# Patient Record
Sex: FEMALE | Race: WHITE | NOT HISPANIC OR LATINO | Employment: FULL TIME | URBAN - METROPOLITAN AREA
[De-identification: names, ages, dates, MRNs, and addresses within clinical notes are randomized per-mention and may not be internally consistent; named-entity substitution may affect disease eponyms.]

---

## 2017-01-23 ASSESSMENT — ENCOUNTER SYMPTOMS
CONSTIPATION: 1
BOWEL INCONTINENCE: 0
HEMATURIA: 0
BLOATING: 1
POLYDIPSIA: 0
WEIGHT GAIN: 0
RECTAL PAIN: 0
NIGHT SWEATS: 1
RECTAL BLEEDING: 0
ALTERED TEMPERATURE REGULATION: 1
WEIGHT LOSS: 0
NERVOUS/ANXIOUS: 1
FLANK PAIN: 0
ABDOMINAL PAIN: 1
FATIGUE: 1
BLOOD IN STOOL: 0
VOMITING: 0
DEPRESSION: 0
PANIC: 0
JAUNDICE: 0
DECREASED CONCENTRATION: 1
DYSURIA: 0
INSOMNIA: 1
FEVER: 0
HEARTBURN: 1
DIARRHEA: 0
DIFFICULTY URINATING: 0
DECREASED APPETITE: 0
HALLUCINATIONS: 0
POLYPHAGIA: 0
CHILLS: 0
INCREASED ENERGY: 0
NAUSEA: 0

## 2017-01-27 ENCOUNTER — OFFICE VISIT (OUTPATIENT)
Dept: FAMILY MEDICINE | Facility: CLINIC | Age: 22
End: 2017-01-27
Payer: COMMERCIAL

## 2017-01-27 VITALS
BODY MASS INDEX: 23.56 KG/M2 | TEMPERATURE: 97.7 F | DIASTOLIC BLOOD PRESSURE: 67 MMHG | SYSTOLIC BLOOD PRESSURE: 98 MMHG | WEIGHT: 141.4 LBS | HEIGHT: 65 IN | OXYGEN SATURATION: 100 % | HEART RATE: 55 BPM

## 2017-01-27 DIAGNOSIS — Z30.41 ENCOUNTER FOR BIRTH CONTROL PILLS MAINTENANCE: ICD-10-CM

## 2017-01-27 DIAGNOSIS — K59.00 CONSTIPATION, UNSPECIFIED CONSTIPATION TYPE: ICD-10-CM

## 2017-01-27 DIAGNOSIS — R10.11 ABDOMINAL PAIN, RIGHT UPPER QUADRANT: Primary | ICD-10-CM

## 2017-01-27 PROCEDURE — 99203 OFFICE O/P NEW LOW 30 MIN: CPT | Performed by: PHYSICIAN ASSISTANT

## 2017-01-27 RX ORDER — NORGESTIMATE AND ETHINYL ESTRADIOL 0.25-0.035
KIT ORAL
Refills: 3 | COMMUNITY
Start: 2017-01-09 | End: 2017-02-01

## 2017-01-27 RX ORDER — NORGESTIMATE AND ETHINYL ESTRADIOL 0.25-0.035
1 KIT ORAL DAILY
Qty: 84 TABLET | Refills: 3 | Status: SHIPPED | OUTPATIENT
Start: 2017-01-27 | End: 2018-01-29

## 2017-01-27 RX ORDER — IBUPROFEN 200 MG
1 CAPSULE ORAL DAILY
COMMUNITY
End: 2019-08-30

## 2017-01-27 NOTE — PROGRESS NOTES
"   SUBJECTIVE:     CC: Genesis Rivas is an 21 year old woman who presents for preventive health visit.     Physical  Annual:     Getting at least 3 servings of Calcium per day::  Yes    Bi-annual eye exam::  Yes    Dental care twice a year::  NO    Sleep apnea or symptoms of sleep apnea::  None    Diet::  Regular (no restrictions)    Frequency of exercise::  4-5 days/week    Duration of exercise::  30-45 minutes    Taking medications regularly::  Yes    Medication side effects::  Not applicable    Additional concerns today::  YES    {Outside tests to abstract? :215730}    {additional problems to add:627392}    Today's PHQ-2 Score:   PHQ-2 ( 1999 [employer]) 1/27/2017   Q1: Little interest or pleasure in doing things 0   Q2: Feeling down, depressed or hopeless 1   PHQ-2 Score 1   Little interest or pleasure in doing things -   Feeling down, depressed or hopeless -   PHQ-2 Score -       Abuse: Current or Past(Physical, Sexual or Emotional)- No  Do you feel safe in your environment -yes    Social History   Substance Use Topics     Smoking status: Never Smoker      Smokeless tobacco: Never Used     Alcohol Use: 0.0 oz/week     0 Standard drinks or equivalent per week     rare    No results for input(s): CHOL, HDL, LDL, TRIG, CHOLHDLRATIO, NHDL in the last 63104 hours.    Reviewed orders with patient.  Reviewed health maintenance and updated orders accordingly - {Yes/No:189867::\"Yes\"}    Mammo Decision Support:  {Mammo:365474}    Pertinent mammograms are reviewed under the imaging tab.  History of abnormal Pap smear: {PAP HX:584111}  All Histories reviewed and updated in Epic.  {HISTORY OPTIONS:058935}    ROS:  {FEMALE PREVENTATIVE ROS:621753}    {CHRONICPROBDATA:678751}  OBJECTIVE:     BP 98/67 mmHg  Pulse 55  Temp(Src) 97.7  F (36.5  C) (Oral)  Ht 5' 5\" (1.651 m)  Wt 141 lb 6.4 oz (64.139 kg)  BMI 23.53 kg/m2  SpO2 100%  LMP 01/09/2017 (Approximate)  Breastfeeding? No  EXAM:  {Exam " "Choices:386428}    ASSESSMENT/PLAN:     {Diag Picklist:350565}    COUNSELING:  {FEMALE COUNSELING MESSAGES:264160::\"Reviewed preventive health counseling, as reflected in patient instructions\"}    {Blood Pressure Screenin}     reports that she has never smoked. She has never used smokeless tobacco.  {Tobacco Cessation needed for ACO -- Delete if patient is a non-smoker:612679}  Estimated body mass index is 23.53 kg/(m^2) as calculated from the following:    Height as of this encounter: 5' 5\" (1.651 m).    Weight as of this encounter: 141 lb 6.4 oz (64.139 kg).   {Weight Management Plan needed for ACO:715988}    Counseling Resources:  ATP IV Guidelines  Pooled Cohorts Equation Calculator  Breast Cancer Risk Calculator  FRAX Risk Assessment  ICSI Preventive Guidelines  Dietary Guidelines for Americans,   USDA's MyPlate  ASA Prophylaxis  Lung CA Screening    Josette Chavez PA-C  [facility]    "

## 2017-01-27 NOTE — PROGRESS NOTES
SUBJECTIVE:                                                    Genesis Rivas is a 21 year old female who presents to clinic today for the following health issues:      Long history of constipation.  Was seen by GI in July 2016. This is when she started miralax 4 times daily and a fiber supplement to treat the constipation. She is drinking lots of water daily. She is feeling very gassy. Treatment is helping with the constipation, but she continues to feel bloated. She will have pain in her LLQ intermittently. Denies bloody and black tarry stools. She is having bowel movements most days. She does have to strain to pass her bowel movements. Bowel movements are softer. She has never had a colonoscopy. She has had an abdominal CT this fall, which showed she was full of gas and stool. Denies urinary and vaginal symptoms. She has had some urinary incontinence due to constipation. She has not tried any elimination diets.     She will have RUQ abdominal pain after having larger meals. She is concerned this may be due to her gallbladder. States an Ultrasound was unremarkable. She would like further workup.      She is requesting a refill of her birth control. She denies any side effects from the birth control. She has been on this birth control for 2-3 years.     She denies any other concerns.     Problem list and histories reviewed & adjusted, as indicated.  Additional history: as documented    There is no problem list on file for this patient.    History reviewed. No pertinent past surgical history.    Social History   Substance Use Topics     Smoking status: Never Smoker      Smokeless tobacco: Never Used     Alcohol Use: 0.0 oz/week     0 Standard drinks or equivalent per week     Family History   Problem Relation Age of Onset     DIABETES Mother      DIABETES Maternal Grandmother      Coronary Artery Disease Maternal Grandfather      CEREBROVASCULAR DISEASE Maternal Grandfather      Coronary Artery Disease  "Paternal Grandmother      Unknown/Adopted Paternal Grandfather          Current Outpatient Prescriptions   Medication Sig Dispense Refill     calcium citrate (CALCITRATE) 950 MG tablet Take 1 tablet by mouth daily       VITAMIN D, CHOLECALCIFEROL, PO Take 2,000 Units by mouth daily       norgestimate-ethinyl estradiol (ORTHO-CYCLEN, SPRINTEC) 0.25-35 MG-MCG per tablet Take 1 tablet by mouth daily 84 tablet 3     polyethylene glycol (MIRALAX) powder        Multiple Vitamins-Calcium (DAILY VITAMINS FOR WOMEN PO)        Fiber POWD        SPRINTEC 28 0.25-35 MG-MCG per tablet   3     Allergies   Allergen Reactions     Penicillins Rash     BP Readings from Last 3 Encounters:   01/27/17 98/67   11/09/16 100/60   07/19/16 115/77    Wt Readings from Last 3 Encounters:   01/27/17 141 lb 6.4 oz (64.139 kg)   11/09/16 139 lb 14.4 oz (63.458 kg)   07/19/16 140 lb 4.8 oz (63.64 kg)                  Problem list, Medication list, Allergies, and Medical/Social/Surgical histories reviewed in [facility] and updated as appropriate.    ROS:  Constitutional, HEENT, cardiovascular, pulmonary, gi and gu systems are negative, except as otherwise noted.    OBJECTIVE:                                                    BP 98/67 mmHg  Pulse 55  Temp(Src) 97.7  F (36.5  C) (Oral)  Ht 5' 5\" (1.651 m)  Wt 141 lb 6.4 oz (64.139 kg)  BMI 23.53 kg/m2  SpO2 100%  LMP 01/09/2017 (Approximate)  Breastfeeding? No  Body mass index is 23.53 kg/(m^2).  GENERAL: healthy, alert and no distress  RESP: lungs clear to auscultation - no rales, rhonchi or wheezes  CV: regular rate and rhythm, normal S1 S2, no S3 or S4, no murmur, click or rub, no peripheral edema and peripheral pulses strong  ABDOMEN: soft, RUQ tenderness, no guarding, no rebound, no hepatosplenomegaly, no masses and bowel sounds normal  MS: no gross musculoskeletal defects noted, no edema  SKIN: no suspicious lesions or rashes  BACK: no CVA tenderness, no paralumbar tenderness     "     ASSESSMENT/PLAN:                                                        ICD-10-CM    1. Abdominal pain, right upper quadrant R10.11 NM HepatOBiliary Scan w CCK   2. Encounter for birth control pills maintenance Z30.41 norgestimate-ethinyl estradiol (ORTHO-CYCLEN, SPRINTEC) 0.25-35 MG-MCG per tablet   3. Constipation, unspecified constipation type K59.00        Patient Instructions   Eat a gluten free diet and keep track of your symptoms for the next 4 weeks.    Stop the fiber supplement to see if it helps decrease you bloating.    Schedule your HIDA scan. If your scan shows problems with your gallbladder, I will refer you to gastroenterology at that time.     Return to the clinic as needed.           Josette Chavez PA-C  [facility]

## 2017-01-27 NOTE — NURSING NOTE
"Chief Complaint   Patient presents with     Physical       Initial BP 98/67 mmHg  Pulse 55  Temp(Src) 97.7  F (36.5  C) (Oral)  Ht 5' 5\" (1.651 m)  Wt 141 lb 6.4 oz (64.139 kg)  BMI 23.53 kg/m2  SpO2 100%  LMP 01/09/2017 (Approximate)  Breastfeeding? No Estimated body mass index is 23.53 kg/(m^2) as calculated from the following:    Height as of this encounter: 5' 5\" (1.651 m).    Weight as of this encounter: 141 lb 6.4 oz (64.139 kg).  BP completed using cuff size: jefe Larios CMA   4:36 PM  1/27/2017        "

## 2017-01-27 NOTE — MR AVS SNAPSHOT
After Visit Summary   1/27/2017    Genesis Rivas    MRN: [id number]           Patient Information     Date Of Birth          1995        Visit Information        Provider Department      1/27/2017 4:40 PM Josette Chavez PA-C [facility]        Today's Diagnoses     Abdominal pain, right upper quadrant    -  1     Encounter for birth control pills maintenance           Care Instructions    Eat a gluten free diet and keep track of your symptoms for the next 4 weeks.    Stop the fiber supplement to see if it helps decrease you bloating.    Schedule your HIDA scan. If your scan shows problems with your gallbladder, I will refer you to gastroenterology at that time.     Return to the clinic as needed.           Follow-ups after your visit        Your next 10 appointments already scheduled     Feb 01, 2017 11:40 AM   (Arrive by 11:25 AM)   Return Visit with Gayle Haro MD   [facility] ([facility])    [address] [place] [id number] [phone]            Apr 05, 2017  9:40 AM   (Arrive by 9:25 AM)   Return Visit with Gayle Haro MD   [facility] ([facility])    [address] [place] [id number]   [phone]              Future tests that were ordered for you today     Open Future Orders        Priority Expected Expires Ordered    NM HepatOBiliary Scan w CCK Routine  1/27/2018 1/27/2017            Who to contact     If you have questions or need follow up information about today's clinic visit or your schedule please contact [facility] directly at [phone].  Normal or non-critical lab and imaging results will be communicated to you by MyChart, letter or phone within 4 business days after the clinic has received the results. If you do not hear from us within 7 days, please  "contact the clinic through [employer] or phone. If you have a critical or abnormal lab result, we will notify you by phone as soon as possible.  Submit refill requests through [employer] or call your pharmacy and they will forward the refill request to us. Please allow 3 business days for your refill to be completed.          Additional Information About Your Visit        [employer] Information     [employer] gives you secure access to your electronic health record. If you see a primary care provider, you can also send messages to your care team and make appointments. If you have questions, please call your primary care clinic.  If you do not have a primary care provider, please call [phone] and they will assist you.        Care EveryWhere ID     This is your Care EveryWhere ID. This could be used by other organizations to access your [place] medical records  [id number]        Your Vitals Were     Pulse Temperature Height    55 97.7  F (36.5  C) (Oral) 5' 5\" (1.651 m)    BMI (Body Mass Index) Pulse Oximetry Last Period    23.53 kg/m2 100% 01/09/2017 (Approximate)    Breastfeeding?          No         Blood Pressure from Last 3 Encounters:   01/27/17 98/67   11/09/16 100/60   07/19/16 115/77    Weight from Last 3 Encounters:   01/27/17 141 lb 6.4 oz (64.139 kg)   11/09/16 139 lb 14.4 oz (63.458 kg)   07/19/16 140 lb 4.8 oz (63.64 kg)                 Today's Medication Changes          These changes are accurate as of: 1/27/17  5:11 PM.  If you have any questions, ask your nurse or doctor.               These medicines have changed or have updated prescriptions.        Dose/Directions    * SPRINTEC 28 0.25-35 MG-MCG per tablet   This may have changed:  Another medication with the same name was added. Make sure you understand how and when to take each.   Generic drug:  norgestimate-ethinyl estradiol   Changed by:  Josette Chavez PA-C        Refills:  3       * norgestimate-ethinyl estradiol 0.25-35 MG-MCG per " tablet   Commonly known as:  ORTHO-CYCLEN, SPRINTEC   This may have changed:  You were already taking a medication with the same name, and this prescription was added. Make sure you understand how and when to take each.   Used for:  Encounter for birth control pills maintenance   Changed by:  Josette Chavez PA-C        Dose:  1 tablet   Take 1 tablet by mouth daily   Quantity:  84 tablet   Refills:  3       * Notice:  This list has 2 medication(s) that are the same as other medications prescribed for you. Read the directions carefully, and ask your doctor or other care provider to review them with you.         Where to get your medicines      These medications were sent to [place] [id number]     Phone:  [phone]    - norgestimate-ethinyl estradiol 0.25-35 MG-MCG per tablet             Primary Care Provider Office Phone # Fax #    Gwendolyn Burns [phone]       [place] [id number]        Thank you!     Thank you for choosing [facility]  for your care. Our goal is always to provide you with excellent care. Hearing back from our patients is one way we can continue to improve our services. Please take a few minutes to complete the written survey that you may receive in the mail after your visit with us. Thank you!             Your Updated Medication List - Protect others around you: Learn how to safely use, store and throw away your medicines at www.disposemymeds.org.          This list is accurate as of: 1/27/17  5:11 PM.  Always use your most recent med list.                   Brand Name Dispense Instructions for use    calcium citrate 950 MG tablet    CALCITRATE     Take 1 tablet by mouth daily       DAILY VITAMINS FOR WOMEN PO          Fiber Powd          MIRALAX powder   Generic drug:  polyethylene glycol          * SPRINTEC 28 0.25-35  MG-MCG per tablet   Generic drug:  norgestimate-ethinyl estradiol          * norgestimate-ethinyl estradiol 0.25-35 MG-MCG per tablet    ORTHO-CYCLEN, SPRINTEC    84 tablet    Take 1 tablet by mouth daily       VITAMIN D (CHOLECALCIFEROL) PO      Take 2,000 Units by mouth daily       * Notice:  This list has 2 medication(s) that are the same as other medications prescribed for you. Read the directions carefully, and ask your doctor or other care provider to review them with you.

## 2017-01-27 NOTE — PATIENT INSTRUCTIONS
Eat a gluten free diet and keep track of your symptoms for the next 4 weeks.    Stop the fiber supplement to see if it helps decrease you bloating.    Schedule your HIDA scan. If your scan shows problems with your gallbladder, I will refer you to gastroenterology at that time.     Return to the clinic as needed.

## 2017-02-01 ENCOUNTER — OFFICE VISIT (OUTPATIENT)
Dept: GASTROENTEROLOGY | Facility: CLINIC | Age: 22
End: 2017-02-01

## 2017-02-01 VITALS
DIASTOLIC BLOOD PRESSURE: 74 MMHG | BODY MASS INDEX: 23.49 KG/M2 | WEIGHT: 141 LBS | HEART RATE: 56 BPM | OXYGEN SATURATION: 100 % | HEIGHT: 65 IN | SYSTOLIC BLOOD PRESSURE: 108 MMHG

## 2017-02-01 DIAGNOSIS — R10.11 ABDOMINAL PAIN, RIGHT UPPER QUADRANT: Primary | ICD-10-CM

## 2017-02-01 DIAGNOSIS — K59.00 CONSTIPATION, UNSPECIFIED CONSTIPATION TYPE: ICD-10-CM

## 2017-02-01 DIAGNOSIS — R10.11 ABDOMINAL PAIN, RIGHT UPPER QUADRANT: ICD-10-CM

## 2017-02-01 ASSESSMENT — PAIN SCALES - GENERAL: PAINLEVEL: NO PAIN (0)

## 2017-02-01 NOTE — PROGRESS NOTES
GI CLINIC VISIT    CC: follow-up constipation      ASSESSMENT/PLAN:   (R10.11) Abdominal pain, right upper quadrant  (primary encounter diagnosis)  Comment:    Ms. Rivas describes this as a separate discomfort (more sharp, RUQ) than her constipation-related pain (LLQ, more pressure/fullness) and in her mind does not correlate with more significant periods of constipation. Per history reliably occurs after very large meals and is associated with nausea and vomiting, but no other symptoms noted.     CT and US without acute cause. Labs at time of pain, including LFTs were normal. Does not appear amylase and lipase were sent, though no overt CT evidence of acute pancreatitis.    Certainly gastric/luminal over-distention in the setting of an already packed lumen (due to chronic constipation) could lead to acute worsening of pain though stool burden seen on CT is chronic and may be unrelated. Pursuit of HIDA donahue planned and would be very reasonable.    Celiac disease an unlikely contributor to acute, pronounced pain - though noted patient concern. Will send serologies.  Plan:   -IgA, Tissue transglutaminase citlalli IgA and IgG,   -noted pending HIDA scan          (K59.00) Constipation, unspecified constipation type  Comment:    Chronic constipation since early childhood with essentially normal anorectal manometry, EMG, and defecography. Fortunately, Hirschsprung's felt to be unlikely based on these evaluations.    Pt initially with good response to Miralax, now requiring large doses. With waning response to laxatives would pursue additional evaluation with Sitz marker study to assess colonic transit as well as a colonoscopy. (Time Sitz marker test prior to colonoscopy, then do bowel-cleanout).   Plan:   - Sitz marker study (instructions reviewed)  - colonoscopy with a two-day colon prep with [facility].   - in the meantime continue your Miralax  - ok for Mg citrate if 3 days without a BM   - continue high fiber diet with up  to 8 glasses of water daily  - ok to continue your caffeine.   - check with your parents about colon polyp history      RTC follow-up after above testing, ~ 3-4 months    It was a pleasure to participate in the care of this patient; please contact us with any further questions.  A total of 30 minutes was spent with this patient, >50% of which was counseling regarding the above delineated issues.    Gayle Haro MD   of Medicine  Division of Gastroenterology, Hepatology and Nutrition  [facility]    ---------------------------------------------------------------------------------------------------  HPI:  Ms. Rivas is a previously healthy 21 year old female who was initially seen in consultation with Jordan Du and Fabien of GI in July 2016 for constipation. She was initially seen by this writer in Nov 2016 and returns today for follow-up.     Summarized per my prior documentation:   Ms. Rivas reports issues with constipation since early childhood but with no regular laxatives or daily medications as a child nor need for disimpactions, enemas, nor hospitalizations. She used OTC laxatives as needed through late childhood and her stools. She had two urgent care visits during these years for constipation-related abdominal pain.     Her constipation apparently worsened in her early twenties, occurring once every 1-2 months. She described going up to 2 weeks without a satisfactory BM, (she may pass some small,[place] 1 stools with straining during this time). These periods are associated with significant bloating and LLQ pain which resolve after defecation.  Ms. Rivas denies rectal fullness, rectal pain/discomfort, noted tissue prolapse, or known hemorrhoids. In the past she has had drops of blood on toilet paper. She previously tried prunes/prune juice, OTC Mg citrate, an unknown laxative pill prn with minimal effect. Her Avi COSTA put her on daily Miralax which initially  "was helpful, but its effect waned.  She reports a high vegetable and fruit intake, at least 70 oz of water daily, and daily physical activity (running) which has led to an intentional 80 lbs weight loss.  Her prior work-up included a normal TSH, calcium, CMP, and hgb. She has not undergone any prior colonoscopy, anorectal manometry, or abdominal imaging.     At her initial GI consultation with Dr. Du, she was started on Citrucel 1 scoopful daily and Miralax BID. This eventually was increased to TID due to loss of response. She continued her one cup of coffee daily (no other caffeinated beverages). With this regimen she was stooling about ever other day/4 times a week, [place] 4 -5 stools with intermittent straining (typically on the days she does not drink coffee). At her last visit with increased her Citrucel dosing, continued her Miralax and referred her for anorectal manometry and defecography (she'd denied rectal symptoms but did endorse urinary leakage with standing (but not with daily running, coughing).       Since her last visit, [facility] on 11/22/2016 by Dr. Kailey Acevedo. Her testing was essentially normal with no evidence of Hirschsprung's, only mild perineal descent was noted.        In regards to her constipation she is using Miralax three to four times daily. She was taking Citrucel only on 1 tablespoon a day, though this was discontinued by her PCP due to concern for \"gas\". With this regimen, Ms. Rivas has one large, soft, BM every morning and typically three smaller BMs spread throughout the day. There is occasionally straining with first BM. She continues to endorse ongoing abdominal fullness and bloating even with more regular bowel movements. She reports frequent flatus.       Unfortunately, Ms. Rivas states she had a period of pronounced RUQ pain on Thanksgiving after eating. Pepto-Bismol, Tums, lying down, and stretching were all attempted to " "address the pain but were unsuccessful. She was seen in the [facility] ED and a CT scan and abdominal US were done. No acute cause of her discomfort was identified; she had no cholelithiais, nor GB sludge.Ms. Rivas feels this was not related to her constipation as the characteristics of the pain were different in her mind. She states the CT revealed a large amount of stool and so she was told she had \"constipation\" and discharged to home. Per her recollection, this is the 4th or 5th time this pain has occurred in the last two years. She notes that the pain episodes always occurs after a big, heavy meal (she lists Thanksgiving, Xmas, and a birthday dinner as times of prior episodes). She states that she will often have nausea and vomiting during the pain episodes but denies fevers/chills. She has no other associated GI symptoms and has not noted a correlation with her stooling pattern. She has been following with her PCP for this issue and a HIDA scan is ordered for later this week.       ROS:    Please see bottom of this note.    PROBLEM LIST  There are no active problems to display for this patient.      PERTINENT MEDICATIONS:  Current Outpatient Prescriptions   Medication     calcium citrate (CALCITRATE) 950 MG tablet     VITAMIN D, CHOLECALCIFEROL, PO     norgestimate-ethinyl estradiol (ORTHO-CYCLEN, SPRINTEC) 0.25-35 MG-MCG per tablet     Multiple Vitamins-Calcium (DAILY VITAMINS FOR WOMEN PO)     Fiber POWD     [DISCONTINUED] SPRINTEC 28 0.25-35 MG-MCG per tablet     polyethylene glycol (MIRALAX) powder     No current facility-administered medications for this visit.         PHYSICAL EXAMINATION:  Vitals /74 mmHg  Pulse 56  Ht 1.651 m (5' 5\")  Wt 63.957 kg (141 lb)  BMI 23.46 kg/m2  SpO2 100%  LMP 01/09/2017 (Approximate)   Wt   Wt Readings from Last 2 Encounters:   02/01/17 63.957 kg (141 lb)   01/27/17 64.139 kg (141 lb 6.4 oz)   Gen: Pt sitting up in NAD, interactive and cooperative on exam  Eyes: " sclerae anicteric, no injection  ENT:   MMM  GI: Normoactive BS, abd soft, flat, nontender  Skin: Warm, dry, no jaundice, nails appear healthy  Neuro: alert, oriented, answers questions appropriately      PERTINENT STUDIES:    Orders Only on 07/19/2016   Component Date Value Ref Range Status     Sodium 07/19/2016 140  133 - 144 mmol/L Final     Potassium 07/19/2016 4.1  3.4 - 5.3 mmol/L Final     Chloride 07/19/2016 108  94 - 109 mmol/L Final     Carbon Dioxide 07/19/2016 29  20 - 32 mmol/L Final     Anion Gap 07/19/2016 2* 3 - 14 mmol/L Final     Glucose 07/19/2016 68* 70 - 99 mg/dL Final     Urea Nitrogen 07/19/2016 13  7 - 30 mg/dL Final     Creatinine 07/19/2016 0.70  0.52 - 1.04 mg/dL Final     GFR Estimate 07/19/2016   >60 mL/min/1.7m2 Final                    Value:>90  Non  GFR Calc       GFR Estimate If Black 07/19/2016   >60 mL/min/1.7m2 Final                    Value:>90   GFR Calc       Calcium 07/19/2016 9.0  8.5 - 10.1 mg/dL Final     Bilirubin Direct 07/19/2016 0.2  0.0 - 0.2 mg/dL Final     Bilirubin Total 07/19/2016 0.5  0.2 - 1.3 mg/dL Final     Albumin 07/19/2016 3.4  3.4 - 5.0 g/dL Final     Protein Total 07/19/2016 6.8  6.8 - 8.8 g/dL Final     Alkaline Phosphatase 07/19/2016 46  40 - 150 U/L Final     ALT 07/19/2016 33  0 - 50 U/L Final     AST 07/19/2016 27  0 - 45 U/L Final     Hemoglobin 07/19/2016 13.5  11.7 - 15.7 g/dL Final           Answers for HPI/ROS submitted by the patient on 1/23/2017   General Symptoms: Yes  Skin Symptoms: No  HENT Symptoms: No  EYE SYMPTOMS: No  HEART SYMPTOMS: No  LUNG SYMPTOMS: No  INTESTINAL SYMPTOMS: Yes  URINARY SYMPTOMS: Yes  GYNECOLOGIC SYMPTOMS: No  BREAST SYMPTOMS: No  SKELETAL SYMPTOMS: No  BLOOD SYMPTOMS: No  NERVOUS SYSTEM SYMPTOMS: No  MENTAL HEALTH SYMPTOMS: Yes  Fever: No  Loss of appetite: No  Weight loss: No  Weight gain: No  Fatigue: Yes  Night sweats: Yes  Chills: No  Increased stress: Yes  Excessive hunger:  No  Excessive thirst: No  Feeling hot or cold when others believe the temperature is normal: Yes  Loss of height: No  Post-operative complications: No  Surgical site pain: No  Hallucinations: No  Change in or Loss of Energy: No  Hyperactivity: No  Confusion: No  Heart burn or indigestion: Yes  Nausea: No  Vomiting: No  Abdominal pain: Yes  Bloating: Yes  Constipation: Yes  Diarrhea: No  Blood in stool: No  Black stools: No  Rectal or Anal pain: No  Fecal incontinence: No  Rectal bleeding: No  Yellowing of skin or eyes: No  Vomit with blood: No  Change in stools: Yes  Hemorrhoids: No  Trouble holding urine or incontinence: Yes  Pain or burning: No  Trouble starting or stopping: No  Increased frequency of urination: No  Blood in urine: No  Decreased frequency of urination: No  Frequent nighttime urination: No  Flank pain: No  Difficulty emptying bladder: No  Nervous or Anxious: Yes  Depression: No  Trouble sleeping: Yes  Trouble thinking or concentrating: Yes  Mood changes: Yes  Panic attacks: No

## 2017-02-01 NOTE — NURSING NOTE
"Chief Complaint   Patient presents with     RECHECK     abd pain.       Filed Vitals:    02/01/17 1141   BP: 108/74   Pulse: 56   Height: 1.651 m (5' 5\")   Weight: 63.957 kg (141 lb)   SpO2: 100%       Body mass index is 23.46 kg/(m^2).                           "

## 2017-02-01 NOTE — LETTER
2/1/2017       RE: Genesis Rivas  [address]  [place] [address]     Dear Colleague,    Thank you for referring your patient, Genesis Rivas, to the [facility] GASTROENTEROLOGY AND IBD at [facility]. Please see a copy of my visit note below.    GI CLINIC VISIT    CC: follow-up constipation      ASSESSMENT/PLAN:   (R10.11) Abdominal pain, right upper quadrant  (primary encounter diagnosis)  Comment:    Ms. Rivas describes this as a separate discomfort (more sharp, RUQ) than her constipation-related pain (LLQ, more pressure/fullness) and in her mind does not correlate with more significant periods of constipation. Per history reliably occurs after very large meals and is associated with nausea and vomiting, but no other symptoms noted.     CT and US without acute cause. Labs at time of pain, including LFTs were normal. Does not appear amylase and lipase were sent, though no overt CT evidence of acute pancreatitis.    Certainly gastric/luminal over-distention in the setting of an already packed lumen (due to chronic constipation) could lead to acute worsening of pain though stool burden seen on CT is chronic and may be unrelated. Pursuit of HIDA donahue planned and would be very reasonable.    Celiac disease an unlikely contributor to acute, pronounced pain - though noted patient concern. Will send serologies.  Plan:   -IgA, Tissue transglutaminase citlalli IgA and IgG,   -noted pending HIDA scan          (K59.00) Constipation, unspecified constipation type  Comment:    Chronic constipation since early childhood with essentially normal anorectal manometry, EMG, and defecography. Fortunately, Hirschsprung's felt to be unlikely based on these evaluations.    Pt initially with good response to Miralax, now requiring large doses. With waning response to laxatives would pursue additional evaluation with Sitz marker study to assess colonic transit as well as a  colonoscopy. (Time Sitz marker test prior to colonoscopy, then do bowel-cleanout).   Plan:   - Sitz marker study (instructions reviewed)  - colonoscopy with a two-day colon prep with [facility].   - in the meantime continue your Miralax  - ok for Mg citrate if 3 days without a BM   - continue high fiber diet with up to 8 glasses of water daily  - ok to continue your caffeine.   - check with your parents about colon polyp history      RTC follow-up after above testing, ~ 3-4 months    It was a pleasure to participate in the care of this patient; please contact us with any further questions.  A total of 30 minutes was spent with this patient, >50% of which was counseling regarding the above delineated issues.    Gayle Haro MD   of Medicine  Division of Gastroenterology, Hepatology and Nutrition  [facility]    ---------------------------------------------------------------------------------------------------  HPI:  Ms. Rivas is a previously healthy 21 year old female who was initially seen in consultation with Jordan Du and Fabien of GI in July 2016 for constipation. She was initially seen by this writer in Nov 2016 and returns today for follow-up.     Summarized per my prior documentation:   Ms. Rivas reports issues with constipation since early childhood but with no regular laxatives or daily medications as a child nor need for disimpactions, enemas, nor hospitalizations. She used OTC laxatives as needed through late childhood and her stools. She had two urgent care visits during these years for constipation-related abdominal pain.     Her constipation apparently worsened in her early twenties, occurring once every 1-2 months. She described going up to 2 weeks without a satisfactory BM, (she may pass some small,[place] 1 stools with straining during this time). These periods are associated with significant bloating and LLQ pain which resolve after defecation.  Ms.  "Rob denies rectal fullness, rectal pain/discomfort, noted tissue prolapse, or known hemorrhoids. In the past she has had drops of blood on toilet paper. She previously tried prunes/prune juice, OTC Mg citrate, an unknown laxative pill prn with minimal effect. Her Avi MD put her on daily Miralax which initially was helpful, but its effect waned.  She reports a high vegetable and fruit intake, at least 70 oz of water daily, and daily physical activity (running) which has led to an intentional 80 lbs weight loss.  Her prior work-up included a normal TSH, calcium, CMP, and hgb. She has not undergone any prior colonoscopy, anorectal manometry, or abdominal imaging.     At her initial GI consultation with Dr. Du, she was started on Citrucel 1 scoopful daily and Miralax BID. This eventually was increased to TID due to loss of response. She continued her one cup of coffee daily (no other caffeinated beverages). With this regimen she was stooling about ever other day/4 times a week, [place] 4 -5 stools with intermittent straining (typically on the days she does not drink coffee). At her last visit with increased her Citrucel dosing, continued her Miralax and referred her for anorectal manometry and defecography (she'd denied rectal symptoms but did endorse urinary leakage with standing (but not with daily running, coughing).       Since her last visit, [facility] on 11/22/2016 by Dr. Kailey Acevedo. Her testing was essentially normal with no evidence of Hirschsprung's, only mild perineal descent was noted.        In regards to her constipation she is using Miralax three to four times daily. She was taking Citrucel only on 1 tablespoon a day, though this was discontinued by her PCP due to concern for \"gas\". With this regimen, Ms. Rivas has one large, soft, BM every morning and typically three smaller BMs spread throughout the day. There is occasionally straining with first " "BM. She continues to endorse ongoing abdominal fullness and bloating even with more regular bowel movements. She reports frequent flatus.       Unfortunately, Ms. Rivas states she had a period of pronounced RUQ pain on Thanksgiving after eating. Pepto-Bismol, Tums, lying down, and stretching were all attempted to address the pain but were unsuccessful. She was seen in the [facility] ED and a CT scan and abdominal US were done. No acute cause of her discomfort was identified; she had no cholelithiais, nor GB sludge.Ms. Rivas feels this was not related to her constipation as the characteristics of the pain were different in her mind. She states the CT revealed a large amount of stool and so she was told she had \"constipation\" and discharged to home. Per her recollection, this is the 4th or 5th time this pain has occurred in the last two years. She notes that the pain episodes always occurs after a big, heavy meal (she lists Thanksgiving, Xmas, and a birthday dinner as times of prior episodes). She states that she will often have nausea and vomiting during the pain episodes but denies fevers/chills. She has no other associated GI symptoms and has not noted a correlation with her stooling pattern. She has been following with her PCP for this issue and a HIDA scan is ordered for later this week.       ROS:    Please see bottom of this note.    PROBLEM LIST  There are no active problems to display for this patient.      PERTINENT MEDICATIONS:  Current Outpatient Prescriptions   Medication     calcium citrate (CALCITRATE) 950 MG tablet     VITAMIN D, CHOLECALCIFEROL, PO     norgestimate-ethinyl estradiol (ORTHO-CYCLEN, SPRINTEC) 0.25-35 MG-MCG per tablet     Multiple Vitamins-Calcium (DAILY VITAMINS FOR WOMEN PO)     Fiber POWD     [DISCONTINUED] SPRINTEC 28 0.25-35 MG-MCG per tablet     polyethylene glycol (MIRALAX) powder     No current facility-administered medications for this visit.         PHYSICAL " "EXAMINATION:  Vitals /74 mmHg  Pulse 56  Ht 1.651 m (5' 5\")  Wt 63.957 kg (141 lb)  BMI 23.46 kg/m2  SpO2 100%  LMP 01/09/2017 (Approximate)   Wt   Wt Readings from Last 2 Encounters:   02/01/17 63.957 kg (141 lb)   01/27/17 64.139 kg (141 lb 6.4 oz)   Gen: Pt sitting up in NAD, interactive and cooperative on exam  Eyes: sclerae anicteric, no injection  ENT:   MMM  GI: Normoactive BS, abd soft, flat, nontender  Skin: Warm, dry, no jaundice, nails appear healthy  Neuro: alert, oriented, answers questions appropriately      PERTINENT STUDIES:    Orders Only on 07/19/2016   Component Date Value Ref Range Status     Sodium 07/19/2016 140  133 - 144 mmol/L Final     Potassium 07/19/2016 4.1  3.4 - 5.3 mmol/L Final     Chloride 07/19/2016 108  94 - 109 mmol/L Final     Carbon Dioxide 07/19/2016 29  20 - 32 mmol/L Final     Anion Gap 07/19/2016 2* 3 - 14 mmol/L Final     Glucose 07/19/2016 68* 70 - 99 mg/dL Final     Urea Nitrogen 07/19/2016 13  7 - 30 mg/dL Final     Creatinine 07/19/2016 0.70  0.52 - 1.04 mg/dL Final     GFR Estimate 07/19/2016   >60 mL/min/1.7m2 Final                    Value:>90  Non  GFR Calc       GFR Estimate If Black 07/19/2016   >60 mL/min/1.7m2 Final                    Value:>90   GFR Calc       Calcium 07/19/2016 9.0  8.5 - 10.1 mg/dL Final     Bilirubin Direct 07/19/2016 0.2  0.0 - 0.2 mg/dL Final     Bilirubin Total 07/19/2016 0.5  0.2 - 1.3 mg/dL Final     Albumin 07/19/2016 3.4  3.4 - 5.0 g/dL Final     Protein Total 07/19/2016 6.8  6.8 - 8.8 g/dL Final     Alkaline Phosphatase 07/19/2016 46  40 - 150 U/L Final     ALT 07/19/2016 33  0 - 50 U/L Final     AST 07/19/2016 27  0 - 45 U/L Final     Hemoglobin 07/19/2016 13.5  11.7 - 15.7 g/dL Final           Answers for HPI/ROS submitted by the patient on 1/23/2017   General Symptoms: Yes  Skin Symptoms: No  HENT Symptoms: No  EYE SYMPTOMS: No  HEART SYMPTOMS: No  LUNG SYMPTOMS: No  INTESTINAL SYMPTOMS: " Yes  URINARY SYMPTOMS: Yes  GYNECOLOGIC SYMPTOMS: No  BREAST SYMPTOMS: No  SKELETAL SYMPTOMS: No  BLOOD SYMPTOMS: No  NERVOUS SYSTEM SYMPTOMS: No  MENTAL HEALTH SYMPTOMS: Yes  Fever: No  Loss of appetite: No  Weight loss: No  Weight gain: No  Fatigue: Yes  Night sweats: Yes  Chills: No  Increased stress: Yes  Excessive hunger: No  Excessive thirst: No  Feeling hot or cold when others believe the temperature is normal: Yes  Loss of height: No  Post-operative complications: No  Surgical site pain: No  Hallucinations: No  Change in or Loss of Energy: No  Hyperactivity: No  Confusion: No  Heart burn or indigestion: Yes  Nausea: No  Vomiting: No  Abdominal pain: Yes  Bloating: Yes  Constipation: Yes  Diarrhea: No  Blood in stool: No  Black stools: No  Rectal or Anal pain: No  Fecal incontinence: No  Rectal bleeding: No  Yellowing of skin or eyes: No  Vomit with blood: No  Change in stools: Yes  Hemorrhoids: No  Trouble holding urine or incontinence: Yes  Pain or burning: No  Trouble starting or stopping: No  Increased frequency of urination: No  Blood in urine: No  Decreased frequency of urination: No  Frequent nighttime urination: No  Flank pain: No  Difficulty emptying bladder: No  Nervous or Anxious: Yes  Depression: No  Trouble sleeping: Yes  Trouble thinking or concentrating: Yes  Mood changes: Yes  Panic attacks: No        Again, thank you for allowing me to participate in the care of your patient.      Sincerely,    Gayle Haro MD

## 2017-02-01 NOTE — MR AVS SNAPSHOT
After Visit Summary   2/1/2017    Genesis Rivas    MRN: [id number]           Patient Information     Date Of Birth          1995        Visit Information        Provider Department      2/1/2017 11:40 AM Gayle Haro MD [facility] Gastroenterology and IBD        Today's Diagnoses     Abdominal pain, right upper quadrant    -  1     Constipation, unspecified constipation type           Care Instructions    1. Stop at the lab for a blood draw.  2. Schedule a colonoscopy.   3. Recommend a two-day colon prep with Go-lytely.   4. Just prior to your colon prep get your abdominal X-ray for the Sitz marker study done.  5. Take your Sitz marker pills on Day 0, have your abdominal X-ray done on Day 5.  6. Stop your laxatives two days prior to taking the Sitz markers.  7. Continue your Miralax in the meantime.   8. A high fiber diet with plenty of fluids (up to 8 glasses of water daily) is suggested to relieve these symptoms.   9. Ok to continue your caffeine.   10. Check with your parents about what types of polyps they had.   If you have any questions, please don't hesitate to contact our GI RN Clinic Coordinators at [phone] (select option #3 for nurse line).             Follow-ups after your visit        Your next 10 appointments already scheduled     Feb 03, 2017  8:45 AM   NM HEPATOBILIARY SCAN W GB EF with [address], [place], Nuclear Medicine ([facility], [place])    [address] [address] [phone]           Please bring a list of your medicines to the exam. (Include vitamins, minerals and over-the-counter drugs.) You should wear comfortable clothes. Leave your valuables at home. Please bring related prior results and films.  Tell your doctor:   If you are breastfeeding or may be pregnant.   If you have had a barium test within the past few days. Barium may change the results of certain exams.   If you  think you may need sedation (medicine to help you relax).  No food or drink (including water) for 4 hours prior to the exam.  No morphine or morphine derivatives for 6 hours prior to the exam.  Please call your Imaging Department at your exam site with any questions.            Apr 05, 2017  9:40 AM   (Arrive by 9:25 AM)   Return Visit with Gayle Haro MD   [facility] G[facility]enterology and IBD ([facility] [place])    [address]  [address] [facility] [address] [phone]              Future tests that were ordered for you today     Open Future Orders        Priority Expected Expires Ordered    X-ray Abdomen 1 vw Routine 2/11/2017 3/18/2017 2/1/2017    IgA Routine  4/1/2017 2/1/2017    Tissue transglutaminase citlalli IgA and IgG Routine  4/1/2017 2/1/2017            Who to contact     Please call your clinic at [phone] to:    Ask questions about your health    Make or cancel appointments    Discuss your medicines    Learn about your test results    Speak to your doctor   If you have compliments or concerns about an experience at your clinic, or if you wish to file a complaint, please contact [facility] Patient Relations at [phone] or email us at [email or web address]         Additional Information About Your Visit        [employer] Information     [employer] gives you secure access to your electronic health record. If you see a primary care provider, you can also send messages to your care team and make appointments. If you have questions, please call your primary care clinic.  If you do not have a primary care provider, please call [phone] and they will assist you.      [employer] is an electronic gateway that provides easy, online access to your medical records. With [employer], you can request a clinic appointment, read your test results, renew a prescription or communicate with your care team.     To access your existing  "account, please contact your [facility] or call [phone] for assistance.        Care EveryWhere ID     This is your Care EveryWhere ID. This could be used by other organizations to access your [place] medical records  [id number]        Your Vitals Were     Pulse Height BMI (Body Mass Index) Pulse Oximetry Last Period       56 1.651 m (5' 5\") 23.46 kg/m2 100% 01/09/2017 (Approximate)        Blood Pressure from Last 3 Encounters:   02/01/17 108/74   01/27/17 98/67   11/09/16 100/60    Weight from Last 3 Encounters:   02/01/17 63.957 kg (141 lb)   01/27/17 64.139 kg (141 lb 6.4 oz)   11/09/16 63.458 kg (139 lb 14.4 oz)               Primary Care Provider Office Phone # Fax #    Gwendolyn Burns [phone]       [place] [address]        Thank you!     Thank you for choosing [facility] GASTROENTEROLOGY AND IBD  for your care. Our goal is always to provide you with excellent care. Hearing back from our patients is one way we can continue to improve our services. Please take a few minutes to complete the written survey that you may receive in the mail after your visit with us. Thank you!             Your Updated Medication List - Protect others around you: Learn how to safely use, store and throw away your medicines at www.disposemymeds.org.          This list is accurate as of: 2/1/17 12:37 PM.  Always use your most recent med list.                   Brand Name Dispense Instructions for use    calcium citrate 950 MG tablet    CALCITRATE     Take 1 tablet by mouth daily       DAILY VITAMINS FOR WOMEN PO          Fiber Powd          MIRALAX powder   Generic drug:  polyethylene glycol      2 capfuls daily       norgestimate-ethinyl estradiol 0.25-35 MG-MCG per tablet    ORTHO-CYCLEN, SPRINTEC    84 tablet    Take 1 tablet by mouth daily       VITAMIN D (CHOLECALCIFEROL) PO      Take 2,000 Units by mouth daily         "

## 2017-02-01 NOTE — PATIENT INSTRUCTIONS
1. Stop at the lab for a blood draw.  2. Schedule a colonoscopy.   3. Recommend a two-day colon prep with Go-lytely.   4. Just prior to your colon prep get your abdominal X-ray for the Sitz marker study done.  5. Take your Sitz marker pills on Day 0, have your abdominal X-ray done on Day 5.  6. Stop your laxatives two days prior to taking the Sitz markers.  7. Continue your Miralax in the meantime.   8. A high fiber diet with plenty of fluids (up to 8 glasses of water daily) is suggested to relieve these symptoms.   9. Ok to continue your caffeine.   10. Check with your parents about what types of polyps they had.   If you have any questions, please don't hesitate to contact our GI RN Clinic Coordinators at [phone] (select option #3 for nurse line).

## 2017-02-02 LAB — IGA SERPL-MCNC: 136 MG/DL (ref 70–380)

## 2017-02-03 ENCOUNTER — MYC MEDICAL ADVICE (OUTPATIENT)
Dept: FAMILY MEDICINE | Facility: CLINIC | Age: 22
End: 2017-02-03

## 2017-02-03 ENCOUNTER — HOSPITAL ENCOUNTER (OUTPATIENT)
Dept: NUCLEAR MEDICINE | Facility: CLINIC | Age: 22
Setting detail: NUCLEAR MEDICINE
Discharge: HOME | End: 2017-02-03
Attending: PHYSICIAN ASSISTANT | Admitting: PHYSICIAN ASSISTANT
Payer: COMMERCIAL

## 2017-02-03 DIAGNOSIS — R10.11 ABDOMINAL PAIN, RIGHT UPPER QUADRANT: ICD-10-CM

## 2017-02-03 DIAGNOSIS — K59.00 CONSTIPATION, UNSPECIFIED CONSTIPATION TYPE: Primary | ICD-10-CM

## 2017-02-03 DIAGNOSIS — R10.84 ABDOMINAL PAIN, GENERALIZED: ICD-10-CM

## 2017-02-03 LAB
TTG IGA SER-ACNC: NORMAL U/ML
TTG IGG SER-ACNC: NORMAL U/ML

## 2017-02-03 PROCEDURE — 34300033 ZZH RX 343: Performed by: PHYSICIAN ASSISTANT

## 2017-02-03 PROCEDURE — 78227 HEPATOBIL SYST IMAGE W/DRUG: CPT

## 2017-02-03 PROCEDURE — 25000125 ZZHC RX 250: Performed by: PHYSICIAN ASSISTANT

## 2017-02-03 PROCEDURE — A9537 TC99M MEBROFENIN: HCPCS | Performed by: PHYSICIAN ASSISTANT

## 2017-02-03 RX ORDER — KIT FOR THE PREPARATION OF TECHNETIUM TC 99M MEBROFENIN 45 MG/10ML
5-7 INJECTION, POWDER, LYOPHILIZED, FOR SOLUTION INTRAVENOUS ONCE
Status: COMPLETED | OUTPATIENT
Start: 2017-02-03 | End: 2017-02-03

## 2017-02-03 RX ADMIN — MEBROFENIN 5.2 MILLICURIE: 45 INJECTION, POWDER, LYOPHILIZED, FOR SOLUTION INTRAVENOUS at 08:15

## 2017-02-03 RX ADMIN — SINCALIDE 1.3 MCG: 5 INJECTION, POWDER, LYOPHILIZED, FOR SOLUTION INTRAVENOUS at 09:24

## 2017-02-09 ENCOUNTER — MYC MEDICAL ADVICE (OUTPATIENT)
Dept: FAMILY MEDICINE | Facility: CLINIC | Age: 22
End: 2017-02-09

## 2017-02-09 DIAGNOSIS — F43.9 STRESS: Primary | ICD-10-CM

## 2017-02-09 NOTE — TELEPHONE ENCOUNTER
Patient wondering who she can contact to get set up with a therapist/mental health provider to help her with her stress and self-image issues.    Would you like to suggest [facility]? [phone]

## 2017-03-05 ENCOUNTER — MYC MEDICAL ADVICE (OUTPATIENT)
Dept: FAMILY MEDICINE | Facility: CLINIC | Age: 22
End: 2017-03-05

## 2017-03-05 DIAGNOSIS — K59.00 CONSTIPATION, UNSPECIFIED CONSTIPATION TYPE: ICD-10-CM

## 2017-03-05 DIAGNOSIS — R10.11 ABDOMINAL PAIN, RIGHT UPPER QUADRANT: Primary | ICD-10-CM

## 2017-03-06 NOTE — TELEPHONE ENCOUNTER
I have pended the GI referral as per the one Dr. Haro had placed.  Please sign and send back to team for  to fax to [facility] at [phone] and let patient know when done.  Carolina Sapp RN

## 2017-03-09 ENCOUNTER — TELEPHONE (OUTPATIENT)
Dept: GASTROENTEROLOGY | Facility: OUTPATIENT CENTER | Age: 22
End: 2017-03-09

## 2017-03-09 NOTE — TELEPHONE ENCOUNTER
Patient taking any blood thinners ? no    Heart disease ? denies    Lung disease ? denies      Sleep apnea ? denies    Diabetic ? denies    Kidney disease ? denies    Dialysis ? n/a    Electronic implanted medical devices ? denies    Are you taking any narcotic pain medication ? no  What is your daily dosage ?    PTSD ? n/a    Prep instructions reviewed with patient ? Instructions for 2 day Golytely prep reviewed.  policy, MAC sedation plan reviewed. Advised pt. To have someone stay with her post exam    Ph[facility] : [facility]    Indication for procedure : constipation    Referring provider : Josette Chavez

## 2017-03-16 ENCOUNTER — TRANSFERRED RECORDS (OUTPATIENT)
Dept: HEALTH INFORMATION MANAGEMENT | Facility: CLINIC | Age: 22
End: 2017-03-16

## 2017-03-29 ENCOUNTER — CARE COORDINATION (OUTPATIENT)
Dept: GASTROENTEROLOGY | Facility: CLINIC | Age: 22
End: 2017-03-29

## 2017-03-29 ENCOUNTER — TELEPHONE (OUTPATIENT)
Dept: GASTROENTEROLOGY | Facility: CLINIC | Age: 22
End: 2017-03-29

## 2017-03-29 ASSESSMENT — ENCOUNTER SYMPTOMS
BOWEL INCONTINENCE: 0
DIFFICULTY URINATING: 0
DYSURIA: 0
RECTAL BLEEDING: 0
RECTAL PAIN: 0
HEARTBURN: 1
JAUNDICE: 0
VOMITING: 0
ABDOMINAL PAIN: 1
FLANK PAIN: 0
HEMATURIA: 0
DIARRHEA: 0
CONSTIPATION: 1
NAUSEA: 0
BLOOD IN STOOL: 0
BLOATING: 1

## 2017-03-29 NOTE — PROGRESS NOTES
I am contacting you to confirm your appointment with Dr. Haro at [facility] on the 4th floor at 9:40am on 4/5/2017.    If you cannot make it to this appointment and would like to reschedule, please call [phone].       It's a pleasure being involved in your health care.    Sincerely,     Rubia Bowers LPN

## 2017-04-05 ENCOUNTER — OFFICE VISIT (OUTPATIENT)
Dept: GASTROENTEROLOGY | Facility: CLINIC | Age: 22
End: 2017-04-05

## 2017-04-05 VITALS
WEIGHT: 135 LBS | OXYGEN SATURATION: 100 % | DIASTOLIC BLOOD PRESSURE: 64 MMHG | SYSTOLIC BLOOD PRESSURE: 104 MMHG | HEIGHT: 65 IN | HEART RATE: 60 BPM | BODY MASS INDEX: 22.49 KG/M2

## 2017-04-05 DIAGNOSIS — K59.04 CHRONIC IDIOPATHIC CONSTIPATION: Primary | ICD-10-CM

## 2017-04-05 ASSESSMENT — PAIN SCALES - GENERAL: PAINLEVEL: NO PAIN (0)

## 2017-04-05 NOTE — LETTER
4/5/2017       RE: Genesis Rivas  [address]  [place] [address]     Dear Colleague,    Thank you for referring your patient, Genesis Rivas, to the [facility] GASTROENTEROLOGY AND IBD at [facility]. Please see a copy of my visit note below.    GI CLINIC VISIT    CC: follow-up constipation      ASSESSMENT/PLAN:  (K59.04) Chronic idiopathic constipation  (primary encounter diagnosis)  Comment:    Normal colonoscopy, Sitz marker study, and anorectal manometry/defecography. Response to laxatives (Miralax specifically) but requiring ever-increasing doses (currently 4 Miralax daily). She is hopeful for an easier to take/time daily medication. She has reviewed previously provided literature on Amitiza and Linzess and has no further questions as this time. Linzess covered by her insurance - will start there. Med discussed including side effects, interactions, etc.   Plan:  - will start linaclotide (LINZESS) 145 MCG capsule, pending response can increase to 290 mcg daily  - stop Miralax   - if no response to Linzess/worsening constipation, ok for use of prn Miralax  - contact GI clinic via phone in a few weeks with an update.              RTC 3-4 months     It was a pleasure to participate in the care of this patient; please contact us with any further questions.  A total of 15 minutes was spent with this patient, >50% of which was counseling regarding the above delineated issues.    Gyale Haro MD   of Medicine  Division of Gastroenterology, Hepatology and Nutrition  [facility]    ---------------------------------------------------------------------------------------------------  HPI:  Ms. Rivas is a previously healthy 22 year old female who was initially seen in consultation with Jordan Du and Fabien of GI in July 2016 for constipation. She was initially seen by this writer in Nov 2016, her last visit was Feb 1,  2017. She returns today for follow-up.      Summarized per my prior documentation:   Ms. Rivas reports issues with constipation since early childhood but with no regular laxatives or daily medications as a child nor need for disimpactions, enemas, nor hospitalizations. She used OTC laxatives as needed through late childhood and teen years. She had two urgent care visits during these years for constipation-related abdominal pain.   Her constipation apparently worsened in her early twenties, occurring once every 1-2 months. She described going up to 2 weeks without a satisfactory BM, (she may pass some small,[place] 1 stools with straining during this time). These periods are associated with significant bloating and LLQ pain which resolve after defecation.  Ms. Rivas denies rectal fullness, rectal pain/discomfort, noted tissue prolapse, or known hemorrhoids. In the past she has had drops of blood on toilet paper. She previously tried prunes/prune juice, OTC Mg citrate, an unknown laxative pill prn with minimal effect. Her Avi MD put her on daily Miralax which initially was helpful, but its effect waned.  She reports a high vegetable and fruit intake, at least 70 oz of water daily, and daily physical activity (running) which has led to an intentional 80 lbs weight loss. Her prior work-up included a normal TSH, calcium, CMP, and hgb.    Since establishing in our clinic she has [facility] by Dr. Kailey Acevedo. Her testing was essentially normal with no evidence of Hirschsprung's, only mild perineal descent was noted. Her fiber intake and Miralax have been titrated up and she's continued to stool fairly well.       Since her last visit, she did undergo a Sitz marker. She reports sh had been off of laxatives for one week and recalls having a large stool just prior to taking the Sitz markers. Approx 18- 24 hours later she had another large BM, but does not believe she passed any  "additional stools prior to her AXR (done 5 days after Tia marker ingestion). No Sitz markers were seen on XR. She ntoes she was on her period at that time (expectedly moving her bowels more frequently than her typical baseline).  A colonoscopy was completed thereafter. Despite being off of laxatives (for Sitz study, just prior to colonoscopy) and her only able to tolerate 4L Golytely (due to nausea) she still had an adequate prep for her exam. Her prep was rated at fair and her colonoscopy essentially normal.         Following her colonoscopy, Ms. Rivas started with Miralax once daily and has since titrated up to Miralax 4 times daily with morning coffee/caffeine ingestions. During the period of Miralax titration she states her stools were hard, firm and occurring every 2-3 days. Now she is moving bowels once daily; stools are soft, formed, with no straining. She has no associated discomfort with her regular bowel movements.       Overall, Ms. Rivas feels pretty well She notes some increased stress related to her upcoming wedding (in 17 days) and finals (in about one month).     ROS:    Please see bottom of this note.    PROBLEM LIST  There are no active problems to display for this patient.      PERTINENT MEDICATIONS:  Current Outpatient Prescriptions   Medication     calcium citrate (CALCITRATE) 950 MG tablet     VITAMIN D, CHOLECALCIFEROL, PO     norgestimate-ethinyl estradiol (ORTHO-CYCLEN, SPRINTEC) 0.25-35 MG-MCG per tablet     polyethylene glycol (MIRALAX) powder     Multiple Vitamins-Calcium (DAILY VITAMINS FOR WOMEN PO)     Fiber POWD     No current facility-administered medications for this visit.          PHYSICAL EXAMINATION:  Vitals /64  Pulse 60  Ht 1.651 m (5' 5\")  Wt 61.2 kg (135 lb)  LMP 04/03/2017  SpO2 100%  BMI 22.47 kg/m2   Wt   Wt Readings from Last 2 Encounters:   04/05/17 61.2 kg (135 lb)   02/01/17 64 kg (141 lb)   Gen: Pt sitting up on exam table in NAD, interactive and " cooperative on exam  Eyes: sclerae anicteric, no injection  ENT:  MMM  Cardiac: RRR, nl S1, S2  Resp: Clear on anterior exam  GI: Normoactive BS, abd soft, flat, nontender  Skin: Warm, dry, no jaundice, nails appear healthy  Lymph: no submandibular, no cervical, and no supraclavicular LAD  Neuro: alert, oriented, answers questions appropriately      PERTINENT STUDIES:    Orders Only on 02/01/2017   Component Date Value Ref Range Status     IGA 02/01/2017 136  70 - 380 mg/dL Final     Tissue Transglutaminase Antibody I* 02/01/2017   <7 U/mL Final                    Value:<1  Negative   The tTG-IgA assay has limited utility for patients with decreased levels of   IgA. Screening for celiac disease should include IgA testing to rule out   selective IgA deficiency and to guide selection and interpretation of   serological testing. tTG-IgG testing may be positive in celiac disease patients   with IgA deficiency.       Tissue Transglutaminase Bettina IgG 02/01/2017   <7 U/mL Final                    Value:<1  Negative               Again, thank you for allowing me to participate in the care of your patient.      Sincerely,    Gayle Haro MD

## 2017-04-05 NOTE — MR AVS SNAPSHOT
After Visit Summary   4/5/2017    Genesis Rivas    MRN: [id number]           Patient Information     Date Of Birth          1995        Visit Information        Provider Department      4/5/2017 9:40 AM Gayle Haro MD [facility] Gastroenterology and IBD        Today's Diagnoses     Chronic idiopathic constipation    -  1      Care Instructions    1. Stop Miralax.  2. Start Linzess each morning, 30 minutes before eating (on an empty stomach).   3. Monitor response and contact GI clinic with an update in a few weeks.   If you have any questions, please don't hesitate to contact our GI RN Clinic Coordinators at [phone] (select option #3 for nurse line).   4. Follow-up in GI clinic in 3-4 months.           Follow-ups after your visit        Who to contact     Please call your clinic at [phone] to:    Ask questions about your health    Make or cancel appointments    Discuss your medicines    Learn about your test results    Speak to your doctor   If you have compliments or concerns about an experience at your clinic, or if you wish to file a complaint, please contact [facility] Patient Relations at [phone] or email us at [email or web address]         Additional Information About Your Visit        MyChart Information     [employer] gives you secure access to your electronic health record. If you see a primary care provider, you can also send messages to your care team and make appointments. If you have questions, please call your primary care clinic.  If you do not have a primary care provider, please call [phone] and they will assist you.      [employer] is an electronic gateway that provides easy, online access to your medical records. With [employer], you can request a clinic appointment, read your test results, renew a prescription or communicate with your care team.     To access your existing account, please contact your  "[facility] or call [phone] for assistance.        Care EveryWhere ID     This is your Care EveryWhere ID. This could be used by other organizations to access your [facility] medical records  [id number]        Your Vitals Were     Pulse Height Last Period Pulse Oximetry BMI (Body Mass Index)       60 1.651 m (5' 5\") 04/03/2017 100% 22.47 kg/m2        Blood Pressure from Last 3 Encounters:   04/05/17 104/64   02/01/17 108/74   01/27/17 98/67    Weight from Last 3 Encounters:   04/05/17 61.2 kg (135 lb)   02/01/17 64 kg (141 lb)   01/27/17 64.1 kg (141 lb 6.4 oz)              Today, you had the following     No orders found for display         Today's Medication Changes          These changes are accurate as of: 4/5/17  9:55 AM.  If you have any questions, ask your nurse or doctor.               Start taking these medicines.        Dose/Directions    linaclotide 145 MCG capsule   Commonly known as:  LINZESS   Used for:  Chronic idiopathic constipation   Started by:  Gayle Haro MD        Dose:  145 mcg   Take 1 capsule (145 mcg) by mouth every morning (before breakfast)   Quantity:  30 capsule   Refills:  3            Where to get your medicines      These medications were sent to [facility] [place] [id number] [facility] [address] [id number]    Hours:  TRANSPLANT PHONE NUMBER [phone] Phone:  [phone]     linaclotide 145 MCG capsule                Primary Care Provider Office Phone # Fax #    Gwendolyn Bonner [place] [phone]       [address] [address]        Thank you!     Thank you for choosing [facility] GASTROENTEROLOGY AND IBD  for your care. Our goal is always to provide you with excellent care. Hearing back from our patients is one way we can continue to improve our services. Please take a few minutes to complete the written " survey that you may receive in the mail after your visit with us. Thank you!             Your Updated Medication List - Protect others around you: Learn how to safely use, store and throw away your medicines at www.disposemymeds.org.          This list is accurate as of: 4/5/17  9:55 AM.  Always use your most recent med list.                   Brand Name Dispense Instructions for use    calcium citrate 950 MG tablet    CALCITRATE     Take 1 tablet by mouth daily       DAILY VITAMINS FOR WOMEN PO          Fiber Powd      Reported on 4/5/2017       linaclotide 145 MCG capsule    LINZESS    30 capsule    Take 1 capsule (145 mcg) by mouth every morning (before breakfast)       MIRALAX powder   Generic drug:  polyethylene glycol      Take 1 capful by mouth 4 times daily       norgestimate-ethinyl estradiol 0.25-35 MG-MCG per tablet    ORTHO-CYCLEN, SPRINTEC    84 tablet    Take 1 tablet by mouth daily       VITAMIN D (CHOLECALCIFEROL) PO      Take 2,000 Units by mouth daily

## 2017-04-05 NOTE — PROGRESS NOTES
GI CLINIC VISIT    CC: follow-up constipation      ASSESSMENT/PLAN:  (K59.04) Chronic idiopathic constipation  (primary encounter diagnosis)  Comment:    Normal colonoscopy, Sitz marker study, and anorectal manometry/defecography. Response to laxatives (Miralax specifically) but requiring ever-increasing doses (currently 4 Miralax daily). She is hopeful for an easier to take/time daily medication. She has reviewed previously provided literature on Amitiza and Linzess and has no further questions as this time. Linzess covered by her insurance - will start there. Med discussed including side effects, interactions, etc.   Plan:  - will start linaclotide (LINZESS) 145 MCG capsule, pending response can increase to 290 mcg daily  - stop Miralax   - if no response to Linzess/worsening constipation, ok for use of prn Miralax  - contact GI clinic via phone in a few weeks with an update.              RTC 3-4 months     It was a pleasure to participate in the care of this patient; please contact us with any further questions.  A total of 15 minutes was spent with this patient, >50% of which was counseling regarding the above delineated issues.    Gayle Haro MD   of Medicine  Division of Gastroenterology, Hepatology and Nutrition  [facility]    ---------------------------------------------------------------------------------------------------  HPI:  Ms. Rivas is a previously healthy 22 year old female who was initially seen in consultation with Jordan Du and Fabien of GI in July 2016 for constipation. She was initially seen by this writer in Nov 2016, her last visit was Feb 1, 2017. She returns today for follow-up.      Summarized per my prior documentation:   Ms. Rivas reports issues with constipation since early childhood but with no regular laxatives or daily medications as a child nor need for disimpactions, enemas, nor hospitalizations. She used OTC laxatives as needed  through late childhood and teen years. She had two urgent care visits during these years for constipation-related abdominal pain.   Her constipation apparently worsened in her early twenties, occurring once every 1-2 months. She described going up to 2 weeks without a satisfactory BM, (she may pass some small,[place] 1 stools with straining during this time). These periods are associated with significant bloating and LLQ pain which resolve after defecation.  Ms. Rivas denies rectal fullness, rectal pain/discomfort, noted tissue prolapse, or known hemorrhoids. In the past she has had drops of blood on toilet paper. She previously tried prunes/prune juice, OTC Mg citrate, an unknown laxative pill prn with minimal effect. Her Avi MD put her on daily Miralax which initially was helpful, but its effect waned.  She reports a high vegetable and fruit intake, at least 70 oz of water daily, and daily physical activity (running) which has led to an intentional 80 lbs weight loss. Her prior work-up included a normal TSH, calcium, CMP, and hgb.    Since establishing in our clinic she has [facility] by Dr. Kailey Acevedo. Her testing was essentially normal with no evidence of Hirschsprung's, only mild perineal descent was noted. Her fiber intake and Miralax have been titrated up and she's continued to stool fairly well.       Since her last visit, she did undergo a Sitz marker. She reports sh had been off of laxatives for one week and recalls having a large stool just prior to taking the Sitz markers. Approx 18- 24 hours later she had another large BM, but does not believe she passed any additional stools prior to her AXR (done 5 days after Tia marker ingestion). No Sitz markers were seen on XR. She ntoes she was on her period at that time (expectedly moving her bowels more frequently than her typical baseline).  A colonoscopy was completed thereafter. Despite being off of laxatives  "(for Sitz study, just prior to colonoscopy) and her only able to tolerate 4L Golytely (due to nausea) she still had an adequate prep for her exam. Her prep was rated at fair and her colonoscopy essentially normal.         Following her colonoscopy, Ms. Rivas started with Miralax once daily and has since titrated up to Miralax 4 times daily with morning coffee/caffeine ingestions. During the period of Miralax titration she states her stools were hard, firm and occurring every 2-3 days. Now she is moving bowels once daily; stools are soft, formed, with no straining. She has no associated discomfort with her regular bowel movements.       Overall, Ms. Rivas feels pretty well She notes some increased stress related to her upcoming wedding (in 17 days) and finals (in about one month).     ROS:    Please see bottom of this note.    PROBLEM LIST  There are no active problems to display for this patient.      PERTINENT MEDICATIONS:  Current Outpatient Prescriptions   Medication     calcium citrate (CALCITRATE) 950 MG tablet     VITAMIN D, CHOLECALCIFEROL, PO     norgestimate-ethinyl estradiol (ORTHO-CYCLEN, SPRINTEC) 0.25-35 MG-MCG per tablet     polyethylene glycol (MIRALAX) powder     Multiple Vitamins-Calcium (DAILY VITAMINS FOR WOMEN PO)     Fiber POWD     No current facility-administered medications for this visit.          PHYSICAL EXAMINATION:  Vitals /64  Pulse 60  Ht 1.651 m (5' 5\")  Wt 61.2 kg (135 lb)  LMP 04/03/2017  SpO2 100%  BMI 22.47 kg/m2   Wt   Wt Readings from Last 2 Encounters:   04/05/17 61.2 kg (135 lb)   02/01/17 64 kg (141 lb)   Gen: Pt sitting up on exam table in NAD, interactive and cooperative on exam  Eyes: sclerae anicteric, no injection  ENT:  MMM  Cardiac: RRR, nl S1, S2  Resp: Clear on anterior exam  GI: Normoactive BS, abd soft, flat, nontender  Skin: Warm, dry, no jaundice, nails appear healthy  Lymph: no submandibular, no cervical, and no supraclavicular LAD  Neuro: " alert, oriented, answers questions appropriately      PERTINENT STUDIES:    Orders Only on 02/01/2017   Component Date Value Ref Range Status     IGA 02/01/2017 136  70 - 380 mg/dL Final     Tissue Transglutaminase Antibody I* 02/01/2017   <7 U/mL Final                    Value:<1  Negative   The tTG-IgA assay has limited utility for patients with decreased levels of   IgA. Screening for celiac disease should include IgA testing to rule out   selective IgA deficiency and to guide selection and interpretation of   serological testing. tTG-IgG testing may be positive in celiac disease patients   with IgA deficiency.       Tissue Transglutaminase Bettina IgG 02/01/2017   <7 U/mL Final                    Value:<1  Negative               Answers for HPI/ROS submitted by the patient on 3/29/2017   General Symptoms: No  Skin Symptoms: No  HENT Symptoms: No  EYE SYMPTOMS: No  HEART SYMPTOMS: No  LUNG SYMPTOMS: No  INTESTINAL SYMPTOMS: Yes  URINARY SYMPTOMS: Yes  GYNECOLOGIC SYMPTOMS: No  BREAST SYMPTOMS: No  SKELETAL SYMPTOMS: No  BLOOD SYMPTOMS: No  NERVOUS SYSTEM SYMPTOMS: No  MENTAL HEALTH SYMPTOMS: No  Heart burn or indigestion: Yes  Nausea: No  Vomiting: No  Abdominal pain: Yes  Bloating: Yes  Constipation: Yes  Diarrhea: No  Blood in stool: No  Black stools: No  Rectal or Anal pain: No  Fecal incontinence: No  Rectal bleeding: No  Yellowing of skin or eyes: No  Vomit with blood: No  Change in stools: No  Hemorrhoids: No  Trouble holding urine or incontinence: Yes  Pain or burning: No  Trouble starting or stopping: No  Increased frequency of urination: No  Blood in urine: No  Decreased frequency of urination: No  Frequent nighttime urination: No  Flank pain: No  Difficulty emptying bladder: No

## 2017-04-05 NOTE — NURSING NOTE
"Chief Complaint   Patient presents with     RECHECK     constipation       Vitals:    04/05/17 0907   BP: 104/64   Pulse: 60   SpO2: 100%   Weight: 61.2 kg (135 lb)   Height: 1.651 m (5' 5\")       Body mass index is 22.47 kg/(m^2).                            "

## 2017-04-05 NOTE — PATIENT INSTRUCTIONS
1. Stop Miralax.  2. Start Linzess each morning, 30 minutes before eating (on an empty stomach).   3. Monitor response and contact GI clinic with an update in a few weeks.   If you have any questions, please don't hesitate to contact our GI RN Clinic Coordinators at [phone] (select option #3 for nurse line).   4. Follow-up in GI clinic in 3-4 months.

## 2017-04-06 ENCOUNTER — CARE COORDINATION (OUTPATIENT)
Dept: GASTROENTEROLOGY | Facility: CLINIC | Age: 22
End: 2017-04-06

## 2017-04-06 NOTE — PROGRESS NOTES
Attempted patient by phone x2. Unable to leave message. [employer] message sent with clinic contact information.

## 2017-05-30 ENCOUNTER — MYC MEDICAL ADVICE (OUTPATIENT)
Dept: GASTROENTEROLOGY | Facility: CLINIC | Age: 22
End: 2017-05-30

## 2017-05-30 DIAGNOSIS — K59.04 CHRONIC IDIOPATHIC CONSTIPATION: Primary | ICD-10-CM

## 2017-08-09 ASSESSMENT — ENCOUNTER SYMPTOMS
HEARTBURN: 1
DIFFICULTY URINATING: 0
CHILLS: 0
BLOATING: 1
HEMATURIA: 0
POLYPHAGIA: 0
WEIGHT LOSS: 0
BOWEL INCONTINENCE: 0
DECREASED APPETITE: 0
FATIGUE: 0
ABDOMINAL PAIN: 1
FLANK PAIN: 0
ALTERED TEMPERATURE REGULATION: 1
DYSURIA: 0
NIGHT SWEATS: 0
VOMITING: 0
JAUNDICE: 0
RECTAL BLEEDING: 0
BLOOD IN STOOL: 0
INCREASED ENERGY: 0
DIARRHEA: 0
HALLUCINATIONS: 0
NAUSEA: 1
FEVER: 0
POLYDIPSIA: 0
CONSTIPATION: 1
RECTAL PAIN: 1
WEIGHT GAIN: 0

## 2017-08-23 ENCOUNTER — OFFICE VISIT (OUTPATIENT)
Dept: GASTROENTEROLOGY | Facility: CLINIC | Age: 22
End: 2017-08-23

## 2017-08-23 VITALS
OXYGEN SATURATION: 100 % | WEIGHT: 139 LBS | DIASTOLIC BLOOD PRESSURE: 71 MMHG | HEIGHT: 65 IN | HEART RATE: 55 BPM | BODY MASS INDEX: 23.16 KG/M2 | SYSTOLIC BLOOD PRESSURE: 102 MMHG

## 2017-08-23 DIAGNOSIS — K59.04 CHRONIC IDIOPATHIC CONSTIPATION: Primary | ICD-10-CM

## 2017-08-23 ASSESSMENT — PAIN SCALES - GENERAL: PAINLEVEL: NO PAIN (0)

## 2017-08-23 NOTE — LETTER
8/23/2017       RE: Genesis Cutler  [id number]    [facility] [address]     Dear Colleague,    Thank you for referring your patient, Genesis Cutler, to the [facility] GASTROENTEROLOG[facility] AND IBD CLINIC at [facility]. Please see a copy of my visit note below.    GI CLINIC VISIT    CC: follow-up      ASSESSMENT/PLAN:  (K59.04) Chronic idiopathic constipation  (primary encounter diagnosis)  Comment:    Normal colonoscopy, Sitz marker study, and anorectal manometry/defecography. Response to laxatives (Miralax specifically) but requiring ever-increasing doses (currently 4 Miralax daily). She is hopeful for an easier to take/time daily medication. She has reviewed previously provided literature on Amitiza and Linzess and has no further questions as this time. Linzess covered by her insurance - will start there. Med discussed including side effects, interactions, etc. **  Plan:  1. Continue Linzess 290 mcg daily.  2. Ok to add as needed Miralax for more symptomatic periods (such as with traveling).   3. Continue efforts to keep up hydration and fiber intake.                  RTC 6 months    It was a pleasure to participate in the care of this patient; please contact us with any further questions.  A total of 15 face-to-face minutes was spent with this patient, >50% of which was counseling regarding the above delineated issues.    Gayle Haro MD   of Medicine  Division of Gastroenterology, Hepatology and Nutrition  [facility]    ---------------------------------------------------------------------------------------------------  HPI:  Ms. Rivas is a previously healthy 22 year old female who was initially seen in consultation with Jordan Du and Fabien of GI in July 2016 for constipation. She was initially seen by this writer in Nov 2016, her last visit was Feb 1, 2017. She returns today for follow-up.        Summarized per my prior documentation:   Ms. Rivas reports issues with constipation since early childhood but with no regular laxatives or daily medications as a child nor need for disimpactions, enemas, nor hospitalizations. She used OTC laxatives as needed through late childhood and teen years. She had two urgent care visits during these years for constipation-related abdominal pain.   Her constipation apparently worsened in her early twenties, occurring once every 1-2 months. She described going up to 2 weeks without a satisfactory BM, (she may pass some small,[place] 1 stools with straining during this time). These periods are associated with significant bloating and LLQ pain which resolve after defecation.  Ms. Rivas denies rectal fullness, rectal pain/discomfort, noted tissue prolapse, or known hemorrhoids. In the past she has had drops of blood on toilet paper. She previously tried prunes/prune juice, OTC Mg citrate, an unknown laxative pill prn with minimal effect. Her Avi MD put her on daily Miralax which initially was helpful, but its effect waned.  She reports a high vegetable and fruit intake, at least 70 oz of water daily, and daily physical activity (running) which has led to an intentional 80 lbs weight loss. Her prior work-up included a normal TSH, calcium, CMP, and hgb.    Since establishing in our clinic she [facility] by Dr. Kailey Acevedo. Her testing was essentially normal with no evidence of Hirschsprung's, only mild perineal descent was noted. Her fiber intake and Miralax have been titrated up and she's continued to stool fairly well.       Since her last visit, she did undergo a Sitz marker. She reports sh had been off of laxatives for one week and recalls having a large stool just prior to taking the Sitz markers. Approx 18- 24 hours later she had another large BM, but does not believe she passed any additional stools prior to her AXR  (done 5 days after Tia marker ingestion). No Sitz markers were seen on XR. She ntoes she was on her period at that time (expectedly moving her bowels more frequently than her typical baseline).  A colonoscopy was completed thereafter. Despite being off of laxatives (for Sitz study, just prior to colonoscopy) and her only able to tolerate 4L Golytely (due to nausea) she still had an adequate prep for her exam. Her prep was rated at fair and her colonoscopy essentially normal.          Following her colonoscopy, Ms. Rivas started with Miralax once daily and has since titrated up to Miralax 4 times daily with morning coffee/caffeine ingestions. During the period of Miralax titration she states her stools were hard, firm and occurring every 2-3 days. Now she is moving bowels once daily; stools are soft, formed, with no straining. She has no associated discomfort with her regular bowel movements.        Overall, Ms. Rivas feels pretty well She notes some increased stress related to her upcoming wedding (in 17 days) and finals (in about one month).     Today     in April  Just got back from [place], saw eclipse in [place],     Linzess 290 mcg - going mostly once a day, sometimes skip a day, [place] 3-4. With dose increase had a couple days of loose stools,then evened out    While on recent [place] - did great initially, then [place] 1 small stools for a few days in a row. Having more bloating and discomfort. This morning back to [place] 3-4 with one dose of Miralax this morning.     Much less gas-sy on Linzess than on Miralax.             ROS:    Please see bottom of this note.    PROBLEM LIST  There are no active problems to display for this patient.      PERTINENT MEDICATIONS:  Current Outpatient Prescriptions   Medication     linaclotide (LINZESS) 290 MCG capsule     calcium citrate (CALCITRATE) 950 MG tablet     VITAMIN D, CHOLECALCIFEROL, PO     Fiber POWD     norgestimate-ethinyl estradiol  "(ORTHO-CYCLEN, SPRINTEC) 0.25-35 MG-MCG per tablet     polyethylene glycol (MIRALAX) powder     Multiple Vitamins-Calcium (DAILY VITAMINS FOR WOMEN PO)     No current facility-administered medications for this visit.          PHYSICAL EXAMINATION:  Vitals /71  Pulse 55  Ht 1.651 m (5' 5\")  Wt 63 kg (139 lb)  LMP 08/23/2017  SpO2 100%  BMI 23.13 kg/m2   Wt   Wt Readings from Last 2 Encounters:   08/23/17 63 kg (139 lb)   04/05/17 61.2 kg (135 lb)   Gen: Pt sitting up in NAD, interactive and cooperative on exam  Eyes: sclerae anicteric, no injection  ENT:  MMM  Cardiac: RRR, nl S1, S2  Resp: Clear on anterior exam  GI: Normoactive BS, abd soft, flat, nontender  Skin: Warm, dry, no jaundice, nails appear healthy  Neuro: alert, oriented, answers questions appropriately      PERTINENT STUDIES:    Orders Only on 02/01/2017   Component Date Value Ref Range Status     IGA 02/01/2017 136  70 - 380 mg/dL Final     Tissue Transglutaminase Antibody I* 02/01/2017   <7 U/mL Final                    Value:<1  Negative   The tTG-IgA assay has limited utility for patients with decreased levels of   IgA. Screening for celiac disease should include IgA testing to rule out   selective IgA deficiency and to guide selection and interpretation of   serological testing. tTG-IgG testing may be positive in celiac disease patients   with IgA deficiency.       Tissue Transglutaminase Bettina IgG 02/01/2017   <7 U/mL Final                    Value:<1  Negative           Sincerely,    Gayle Haro MD  "

## 2017-08-23 NOTE — NURSING NOTE
"Chief Complaint   Patient presents with     RECHECK       Vitals:    08/23/17 1227   BP: 102/71   Pulse: 55   SpO2: 100%   Weight: 63 kg (139 lb)   Height: 1.651 m (5' 5\")       Body mass index is 23.13 kg/(m^2).    Current Outpatient Prescriptions   Medication Sig Dispense Refill     linaclotide (LINZESS) 290 MCG capsule Take 1 capsule (290 mcg) by mouth every morning (before breakfast) 30 capsule 11     calcium citrate (CALCITRATE) 950 MG tablet Take 1 tablet by mouth daily       VITAMIN D, CHOLECALCIFEROL, PO Take 2,000 Units by mouth daily       Fiber POWD Reported on 4/5/2017       norgestimate-ethinyl estradiol (ORTHO-CYCLEN, SPRINTEC) 0.25-35 MG-MCG per tablet Take 1 tablet by mouth daily 84 tablet 3     polyethylene glycol (MIRALAX) powder Take 1 capful by mouth 4 times daily        Multiple Vitamins-Calcium (DAILY VITAMINS FOR WOMEN PO)      Signed By:  Marilou Wolfe; August 23, 2017; 12:30 PM )                          "

## 2017-08-23 NOTE — MR AVS SNAPSHOT
After Visit Summary   8/23/2017    Genesis Cutler    MRN: [id number]           Patient Information     Date Of Birth          1995        Visit Information        Provider Department      8/23/2017 12:20 PM Gayle Haro MD [facility] Gastroenterology and IBD        Care Instructions    1. Continue Linzess 290 mcg daily.  2. Ok to add as needed Miralax for more symptomatic periods (such as with traveling).   3. Continue efforts to keep up hydration and fiber intake.   4. Follow-up in GI clinic in 6 months.   If you have any questions, please don't hesitate to contact our GI RN Clinic Coordinators at [phone] (select option #3 for nurse line).                 Follow-ups after your visit        Who to contact     Please call your clinic at [phone] to:    Ask questions about your health    Make or cancel appointments    Discuss your medicines    Learn about your test results    Speak to your doctor   If you have compliments or concerns about an experience at your clinic, or if you wish to file a complaint, please contact [facility] Patient Relations at [phone] or email us at [email or web address]         Additional Information About Your Visit        MyChart Information     [employer] gives you secure access to your electronic health record. If you see a primary care provider, you can also send messages to your care team and make appointments. If you have questions, please call your primary care clinic.  If you do not have a primary care provider, please call [phone] and they will assist you.      [employer] is an electronic gateway that provides easy, online access to your medical records. With [employer], you can request a clinic appointment, read your test results, renew a prescription or communicate with your care team.     To access your existing account, please contact your [facility] or  "call [phone] for assistance.        Care EveryWhere ID     This is your Care EveryWhere ID. This could be used by other organizations to access your [facility] medical records  [id number]        Your Vitals Were     Pulse Height Last Period Pulse Oximetry BMI (Body Mass Index)       55 1.651 m (5' 5\") 08/23/2017 100% 23.13 kg/m2        Blood Pressure from Last 3 Encounters:   08/23/17 102/71   04/05/17 104/64   02/01/17 108/74    Weight from Last 3 Encounters:   08/23/17 63 kg (139 lb)   04/05/17 61.2 kg (135 lb)   02/01/17 64 kg (141 lb)              Today, you had the following     No orders found for display         Today's Medication Changes          These changes are accurate as of: 8/23/17  1:10 PM.  If you have any questions, ask your nurse or doctor.               These medicines have changed or have updated prescriptions.        Dose/Directions    linaclotide 290 MCG capsule   Commonly known as:  LINZESS   This may have changed:  Another medication with the same name was removed. Continue taking this medication, and follow the directions you see here.   Used for:  Chronic idiopathic constipation   Changed by:  Gayle Haro MD        Dose:  290 mcg   Take 1 capsule (290 mcg) by mouth every morning (before breakfast)   Quantity:  30 capsule   Refills:  11                Primary Care Provider Office Phone # Fax #    Gwendolyn Burns [phone]       [place] [id number]        Equal Access to Services     HAKEEM MILLARD AH: Marie Ramos, waaxda luqadaha, qaybta kaalmada lester shah. So [facility] [phone].    ATENCIÓN: Si habla español, tiene a dela cruz disposición servicios gratuitos de asistencia lingüística. Llame al [phone].    We comply with applicable federal civil rights laws and [place] laws. We do not discriminate on the basis of race, color, national origin, age, " disability sex, sexual orientation or gender identity.            Thank you!     Thank you for choosing [facility] GASTROENTEROLOGY AND IBD  for your care. Our goal is always to provide you with excellent care. Hearing back from our patients is one way we can continue to improve our services. Please take a few minutes to complete the written survey that you may receive in the mail after your visit with us. Thank you!             Your Updated Medication List - Protect others around you: Learn how to safely use, store and throw away your medicines at www.disposemymeds.org.          This list is accurate as of: 8/23/17  1:10 PM.  Always use your most recent med list.                   Brand Name Dispense Instructions for use Diagnosis    calcium citrate 950 MG tablet    CALCITRATE     Take 1 tablet by mouth daily        DAILY VITAMINS FOR WOMEN PO           Fiber Powd      Reported on 4/5/2017        linaclotide 290 MCG capsule    LINZESS    30 capsule    Take 1 capsule (290 mcg) by mouth every morning (before breakfast)    Chronic idiopathic constipation       MIRALAX powder   Generic drug:  polyethylene glycol      Take 1 capful by mouth 4 times daily        norgestimate-ethinyl estradiol 0.25-35 MG-MCG per tablet    ORTHO-CYCLEN, SPRINTEC    84 tablet    Take 1 tablet by mouth daily    Encounter for birth control pills maintenance       VITAMIN D (CHOLECALCIFEROL) PO      Take 2,000 Units by mouth daily

## 2017-08-23 NOTE — PROGRESS NOTES
GI CLINIC VISIT    CC: follow-up      ASSESSMENT/PLAN:  (K59.04) Chronic idiopathic constipation  (primary encounter diagnosis)  Comment:    Normal colonoscopy, Sitz marker study, and anorectal manometry/defecography. Response to laxatives (Miralax specifically) but requiring ever-increasing doses.    Linzess started and then dose increased to 290 mcg daily with good response. Med discussed including side effects, interactions, etc.  Plan:  1. Continue Linzess 290 mcg daily.  2. Ok to add as needed Miralax for more symptomatic periods (such as with traveling).   3. Continue efforts to keep up hydration and fiber intake.               RTC 6 months    It was a pleasure to participate in the care of this patient; please contact us with any further questions.  A total of 15 face-to-face minutes was spent with this patient, >50% of which was counseling regarding the above delineated issues.    Gayle Haro MD   of Medicine  Division of Gastroenterology, Hepatology and [facility]  [facility]    ---------------------------------------------------------------------------------------------------  HPI:    Ms. Cutler (formerly Rob) is a previously healthy 22 year old female who was initially seen in consultation with Jordan Du and Fabien of GI in July 2016 for constipation. She was initially seen by this writer in Nov 2016. She returns today for follow-up.       Summarized/taken from my prior documentation:   Ms. Cutler reports issues with constipation since early childhood but with no regular laxatives or daily medications as a child nor need for disimpactions, enemas, nor hospitalizations. She used OTC laxatives as needed through late childhood and teen years. She had two urgent care visits during these years for constipation-related abdominal pain.     Her constipation apparently worsened in her early twenties, occurring once every 1-2 months. She described going up to 2  "weeks without a satisfactory BM, (she may pass some small,Flagler 1 stools with straining during this time). These periods are associated with significant bloating and LLQ pain which resolve after defecation.  Ms. Rivas denies rectal fullness, rectal pain/discomfort, noted tissue prolapse, or known hemorrhoids. In the past she has had drops of blood on toilet paper. She previously tried prunes/prune juice, OTC Mg citrate, an unknown laxative pill prn with minimal effect. Her Avi MD put her on daily Miralax which initially was helpful, but its effect waned.  She reports a high vegetable and fruit intake, at least 70 oz of water daily, and daily physical activity (running) which has led to an intentional 80 lbs weight loss. Her prior work-up included a normal TSH, calcium, CMP, and hgb.    Since establishing in our clinic she has been [facility]2016 by Dr. Kailey Acevedo. Her testing was essentially normal with no evidence of Hirschsprung's, only mild perineal descent was noted. A Sitz marker study (after one week off of laxatives) was normal (no Sitz markers were seen on AXR done 5 days later). Though notably, she was on her period at that time (expectedly moving her bowels more frequently than her typical baseline).  A colonoscopy was done and was normal. Despite being off of laxatives (for Sitz study, just prior to colonoscopy) and her only being able to tolerate 4L Golytely (due to nausea, she was intended to take additional prep) her prep was rated at \"fair\".          During her constipation work-up, Ms. Cutler was managed on ever-increasing doses of fiber and Miralax. Initially she had a good response but found the efficacy waining with time. She titrated up to Miralax 4 times daily with morning coffee/caffeine ingestions. Ultimately, she was switched to Linzess 145 mcg, then up to 290 mcg with good response.    Today, she shares that she was  in April and that she has " "just returned from her honeymoon where she and her  traveled to [place], [place] to see the eclipse. Despite the change in routine with traveling, she thinks her bowels are doing ok. Prior to traveling with LInzess 290 mcg daily she would move her bowels nearly every day, passing Olpe 3-4 stools. In the beginning of her honeymoon this pattern continued, but then she experienced a few days of small, Olpe 1 stools with increased bloating and discomfort. She thus treated with one capful of Miralax which produced a large stool and she feels she has returned to her prior baseline. Ms. Cutler overall finds Linzess more comfortable than Miralax as she is experience less \"gassiness\" with its use.         ROS:    Please see bottom of this note.    PROBLEM LIST  There are no active problems to display for this patient.      PERTINENT MEDICATIONS:  Current Outpatient Prescriptions   Medication     linaclotide (LINZESS) 290 MCG capsule     calcium citrate (CALCITRATE) 950 MG tablet     VITAMIN D, CHOLECALCIFEROL, PO     Fiber POWD     norgestimate-ethinyl estradiol (ORTHO-CYCLEN, SPRINTEC) 0.25-35 MG-MCG per tablet     polyethylene glycol (MIRALAX) powder     Multiple Vitamins-Calcium (DAILY VITAMINS FOR WOMEN PO)     No current facility-administered medications for this visit.          PHYSICAL EXAMINATION:  Vitals /71  Pulse 55  Ht 1.651 m (5' 5\")  Wt 63 kg (139 lb)  LMP 08/23/2017  SpO2 100%  BMI 23.13 kg/m2   Wt   Wt Readings from Last 2 Encounters:   08/23/17 63 kg (139 lb)   04/05/17 61.2 kg (135 lb)   Gen: Pt sitting up in NAD, interactive and cooperative on exam  Eyes: sclerae anicteric, no injection  ENT:  MMM  Cardiac: RRR, nl S1, S2  Resp: Clear on anterior exam  GI: Normoactive BS, abd soft, flat, nontender  Skin: Warm, dry, no jaundice, nails appear healthy  Neuro: alert, oriented, answers questions appropriately      PERTINENT STUDIES:    Orders Only on 02/01/2017   Component Date Value " Ref Range Status     IGA 02/01/2017 136  70 - 380 mg/dL Final     Tissue Transglutaminase Antibody I* 02/01/2017   <7 U/mL Final                    Value:<1  Negative   The tTG-IgA assay has limited utility for patients with decreased levels of   IgA. Screening for celiac disease should include IgA testing to rule out   selective IgA deficiency and to guide selection and interpretation of   serological testing. tTG-IgG testing may be positive in celiac disease patients   with IgA deficiency.       Tissue Transglutaminase Bettina IgG 02/01/2017   <7 U/mL Final                    Value:<1  Negative       Answers for HPI/ROS submitted by the patient on 8/9/2017   General Symptoms: Yes  Skin Symptoms: No  HENT Symptoms: No  EYE SYMPTOMS: No  HEART SYMPTOMS: No  LUNG SYMPTOMS: No  INTESTINAL SYMPTOMS: Yes  URINARY SYMPTOMS: Yes  GYNECOLOGIC SYMPTOMS: No  BREAST SYMPTOMS: No  SKELETAL SYMPTOMS: No  BLOOD SYMPTOMS: No  NERVOUS SYSTEM SYMPTOMS: No  MENTAL HEALTH SYMPTOMS: No  Fever: No  Loss of appetite: No  Weight loss: No  Weight gain: No  Fatigue: No  Night sweats: No  Chills: No  Increased stress: Yes  Excessive hunger: No  Excessive thirst: No  Feeling hot or cold when others believe the temperature is normal: Yes  Loss of height: No  Post-operative complications: No  Surgical site pain: No  Hallucinations: No  Change in or Loss of Energy: No  Hyperactivity: No  Confusion: No  Heart burn or indigestion: Yes  Nausea: Yes  Vomiting: No  Abdominal pain: Yes  Bloating: Yes  Constipation: Yes  Diarrhea: No  Blood in stool: No  Black stools: No  Rectal or Anal pain: Yes  Fecal incontinence: No  Rectal bleeding: No  Yellowing of skin or eyes: No  Vomit with blood: No  Change in stools: No  Hemorrhoids: No  Trouble holding urine or incontinence: Yes  Pain or burning: No  Trouble starting or stopping: No  Increased frequency of urination: No  Blood in urine: No  Decreased frequency of urination: No  Frequent nighttime urination:  No  Flank pain: No  Difficulty emptying bladder: No

## 2017-08-23 NOTE — PATIENT INSTRUCTIONS
1. Continue Linzess 290 mcg daily.  2. Ok to add as needed Miralax for more symptomatic periods (such as with traveling).   3. Continue efforts to keep up hydration and fiber intake.   4. Follow-up in GI clinic in 6 months.   If you have any questions, please don't hesitate to contact our GI RN Clinic Coordinators at [phone] (select option #3 for nurse line).

## 2017-10-17 ENCOUNTER — MYC MEDICAL ADVICE (OUTPATIENT)
Dept: GASTROENTEROLOGY | Facility: CLINIC | Age: 22
End: 2017-10-17

## 2018-01-09 ENCOUNTER — MYC MEDICAL ADVICE (OUTPATIENT)
Dept: GASTROENTEROLOGY | Facility: CLINIC | Age: 23
End: 2018-01-09

## 2018-01-09 DIAGNOSIS — K59.04 CHRONIC IDIOPATHIC CONSTIPATION: Primary | ICD-10-CM

## 2018-01-29 ENCOUNTER — OFFICE VISIT (OUTPATIENT)
Dept: FAMILY MEDICINE | Facility: CLINIC | Age: 23
End: 2018-01-29
Payer: COMMERCIAL

## 2018-01-29 VITALS
BODY MASS INDEX: 23.66 KG/M2 | HEART RATE: 82 BPM | SYSTOLIC BLOOD PRESSURE: 115 MMHG | HEIGHT: 65 IN | TEMPERATURE: 98.2 F | DIASTOLIC BLOOD PRESSURE: 66 MMHG | OXYGEN SATURATION: 100 % | WEIGHT: 142 LBS

## 2018-01-29 DIAGNOSIS — Z12.4 PAP SMEAR FOR CERVICAL CANCER SCREENING: ICD-10-CM

## 2018-01-29 DIAGNOSIS — K59.00 CONSTIPATION, UNSPECIFIED CONSTIPATION TYPE: ICD-10-CM

## 2018-01-29 DIAGNOSIS — Z30.41 ENCOUNTER FOR BIRTH CONTROL PILLS MAINTENANCE: ICD-10-CM

## 2018-01-29 DIAGNOSIS — R23.2 HOT FLASHES: ICD-10-CM

## 2018-01-29 DIAGNOSIS — Z00.01 ENCOUNTER FOR ROUTINE ADULT MEDICAL EXAM WITH ABNORMAL FINDINGS: Primary | ICD-10-CM

## 2018-01-29 PROCEDURE — 99395 PREV VISIT EST AGE 18-39: CPT | Performed by: PHYSICIAN ASSISTANT

## 2018-01-29 PROCEDURE — G0145 SCR C/V CYTO,THINLAYER,RESCR: HCPCS | Performed by: PHYSICIAN ASSISTANT

## 2018-01-29 RX ORDER — NORGESTIMATE AND ETHINYL ESTRADIOL 0.25-0.035
1 KIT ORAL DAILY
Qty: 112 TABLET | Refills: 3 | Status: SHIPPED | OUTPATIENT
Start: 2018-01-29 | End: 2019-02-28

## 2018-01-29 NOTE — MR AVS SNAPSHOT
After Visit Summary   1/29/2018    Genesis Cutler    MRN: [id number]           Patient Information     Date Of Birth          1995        Visit Information        Provider Department      1/29/2018 8:40 AM Josette Chavez PA-C [facility]        Today's Diagnoses     Encounter for routine adult medical exam with abnormal findings    -  1    Encounter for birth control pills maintenance        Hot flashes        Constipation, unspecified constipation type        Pap smear for cervical cancer screening          Care Instructions    Call your insurance to discuss coverage of Vitamin D lab and TSH lab.     Please call to schedule your lab only appointment.    After your next menses, start taking your birth control continuously, skipping the sugar pills, for a total of 4 packs. Then, take a week of the sugar pills.     Return in 6 months for recheck of your birth control.    Return sooner if any concerns.           Follow-ups after your visit        Your next 10 appointments already scheduled     Mar 14, 2018 11:00 AM CDT   (Arrive by 10:45 AM)   Return Visit with Gayle Haro MD   [facility] ([facility])    [address] [place] [id number] [phone]              Future tests that were ordered for you today     Open Future Orders        Priority Expected Expires Ordered    Vitamin D Deficiency Routine  1/29/2019 1/29/2018    TSH with free T4 reflex Routine  8/2/2018 1/29/2018            Who to contact     If you have questions or need follow up information about today's clinic visit or your schedule please contact [facility] directly at [phone].  Normal or non-critical lab and imaging results will be communicated to you by MyChart, letter or phone within 4 business days after the clinic has received the results. If you do not hear from us within 7  "days, please contact the clinic through Sharethrough or phone. If you have a critical or abnormal lab result, we will notify you by phone as soon as possible.  Submit refill requests through Sharethrough or call your pharmacy and they will forward the refill request to us. Please allow 3 business days for your refill to be completed.          Additional Information About Your Visit        [facility] Information     Share[employer] gives you secure access to your electronic health record. If you see a primary care provider, you can also send messages to your care team and make appointments. If you have questions, please call your primary care clinic.  If you do not have a primary care provider, please call [phone] and they will assist you.        Care EveryWhere ID     This is your Care EveryWhere ID. This could be used by other organizations to access your [facility] medical records  [id number]        Your Vitals Were     Pulse Temperature Height Last Period Pulse Oximetry BMI (Body Mass Index)    82 98.2  F (36.8  C) (Oral) 5' 4.5\" (1.638 m) 01/07/2018 100% 24 kg/m2       Blood Pressure from Last 3 Encounters:   01/29/18 115/66   08/23/17 102/71   04/05/17 104/64    Weight from Last 3 Encounters:   01/29/18 142 lb (64.4 kg)   08/23/17 139 lb (63 kg)   04/05/17 135 lb (61.2 kg)              We Performed the Following     Pap imaged thin layer screen only - recommended age 21 - 24 years          Today's Medication Changes          These changes are accurate as of 1/29/18  9:40 AM.  If you have any questions, ask your nurse or doctor.               These medicines have changed or have updated prescriptions.        Dose/Directions    norgestimate-ethinyl estradiol 0.25-35 MG-MCG per tablet   Commonly known as:  ORTHO-CYCLEN, SPRINTEC   This may have changed:  additional instructions   Used for:  Encounter for birth control pills maintenance   Changed by:  Josette Chavez PA-C        Dose:  1 tablet   Take 1 tablet by mouth " daily Take 4 continuous packs, skipping the sugar pill week.   Quantity:  112 tablet   Refills:  3            Where to get your medicines      These medications were sent to [facility] - [place], [place] - [address] [place] [address]  [address] [facility] [address]     Phone:  [phone]     norgestimate-ethinyl estradiol 0.25-35 MG-MCG per tablet                Primary Care Provider Office Phone # Fax #    Josette Roselyn Chavez PA-C [phone] [address] [facility] [address]        Equal Access to Services     [facility]: Hadii stas ku hadasho Soomaali, waaxda luqadaha, qaybta kaalmada adebekahyagerda, lester escobar . So [facility] [phone].    ATENCIÓN: Si habla español, tiene a dela cruz disposición servicios gratuitos de asistencia lingüística. [facility] [phone].    We comply with applicable federal civil rights laws and [place] laws. We do not discriminate on the basis of race, color, national origin, age, disability, sex, sexual orientation, or gender identity.            Thank you!     Thank you for choosing [facility]  for your care. Our goal is always to provide you with excellent care. Hearing back from our patients is one way we can continue to improve our services. Please take a few minutes to complete the written survey that you may receive in the mail after your visit with us. Thank you!             Your Updated Medication List - Protect others around you: Learn how to safely use, store and throw away your medicines at www.disposemymeds.org.          This list is accurate as of 1/29/18  9:40 AM.  Always use your most recent med list.                   Brand Name Dispense Instructions for use Diagnosis    calcium citrate 950 MG tablet    CALCITRATE     Take 1 tablet by mouth daily        DAILY VITAMINS FOR WOMEN PO           MIRALAX powder   Generic drug:  polyethylene glycol      Take 1 capful by mouth 4 times daily         norgestimate-ethinyl estradiol 0.25-35 MG-MCG per tablet    ORTHO-CYCLEN, SPRINTEC    112 tablet    Take 1 tablet by mouth daily Take 4 continuous packs, skipping the sugar pill week.    Encounter for birth control pills maintenance       plecanatide 3 MG tablet    TRULANCE    60 tablet    Take 1 tablet (3 mg) by mouth daily    Chronic idiopathic constipation       VITAMIN D (CHOLECALCIFEROL) PO      Take 2,000 Units by mouth daily

## 2018-01-29 NOTE — PROGRESS NOTES
SUBJECTIVE:   CC: Genesis Cutler is an 22 year old woman who presents for preventive health visit.     Physical   Annual:     Getting at least 3 servings of Calcium per day::  Yes    Bi-annual eye exam::  Yes    Dental care twice a year::  Yes    Sleep apnea or symptoms of sleep apnea::  None    Diet::  Regular (no restrictions)    Frequency of exercise::  4-5 days/week    Duration of exercise::  30-45 minutes    Taking medications regularly::  Yes    Medication side effects::  None    Additional concerns today::  YES            Last pap smear was when she turned 20. Results were NIL per patient. Declines STD screening today.  She would like her vitamin D level checked. She is concerned this may be playing a factor for her constipation. She denies fatigue. She will check coverage with insurance first.     States her last menses started early and was lighter than normal. She is having hot flashes at night during her menses. She denies weight changes, skin or hair changes. She would like to discuss other birth control options for her symptoms. She declines a tri-phasic OCP as it caused her mood swings. She refuses IUD or implanon. She prefers an OCP option.             Today's PHQ-2 Score:   PHQ-2 ( 1999 Pfizer) 1/29/2018   Q1: Little interest or pleasure in doing things 0   Q2: Feeling down, depressed or hopeless 0   PHQ-2 Score 0   Q1: Little interest or pleasure in doing things -   Q2: Feeling down, depressed or hopeless -   PHQ-2 Score -       Abuse: Current or Past(Physical, Sexual or Emotional)- No  Do you feel safe in your environment - Yes    Social History   Substance Use Topics     Smoking status: Never Smoker     Smokeless tobacco: Never Used     Alcohol use 0.0 oz/week     0 Standard drinks or equivalent per week     No flowsheet data found.No flowsheet data found.    Reviewed orders with patient.  Reviewed health maintenance and updated orders accordingly - Yes  BP Readings from Last 3  Encounters:   01/29/18 115/66   08/23/17 102/71   04/05/17 104/64    Wt Readings from Last 3 Encounters:   01/29/18 142 lb (64.4 kg)   08/23/17 139 lb (63 kg)   04/05/17 135 lb (61.2 kg)                  There is no problem list on file for this patient.    Past Surgical History:   Procedure Laterality Date     COLONOSCOPY  03/2017       Social History   Substance Use Topics     Smoking status: Never Smoker     Smokeless tobacco: Never Used     Alcohol use 0.0 oz/week     0 Standard drinks or equivalent per week     Family History   Problem Relation Age of Onset     DIABETES Mother      DIABETES Maternal Grandmother      Coronary Artery Disease Maternal Grandfather      CEREBROVASCULAR DISEASE Maternal Grandfather      Coronary Artery Disease Paternal Grandmother      Unknown/Adopted Paternal Grandfather          Current Outpatient Prescriptions   Medication Sig Dispense Refill     norgestimate-ethinyl estradiol (ORTHO-CYCLEN, SPRINTEC) 0.25-35 MG-MCG per tablet Take 1 tablet by mouth daily Take 4 continuous packs, skipping the sugar pill week. 112 tablet 3     plecanatide (TRULANCE) 3 MG tablet Take 1 tablet (3 mg) by mouth daily 60 tablet 3     calcium citrate (CALCITRATE) 950 MG tablet Take 1 tablet by mouth daily       VITAMIN D, CHOLECALCIFEROL, PO Take 2,000 Units by mouth daily       polyethylene glycol (MIRALAX) powder Take 1 capful by mouth 4 times daily        Multiple Vitamins-Calcium (DAILY VITAMINS FOR WOMEN PO)        [DISCONTINUED] norgestimate-ethinyl estradiol (ORTHO-CYCLEN, SPRINTEC) 0.25-35 MG-MCG per tablet Take 1 tablet by mouth daily 84 tablet 3     Allergies   Allergen Reactions     Penicillins Rash       Mammogram not appropriate for this patient based on age.    Pertinent mammograms are reviewed under the imaging tab.  History of abnormal Pap smear: NO - age 21-29 PAP every 3 years recommended    Reviewed and updated as needed this visit by clinical staff  Tobacco  Allergies  Meds  Med Hx  " Surg Hx  Fam Hx  Soc Hx        Reviewed and updated as needed this visit by Provider            Review of Systems  C: NEGATIVE for fever, chills, change in weight  I: NEGATIVE for worrisome rashes, moles or lesions  E: NEGATIVE for vision changes or irritation  ENT: NEGATIVE for ear, mouth and throat problems  R: NEGATIVE for significant cough or SOB  B: NEGATIVE for masses, tenderness or discharge  CV: NEGATIVE for chest pain, palpitations or peripheral edema  GI: NEGATIVE for nausea, abdominal pain, heartburn, or change in bowel habits  : NEGATIVE for unusual urinary or vaginal symptoms. Periods are regular.  M: NEGATIVE for significant arthralgias or myalgia  N: NEGATIVE for weakness, dizziness or paresthesias  P: NEGATIVE for changes in mood or affect     OBJECTIVE:   /66  Pulse 82  Temp 98.2  F (36.8  C) (Oral)  Ht 5' 4.5\" (1.638 m)  Wt 142 lb (64.4 kg)  LMP 01/07/2018  SpO2 100%  BMI 24 kg/m2  Physical Exam  GENERAL: healthy, alert and no distress  EYES: Eyes grossly normal to inspection, PERRL and conjunctivae and sclerae normal  HENT: ear canals and TM's normal, nose and mouth without ulcers or lesions  NECK: no adenopathy, no asymmetry, masses, or scars and thyroid normal to palpation  RESP: lungs clear to auscultation - no rales, rhonchi or wheezes  BREAST: normal without masses, tenderness or nipple discharge and no palpable axillary masses or adenopathy  CV: regular rate and rhythm, normal S1 S2, no S3 or S4, no murmur, click or rub, no peripheral edema and peripheral pulses strong  ABDOMEN: soft, nontender, no hepatosplenomegaly, no masses and bowel sounds normal   (female): normal female external genitalia, normal urethral meatus , vaginal mucosa pink, moist, well rugated and normal cervix, adnexae, and uterus without masses.  MS: no gross musculoskeletal defects noted, no edema  SKIN: no suspicious lesions or rashes  NEURO: Normal strength and tone, mentation intact and speech " "normal  PSYCH: mentation appears normal, affect normal/bright    ASSESSMENT/PLAN:       ICD-10-CM    1. Encounter for routine adult medical exam with abnormal findings Z00.01    2. Hot flashes R23.2 TSH with free T4 reflex   3. Constipation, unspecified constipation type K59.00 Vitamin D Deficiency   4. Encounter for birth control pills maintenance Z30.41 norgestimate-ethinyl estradiol (ORTHO-CYCLEN, SPRINTEC) 0.25-35 MG-MCG per tablet   5. Pap smear for cervical cancer screening Z12.4 Pap imaged thin layer screen only - recommended age 21 - 24 years     Will check thyroid due to hot flashes. Will try continuous birth control option before changing to different type of OCP. Recommended follow up with GYN in 6 months for recheck. Return sooner if any concerns. She understood and agreed.    COUNSELING:  Reviewed preventive health counseling, as reflected in patient instructions       Regular exercise       Healthy diet/nutrition         reports that she has never smoked. She has never used smokeless tobacco.    Estimated body mass index is 24 kg/(m^2) as calculated from the following:    Height as of this encounter: 5' 4.5\" (1.638 m).    Weight as of this encounter: 142 lb (64.4 kg).       Counseling Resources:  ATP IV Guidelines  Pooled Cohorts Equation Calculator  Breast Cancer Risk Calculator  FRAX Risk Assessment  ICSI Preventive Guidelines  Dietary Guidelines for Americans, 2010  USDA's MyPlate  ASA Prophylaxis  Lung CA Screening    Josette Chavez PA-C  [facility]  "

## 2018-01-29 NOTE — NURSING NOTE
"Chief Complaint   Patient presents with     Physical       Initial /66  Pulse 82  Temp 98.2  F (36.8  C) (Oral)  Ht 5' 4.5\" (1.638 m)  Wt 142 lb (64.4 kg)  LMP 01/07/2018  SpO2 100%  BMI 24 kg/m2 Estimated body mass index is 24 kg/(m^2) as calculated from the following:    Height as of this encounter: 5' 4.5\" (1.638 m).    Weight as of this encounter: 142 lb (64.4 kg).  Medication Reconciliation: complete  Dalia Perez M.A.    "

## 2018-01-30 DIAGNOSIS — K59.00 CONSTIPATION, UNSPECIFIED CONSTIPATION TYPE: ICD-10-CM

## 2018-01-30 DIAGNOSIS — R23.2 HOT FLASHES: ICD-10-CM

## 2018-01-30 LAB
DEPRECATED CALCIDIOL+CALCIFEROL SERPL-MC: 86 UG/L (ref 20–75)
TSH SERPL DL<=0.005 MIU/L-ACNC: 2.31 MU/L (ref 0.4–4)

## 2018-01-30 PROCEDURE — 82306 VITAMIN D 25 HYDROXY: CPT | Performed by: PHYSICIAN ASSISTANT

## 2018-01-30 PROCEDURE — 36415 COLL VENOUS BLD VENIPUNCTURE: CPT | Performed by: PHYSICIAN ASSISTANT

## 2018-01-30 PROCEDURE — 84443 ASSAY THYROID STIM HORMONE: CPT | Performed by: PHYSICIAN ASSISTANT

## 2018-01-31 LAB
COPATH REPORT: NORMAL
PAP: NORMAL

## 2018-02-06 ENCOUNTER — OFFICE VISIT (OUTPATIENT)
Dept: FAMILY MEDICINE | Facility: CLINIC | Age: 23
End: 2018-02-06
Payer: COMMERCIAL

## 2018-02-06 VITALS
WEIGHT: 142 LBS | HEART RATE: 69 BPM | TEMPERATURE: 97.3 F | SYSTOLIC BLOOD PRESSURE: 127 MMHG | HEIGHT: 65 IN | BODY MASS INDEX: 23.66 KG/M2 | DIASTOLIC BLOOD PRESSURE: 89 MMHG | OXYGEN SATURATION: 100 %

## 2018-02-06 DIAGNOSIS — R53.83 OTHER FATIGUE: ICD-10-CM

## 2018-02-06 DIAGNOSIS — N95.1 MENOPAUSAL SYNDROME (HOT FLASHES): Primary | ICD-10-CM

## 2018-02-06 DIAGNOSIS — I73.00 RAYNAUD'S DISEASE WITHOUT GANGRENE: ICD-10-CM

## 2018-02-06 DIAGNOSIS — N92.6 IRREGULAR MENSES: ICD-10-CM

## 2018-02-06 PROCEDURE — 99214 OFFICE O/P EST MOD 30 MIN: CPT | Performed by: FAMILY MEDICINE

## 2018-02-06 RX ORDER — CLONIDINE HYDROCHLORIDE 0.1 MG/1
0.1 TABLET ORAL AT BEDTIME
Qty: 30 TABLET | Refills: 5 | Status: SHIPPED | OUTPATIENT
Start: 2018-02-06 | End: 2019-06-12

## 2018-02-06 RX ORDER — BUPROPION HYDROCHLORIDE 100 MG/1
TABLET, EXTENDED RELEASE ORAL
Qty: 60 TABLET | Refills: 5 | Status: SHIPPED | OUTPATIENT
Start: 2018-02-06 | End: 2019-06-12

## 2018-02-06 NOTE — PROGRESS NOTES
Night sweats, no sex drive, mood swings the week before, cold all the time-finger nails turn purple, fatigue, the week before and during she has urine leakage, constipation

## 2018-02-06 NOTE — MR AVS SNAPSHOT
After Visit Summary   2/6/2018    Genesis Cutler    MRN: [id number]           Patient Information     Date Of Birth          1995        Visit Information        Provider Department      2/6/2018 5:30 PM Yonatan Hill MD [facility]        Today's Diagnoses     Menopausal syndrome (hot flashes)    -  1    Raynaud's disease without gangrene        Other fatigue        Irregular menses           Follow-ups after your visit        Additional Services     OB/GYN REFERRAL       Your provider has referred you to:  FMG: [facility] [phone]   [email or web address]/  [facility]: [facility] [phone]   [email or web address]/[email or web address]/  [facility]: [facility] [phone]   [email or web address]/[email or web address]/  FMG: [facility] [phone]   [email or web address]/   FMG: [facility] [phone]   [email or web address]/[email or web address]/    Please be aware that coverage of these services is subject to the terms and limitations of your health insurance plan.  Call member services at your health plan with any benefit or coverage questions.      Please bring the following with you to your appointment:    (1) Any X-Rays, CTs or MRIs which have been performed.  Contact the facility where they were done to arrange for  prior to your scheduled appointment.   (2) List of current medications   (3) This referral request   (4) Any documents/labs given to you for this referral                  Your next 10 appointments already scheduled     Mar 14, 2018 11:00 AM CDT   (Arrive by 10:45 AM)   Return Visit with Gayle Haro MD   [facility] ([facility])    [address]  [address]th [facility] [address]  "  [phone]              Future tests that were ordered for you today     Open Future Orders        Priority Expected Expires Ordered    US Pelvic Complete w Transvaginal Routine  2/6/2019 2/6/2018            Who to contact     If you have questions or need follow up information about today's clinic visit or your schedule please contact [facility] directly at [phone].  Normal or non-critical lab and imaging results will be communicated to you by Visual.lyhart, letter or phone within 4 business days after the clinic has received the results. If you do not hear from us within 7 days, please contact the clinic through [employer] or phone. If you have a critical or abnormal lab result, we will notify you by phone as soon as possible.  Submit refill requests through [employer]coin or call your pharmacy and they will forward the refill request to us. Please allow 3 business days for your refill to be completed.          Additional Information About Your Visit        Visual.lyhart Information     expresscoin gives you secure access to your electronic health record. If you see a primary care provider, you can also send messages to your care team and make appointments. If you have questions, please call your primary care clinic.  If you do not have a primary care provider, please call [phone] and they will assist you.        Care EveryWhere ID     This is your Care EveryWhere ID. This could be used by other organizations to access your [place] medical records  [id number]        Your Vitals Were     Pulse Temperature Height Last Period Pulse Oximetry BMI (Body Mass Index)    69 97.3  F (36.3  C) (Oral) 5' 4.5\" (1.638 m) 01/07/2018 100% 24 kg/m2       Blood Pressure from Last 3 Encounters:   02/06/18 127/89   01/29/18 115/66   08/23/17 102/71    Weight from Last 3 Encounters:   02/06/18 142 lb (64.4 kg)   01/29/18 142 lb (64.4 kg)   08/23/17 139 lb (63 kg)              We Performed the Following     OB/GYN REFERRAL        "   Today's Medication Changes          These changes are accurate as of 2/6/18  5:48 PM.  If you have any questions, ask your nurse or doctor.               Start taking these medicines.        Dose/Directions    buPROPion 100 MG 12 hr tablet   Commonly known as:  WELLBUTRIN SR   Used for:  Other fatigue, Irregular menses        One tab daily x 1 week then twice daily   Quantity:  60 tablet   Refills:  5       cloNIDine 0.1 MG tablet   Commonly known as:  CATAPRES   Used for:  Menopausal syndrome (hot flashes)        Dose:  0.1 mg   Take 1 tablet (0.1 mg) by mouth At Bedtime As needed for hot flashes   Quantity:  30 tablet   Refills:  5            Where to get your medicines      These medications were sent to [address] [address]     Phone:  [phone]     buPROPion 100 MG 12 hr tablet    cloNIDine 0.1 MG tablet                Primary Care Provider Office Phone # Fax #    Josette Chavez PA-C [phone] [address] [facility] [address]        Equal Access to Services     [facility]: Hadii aad ku hadasho Soomaali, waaxda luqadaha, qaybta kaalmada adeegyagerda, lester escobar . So [facility] [phone].    ATENCIÓN: Si habla español, tiene a dela cruz disposición servicios gratuitos de asistencia lingüística. [facility] [phone].    We comply with applicable federal civil rights laws and [place] laws. We do not discriminate on the basis of race, color, national origin, age, disability, sex, sexual orientation, or gender identity.            Thank you!     Thank you for choosing [facility]  for your care. Our goal is always to provide you with excellent care. Hearing back from our patients is one way we can continue to improve our services. Please take a few minutes to complete the written survey that you may receive in the mail after your visit with us. Thank you!              Your Updated Medication List - Protect others around you: Learn how to safely use, store and throw away your medicines at www.disposemymeds.org.          This list is accurate as of 2/6/18  5:48 PM.  Always use your most recent med list.                   Brand Name Dispense Instructions for use Diagnosis    buPROPion 100 MG 12 hr tablet    WELLBUTRIN SR    60 tablet    One tab daily x 1 week then twice daily    Other fatigue, Irregular menses       calcium citrate 950 MG tablet    CALCITRATE     Take 1 tablet by mouth daily        cloNIDine 0.1 MG tablet    CATAPRES    30 tablet    Take 1 tablet (0.1 mg) by mouth At Bedtime As needed for hot flashes    Menopausal syndrome (hot flashes)       DAILY VITAMINS FOR WOMEN PO           MIRALAX powder   Generic drug:  polyethylene glycol      Take 1 capful by mouth 4 times daily        norgestimate-ethinyl estradiol 0.25-35 MG-MCG per tablet    ORTHO-CYCLEN, SPRINTEC    112 tablet    Take 1 tablet by mouth daily Take 4 continuous packs, skipping the sugar pill week.    Encounter for birth control pills maintenance       plecanatide 3 MG tablet    TRULANCE    60 tablet    Take 1 tablet (3 mg) by mouth daily    Chronic idiopathic constipation       VITAMIN D (CHOLECALCIFEROL) PO      Take 2,000 Units by mouth daily

## 2018-02-06 NOTE — NURSING NOTE
"Chief Complaint   Patient presents with     Perspiration       Initial /89  Pulse 69  Temp 97.3  F (36.3  C) (Oral)  Ht 5' 4.5\" (1.638 m)  Wt 142 lb (64.4 kg)  LMP 01/07/2018  SpO2 100%  BMI 24 kg/m2 Estimated body mass index is 24 kg/(m^2) as calculated from the following:    Height as of this encounter: 5' 4.5\" (1.638 m).    Weight as of this encounter: 142 lb (64.4 kg).  Medication Reconciliation: complete  Francoise Mtz CMA    "

## 2018-02-06 NOTE — PROGRESS NOTES
"SUBJECTIVE:  Genesis Cutler, a 22 year old female scheduled an appointment to discuss the following issues:  Night sweats, no sex drive, mood swings the week before, cold all the time-finger nails turn purple, fatigue, the week before and during she has urine leakage, constipation.  Started about 1.5 years ago. Started after weight loss 80 lbs weight.   On ocp - regular timing. Mildly painful. No too heavy. Was irregular before ocp.   Sister PCO.  Mood swings before menses. On prozac in past - didn't feel good while on.   wellbutrin was helpful with mood swings in past.   No changes in stress. Marriage going ok. 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   Sleep ok except night sweats before menses.  GI - constipation ok.  Occasionally nauseated with menses.   Stopped vitaminD was on 4000 campos before.   Purple finger with cold weather.  Uncle with RA.   No joint swelling or pain. No rashes. No blurry vision - glasses - eye exam in fall ok.   6 years ago normal u/s and had cyst?   trulance started one month ago - doesn't think related.   No children in past.     No past medical history on file.    Past Surgical History:   Procedure Laterality Date     COLONOSCOPY  03/2017       Family History   Problem Relation Age of Onset     DIABETES Mother      DIABETES Maternal Grandmother      Coronary Artery Disease Maternal Grandfather      CEREBROVASCULAR DISEASE Maternal Grandfather      Coronary Artery Disease Paternal Grandmother      Unknown/Adopted Paternal Grandfather        Social History   Substance Use Topics     Smoking status: Never Smoker     Smokeless tobacco: Never Used     Alcohol use 0.0 oz/week     0 Standard drinks or equivalent per week       ROS:  All other ROS negative  OBJECTIVE:  /89  Pulse 69  Temp 97.3  F (36.3  C) (Oral)  Ht 5' 4.5\" (1.638 m)  Wt 142 lb (64.4 kg)  LMP 01/07/2018  SpO2 100%  BMI 24 kg/m2  EXAM:  GENERAL APPEARANCE: healthy, alert and no " distress  EYES: EOMI,  PERRL  HENT: ear canals and TM's normal and nose and mouth without ulcers or lesions  NECK: no adenopathy, no asymmetry, masses, or scars and thyroid normal to palpation  RESP: lungs clear to auscultation - no rales, rhonchi or wheezes  CV: regular rates and rhythm, normal S1 S2, no S3 or S4 and no murmur, click or rub -  ABDOMEN:  soft, nontender, no HSM or masses and bowel sounds normal  MS: extremities normal- no gross deformities noted, no evidence of inflammation in joints, FROM in all extremities.  SKIN: no suspicious lesions or rashes  NEURO: Normal strength and tone, sensory exam grossly normal, mentation intact and speech normal  PSYCH: mentation appears normal and affect normal/bright  PSYCH: mildly anxious  LYMPHATICS: No axillary, cervical, inguinal, or supraclavicular nodes    ASSESSMENT / PLAN:  (N95.1) Menopausal syndrome (hot flashes)  (primary encounter diagnosis)  Plan: cloNIDine (CATAPRES) 0.1 MG tablet, US Pelvic         Complete w Transvaginal, OB/GYN REFERRAL        Follow-up ob/gyn for hormonal work up. Consider endo input.   Reveiwed risks and side effects of medication - take at bedtime prn and keep well hydrated.     (I73.00) Raynaud's disease without gangrene  Comment: no arthritis changes  Plan: keep warm. Consider rheum input. Needs hormonal work up too.     (R53.83) Other fatigue  Comment: wellbutrin helpful in past  Plan: buPROPion (WELLBUTRIN SR) 100 MG 12 hr tablet        Continue exercise. If SUICIAL IDEATION OR HOMOCIDAL IDEATION OR GREG TO ER. Should help with sex drive too. Reveiwed risks and side effects of medication  Expected course and warning signs reviewed. Call/email with questions/concerns. Can wean off in spring if desired too.     (N92.6) Irregular menses  Plan: buPROPion (WELLBUTRIN SR) 100 MG 12 hr tablet,         US Pelvic Complete w Transvaginal, OB/GYN         REFERRAL            Yonatan Hill

## 2018-03-01 ENCOUNTER — RADIANT APPOINTMENT (OUTPATIENT)
Dept: ULTRASOUND IMAGING | Facility: CLINIC | Age: 23
End: 2018-03-01
Attending: FAMILY MEDICINE
Payer: COMMERCIAL

## 2018-03-01 ENCOUNTER — OFFICE VISIT (OUTPATIENT)
Dept: OBGYN | Facility: CLINIC | Age: 23
End: 2018-03-01
Payer: COMMERCIAL

## 2018-03-01 VITALS
OXYGEN SATURATION: 100 % | TEMPERATURE: 97.9 F | SYSTOLIC BLOOD PRESSURE: 116 MMHG | DIASTOLIC BLOOD PRESSURE: 74 MMHG | HEART RATE: 60 BPM | WEIGHT: 142 LBS | BODY MASS INDEX: 24 KG/M2

## 2018-03-01 DIAGNOSIS — Z30.41 ENCOUNTER FOR SURVEILLANCE OF CONTRACEPTIVE PILLS: Primary | ICD-10-CM

## 2018-03-01 DIAGNOSIS — N92.6 IRREGULAR MENSES: ICD-10-CM

## 2018-03-01 DIAGNOSIS — N95.1 MENOPAUSAL SYNDROME (HOT FLASHES): ICD-10-CM

## 2018-03-01 PROCEDURE — 76830 TRANSVAGINAL US NON-OB: CPT | Performed by: STUDENT IN AN ORGANIZED HEALTH CARE EDUCATION/TRAINING PROGRAM

## 2018-03-01 PROCEDURE — 76856 US EXAM PELVIC COMPLETE: CPT | Performed by: STUDENT IN AN ORGANIZED HEALTH CARE EDUCATION/TRAINING PROGRAM

## 2018-03-01 PROCEDURE — 99203 OFFICE O/P NEW LOW 30 MIN: CPT | Performed by: OBSTETRICS & GYNECOLOGY

## 2018-03-01 NOTE — PATIENT INSTRUCTIONS
If you have any questions regarding your visit, Please contact your care team.    Women s Health CLINIC HOURS TELEPHONE NUMBER   MD Mckenzie Uriostegui CMA Lisa -    ROYER Reyes       Monday:       7:30-4:30 [place]  Wednesday:       7:30-4:30 [place]  Thursday:       7:30-1:30 [place]  Friday:       7:30-11:30 [facility]  [address] [place]  [address]  [phone] ask for [facility]  [address] [place] [address]  [phone] ask for [facility]    Imaging Scheduling for [place]:  [phone]    Imaging Scheduling for [place]: [phone]       Urgent Care locations:    [facility] Saturday and Sunday   9 am - 5 pm    Monday-Friday   12 pm - 8 pm  Saturday and Sunday   9 am - 5 pm   [phone]     [facility] Labor and Delivery:  [phone]    If you need a medication refill, please contact your pharmacy. Please allow 3 business days for your refill to be completed.  As always, Thank you for trusting us with your healthcare needs!

## 2018-03-01 NOTE — MR AVS SNAPSHOT
After Visit Summary   3/1/2018    Genesis Cutler    MRN: [id number]           Patient Information     Date Of Birth          1995        Visit Information        Provider Department      3/1/2018 10:00 AM Andrzej Chin MD [facility]        Today's Diagnoses     Encounter for surveillance of contraceptive pills    -  1      Care Instructions                                                         If you have any questions regarding your visit, Please contact your care team.    Women s Health CLINIC HOURS TELEPHONE NUMBER   MD Mckenzie Uriostegui CMA Lisa -    ROYER Reyes       Monday:       7:30-4:30 [place]  Wednesday:       7:30-4:30 [place]  Thursday:       7:30-1:30 [place]  Friday:       7:30-11:30 [facility] [place]  [address] [place], [place]  [id number] [phone] ask for [facility]  [address] [place] [address]  [phone] ask for [facility]    Imaging Scheduling for [place]:  [phone]    Imaging Scheduling for [place]: [phone]       Urgent Care locations:    [facility] Saturday and Sunday   9 am - 5 pm    Monday-Friday   12 pm - 8 pm  Saturday and Sunday   9 am - 5 pm   [phone]     [facility] Labor and Delivery:  [phone]    If you need a medication refill, please contact your pharmacy. Please allow 3 business days for your refill to be completed.  As always, Thank you for trusting us with your healthcare needs!              Follow-ups after your visit        Follow-up notes from your care team     Return in about 4 weeks (around 3/29/2018) for Follow up Visit.      Your next 10 appointments already scheduled     Mar 14, 2018 11:00 AM CDT   (Arrive by 10:45 AM)   Return Visit with Gayle Haro MD   [facility] ([facility]  [place])    [address]  [address]th [facility] [address] [phone]              Who to contact     If you have questions or need follow up information about today's clinic visit or your schedule please contact [facility] directly at [phone].  Normal or non-critical lab and imaging results will be communicated to you by MyChart, letter or phone within 4 business days after the clinic has received the results. If you do not hear from us within 7 days, please contact the clinic through MyChart or phone. If you have a critical or abnormal lab result, we will notify you by phone as soon as possible.  Submit refill requests through [employer] or call your pharmacy and they will forward the refill request to us. Please allow 3 business days for your refill to be completed.          Additional Information About Your Visit        MyChart Information     [employer] gives you secure access to your electronic health record. If you see a primary care provider, you can also send messages to your care team and make appointments. If you have questions, please call your primary care clinic.  If you do not have a primary care provider, please call [phone] and they will assist you.        Care EveryWhere ID     This is your Care EveryWhere ID. This could be used by other organizations to access your [place] medical records  [id number]        Your Vitals Were     Pulse Temperature Last Period Pulse Oximetry BMI (Body Mass Index)       60 97.9  F (36.6  C) (Oral) 02/06/2018 100% 24 kg/m2        Blood Pressure from Last 3 Encounters:   03/01/18 116/74   02/06/18 127/89   01/29/18 115/66    Weight from Last 3 Encounters:   03/01/18 142 lb (64.4 kg)   02/06/18 142 lb (64.4 kg)   01/29/18 142 lb (64.4 kg)              Today, you had the following     No orders found for display       Primary Care Provider Office Phone # Fax #    Josette Chavez PA-C [phone] [address]  [place] [address]        Equal Access to Services     [facility] VENICE : Hadii aad ku haddontejose luis Saúlali, wacamachoda luqadaha, qaybta dorenelawandalester shelton. So [facility] [phone].    ATENCIÓN: Si habla español, tiene a dela cruz disposición servicios gratuitos de asistencia lingüística. [facility] [phone].    We comply with applicable federal civil rights laws and [place] laws. We do not discriminate on the basis of race, color, national origin, age, disability, sex, sexual orientation, or gender identity.            Thank you!     Thank you for choosing [facility]  for your care. Our goal is always to provide you with excellent care. Hearing back from our patients is one way we can continue to improve our services. Please take a few minutes to complete the written survey that you may receive in the mail after your visit with us. Thank you!             Your Updated Medication List - Protect others around you: Learn how to safely use, store and throw away your medicines at www.disposemymeds.org.          This list is accurate as of 3/1/18 11:59 PM.  Always use your most recent med list.                   Brand Name Dispense Instructions for use Diagnosis    buPROPion 100 MG 12 hr tablet    WELLBUTRIN SR    60 tablet    One tab daily x 1 week then twice daily    Other fatigue, Irregular menses       calcium citrate 950 MG tablet    CALCITRATE     Take 1 tablet by mouth daily        cloNIDine 0.1 MG tablet    CATAPRES    30 tablet    Take 1 tablet (0.1 mg) by mouth At Bedtime As needed for hot flashes    Menopausal syndrome (hot flashes)       DAILY VITAMINS FOR WOMEN PO           MIRALAX powder   Generic drug:  polyethylene glycol      Take 1 capful by mouth 4 times daily        norgestimate-ethinyl estradiol 0.25-35 MG-MCG per tablet    ORTHO-CYCLEN, SPRINTEC    112 tablet    Take 1 tablet by mouth daily Take 4 continuous packs, skipping the sugar pill week.     Encounter for birth control pills maintenance       plecanatide 3 MG tablet    TRULANCE    60 tablet    Take 1 tablet (3 mg) by mouth daily    Chronic idiopathic constipation       VITAMIN D (CHOLECALCIFEROL) PO      Take 2,000 Units by mouth daily

## 2018-03-01 NOTE — NURSING NOTE
"Chief Complaint   Patient presents with     Consult     Hot flashes per Dr. Hill       Initial /74  Pulse 60  Temp 97.9  F (36.6  C) (Oral)  Wt 142 lb (64.4 kg)  LMP 02/06/2018  SpO2 100%  BMI 24 kg/m2 Estimated body mass index is 24 kg/(m^2) as calculated from the following:    Height as of 2/6/18: 5' 4.5\" (1.638 m).    Weight as of this encounter: 142 lb (64.4 kg).  Medication Reconciliation: complete   Mckenzie Lua CMA      "

## 2018-03-01 NOTE — PROGRESS NOTES
Genesis is a 22 year old   is here today complaining of several problems.  This has been a problem for at least a year.  She has increased night sweats during the week of placebo pills.  She had been on cyclic ORAL CONTRACEPTIVE PILLS for 6+ years.  She was just changed to a continuous regiment and would be due to discard the blanks and go into the next pack next week.  She originally needed both cycle control and birth control and still needs birth control.  The worsening of her libido and the hot flushes seem to coincide.     No urinary frequency or dysuria, bladder or kidney problems    ROS: Ten point review of systems was reviewed and negative except the above.    Gyn Hx:      History reviewed. No pertinent past medical history.  Past Surgical History:   Procedure Laterality Date     COLONOSCOPY  2017     There is no problem list on file for this patient.      ALL/Meds: Her medication and allergy histories were reviewed and are documented in their appropriate chart areas.    SH: Reviewed and documented in the appropriate area of the chart.  FH:  Her family history is reviewed and updated in the chart, today.  PMH: Her past medical, surgical, and obstetric histories were reviewed and updated today in the appropriate chart areas.    PE: /74  Pulse 60  Temp 97.9  F (36.6  C) (Oral)  Wt 142 lb (64.4 kg)  LMP 2018  SpO2 100%  BMI 24 kg/m2  Body mass index is 24 kg/(m^2).    General Appearance:  healthy, alert, active, no distress    Discussed how contraceptive pills work.  Explained that there is no estrogen in the placebo week, and that drop in estrogen may be triggering her symptoms.  Explained how the lack of estrogen affects her body.      A/P:(Z30.41) Encounter for surveillance of contraceptive pills  (primary encounter diagnosis)  Comment: Out of 30 minutes spent face to face with the patient, > 50% of this was spent in consultation.    Plan: She will skip the active pills and see  if the continued estrogen through that week alleviate her vasomotor symptoms.  She will follow up in 1 month if there is no improvement           - No orders of the defined types were placed in this encounter.

## 2018-03-07 ASSESSMENT — ENCOUNTER SYMPTOMS
ALTERED TEMPERATURE REGULATION: 1
INCREASED ENERGY: 0
WEIGHT GAIN: 0
POLYDIPSIA: 0
CONSTIPATION: 1
DIARRHEA: 0
NIGHT SWEATS: 1
HEMATURIA: 0
DYSURIA: 0
JAUNDICE: 0
BLOATING: 1
DECREASED APPETITE: 0
POLYPHAGIA: 0
DECREASED LIBIDO: 1
HEARTBURN: 1
FATIGUE: 1
VOMITING: 0
CHILLS: 1
BLOOD IN STOOL: 0
BOWEL INCONTINENCE: 0
FLANK PAIN: 0
HALLUCINATIONS: 0
NAUSEA: 1
FEVER: 0
WEIGHT LOSS: 0
HOT FLASHES: 1
RECTAL PAIN: 1
DIFFICULTY URINATING: 0
ABDOMINAL PAIN: 1

## 2018-03-09 ENCOUNTER — TELEPHONE (OUTPATIENT)
Dept: GASTROENTEROLOGY | Facility: CLINIC | Age: 23
End: 2018-03-09

## 2018-03-14 ENCOUNTER — OFFICE VISIT (OUTPATIENT)
Dept: GASTROENTEROLOGY | Facility: CLINIC | Age: 23
End: 2018-03-14
Payer: COMMERCIAL

## 2018-03-14 VITALS
OXYGEN SATURATION: 100 % | DIASTOLIC BLOOD PRESSURE: 86 MMHG | TEMPERATURE: 98.4 F | BODY MASS INDEX: 23.93 KG/M2 | HEART RATE: 64 BPM | WEIGHT: 143.6 LBS | HEIGHT: 65 IN | SYSTOLIC BLOOD PRESSURE: 119 MMHG

## 2018-03-14 DIAGNOSIS — R10.84 ABDOMINAL PAIN, GENERALIZED: ICD-10-CM

## 2018-03-14 DIAGNOSIS — K59.04 CHRONIC IDIOPATHIC CONSTIPATION: Primary | ICD-10-CM

## 2018-03-14 LAB
ALBUMIN SERPL-MCNC: 3.9 G/DL (ref 3.4–5)
ALP SERPL-CCNC: 47 U/L (ref 40–150)
ALT SERPL W P-5'-P-CCNC: 33 U/L (ref 0–50)
AMYLASE SERPL-CCNC: 70 U/L (ref 30–110)
AST SERPL W P-5'-P-CCNC: 26 U/L (ref 0–45)
BILIRUB DIRECT SERPL-MCNC: 0.2 MG/DL (ref 0–0.2)
BILIRUB SERPL-MCNC: 0.4 MG/DL (ref 0.2–1.3)
LIPASE SERPL-CCNC: 159 U/L (ref 73–393)
PROT SERPL-MCNC: 7.1 G/DL (ref 6.8–8.8)

## 2018-03-14 ASSESSMENT — PAIN SCALES - GENERAL: PAINLEVEL: NO PAIN (0)

## 2018-03-14 NOTE — NURSING NOTE
"No chief complaint on file.      Vitals:    03/14/18 1051   BP: 119/86   BP Location: Left arm   Patient Position: Chair   Cuff Size: Adult Regular   Pulse: 64   Temp: 98.4  F (36.9  C)   SpO2: 100%   Weight: 143 lb 9.6 oz   Height: 5' 4.5\"       Body mass index is 24.27 kg/(m^2).      Grazyna Singh                          "

## 2018-03-14 NOTE — LETTER
3/14/2018       RE: Genesis Cutler  [id number]    [facility] [address]     Dear Colleague,    Thank you for referring your patient, Genesis Cutler, to the [facility] GASTROENTEROLOGY AND IBD CLINIC at [facility]. Please see a copy of my visit note below.    GI CLINIC VISIT    CC: follow-up      ASSESSMENT/PLAN:  (K59.04) Chronic idiopathic constipation  (primary encounter diagnosis)  Comment:    Normal colonoscopy, Sitz marker study, and anorectal manometry/defecography. Response to laxatives (Miralax specifically) but requiring ever-increasing doses. Linzess 290 mcg was effective for several months, then lost effect. Now on plecanatide with good response (daily stools with no abd symptoms). Though stooling regularly, stools are somewhat hard; she denies straining but did develop a hemorrhoid. Maxed out on water and fiber intake.   Plan:  - Keep up the good work with drinking water daily and staying hydrated.  - Consider docusate stool softener - 1 every other day to every other day to work on stool consistency.   - continue plecanatide daily    (R10.84) Abdominal pain, generalized  Comment:    Four discrete episodes over 1.5 years, though 3 of these episodes in the last five months. History suggests gallbladder disease but no noted gallstones on imaging with normal HIDA scan in years past.    Previously constipation was implicated, but this has been under good control as of late. Notably pancreatic enzymes not checked previously or during pain episode.    We discussed evaluation in the ED during episode given reported severity of pain and inability to eat/drink or move during an episode. Will send tests as below. Could consider future EGD +/- EUS (to evaluate for microlithiasis/sludge) or even CTE for better assessment of small bowel (though notably CT during initial pain episode was normal). Pt encouraged to journal symptoms following  episodes as well.   Plan:  -  H Pylori antigen stool, Lipase, Amylase, Hepatic panel            RTC 6 months    It was a pleasure to participate in the care of this patient; please contact us with any further questions.  A total of 25 face-to-face minutes was spent with this patient, >50% of which was counseling regarding the above delineated issues.    Gayle Haro MD   of Medicine  Division of Gastroenterology, Hepatology and Nutrition  [facility]    ---------------------------------------------------------------------------------------------------  HPI:  Ms. Cutler (formerly Rob) is a previously healthy 22 year old female who was initially seen in consultation with Jordan Du and Fabien of GI in July 2016 for constipation. She was initially seen by this writer in Nov 2016. She returns today for follow-up.       Summarized/taken from my prior documentation:   Ms. Cutler reports issues with constipation since early childhood but with no regular laxatives or daily medications as a child nor need for disimpactions, enemas, nor hospitalizations. She used OTC laxatives as needed through late childhood and teen years. She had two urgent care visits during these years for constipation-related abdominal pain.     Her constipation apparently worsened in her early twenties, occurring once every 1-2 months. She described going up to 2 weeks without a satisfactory BM, (she may pass some small,[place] 1 stools with straining during this time). These periods are associated with significant bloating and LLQ pain which resolve after defecation.  Ms. Cutler denies rectal fullness, rectal pain/discomfort, noted tissue prolapse, or known hemorrhoids. In the past she has had drops of blood on toilet paper. She previously tried prunes/prune juice, OTC Mg citrate, an unknown laxative pill prn with minimal effect. Her Avi COSTA put her on daily Miralax which initially was helpful, but  "its effect waned.  She reports a high vegetable and fruit intake, at least 70 oz of water daily, and daily physical activity (running) which has led to an intentional 80 lbs weight loss. Her prior work-up included a normal TSH, calcium, CMP, and hgb.    Since establishing in our clinic she has been [facility] 11[facility]2016 by Dr. Kailey Acevedo. Her testing was essentially normal with no evidence of Hirschsprung's, only mild perineal descent was noted. A Sitz marker study (after one week off of laxatives) was normal (no Sitz markers were seen on AXR done 5 days later). Though notably, she was on her period at that time (expectedly moving her bowels more frequently than her typical baseline).  A colonoscopy was done and was normal. Despite being off of laxatives (for Sitz study, just prior to colonoscopy) and her only being able to tolerate 4L Golytely (due to nausea, she was intended to take additional prep) her prep was rated at \"fair\".           During her constipation work-up, Ms. Cutler was managed on ever-increasing doses of fiber and Miralax. Initially she had a good response but found the efficacy waining with time. She titrated up to Miralax 4 times daily with morning coffee/caffeine ingestions. Ultimately, she was switched to Linzess 145 mcg, then up to 290 mcg with good response for a number of months.      Since her last visit (in Aug 2017), she found Linzess was no longer as helpful. In Jan 2018 we switched her to plecanatide/Trulance. Ms. Cutler feels this has been much more efficacious. She is moving her bowels nearly every day (only two days in the last two months when she did not have a BM). Her stools are Edmondson 1-2, but described as a \"large\" amount. She feels good after each BM, endorsing that she is \"empty.\" She continues to drink a large amount of water daily. She is ingesting 40-50 gm of fiber in her diet on an average day (tracks with ERN nelsy).    Ms. Cutler " denies any issues tolerating the plecanatide. She did feel she had a hemorrhoid last month, experienced as a tender perianal bump which she states is now gone. She denied any associated bleeding or pain with defecation. She declined a rectal exam today.       She does have concern for severe abdominal pain episodes which she'd like to discuss. She has had four episodes in total since 2016. During the first one she presented to the [facility] ED in Nov 2016, work-up with US, CT, and basic labs was negative. As the CT scan showed a large stool burden she was told this was likely due to constipation and was discharged to home. A second pain episode (precisely the same in nature) occurred around Nov 2017. She did not seek care at that time and has been monitoring her symptoms closely. She has has two more pain events since Nov 2017. The first one started around 2:30 pm and seemed to come on suddenly. The pain was intense and focused in the epigastric regions/RUQ with radiation to the back. The pain slowly dissipating, lasting two days in total. Her most recent pain experience was this past Saturday. She ate at [employer], but feels it was a typical meal. She awoke in the middle of the night with the same pain. She noted it was worse when lying down and better when sitting or hunched over. The pain is severe enough that she is unable to eat, drink, drive, perform ADLs. This pain remitted the following day. She denies f/c, n/v, or any other associated symptoms.       She has not been seen to have gallstones on prior CT or US imaging. A recent pelvic US (done a couple weeks ago) was normal. A HIDA scan was done sometime after her [facility] ED visit and was seen to be normal.         ROS:    Please see bottom of this note.    PROBLEM LIST  There are no active problems to display for this patient.      PERTINENT MEDICATIONS:  Current Outpatient Prescriptions   Medication     buPROPion (WELLBUTRIN SR) 100 MG 12 hr tablet      "cloNIDine (CATAPRES) 0.1 MG tablet     norgestimate-ethinyl estradiol (ORTHO-CYCLEN, SPRINTEC) 0.25-35 MG-MCG per tablet     plecanatide (TRULANCE) 3 MG tablet     calcium citrate (CALCITRATE) 950 MG tablet     VITAMIN D, CHOLECALCIFEROL, PO     polyethylene glycol (MIRALAX) powder     Multiple Vitamins-Calcium (DAILY VITAMINS FOR WOMEN PO)     No current facility-administered medications for this visit.          PHYSICAL EXAMINATION:  Vitals /86 (BP Location: Left arm, Patient Position: Chair, Cuff Size: Adult Regular)  Pulse 64  Temp 98.4  F (36.9  C)  Ht 1.638 m (5' 4.5\")  Wt 65.1 kg (143 lb 9.6 oz)  SpO2 100%  BMI 24.27 kg/m2   Wt   Wt Readings from Last 2 Encounters:   03/14/18 65.1 kg (143 lb 9.6 oz)   03/01/18 64.4 kg (142 lb)   Gen: Pt sitting up in NAD, interactive and cooperative on exam  Eyes: sclerae anicteric, no injection  ENT:  MMM  Cardiac: RRR, nl S1, S2  Resp: Clear on anterior exam  GI: Normoactive BS, abd soft, flat, nontender  Skin: Warm, dry, no jaundice, nails appear healthy  Neuro: alert, oriented, answers questions appropriately      PERTINENT STUDIES:    Orders Only on 01/30/2018   Component Date Value Ref Range Status     Vitamin D Deficiency screening 01/30/2018 86* 20 - 75 ug/L Final     TSH 01/30/2018 2.31  0.40 - 4.00 mU/L Final         Again, thank you for allowing me to participate in the care of your patient.      Sincerely,    Gayle Haro MD      "

## 2018-03-14 NOTE — PATIENT INSTRUCTIONS
1. Keep up the good work with drinking water daily and staying hydrated.  2. Consider docusate stool softener - 1 every other day to every other day to work on stool consistency.   3. Stop at the lab for a blood draw and stool collection kit.  4. Follow-up in GI clinic in 6 months' time with Dr. Haro.     If you have any questions, please don't hesitate to contact our GI RN Clinic Coordinators at [phone] (select option #3 for nurse line).

## 2018-03-14 NOTE — MR AVS SNAPSHOT
After Visit Summary   3/14/2018    Genesis Cutler    MRN: [id number]           Patient Information     Date Of Birth          1995        Visit Information        Provider Department      3/14/2018 11:00 AM Gayle Haro MD [facility] Gastroenterology and IBD Clinic        Today's Diagnoses     Abdominal pain, generalized    -  1      Care Instructions    1. Keep up the good work with drinking water daily and staying hydrated.  2. Consider docusate stool softener - 1 every other day to every other day to work on stool consistency.   3. Stop at the lab for a blood draw and stool collection kit.  4. Follow-up in GI clinic in 6 months' time with Dr. Haro.     If you have any questions, please don't hesitate to contact our GI RN Clinic Coordinators at [phone] (select option #3 for nurse line).                 Follow-ups after your visit        Your next 10 appointments already scheduled     Mar 14, 2018 12:15 PM CDT   LAB with  LAB   [facility])    [address] [place] [address]  [address] [facility] [address] [phone]           Please do not eat 10-12 hours before your appointment if you are coming in fasting for labs on lipids, cholesterol, or glucose (sugar). This does not apply to pregnant women. Water, hot tea and black coffee (with nothing added) are okay. Do not drink other fluids, diet soda or chew gum.            Sep 12, 2018  4:20 PM CDT   (Arrive by 4:05 PM)   Return Visit with Gayle Haro MD   [facility] Gastroenterology and IBD Clinic ([facility])    [address] [place] [address]  [address] [facility] [address] [phone]              Future tests that were ordered for you today     Open Future Orders        Priority Expected Expires Ordered    H Pylori antigen stool Routine  3/14/2019 3/14/2018    Lipase Routine  3/14/2019 3/14/2018    Amylase Routine  3/14/2019  "3/14/2018    Hepatic panel Routine  3/14/2019 3/14/2018            Who to contact     Please call your clinic at [phone] to:    Ask questions about your health    Make or cancel appointments    Discuss your medicines    Learn about your test results    Speak to your doctor            Additional Information About Your Visit        MyChart Information     [employer] gives you secure access to your electronic health record. If you see a primary care provider, you can also send messages to your care team and make appointments. If you have questions, please call your primary care clinic.  If you do not have a primary care provider, please call [phone] and they will assist you.      [employer] is an electronic gateway that provides easy, online access to your medical records. With [employer], you can request a clinic appointment, read your test results, renew a prescription or communicate with your care team.     To access your existing account, please contact your [facility] or call [phone] for assistance.        Care EveryWhere ID     This is your Care EveryWhere ID. This could be used by other organizations to access your [facility] medical records  [id number]        Your Vitals Were     Pulse Temperature Height Pulse Oximetry BMI (Body Mass Index)       64 98.4  F (36.9  C) 1.638 m (5' 4.5\") 100% 24.27 kg/m2        Blood Pressure from Last 3 Encounters:   03/14/18 119/86   03/01/18 116/74   02/06/18 127/89    Weight from Last 3 Encounters:   03/14/18 65.1 kg (143 lb 9.6 oz)   03/01/18 64.4 kg (142 lb)   02/06/18 64.4 kg (142 lb)               Primary Care Provider Office Phone # Fax #    Josette Chavez PA-C [phone] [address] [address] [facility] [address]        Equal Access to Services     HAKEEM MILLARD AH: Hadii stas beebe hadasho Soomaali, waaxda luqadaha, qaybta kaalmada adeegyada, lester pompa. So wa " [phone].    ATENCIÓN: Si paolo cruz, tiene a dela cruz disposición servicios gratuitos de asistencia lingüística. Darci aden [phone].    We comply with applicable federal civil rights laws and [place] laws. We do not discriminate on the basis of race, color, national origin, age, disability, sex, sexual orientation, or gender identity.            Thank you!     Thank you for choosing [facility] GASTROENTEROLOGY AND IBD CLINIC  for your care. Our goal is always to provide you with excellent care. Hearing back from our patients is one way we can continue to improve our services. Please take a few minutes to complete the written survey that you may receive in the mail after your visit with us. Thank you!             Your Updated Medication List - Protect others around you: Learn how to safely use, store and throw away your medicines at www.disposemymeds.org.          This list is accurate as of 3/14/18 12:03 PM.  Always use your most recent med list.                   Brand Name Dispense Instructions for use Diagnosis    buPROPion 100 MG 12 hr tablet    WELLBUTRIN SR    60 tablet    One tab daily x 1 week then twice daily    Other fatigue, Irregular menses       calcium citrate 950 MG tablet    CALCITRATE     Take 1 tablet by mouth daily        cloNIDine 0.1 MG tablet    CATAPRES    30 tablet    Take 1 tablet (0.1 mg) by mouth At Bedtime As needed for hot flashes    Menopausal syndrome (hot flashes)       DAILY VITAMINS FOR WOMEN PO           MIRALAX powder   Generic drug:  polyethylene glycol      Take 1 capful by mouth 4 times daily        norgestimate-ethinyl estradiol 0.25-35 MG-MCG per tablet    ORTHO-CYCLEN, SPRINTEC    112 tablet    Take 1 tablet by mouth daily Take 4 continuous packs, skipping the sugar pill week.    Encounter for birth control pills maintenance       plecanatide 3 MG tablet    TRULANCE    60 tablet    Take 1 tablet (3 mg) by mouth daily    Chronic idiopathic constipation       VITAMIN D  (CHOLECALCIFEROL) PO      Take 2,000 Units by mouth daily

## 2018-03-14 NOTE — PROGRESS NOTES
GI CLINIC VISIT    CC: follow-up      ASSESSMENT/PLAN:  (K59.04) Chronic idiopathic constipation  (primary encounter diagnosis)  Comment:    Normal colonoscopy, Sitz marker study, and anorectal manometry/defecography. Response to laxatives (Miralax specifically) but requiring ever-increasing doses. Linzess 290 mcg was effective for several months, then lost effect. Now on plecanatide with good response (daily stools with no abd symptoms). Though stooling regularly, stools are somewhat hard; she denies straining but did develop a hemorrhoid. Maxed out on water and fiber intake.   Plan:  - Keep up the good work with drinking water daily and staying hydrated.  - Consider docusate stool softener - 1 every other day to every other day to work on stool consistency.   - continue plecanatide daily    (R10.84) Abdominal pain, generalized  Comment:    Four discrete episodes over 1.5 years, though 3 of these episodes in the last five months. History suggests gallbladder disease but no noted gallstones on imaging with normal HIDA scan in years past.    Previously constipation was implicated, but this has been under good control as of late. Notably pancreatic enzymes not checked previously or during pain episode.    We discussed evaluation in the ED during episode given reported severity of pain and inability to eat/drink or move during an episode. Will send tests as below. Could consider future EGD +/- EUS (to evaluate for microlithiasis/sludge) or even CTE for better assessment of small bowel (though notably CT during initial pain episode was normal). Pt encouraged to journal symptoms following episodes as well.   Plan:  -  H Pylori antigen stool, Lipase, Amylase, Hepatic panel            RTC 6 months    It was a pleasure to participate in the care of this patient; please contact us with any further questions.  A total of 25 face-to-face minutes was spent with this patient, >50% of which was counseling regarding the above  delineated issues.    Gayle Haro MD   of Medicine  Division of Gastroenterology, Hepatology and Nutrition  [facility]    ---------------------------------------------------------------------------------------------------  HPI:  Ms. Cutler (formerly Rob) is a previously healthy 22 year old female who was initially seen in consultation with Jordan Du and Fabien of GI in July 2016 for constipation. She was initially seen by this writer in Nov 2016. She returns today for follow-up.       Summarized/taken from my prior documentation:   Ms. Cutler reports issues with constipation since early childhood but with no regular laxatives or daily medications as a child nor need for disimpactions, enemas, nor hospitalizations. She used OTC laxatives as needed through late childhood and teen years. She had two urgent care visits during these years for constipation-related abdominal pain.     Her constipation apparently worsened in her early twenties, occurring once every 1-2 months. She described going up to 2 weeks without a satisfactory BM, (she may pass some small,[place] 1 stools with straining during this time). These periods are associated with significant bloating and LLQ pain which resolve after defecation.  Ms. Cutler denies rectal fullness, rectal pain/discomfort, noted tissue prolapse, or known hemorrhoids. In the past she has had drops of blood on toilet paper. She previously tried prunes/prune juice, OTC Mg citrate, an unknown laxative pill prn with minimal effect. Her Avi COSTA put her on daily Miralax which initially was helpful, but its effect waned.  She reports a high vegetable and fruit intake, at least 70 oz of water daily, and daily physical activity (running) which has led to an intentional 80 lbs weight loss. Her prior work-up included a normal TSH, calcium, CMP, and hgb.    Since establishing in our clinic she has been [facility]  "11/22/2016 by Dr. Kailey Acevedo. Her testing was essentially normal with no evidence of Hirschsprung's, only mild perineal descent was noted. A Sitz marker study (after one week off of laxatives) was normal (no Sitz markers were seen on AXR done 5 days later). Though notably, she was on her period at that time (expectedly moving her bowels more frequently than her typical baseline).  A colonoscopy was done and was normal. Despite being off of laxatives (for Sitz study, just prior to colonoscopy) and her only being able to tolerate 4L [place] (due to nausea, she was intended to take additional prep) her prep was rated at \"fair\".           During her constipation work-up, Ms. Cutler was managed on ever-increasing doses of fiber and Miralax. Initially she had a good response but found the efficacy waining with time. She titrated up to Miralax 4 times daily with morning coffee/caffeine ingestions. Ultimately, she was switched to Linzess 145 mcg, then up to 290 mcg with good response for a number of months.      Since her last visit (in Aug 2017), she found Linzess was no longer as helpful. In Jan 2018 we switched her to plecanatide/Trulance. Ms. Cutler feels this has been much more efficacious. She is moving her bowels nearly every day (only two days in the last two months when she did not have a BM). Her stools are [place] 1-2, but described as a \"large\" amount. She feels good after each BM, endorsing that she is \"empty.\" She continues to drink a large amount of water daily. She is ingesting 40-50 gm of fiber in her diet on an average day (tracks with [employer] nelsy).    Ms. Cutler denies any issues tolerating the plecanatide. She did feel she had a hemorrhoid last month, experienced as a tender perianal bump which she states is now gone. She denied any associated bleeding or pain with defecation. She declined a rectal exam today.       She does have concern for severe abdominal pain episodes which she'd like to " discuss. She has had four episodes in total since 2016. During the first one she presented to the [facility] ED in Nov 2016, work-up with US, CT, and basic labs was negative. As the CT scan showed a large stool burden she was told this was likely due to constipation and was discharged to home. A second pain episode (precisely the same in nature) occurred around Nov 2017. She did not seek care at that time and has been monitoring her symptoms closely. She has has two more pain events since Nov 2017. The first one started around 2:30 pm and seemed to come on suddenly. The pain was intense and focused in the epigastric regions/RUQ with radiation to the back. The pain slowly dissipating, lasting two days in total. Her most recent pain experience was this past Saturday. She ate at [employer] and [employer], but feels it was a typical meal. She awoke in the middle of the night with the same pain. She noted it was worse when lying down and better when sitting or hunched over. The pain is severe enough that she is unable to eat, drink, drive, perform ADLs. This pain remitted the following day. She denies f/c, n/v, or any other associated symptoms.       She has not been seen to have gallstones on prior CT or US imaging. A recent pelvic US (done a couple weeks ago) was normal. A HIDA scan was done sometime after her [facility] ED visit and was seen to be normal.         ROS:    Please see bottom of this note.    PROBLEM LIST  There are no active problems to display for this patient.      PERTINENT MEDICATIONS:  Current Outpatient Prescriptions   Medication     buPROPion (WELLBUTRIN SR) 100 MG 12 hr tablet     cloNIDine (CATAPRES) 0.1 MG tablet     norgestimate-ethinyl estradiol (ORTHO-CYCLEN, SPRINTEC) 0.25-35 MG-MCG per tablet     plecanatide (TRULANCE) 3 MG tablet     calcium citrate (CALCITRATE) 950 MG tablet     VITAMIN D, CHOLECALCIFEROL, PO     polyethylene glycol (MIRALAX) powder     Multiple Vitamins-Calcium (DAILY VITAMINS FOR WOMEN  "PO)     No current facility-administered medications for this visit.          PHYSICAL EXAMINATION:  Vitals /86 (BP Location: Left arm, Patient Position: Chair, Cuff Size: Adult Regular)  Pulse 64  Temp 98.4  F (36.9  C)  Ht 1.638 m (5' 4.5\")  Wt 65.1 kg (143 lb 9.6 oz)  SpO2 100%  BMI 24.27 kg/m2   Wt   Wt Readings from Last 2 Encounters:   03/14/18 65.1 kg (143 lb 9.6 oz)   03/01/18 64.4 kg (142 lb)   Gen: Pt sitting up in NAD, interactive and cooperative on exam  Eyes: sclerae anicteric, no injection  ENT:  MMM  Cardiac: RRR, nl S1, S2  Resp: Clear on anterior exam  GI: Normoactive BS, abd soft, flat, nontender  Skin: Warm, dry, no jaundice, nails appear healthy  Neuro: alert, oriented, answers questions appropriately      PERTINENT STUDIES:    Orders Only on 01/30/2018   Component Date Value Ref Range Status     Vitamin D Deficiency screening 01/30/2018 86* 20 - 75 ug/L Final     TSH 01/30/2018 2.31  0.40 - 4.00 mU/L Final       Answers for HPI/ROS submitted by the patient on 3/7/2018   General Symptoms: Yes  Skin Symptoms: No  HENT Symptoms: No  EYE SYMPTOMS: No  HEART SYMPTOMS: No  LUNG SYMPTOMS: No  INTESTINAL SYMPTOMS: Yes  URINARY SYMPTOMS: Yes  GYNECOLOGIC SYMPTOMS: Yes  BREAST SYMPTOMS: No  SKELETAL SYMPTOMS: No  BLOOD SYMPTOMS: No  NERVOUS SYSTEM SYMPTOMS: No  MENTAL HEALTH SYMPTOMS: No  Fever: No  Loss of appetite: No  Weight loss: No  Weight gain: No  Fatigue: Yes  Night sweats: Yes  Chills: Yes  Increased stress: Yes  Excessive hunger: No  Excessive thirst: No  Feeling hot or cold when others believe the temperature is normal: Yes  Loss of height: No  Post-operative complications: No  Surgical site pain: No  Hallucinations: No  Change in or Loss of Energy: No  Hyperactivity: No  Confusion: No  Heart burn or indigestion: Yes  Nausea: Yes  Vomiting: No  Abdominal pain: Yes  Bloating: Yes  Constipation: Yes  Diarrhea: No  Blood in stool: No  Black stools: No  Rectal or Anal pain: Yes  Fecal " incontinence: No  Yellowing of skin or eyes: No  Vomit with blood: No  Change in stools: No  Trouble holding urine or incontinence: Yes  Pain or burning: No  Trouble starting or stopping: No  Increased frequency of urination: No  Blood in urine: No  Decreased frequency of urination: No  Frequent nighttime urination: No  Flank pain: No  Difficulty emptying bladder: No  Bleeding or spotting between periods: No  Heavy or painful periods: No  Irregular periods: No  Vaginal discharge: No  Hot flashes: Yes  Vaginal dryness: No  Genital ulcers: No  Reduced libido: Yes  Painful intercourse: No  Difficulty with sexual arousal: No  Post-menopausal bleeding: No

## 2018-05-07 ENCOUNTER — MYC MEDICAL ADVICE (OUTPATIENT)
Dept: OTHER | Age: 23
End: 2018-05-07

## 2018-05-07 ENCOUNTER — TELEPHONE (OUTPATIENT)
Dept: GASTROENTEROLOGY | Facility: CLINIC | Age: 23
End: 2018-05-07

## 2018-05-07 DIAGNOSIS — K59.04 CHRONIC IDIOPATHIC CONSTIPATION: ICD-10-CM

## 2018-05-07 NOTE — TELEPHONE ENCOUNTER
Central Prior Authorization Team   Phone: [phone]      PA Initiation    Medication: Trulance 3mg-PA [employer]  Insurance Company: [employer] - Phone [phone] Fax [phone]  Pharmacy Filling the Rx: [place], [place] - [address] [place] [address]  Filling Pharmacy Phone: [phone]  Filling Pharmacy Fax: [phone]  Start Date: 5/7/2018

## 2018-05-07 NOTE — TELEPHONE ENCOUNTER
Prior Authorization Approval    Authorization Effective Date: 5/7/2018  Authorization Expiration Date: 5/7/2019  Medication: Trulance 3mg-APPROVED  Approved Dose/Quantity:   Reference #: [id number]   Insurance Company: [employer] - Phone [phone] Fax [phone]  Expected CoPay:       CoPay Card Available:      Foundation Assistance Needed:    Which Pharmacy is filling the prescription (Not needed for infusion/clinic administered): [place], [place] - [address] [place] [address]  Pharmacy Notified: Yes  Patient Notified: No    Pharmacy will notify patient when ready.

## 2018-05-07 NOTE — TELEPHONE ENCOUNTER
Prior Authorization Specialty Medication Request    FORMULARY EXCEPTION REQUEST    Medication/Dose: Trulance Take 1 tablet (3 mg) by mouth daily  ICD code (if different than what is on RX):  K59.04  Previously Tried and Failed:  Linzess, Miralax, Senna, Mag citrate, ducolax    Important Lab Values:   Rationale: (K59.04) Chronic idiopathic constipation  (primary encounter diagnosis)  Comment:    Normal colonoscopy, Sitz marker study, and anorectal manometry/defecography. Response to laxatives (Miralax specifically) but requiring ever-increasing doses. Linzess 290 mcg was effective for several months, then lost effect. Now on plecanatide with good response (daily stools with no abd symptoms). Though stooling regularly, stools are somewhat hard; she denies straining but did develop a hemorrhoid. Maxed out on water and fiber intake.     Insurance Name:   Insurance ID:   Insurance Phone Number:     Pharmacy Information (if different than what is on RX)  Name:    Phone:

## 2018-07-13 ENCOUNTER — TELEPHONE (OUTPATIENT)
Dept: GASTROENTEROLOGY | Facility: CLINIC | Age: 23
End: 2018-07-13

## 2018-07-13 NOTE — TELEPHONE ENCOUNTER
Note      Prior Authorization Specialty Medication Request     FORMULARY EXCEPTION REQUEST     Medication/Dose: Trulance Take 1 tablet (3 mg) by mouth daily  ICD code (if different than what is on RX):  K59.04  Previously Tried and Failed:  Linzess, Miralax, Senna, Mag citrate, ducolax     Important Lab Values:   Rationale: (K59.04) Chronic idiopathic constipation  (primary encounter diagnosis)  Comment:    Normal colonoscopy, Sitz marker study, and anorectal manometry/defecography. Response to laxatives (Miralax specifically) but requiring ever-increasing doses. Linzess 290 mcg was effective for several months, then lost effect. Now on plecanatide with good response (daily stools with no abd symptoms). Though stooling regularly, stools are somewhat hard; she denies straining but did develop a hemorrhoid. Maxed out on water and fiber intake.      Insurance Name: [employer]  Insurance ID:   Insurance Phone Number:      Pharmacy Information (if different than what is on RX)  Name:    Phone:

## 2018-07-17 NOTE — TELEPHONE ENCOUNTER
[facility] Call Center    Phone Message    May a detailed message be left on voicemail: yes    Reason for Call: Medication Question or concern regarding medication   Prescription Clarification  Name of Medication: plecanatide (TRULANCE) 3 MG tablet  Prescribing Provider: Tahir    Pharmacy: [facility]    What on the order needs clarification? Auth approved they just need it signed in order to fill RX, please call to follow up.           Action Taken: Message routed to:  [facility] ([facility]): NIA

## 2018-07-17 NOTE — TELEPHONE ENCOUNTER
Called and spoke to pharmacy at [facility].  They stated they received an unsigned rx that was faxed to them.    Verbal given to pharmacist for patient.

## 2018-08-17 ENCOUNTER — TELEPHONE (OUTPATIENT)
Dept: GASTROENTEROLOGY | Facility: CLINIC | Age: 23
End: 2018-08-17

## 2018-08-17 NOTE — TELEPHONE ENCOUNTER
LVM for patient in regards to rescheduling 9/12/18 appointment with Dr. Haro. Left call back number for patient to call and reschedule appointment.

## 2018-09-18 ENCOUNTER — TELEPHONE (OUTPATIENT)
Dept: GASTROENTEROLOGY | Facility: CLINIC | Age: 23
End: 2018-09-18

## 2018-09-19 ENCOUNTER — OFFICE VISIT (OUTPATIENT)
Dept: GASTROENTEROLOGY | Facility: CLINIC | Age: 23
End: 2018-09-19
Payer: COMMERCIAL

## 2018-09-19 VITALS
HEART RATE: 60 BPM | HEIGHT: 64 IN | TEMPERATURE: 98.5 F | DIASTOLIC BLOOD PRESSURE: 71 MMHG | WEIGHT: 146.4 LBS | OXYGEN SATURATION: 100 % | SYSTOLIC BLOOD PRESSURE: 107 MMHG | BODY MASS INDEX: 25 KG/M2

## 2018-09-19 DIAGNOSIS — K59.04 CHRONIC IDIOPATHIC CONSTIPATION: Primary | ICD-10-CM

## 2018-09-19 DIAGNOSIS — R68.82 DECREASED LIBIDO: ICD-10-CM

## 2018-09-19 DIAGNOSIS — R10.84 ABDOMINAL PAIN, GENERALIZED: ICD-10-CM

## 2018-09-19 DIAGNOSIS — R23.2 HOT FLASHES: ICD-10-CM

## 2018-09-19 ASSESSMENT — PAIN SCALES - GENERAL: PAINLEVEL: NO PAIN (0)

## 2018-09-19 NOTE — MR AVS SNAPSHOT
After Visit Summary   9/19/2018    Genesis Cutler    MRN: [id number]           Patient Information     Date Of Birth          1995        Visit Information        Provider Department      9/19/2018 3:40 PM Gayle Haro MD [facility]        Care Instructions    1. Continue your Trulance daily as you are.  2. Continue your efforts to stay hydrated and continue your exercise routine.  3. Agree with use of Miralax 1-2 times daily as needed for periods of worsening constipation.  4. Will refer to gynecology to address your current symptoms.   5. Follow-up in GI clinic in 6 months' time with Dr. Haro.      If you have any questions, please don't hesitate to contact our GI RN Clinic Coordinators at [phone] (select option #3 for nurse line).              Follow-ups after your visit        Who to contact     Please call your clinic at [phone] to:    Ask questions about your health    Make or cancel appointments    Discuss your medicines    Learn about your test results    Speak to your doctor            Additional Information About Your Visit        [employer] Information     [employer] gives you secure access to your electronic health record. If you see a primary care provider, you can also send messages to your care team and make appointments. If you have questions, please call your primary care clinic.  If you do not have a primary care provider, please call [phone] and they will assist you.      [employer] is an electronic gateway that provides easy, online access to your medical records. With [employer], you can request a clinic appointment, read your test results, renew a prescription or communicate with your care team.     To access your existing account, please contact your [facility] or call [phone] for assistance.        Care EveryWhere ID     This is your Care EveryWhere ID. This could be used  "by other organizations to access your [place] medical records  [id number]        Your Vitals Were     Pulse Temperature Height Pulse Oximetry BMI (Body Mass Index)       60 98.5  F (36.9  C) (Oral) 1.626 m (5' 4\") 100% 25.13 kg/m2        Blood Pressure from Last 3 Encounters:   09/19/18 107/71   03/14/18 119/86   03/01/18 116/74    Weight from Last 3 Encounters:   09/19/18 66.4 kg (146 lb 6.4 oz)   03/14/18 65.1 kg (143 lb 9.6 oz)   03/01/18 64.4 kg (142 lb)              Today, you had the following     No orders found for display       Primary Care Provider Office Phone # Fax #    Yonatan Hill [phone] [address] [facility] [address]        Equal Access to Services     [facility]: Hadii stas beebe hadasho Soaarti, waaxda luqadaha, qaybta kaalmada adeegyada, lester escobar . So [facility] [phone].    ATENCIÓN: Si habla español, tiene a dela cruz disposición servicios gratuitos de asistencia lingüística. Darci al [phone].    We comply with applicable federal civil rights laws and [place] laws. We do not discriminate on the basis of race, color, national origin, age, disability, sex, sexual orientation, or gender identity.            Thank you!     Thank you for choosing [facility]  for your care. Our goal is always to provide you with excellent care. Hearing back from our patients is one way we can continue to improve our services. Please take a few minutes to complete the written survey that you may receive in the mail after your visit with us. Thank you!             Your Updated Medication List - Protect others around you: Learn how to safely use, store and throw away your medicines at www.disposemymeds.org.          This list is accurate as of 9/19/18  4:10 PM.  Always use your most recent med list.                   Brand Name Dispense Instructions for use Diagnosis    buPROPion 100 MG 12 hr tablet    WELLBUTRIN SR    " 60 tablet    One tab daily x 1 week then twice daily    Other fatigue, Irregular menses       calcium citrate 950 MG tablet    CALCITRATE     Take 1 tablet by mouth daily        cloNIDine 0.1 MG tablet    CATAPRES    30 tablet    Take 1 tablet (0.1 mg) by mouth At Bedtime As needed for hot flashes    Menopausal syndrome (hot flashes)       DAILY VITAMINS FOR WOMEN PO           MIRALAX powder   Generic drug:  polyethylene glycol      Take 1 capful by mouth 4 times daily        norgestimate-ethinyl estradiol 0.25-35 MG-MCG per tablet    ORTHO-CYCLEN, SPRINTEC    112 tablet    Take 1 tablet by mouth daily Take 4 continuous packs, skipping the sugar pill week.    Encounter for birth control pills maintenance       plecanatide 3 MG tablet    TRULANCE    90 tablet    Take 1 tablet (3 mg) by mouth daily    Chronic idiopathic constipation       VITAMIN D (CHOLECALCIFEROL) PO      Take 2,000 Units by mouth daily

## 2018-09-19 NOTE — PATIENT INSTRUCTIONS
1. Continue your Trulance daily as you are.  2. Continue your efforts to stay hydrated and continue your exercise routine.  3. Agree with use of Miralax 1-2 times daily as needed for periods of worsening constipation.  4. Will refer to gynecology to address your current symptoms.   5. Follow-up in GI clinic in 6 months' time with Dr. Haro.      If you have any questions, please don't hesitate to contact our GI RN Clinic Coordinators at [phone] (select option #3 for nurse line).

## 2018-09-19 NOTE — LETTER
9/19/2018       RE: Genesis Cutler  [id number]  [address]  [facility] [address]     Dear Colleague,    Thank you for referring your patient, Genesis Cutler, to the [facility]STRO[facility]INIC at [facility]. Please see a copy of my visit note below.    GI CLINIC VISIT    CC:  Follow-up for constipation    ASSESSMENT/PLAN:  (K59.04) Chronic idiopathic constipation  (primary encounter diagnosis)  Comment:    Normal colonoscopy, Sitz marker study, and anorectal manometry/defecography. Response to laxatives (Miralax specifically) but requiring ever-increasing doses. Linzess 290 mcg was effective for several months, then lost effect. Now on plecanatide with good response (daily stools with no abd symptoms). Has needed prn Miralax during periods of worsening constipation (i.e. Traveling).  Plan:  1. Continue your Trulance daily as you are.  2. Continue your efforts to stay hydrated and continue your exercise routine.  3. Agree with use of Miralax 1-2 times daily as needed for periods of worsening constipation.  4. Will refer to gynecology to address your current symptoms.      (R10.84) Abdominal pain, generalized  Comment:    Four discrete episodes over 1.5 years. ? gallbladder disease but no noted gallstones on imaging with normal HIDA scan in years past.    Previously constipation was implicated, but this has been under good control as of late. Notably pancreatic enzymes not checked previously or during pain episode.    We discussed evaluation in the ED during episode given reported severity of pain and inability to eat/drink or move during an episode. Will send tests as below. Could consider future EGD +/- EUS (to evaluate for microlithiasis/sludge) or even CTE for better assessment of small bowel (though notably CT during initial pain episode was normal).     No further episodes in over 6 months.   Plan:  -   will continue to  monitor    (R68.82) Decreased libido  (R23.2) Hot flashes  - referral to gynecology             RTC 6 months     It was a pleasure to participate in the care of this patient; please contact us with any further questions.  A total of 15 face-to-face minutes was spent with this patient, >50% of which was counseling regarding the above delineated issues.    Gayle Haro MD   of Medicine  Division of Gastroenterology, Hepatology and Nutrition  [facility]    ---------------------------------------------------------------------------------------------------  HPI:    Ms. Cutler (formerly Rob) is a previously healthy 23 year old female who was initially seen in consultation with Jordan Du and Fabien of GI in July 2016 for constipation. She was initially seen by this writer in Nov 2016. She returns today for follow-up.      Since her last visit, she and her  traveled to [place] after finishing up at the . They were there for two weeks and traveled to [place], [place], and [place]. She did a fair amount of hiking, including exploring a glacier. She has started her new job as a  at [employer]'s [employer] office. 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     1. Constipation  Summarized/taken from my prior documentation:   Issues with constipation since early childhood but with no regular laxatives or daily medications as a child nor need for disimpactions, enemas, nor hospitalizations. She used OTC laxatives as needed through late childhood and teen years. She had two urgent care visits during these years for constipation-related abdominal pain.     Her constipation apparently worsened in her early twenties going up to 2 weeks without a satisfactory BM, (she may pass some small,[place] 1 stools with straining during this time). These periods are associated with  "significant bloating and LLQ pain which resolve after defecation.  She previously tried prunes/prune juice, OTC Mg citrate, an unknown laxative pill prn with minimal effect. Her Avi MD put her on daily Miralax which initially was helpful, but its effect waned.  She reports a high vegetable and fruit intake, at least 70 oz of water daily, and daily physical activity (running) which has led to an intentional 80 lbs weight loss. Her prior work-up included a normal TSH, calcium, CMP, and hgb.    Since establishing in our clinic she has [facility] by Dr. Kailey Acevedo. Her testing was essentially normal with no evidence of Hirschsprung's, only mild perineal descent was noted. A Sitz marker study (after one week off of laxatives) was normal (no Sitz markers were seen on AXR done 5 days later). Though notably, she was on her period at that time (expectedly moving her bowels more frequently than her typical baseline).  A colonoscopy was done and was normal. Despite being off of laxatives (for Sitz study, just prior to colonoscopy) and her only being able to tolerate 4L Golytely (due to nausea, she was intended to take additional prep) her prep was rated at \"fair\".           For treatment she was on ever-increasing doses of fiber and Miralax (up to Miralax 4 times daily with morning coffee/caffeine ingestions) and the effect was lost over time. Ultimately, she was switched to Linzess 145 mcg, then up to 290 mcg with good response for a number of months. In Jan 2018 we switched her to plecanatide/Trulance. With this she was moving her bowels nearly every day.  She continues to drink a large amount of water daily. She is ingesting 40-50 gm of fiber in her diet on an average day (tracks with [employer]).     Today, Ms. Cutler continues to report that she is doing well on Trulance. She is tolerating the medication without side effects other than rare abdominal cramping prior to " BMs. She moves her bowels nearly every day and feels her BMs are satisfactory and easy to pass. They are described as soft and well-formed.   She did have increased constipation while traveling. During this time she continued daily Trulance and used Miralax prn to get over the hump taking two doses in the morning, one and night for a week, then cut back to once daily for the next week. Once she returned home she went back Trulance only.     2. Abdominal pain   Summarized/taken from my prior documentation:    She has had four episodes of abdominal pain in total since 2016. During the first one she presented to the [facility] ED in Nov 2016, work-up with US, CT, and basic labs was negative. As the CT scan showed a large stool burden she was told this was likely due to constipation and was discharged to home. A second pain episode (precisely the same in nature) occurred around Nov 2017. She did not seek care at that time and has been monitoring her symptoms closely. She has has two more pain events since Nov 2017. The first one started around 2:30 pm and seemed to come on suddenly. The pain was intense and focused in the epigastric regions/RUQ with radiation to the back. The pain slowly dissipating, lasting two days in total. Her most recent pain experience was March 2018.  She ate at [employer] and [employer], but feels it was a typical meal. She awoke in the middle of the night with the same pain. She noted it was worse when lying down and better when sitting or hunched over. The pain is severe enough that she is unable to eat, drink, drive, perform ADLs. This pain remitted the following day. She denies f/c, n/v, or any other associated symptoms.      She has not been seen to have gallstones on prior CT or US imaging. A recent pelvic US (done a couple weeks ago) was normal. A HIDA scan was done sometime after her [facility] ED visit and was seen to be normal.     At her last visit we discussed sending a number of tests and reviewed  "signs with which to present to the ED. Amylase, lipase, and hepatic panel were all normal. She had some issues collecting stool for H pylori testing and as she's had no further pain episodes, ultimately didn't complete the test. Overall she feels well and has not had recurrence of her prior pain.    3. Hot flashes  4. Decreased libido   Ms. Cutler does report issues with hot flashes ongoing for 2-3 years. She also notes a loss of libido which has been worsening for the last year. She did see a therapist, but this did not change things and underlying medical issue was suspected. Notably she has been on OCP since 16 (Initially monophasic, then triphasic - which gave her mood swings - and now back to monophasic.) She saw a gynecologist who recommended continuous OCPs to manage her hot flashes. This led to lots of breakthrough bleeding and no change in hot flashes. No other interventions were recommended for loss of sex drive. She is interested in seeing another provider to discuss treatment options.    PROBLEM LIST  There are no active problems to display for this patient.      PERTINENT MEDICATIONS:  Current Outpatient Prescriptions   Medication     calcium citrate (CALCITRATE) 950 MG tablet     cloNIDine (CATAPRES) 0.1 MG tablet     Multiple Vitamins-Calcium (DAILY VITAMINS FOR WOMEN PO)     norgestimate-ethinyl estradiol (ORTHO-CYCLEN, SPRINTEC) 0.25-35 MG-MCG per tablet     plecanatide (TRULANCE) 3 MG tablet     polyethylene glycol (MIRALAX) powder     VITAMIN D, CHOLECALCIFEROL, PO     buPROPion (WELLBUTRIN SR) 100 MG 12 hr tablet     No current facility-administered medications for this visit.        PHYSICAL EXAMINATION:  Vitals /71  Pulse 60  Temp 98.5  F (36.9  C) (Oral)  Ht 1.626 m (5' 4\")  Wt 66.4 kg (146 lb 6.4 oz)  SpO2 100%  BMI 25.13 kg/m2   Wt   Wt Readings from Last 2 Encounters:   09/19/18 66.4 kg (146 lb 6.4 oz)   03/14/18 65.1 kg (143 lb 9.6 oz)   Gen: Pt sitting up on exam table in " NAD, interactive and cooperative on exam  Eyes: sclerae anicteric, no injection  Skin: Warm, dry, no jaundice  Neuro: alert, oriented, answers questions appropriately      PERTINENT STUDIES:    Orders Only on 03/14/2018   Component Date Value Ref Range Status     Lipase 03/14/2018 159  73 - 393 U/L Final     Amylase 03/14/2018 70  30 - 110 U/L Final     Bilirubin Direct 03/14/2018 0.2  0.0 - 0.2 mg/dL Final     Bilirubin Total 03/14/2018 0.4  0.2 - 1.3 mg/dL Final     Albumin 03/14/2018 3.9  3.4 - 5.0 g/dL Final     Protein Total 03/14/2018 7.1  6.8 - 8.8 g/dL Final     Alkaline Phosphatase 03/14/2018 47  40 - 150 U/L Final     ALT 03/14/2018 33  0 - 50 U/L Final     AST 03/14/2018 26  0 - 45 U/L Final

## 2018-09-19 NOTE — NURSING NOTE
"Chief Complaint   Patient presents with     RECHECK     6 months follow up       Vitals:    09/19/18 1530   BP: 107/71   Pulse: 60   Temp: 98.5  F (36.9  C)   TempSrc: Oral   SpO2: 100%   Weight: 66.4 kg (146 lb 6.4 oz)   Height: 1.626 m (5' 4\")       Body mass index is 25.13 kg/(m^2).    PARESH Xiong                          "

## 2018-09-19 NOTE — PROGRESS NOTES
GI CLINIC VISIT    CC:  Follow-up for constipation      ASSESSMENT/PLAN:  (K59.04) Chronic idiopathic constipation  (primary encounter diagnosis)  Comment:    Normal colonoscopy, Sitz marker study, and anorectal manometry/defecography. Response to laxatives (Miralax specifically) but requiring ever-increasing doses. Linzess 290 mcg was effective for several months, then lost effect. Now on plecanatide with good response (daily stools with no abd symptoms). Has needed prn Miralax during periods of worsening constipation (i.e. Traveling).  Plan:  1. Continue your Trulance daily as you are.  2. Continue your efforts to stay hydrated and continue your exercise routine.  3. Agree with use of Miralax 1-2 times daily as needed for periods of worsening constipation.  4. Will refer to gynecology to address your current symptoms.      (R10.84) Abdominal pain, generalized  Comment:    Four discrete episodes over 1.5 years. ? gallbladder disease but no noted gallstones on imaging with normal HIDA scan in years past.    Previously constipation was implicated, but this has been under good control as of late. Notably pancreatic enzymes not checked previously or during pain episode.    We discussed evaluation in the ED during episode given reported severity of pain and inability to eat/drink or move during an episode. Will send tests as below. Could consider future EGD +/- EUS (to evaluate for microlithiasis/sludge) or even CTE for better assessment of small bowel (though notably CT during initial pain episode was normal).     No further episodes in over 6 months.   Plan:  -  will continue to monitor    (R68.82) Decreased libido  (R23.2) Hot flashes  - referral to gynecology             RTC 6 months     It was a pleasure to participate in the care of this patient; please contact us with any further questions.  A total of 15 face-to-face minutes was spent with this patient, >50% of which was counseling regarding the above delineated  issues.    Gayle Haro MD   of Medicine  Division of Gastroenterology, Hepatology and Nutrition  [facility]    ---------------------------------------------------------------------------------------------------  HPI:    Ms. Cutler (formerly Rob) is a previously healthy 23 year old female who was initially seen in consultation with Jordan Du and Fabien of GI in July 2016 for constipation. She was initially seen by this writer in Nov 2016. She returns today for follow-up.      Since her last visit, she and her  traveled to [place] after finishing up at the . They were there for two weeks and traveled to [place], [place], and [place]. She did a fair amount of hiking, including exploring a glacier. She has started her new job as a  at [employer]'s [employer] office. 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     1. Constipation  Summarized/taken from my prior documentation:   Issues with constipation since early childhood but with no regular laxatives or daily medications as a child nor need for disimpactions, enemas, nor hospitalizations. She used OTC laxatives as needed through late childhood and teen years. She had two urgent care visits during these years for constipation-related abdominal pain.     Her constipation apparently worsened in her early twenties going up to 2 weeks without a satisfactory BM, (she may pass some small,[place] 1 stools with straining during this time). These periods are associated with significant bloating and LLQ pain which resolve after defecation.  She previously tried prunes/prune juice, OTC Mg citrate, an unknown laxative pill prn with minimal effect. Her Avi COSTA put her on daily Miralax which initially was helpful, but its effect waned.  She reports a high vegetable and fruit intake, at least 70 oz of water daily, and daily  "physical activity (running) which has led to an intentional 80 lbs weight loss. Her prior work-up included a normal TSH, calcium, CMP, and hgb.    Since establishing in our clinic she [facility] by Dr. Kailey Acevedo. Her testing was essentially normal with no evidence of Hirschsprung's, only mild perineal descent was noted. A Sitz marker study (after one week off of laxatives) was normal (no Sitz markers were seen on AXR done 5 days later). Though notably, she was on her period at that time (expectedly moving her bowels more frequently than her typical baseline).  A colonoscopy was done and was normal. Despite being off of laxatives (for Sitz study, just prior to colonoscopy) and her only being able to tolerate 4L Golytely (due to nausea, she was intended to take additional prep) her prep was rated at \"fair\".           For treatment she was on ever-increasing doses of fiber and Miralax (up to Miralax 4 times daily with morning coffee/caffeine ingestions) and the effect was lost over time. Ultimately, she was switched to Linzess 145 mcg, then up to 290 mcg with good response for a number of months. In Jan 2018 we switched her to plecanatide/Trulance. With this she was moving her bowels nearly every day.  She continues to drink a large amount of water daily. She is ingesting 40-50 gm of fiber in her diet on an average day (tracks with [employer] nelsy).     Today, Ms. Cutler continues to report that she is doing well on Trulance. She is tolerating the medication without side effects other than rare abdominal cramping prior to BMs. She moves her bowels nearly every day and feels her BMs are satisfactory and easy to pass. They are described as soft and well-formed.   She did have increased constipation while traveling. During this time she continued daily Trulance and used Miralax prn to get over the hump taking two doses in the morning, one and night for a week, then cut back " to once daily for the next week. Once she returned home she went back [facility] only.         2. Abdominal pain   Summarized/taken from my prior documentation:    She has had four episodes of abdominal pain in total since 2016. During the first one she presented to the [facility] ED in Nov 2016, work-up with US, CT, and basic labs was negative. As the CT scan showed a large stool burden she was told this was likely due to constipation and was discharged to home. A second pain episode (precisely the same in nature) occurred around Nov 2017. She did not seek care at that time and has been monitoring her symptoms closely. She has has two more pain events since Nov 2017. The first one started around 2:30 pm and seemed to come on suddenly. The pain was intense and focused in the epigastric regions/RUQ with radiation to the back. The pain slowly dissipating, lasting two days in total. Her most recent pain experience was March 2018.  She ate at [employer] and [employer], but feels it was a typical meal. She awoke in the middle of the night with the same pain. She noted it was worse when lying down and better when sitting or hunched over. The pain is severe enough that she is unable to eat, drink, drive, perform ADLs. This pain remitted the following day. She denies f/c, n/v, or any other associated symptoms.      She has not been seen to have gallstones on prior CT or US imaging. A recent pelvic US (done a couple weeks ago) was normal. A HIDA scan was done sometime after her [facility] ED visit and was seen to be normal.     At her last visit we discussed sending a number of tests and reviewed signs with which to present to the ED. Amylase, lipase, and hepatic panel were all normal. She had some issues collecting stool for H pylori testing and as she's had no further pain episodes, ultimately didn't complete the test. Overall she feels well and has not had recurrence of her prior pain.    3. Hot flashes  4. Decreased libido   Ms. Cutler  "does report issues with hot flashes ongoing for 2-3 years. She also notes a loss of libido which has been worsening for the last year. She did see a therapist, but this did not change things and underlying medical issue was suspected. Notably she has been on OCP since 16 (Initially monophasic, then triphasic - which gave her mood swings - and now back to monophasic.) She saw a gynecologist who recommended continuous OCPs to manage her hot flashes. This led to lots of breakthrough bleeding and no change in hot flashes. No other interventions were recommended for loss of sex drive. She is interested in seeing another provider to discuss treatment options.        PROBLEM LIST  There are no active problems to display for this patient.      PERTINENT MEDICATIONS:  Current Outpatient Prescriptions   Medication     calcium citrate (CALCITRATE) 950 MG tablet     cloNIDine (CATAPRES) 0.1 MG tablet     Multiple Vitamins-Calcium (DAILY VITAMINS FOR WOMEN PO)     norgestimate-ethinyl estradiol (ORTHO-CYCLEN, SPRINTEC) 0.25-35 MG-MCG per tablet     plecanatide (TRULANCE) 3 MG tablet     polyethylene glycol (MIRALAX) powder     VITAMIN D, CHOLECALCIFEROL, PO     buPROPion (WELLBUTRIN SR) 100 MG 12 hr tablet     No current facility-administered medications for this visit.        PHYSICAL EXAMINATION:  Vitals /71  Pulse 60  Temp 98.5  F (36.9  C) (Oral)  Ht 1.626 m (5' 4\")  Wt 66.4 kg (146 lb 6.4 oz)  SpO2 100%  BMI 25.13 kg/m2   Wt   Wt Readings from Last 2 Encounters:   09/19/18 66.4 kg (146 lb 6.4 oz)   03/14/18 65.1 kg (143 lb 9.6 oz)   Gen: Pt sitting up on exam table in NAD, interactive and cooperative on exam  Eyes: sclerae anicteric, no injection  Skin: Warm, dry, no jaundice  Neuro: alert, oriented, answers questions appropriately      PERTINENT STUDIES:    Orders Only on 03/14/2018   Component Date Value Ref Range Status     Lipase 03/14/2018 159  73 - 393 U/L Final     Amylase 03/14/2018 70  30 - 110 U/L " Final     Bilirubin Direct 03/14/2018 0.2  0.0 - 0.2 mg/dL Final     Bilirubin Total 03/14/2018 0.4  0.2 - 1.3 mg/dL Final     Albumin 03/14/2018 3.9  3.4 - 5.0 g/dL Final     Protein Total 03/14/2018 7.1  6.8 - 8.8 g/dL Final     Alkaline Phosphatase 03/14/2018 47  40 - 150 U/L Final     ALT 03/14/2018 33  0 - 50 U/L Final     AST 03/14/2018 26  0 - 45 U/L Final

## 2018-09-24 ENCOUNTER — TELEPHONE (OUTPATIENT)
Dept: GASTROENTEROLOGY | Facility: CLINIC | Age: 23
End: 2018-09-24

## 2018-09-24 NOTE — TELEPHONE ENCOUNTER
----- Message from Gayle Haro MD sent at 9/20/2018 11:32 AM CDT -----  Macy,      Can you help facilitate a gynecology referral for this patient?    I'm requesting that she see one of these female providers: Dr. Clark, Dr. Orantes, or Dr. Raymond.     She needs the referral order too.     Thank you.    [place]ina

## 2018-10-05 ENCOUNTER — TELEPHONE (OUTPATIENT)
Dept: GASTROENTEROLOGY | Facility: CLINIC | Age: 23
End: 2018-10-05

## 2018-10-05 DIAGNOSIS — K59.04 CHRONIC IDIOPATHIC CONSTIPATION: Primary | ICD-10-CM

## 2018-11-13 ENCOUNTER — OFFICE VISIT (OUTPATIENT)
Dept: OBGYN | Facility: CLINIC | Age: 23
End: 2018-11-13
Attending: OBSTETRICS & GYNECOLOGY
Payer: COMMERCIAL

## 2018-11-13 VITALS
DIASTOLIC BLOOD PRESSURE: 81 MMHG | SYSTOLIC BLOOD PRESSURE: 117 MMHG | BODY MASS INDEX: 25.04 KG/M2 | WEIGHT: 146.7 LBS | HEART RATE: 68 BPM | HEIGHT: 64 IN

## 2018-11-13 DIAGNOSIS — R61 NIGHT SWEATS: Primary | ICD-10-CM

## 2018-11-13 DIAGNOSIS — K59.04 CHRONIC IDIOPATHIC CONSTIPATION: ICD-10-CM

## 2018-11-13 LAB
BASOPHILS # BLD AUTO: 0 10E9/L (ref 0–0.2)
BASOPHILS NFR BLD AUTO: 0.4 %
DIFFERENTIAL METHOD BLD: ABNORMAL
EOSINOPHIL # BLD AUTO: 0.2 10E9/L (ref 0–0.7)
EOSINOPHIL NFR BLD AUTO: 4.2 %
ERYTHROCYTE [DISTWIDTH] IN BLOOD BY AUTOMATED COUNT: 12.5 % (ref 10–15)
HCT VFR BLD AUTO: 44.4 % (ref 35–47)
HGB BLD-MCNC: 14.9 G/DL (ref 11.7–15.7)
HIV 1+2 AB+HIV1 P24 AG SERPL QL IA: NONREACTIVE
IMM GRANULOCYTES # BLD: 0 10E9/L (ref 0–0.4)
IMM GRANULOCYTES NFR BLD: 0 %
LYMPHOCYTES # BLD AUTO: 1.7 10E9/L (ref 0.8–5.3)
LYMPHOCYTES NFR BLD AUTO: 37.9 %
MCH RBC QN AUTO: 33.3 PG (ref 26.5–33)
MCHC RBC AUTO-ENTMCNC: 33.6 G/DL (ref 31.5–36.5)
MCV RBC AUTO: 99 FL (ref 78–100)
MONOCYTES # BLD AUTO: 0.3 10E9/L (ref 0–1.3)
MONOCYTES NFR BLD AUTO: 5.9 %
NEUTROPHILS # BLD AUTO: 2.4 10E9/L (ref 1.6–8.3)
NEUTROPHILS NFR BLD AUTO: 51.6 %
NRBC # BLD AUTO: 0 10*3/UL
NRBC BLD AUTO-RTO: 0 /100
PLATELET # BLD AUTO: 232 10E9/L (ref 150–450)
RBC # BLD AUTO: 4.48 10E12/L (ref 3.8–5.2)
WBC # BLD AUTO: 4.6 10E9/L (ref 4–11)

## 2018-11-13 PROCEDURE — G0463 HOSPITAL OUTPT CLINIC VISIT: HCPCS | Mod: ZF

## 2018-11-13 PROCEDURE — 86480 TB TEST CELL IMMUN MEASURE: CPT | Performed by: OBSTETRICS & GYNECOLOGY

## 2018-11-13 PROCEDURE — 85025 COMPLETE CBC W/AUTO DIFF WBC: CPT | Performed by: OBSTETRICS & GYNECOLOGY

## 2018-11-13 PROCEDURE — 83520 IMMUNOASSAY QUANT NOS NONAB: CPT | Performed by: OBSTETRICS & GYNECOLOGY

## 2018-11-13 PROCEDURE — 87389 HIV-1 AG W/HIV-1&-2 AB AG IA: CPT | Performed by: OBSTETRICS & GYNECOLOGY

## 2018-11-13 PROCEDURE — 36415 COLL VENOUS BLD VENIPUNCTURE: CPT | Performed by: OBSTETRICS & GYNECOLOGY

## 2018-11-13 NOTE — NURSING NOTE
Chief Complaint   Patient presents with     Follow Up For     pt is here for night sweats, and hot flashes

## 2018-11-13 NOTE — MR AVS SNAPSHOT
After Visit Summary   11/13/2018    Genesis Cutler    MRN: [id number]           Patient Information     Date Of Birth          1995        Visit Information        Provider Department      11/13/2018 9:00 AM Kati Orantes MD [facility]        Today's Diagnoses     Night sweats    -  1    Chronic idiopathic constipation          Care Instructions    Help patient get CXR scheduled.    To lab today          Follow-ups after your visit        Follow-up notes from your care team     Return if symptoms worsen or fail to improve.      Your next 10 appointments already scheduled     Mar 20, 2019  8:20 AM CDT   (Arrive by 8:05 AM)   Return Visit with Gayle Haro MD   [facility] ([facility])    [address] [address] [phone]              Who to contact     Please call your clinic at [phone] to:    Ask questions about your health    Make or cancel appointments    Discuss your medicines    Learn about your test results    Speak to your doctor            Additional Information About Your Visit        [employer] Information     Screenmailer gives you secure access to your electronic health record. If you see a primary care provider, you can also send messages to your care team and make appointments. If you have questions, please call your primary care clinic.  If you do not have a primary care provider, please call [phone] and they will assist you.      Screenmailer is an electronic gateway that provides easy, online access to your medical records. With [employer], you can request a clinic appointment, read your test results, renew a prescription or communicate with your care team.     To access your existing account, please contact your [facility] or call [phone] for assistance.        Care EveryWhere ID     This is your Care  "EveryWhere ID. This could be used by other organizations to access your [facility] medical records  [id number]        Your Vitals Were     Pulse Height Last Period Breastfeeding? BMI (Body Mass Index)       68 1.626 m (5' 4\") 10/21/2018 (Exact Date) No 25.18 kg/m2        Blood Pressure from Last 3 Encounters:   11/13/18 117/81   09/19/18 107/71   03/14/18 119/86    Weight from Last 3 Encounters:   11/13/18 66.5 kg (146 lb 11.2 oz)   09/19/18 66.4 kg (146 lb 6.4 oz)   03/14/18 65.1 kg (143 lb 9.6 oz)              We Performed the Following     Anti-Mullerian hormone     CBC with Platelets Differential     HIV Antigen Antibody Combo     Quantiferon TB Gold Plus        Primary Care Provider Office Phone # Fax #    Yonatan [place] [phone] [address] [facility] [address]        Equal Access to Services     GEORGES MILLARD : Hadii aad ku hadasho Soomaali, waaxda luqadaha, qaybta kaalmada adeegyada, waxay keyannain hayzo escobar . So [facility] [phone].    ATENCIÓN: Si habla español, tiene a dela cruz disposición servicios gratuitos de asistencia lingüística. [facility] [phone].    We comply with applicable federal civil rights laws and [place] laws. We do not discriminate on the basis of race, color, national origin, age, disability, sex, sexual orientation, or gender identity.            Thank you!     Thank you for choosing [facility]  for your care. Our goal is always to provide you with excellent care. Hearing back from our patients is one way we can continue to improve our services. Please take a few minutes to complete the written survey that you may receive in the mail after your visit with us. Thank you!             Your Updated Medication List - Protect others around you: Learn how to safely use, store and throw away your medicines at www.disposemymeds.org.          This list is accurate as of 11/13/18 11:59 PM.  Always use your most recent med list. "                   Brand Name Dispense Instructions for use Diagnosis    buPROPion 100 MG 12 hr tablet    WELLBUTRIN SR    60 tablet    One tab daily x 1 week then twice daily    Other fatigue, Irregular menses       calcium citrate 950 MG tablet    CALCITRATE     Take 1 tablet by mouth daily        cloNIDine 0.1 MG tablet    CATAPRES    30 tablet    Take 1 tablet (0.1 mg) by mouth At Bedtime As needed for hot flashes    Menopausal syndrome (hot flashes)       DAILY VITAMINS FOR WOMEN PO           MIRALAX powder   Generic drug:  polyethylene glycol      Take 1 capful by mouth 4 times daily        norgestimate-ethinyl estradiol 0.25-35 MG-MCG per tablet    ORTHO-CYCLEN, SPRINTEC    112 tablet    Take 1 tablet by mouth daily Take 4 continuous packs, skipping the sugar pill week.    Encounter for birth control pills maintenance       plecanatide 3 MG tablet    TRULANCE    90 tablet    Take 1 tablet (3 mg) by mouth daily    Chronic idiopathic constipation       VITAMIN D (CHOLECALCIFEROL) PO      Take 2,000 Units by mouth daily

## 2018-11-13 NOTE — LETTER
11/13/2018       RE: Genesis Cutler  [address]  [place] [place] [address]     Dear Colleague,    Thank you for referring your patient, Genesis Cutler, to the [facility] at [facility]. Please see a copy of my visit note below.      HPI: 24 yo nulligravid female w/ complaints of night sweats.  This occurs per her report, during the week of her placebo week on her BC pills.  She has tried a couple different pills9 monophasic/triphasic), but continues to have trouble. Described as sheet soaking sweats.    She denies pelvic pain or vaginal pain.       Patient's last menstrual period was 10/21/2018 (exact date).  OCP    Patients records are available and reviewed at today's visit.    Past GYN history:  No STD history  Last PAP smear:  Normal  Last TSH: 2.31    Past Medical History:   Diagnosis Date     Chronic constipation        Past Surgical History:   Procedure Laterality Date     COLONOSCOPY  03/2017       Family History   Problem Relation Age of Onset     Diabetes Mother      Diabetes Maternal Grandmother      Coronary Artery Disease Maternal Grandfather      Cerebrovascular Disease Maternal Grandfather      Coronary Artery Disease Paternal Grandmother      Unknown/Adopted Paternal Grandfather        Allergies: Penicillins    Current Outpatient Prescriptions   Medication Sig Dispense Refill     cloNIDine (CATAPRES) 0.1 MG tablet Take 1 tablet (0.1 mg) by mouth At Bedtime As needed for hot flashes 30 tablet 5     Multiple Vitamins-Calcium (DAILY VITAMINS FOR WOMEN PO)        norgestimate-ethinyl estradiol (ORTHO-CYCLEN, SPRINTEC) 0.25-35 MG-MCG per tablet Take 1 tablet by mouth daily Take 4 continuous packs, skipping the sugar pill week. 112 tablet 3     plecanatide (TRULANCE) 3 MG tablet Take 1 tablet (3 mg) by mouth daily 90 tablet 3     polyethylene glycol (MIRALAX) powder Take 1 capful by mouth 4 times daily         "buPROPion (WELLBUTRIN SR) 100 MG 12 hr tablet One tab daily x 1 week then twice daily (Patient not taking: Reported on 9/19/2018) 60 tablet 5     calcium citrate (CALCITRATE) 950 MG tablet Take 1 tablet by mouth daily       VITAMIN D, CHOLECALCIFEROL, PO Take 2,000 Units by mouth daily          ROS: 10 point ROS neg other than the symptoms noted above in the HPI.    EXAM:  Blood pressure 117/81, pulse 68, height 1.626 m (5' 4\"), weight 66.5 kg (146 lb 11.2 oz), last menstrual period 10/21/2018, not currently breastfeeding.   BMI= Body mass index is 25.18 kg/(m^2).  General - pleasant female in no acute distress.  Neck - supple without lymphadenopathy or thyromegaly.  Lungs - clear to auscultation bilaterally.  Abdomen - soft, nontender, nondistended, no hepatosplenomegaly.  Rectovaginal - deferred.  Musculoskeletal - no gross deformities.  Neurological - normal strength, sensation, and mental status.      ASSESSMENT/PLAN:  Sweats of uncertain etiology.  POF, hormone related night sweats, infection, underlying Cancer, medication induced, thyroid disorder.    (R61) Night sweats  (primary encounter diagnosis)  Comment:   Plan: XR Chest 2 Views, Quantiferon TB Gold Plus, HIV        Antigen Antibody Combo, CBC with Platelets         Differential, Anti-Mullerian hormone    Difficult to evaluate for POF while on OCP as will suppress FSH level.           (K59.04) Chronic idiopathic constipation  Comment:       Kati Orantes MD      "

## 2018-11-14 LAB
GAMMA INTERFERON BACKGROUND BLD IA-ACNC: 0.02 IU/ML
M TB IFN-G BLD-IMP: NEGATIVE
M TB IFN-G CD4+ BCKGRND COR BLD-ACNC: >10 IU/ML
MIS SERPL-MCNC: 3.63 NG/ML (ref 0.4–16.02)
MITOGEN IGNF BCKGRD COR BLD-ACNC: 0 IU/ML
MITOGEN IGNF BCKGRD COR BLD-ACNC: 0 IU/ML

## 2018-11-20 ENCOUNTER — MYC MEDICAL ADVICE (OUTPATIENT)
Dept: OBGYN | Facility: CLINIC | Age: 23
End: 2018-11-20

## 2018-11-20 DIAGNOSIS — R61 NIGHT SWEATS: Primary | ICD-10-CM

## 2018-11-20 NOTE — PROGRESS NOTES
HPI: 24 yo nulligravid female w/ complaints of night sweats.  This occurs per her report, during the week of her placebo week on her BC pills.  She has tried a couple different pills9 monophasic/triphasic), but continues to have trouble. Described as sheet soaking sweats.    She denies pelvic pain or vaginal pain.       Patient's last menstrual period was 10/21/2018 (exact date).  OCP    Patients records are available and reviewed at today's visit.    Past GYN history:  No STD history  Last PAP smear:  Normal  Last TSH: 2.31    Past Medical History:   Diagnosis Date     Chronic constipation        Past Surgical History:   Procedure Laterality Date     COLONOSCOPY  03/2017       Family History   Problem Relation Age of Onset     Diabetes Mother      Diabetes Maternal Grandmother      Coronary Artery Disease Maternal Grandfather      Cerebrovascular Disease Maternal Grandfather      Coronary Artery Disease Paternal Grandmother      Unknown/Adopted Paternal Grandfather        Allergies: Penicillins    Current Outpatient Prescriptions   Medication Sig Dispense Refill     cloNIDine (CATAPRES) 0.1 MG tablet Take 1 tablet (0.1 mg) by mouth At Bedtime As needed for hot flashes 30 tablet 5     Multiple Vitamins-Calcium (DAILY VITAMINS FOR WOMEN PO)        norgestimate-ethinyl estradiol (ORTHO-CYCLEN, SPRINTEC) 0.25-35 MG-MCG per tablet Take 1 tablet by mouth daily Take 4 continuous packs, skipping the sugar pill week. 112 tablet 3     plecanatide (TRULANCE) 3 MG tablet Take 1 tablet (3 mg) by mouth daily 90 tablet 3     polyethylene glycol (MIRALAX) powder Take 1 capful by mouth 4 times daily        buPROPion (WELLBUTRIN SR) 100 MG 12 hr tablet One tab daily x 1 week then twice daily (Patient not taking: Reported on 9/19/2018) 60 tablet 5     calcium citrate (CALCITRATE) 950 MG tablet Take 1 tablet by mouth daily       VITAMIN D, CHOLECALCIFEROL, PO Take 2,000 Units by mouth daily          ROS: 10 point ROS neg other than  "the symptoms noted above in the HPI.    EXAM:  Blood pressure 117/81, pulse 68, height 1.626 m (5' 4\"), weight 66.5 kg (146 lb 11.2 oz), last menstrual period 10/21/2018, not currently breastfeeding.   BMI= Body mass index is 25.18 kg/(m^2).  General - pleasant female in no acute distress.  Neck - supple without lymphadenopathy or thyromegaly.  Lungs - clear to auscultation bilaterally.  Abdomen - soft, nontender, nondistended, no hepatosplenomegaly.  Rectovaginal - deferred.  Musculoskeletal - no gross deformities.  Neurological - normal strength, sensation, and mental status.      ASSESSMENT/PLAN:  Sweats of uncertain etiology.  POF, hormone related night sweats, infection, underlying Cancer, medication induced, thyroid disorder.    (R61) Night sweats  (primary encounter diagnosis)  Comment:   Plan: XR Chest 2 Views, Quantiferon TB Gold Plus, HIV        Antigen Antibody Combo, CBC with Platelets         Differential, Anti-Mullerian hormone    Difficult to evaluate for POF while on OCP as will suppress FSH level.           (K59.04) Chronic idiopathic constipation  Comment:     Kati Orantes MD, FACOG  [facility]  OB/GYN    11/20/2018  1:36 PM            "

## 2019-02-10 ENCOUNTER — MYC MEDICAL ADVICE (OUTPATIENT)
Dept: GASTROENTEROLOGY | Facility: CLINIC | Age: 24
End: 2019-02-10

## 2019-02-14 NOTE — TELEPHONE ENCOUNTER
RX: plecanatide (TRULANCE) 3 MG tablet. Pt called as she had sent a [employer] message on 2/10/19 but, had not heard back yet.  Pts insurance company is requiring a new Prior Auth for this medication, for the new year.  Please follow up with pt.  Thank you!

## 2019-02-14 NOTE — TELEPHONE ENCOUNTER
Called patient insurance and received verbal conformation of patients PA for her medication.  Called and informed patient via voicemail her prescription  will be ready for sharona

## 2019-02-16 DIAGNOSIS — R61 NIGHT SWEATS: ICD-10-CM

## 2019-02-16 LAB
ESTRADIOL SERPL-MCNC: 37 PG/ML
FSH SERPL-ACNC: 6.2 IU/L

## 2019-02-17 ENCOUNTER — MYC MEDICAL ADVICE (OUTPATIENT)
Dept: OBGYN | Facility: CLINIC | Age: 24
End: 2019-02-17

## 2019-02-17 DIAGNOSIS — Z30.41 ENCOUNTER FOR BIRTH CONTROL PILLS MAINTENANCE: ICD-10-CM

## 2019-02-28 RX ORDER — NORGESTIMATE AND ETHINYL ESTRADIOL 0.25-0.035
1 KIT ORAL DAILY
Qty: 112 TABLET | Refills: 3 | Status: SHIPPED | OUTPATIENT
Start: 2019-02-28 | End: 2020-06-15

## 2019-02-28 NOTE — TELEPHONE ENCOUNTER
Patient requesting refill of OCP via [employer]. Pap NIL January 2018. Able to refill per protocol and sent to [facility], [address]. Patient notified via [employer].

## 2019-05-13 ASSESSMENT — ENCOUNTER SYMPTOMS
DIARRHEA: 0
HOT FLASHES: 1
RECTAL PAIN: 0
JAUNDICE: 0
BLOATING: 1
NAUSEA: 0
BLOOD IN STOOL: 0
HEMATURIA: 0
VOMITING: 0
DYSURIA: 0
BOWEL INCONTINENCE: 0
CONSTIPATION: 1
HEARTBURN: 0
DECREASED LIBIDO: 1
FLANK PAIN: 0
ABDOMINAL PAIN: 1
DIFFICULTY URINATING: 0

## 2019-05-14 ENCOUNTER — TELEPHONE (OUTPATIENT)
Dept: GASTROENTEROLOGY | Facility: CLINIC | Age: 24
End: 2019-05-14

## 2019-05-15 ENCOUNTER — OFFICE VISIT (OUTPATIENT)
Dept: GASTROENTEROLOGY | Facility: CLINIC | Age: 24
End: 2019-05-15
Payer: COMMERCIAL

## 2019-05-15 VITALS
HEART RATE: 56 BPM | WEIGHT: 144.2 LBS | BODY MASS INDEX: 24.62 KG/M2 | OXYGEN SATURATION: 97 % | DIASTOLIC BLOOD PRESSURE: 68 MMHG | SYSTOLIC BLOOD PRESSURE: 112 MMHG | HEIGHT: 64 IN

## 2019-05-15 DIAGNOSIS — R10.84 ABDOMINAL PAIN, GENERALIZED: ICD-10-CM

## 2019-05-15 DIAGNOSIS — K59.04 CHRONIC IDIOPATHIC CONSTIPATION: Primary | ICD-10-CM

## 2019-05-15 ASSESSMENT — MIFFLIN-ST. JEOR: SCORE: 1384.09

## 2019-05-15 ASSESSMENT — PAIN SCALES - GENERAL: PAINLEVEL: NO PAIN (0)

## 2019-05-15 NOTE — PATIENT INSTRUCTIONS
1. Continue your Trulance daily as you are.  2. Continue your efforts to stay hydrated and continue your exercise routine.  3. Agree with use of Miralax 1-2 times daily as needed for periods of worsening constipation.  4. If having severe abdominal which is impacting your ability to eat, drink, and function. Of if having fever with abdominal pain. Please seek medical care.   5. Follow-up with Dr. Haro in 6-9 months.      If you have any questions, please don't hesitate to contact me through our GI RN Clinic Coordinator, Macy Farley, at [phone].

## 2019-05-15 NOTE — PROGRESS NOTES
GI CLINIC VISIT    CC:  Follow-up      ASSESSMENT/PLAN:  (K59.04) Chronic idiopathic constipation  (primary encounter diagnosis)  Comment:    Prior normal colonoscopy, Sitz marker study, and anorectal manometry/defecography. Response to laxatives (Miralax specifically) but requiring ever-increasing doses (>4x/day). Linzess 290 mcg was effective for several months, then lost effect. Now on plecanatide with good response (near daily stools with no abd symptoms). Has needed prn Miralax during periods of worsening constipation (i.e. Traveling).  Plan:  1. Continue your Trulance daily as you are.  2. Continue your efforts to stay hydrated and continue your exercise routine.  3. Agree with use of Miralax 1-2 times daily as needed for periods of worsening constipation.  4. If having severe abdominal which is impacting your ability to eat, drink, and function. Of if having fever with abdominal pain. Please seek medical care.   5. Follow-up with Dr. Haro in 6-9 months.      (R10.84) Abdominal pain, generalized  Comment:    Five discrete episodes over 2.5years. More recent episodes seem to be post-prandial. No other associated symptoms. Work-up during pain episode (in 2016) was with normal labs, US, and CT scan. HIDA scan thereafter and colonoscopy were without acute pathology.    More recent post-prandial episodes are somewhat suggestive of biliary colic, but no noted gallstones on imaging with normal HIDA scan in years past --> ? Microlithiasis and sluge?    We previously discussed a possible EGD +/- EUS (to evaluate for microlithiasis/sludge) or even CTE for better assessment of small bowel (though notably CT during initial pain episode was normal).  Ms. Cutler would like to hold off at this time, given rare pain episodes.   Plan:  -  will continue to monitor       It was a pleasure to participate in the care of this patient; please contact us with any further questions.  A total of 10 face-to-face minutes was spent  with this patient, >50% of which was counseling regarding the above delineated issues.    Gayle Haro MD   of Medicine  Division of Gastroenterology, Hepatology and Nutrition  [facility]    ---------------------------------------------------------------------------------------------------  HPI:    Ms. Cutler (formerly Rob) is a previously healthy 23 year old female who was initially seen in consultation with Jordan Du and Fabien of GI in July 2016 for constipation. She was initially seen by this writer in Nov 2016. She returns today for follow-up.       Since her last visit, she feels she's been doing well. Has been keeping busy with work and travel. Overall has no new GI concerns.      1. Constipation  Summarized/taken from my prior documentation:   Issues with constipation since early childhood but with no regular laxatives or daily medications as a child nor need for disimpactions, enemas, nor hospitalizations. She used OTC laxatives as needed through late childhood and teen years. She had two urgent care visits during these years for constipation-related abdominal pain.     Her constipation apparently worsened in her early twenties going up to 2 weeks without a satisfactory BM, (she may pass some small,Day 1 stools with straining during this time). These periods are associated with significant bloating and LLQ pain which resolve after defecation.  She previously tried prunes/prune juice, OTC Mg citrate, an unknown laxative pill prn with minimal effect. Her Avi MD put her on daily Miralax which initially was helpful, but its effect waned.  She reports a high vegetable and fruit intake, at least 70 oz of water daily, and daily physical activity (running) which has led to an intentional 80 lbs weight loss. Her prior work-up included a normal TSH, calcium, CMP, and hgb.    Since establishing in our clinic she has [facility]  "11/22/2016 by Dr. Kailey Acevedo. Her testing was essentially normal with no evidence of Hirschsprung's, only mild perineal descent was noted. A Sitz marker study (after one week off of laxatives) was normal (no Sitz markers were seen on AXR done 5 days later). Though notably, she was on her period at that time (expectedly moving her bowels more frequently than her typical baseline).  A colonoscopy was done and was normal. Despite being off of laxatives (for Sitz study, just prior to colonoscopy) and her only being able to tolerate 4L Golytely (due to nausea, she was intended to take additional prep) her prep was rated at \"fair\".           For treatment she was on ever-increasing doses of fiber and Miralax (up to Miralax 4 times daily with morning coffee/caffeine ingestions) and the effect was lost over time. Ultimately, she was switched to Linzess 145 mcg, then up to 290 mcg with good response for a number of months. In Jan 2018 we switched her to plecanatide/Trulance. With this she was moving her bowels nearly every day.  She continues to drink a large amount of water daily. She is ingesting 40-50 gm of fiber in her diet on an average day (tracks with [employer]).      Today,  Ms. Cutler reports that \"Trulance is amazing\". As it is quite expensive, she is happy she's been to use savings card ($30/3 months' supply) with a prior authorization for insurance. She continues to tolerate the medication without issue. She is currently moving her bowels nearly every day. BMs are solid, formed, and easy-to-pass. She continues to have worsening of constipation with traveling, but has been able to manage this with Miralax prn. She does feel she's had an odor change with her flatus; she denies any significant dietary changes.      2. Abdominal pain   Summarized/taken from my prior documentation:    She has had and handful of episodes of abdominal pain in total since 2016. During the first one [place] [facility] [place], work-up with US, CT, and basic labs was negative. As the CT scan showed a large stool burden she was told this was likely due to constipation and was discharged to home. A second pain episode (precisely the same in nature) occurred around Nov 2017. She did not seek care at that time and has been monitoring her symptoms closely. She has has two more pain events in winter 2012/2018. The first one started around 2:30 pm and seemed to come on suddenly. The pain was intense and focused in the epigastric regions/RUQ with radiation to the back. The pain slowly dissipated, lasting two days in total. In March 2018 she had her fourth pain episode.  She recalls eating  at [employer], but feels it was a typical meal. She awoke in the middle of the night with the same pain. She noted it was worse when lying down and better when sitting or hunched over. The pain is severe enough that she is unable to eat, drink, drive, perform ADLs. This pain remitted the following day. She denies f/c, n/v, or any other associated symptoms.      She has not been seen to have gallstones on prior CT or US imaging. A 2018 pelvic US (done a couple weeks ago) was normal. A HIDA scan was done sometime after her [facility] ED visit and was seen to be normal.     Through GI clinic we evaluated amylase, lipase, and hepatic panel were all normal. We attempted H pylori testing, but as she's had no further pain episodes, she ultimately didn't complete the test.    Unfortunately, she reports another pain episode since her last GI clinic visit in Oct 2018.  She states she had a \"large, greasy Mexican meal\" and the pain came on within 30 minutes. She doesn't really recall any other associated symptoms or further details. The pain, as in the past, dissipated on its own after some time.        3. Hot flashes  4. Decreased libido   Taken/summarized from my prior documentation:   Ms. Cutler does report issues with hot flashes ongoing for 2-3 years. " "She also notes a loss of libido which has been worsening for over a year. She did see a therapist, but this did not change things and underlying medical issue was suspected. Notably she has been on OCP since 16 (Initially monophasic, then triphasic - which gave her mood swings - and now back to monophasic.) She saw a gynecologist who recommended continuous OCPs to manage her hot flashes. This led to lots of breakthrough bleeding and no change in hot flashes. No other interventions were recommended for loss of sex drive.      After her last visit we referred her to one of our [facility] gynecologists and she was able to see Dr. Orantes. Ultimately, Ms. Culter went off of birth control and her estrogen level was tested and reportedly normal. OCPs were restarted (and hot flashes got better).  Her decreased libido has not markedly improved. There was apparently some discussion of trying other birth control methods, though she states she was not interested in an IUD as she is thinking about starting a family in the coming year(s).       ROS:    Please see bottom of this note.     PERTINENT MEDICATIONS:  Current Outpatient Medications   Medication     norgestimate-ethinyl estradiol (ORTHO-CYCLEN/SPRINTEC) 0.25-35 MG-MCG tablet     plecanatide (TRULANCE) 3 MG tablet     polyethylene glycol (MIRALAX) powder     buPROPion (WELLBUTRIN SR) 100 MG 12 hr tablet     calcium citrate (CALCITRATE) 950 MG tablet     cloNIDine (CATAPRES) 0.1 MG tablet     Multiple Vitamins-Calcium (DAILY VITAMINS FOR WOMEN PO)     VITAMIN D, CHOLECALCIFEROL, PO     No current facility-administered medications for this visit.          PHYSICAL EXAMINATION:  Vitals /68   Pulse 56   Ht 1.626 m (5' 4\")   Wt 65.4 kg (144 lb 3.2 oz)   SpO2 97%   BMI 24.75 kg/m     Wt   Wt Readings from Last 2 Encounters:   05/15/19 65.4 kg (144 lb 3.2 oz)   11/13/18 66.5 kg (146 lb 11.2 oz)   Gen: Pt sitting up in NAD, interactive and cooperative on exam  Eyes: " sclerae anicteric, no injection  ENT:  OP clear, MMM  Cardiac: RRR, nl S1, S2  Resp: Clear on anterior exam  GI: Normoactive BS, abd soft, flat, nontender  Skin: Warm, dry, no jaundice, nails appear healthy  Neuro: alert, oriented, answers questions appropriately      PERTINENT STUDIES:    Orders Only on 02/16/2019   Component Date Value Ref Range Status     Estradiol 02/16/2019 37  pg/mL Final     FSH 02/16/2019 6.2  IU/L Final       Answers for HPI/ROS submitted by the patient on 5/13/2019   General Symptoms: No  Skin Symptoms: No  HENT Symptoms: No  EYE SYMPTOMS: No  HEART SYMPTOMS: No  LUNG SYMPTOMS: No  INTESTINAL SYMPTOMS: Yes  URINARY SYMPTOMS: Yes  GYNECOLOGIC SYMPTOMS: Yes  BREAST SYMPTOMS: No  SKELETAL SYMPTOMS: No  BLOOD SYMPTOMS: No  NERVOUS SYSTEM SYMPTOMS: No  MENTAL HEALTH SYMPTOMS: No  Heart burn or indigestion: No  Nausea: No  Vomiting: No  Abdominal pain: Yes  Bloating: Yes  Constipation: Yes  Diarrhea: No  Blood in stool: No  Black stools: No  Rectal or Anal pain: No  Fecal incontinence: No  Yellowing of skin or eyes: No  Vomit with blood: No  Change in stools: No  Trouble holding urine or incontinence: Yes  Pain or burning: No  Trouble starting or stopping: No  Increased frequency of urination: No  Blood in urine: No  Decreased frequency of urination: No  Frequent nighttime urination: No  Flank pain: No  Difficulty emptying bladder: No  Bleeding or spotting between periods: No  Heavy or painful periods: No  Irregular periods: Yes  Vaginal discharge: No  Hot flashes: Yes  Vaginal dryness: No  Genital ulcers: No  Reduced libido: Yes  Painful intercourse: Yes  Difficulty with sexual arousal: Yes  Post-menopausal bleeding: No

## 2019-05-15 NOTE — NURSING NOTE
"Chief Complaint   Patient presents with     RECHECK     Return patient, follow up constipation and abdominal pain       Vitals:    05/15/19 0922   BP: 112/68   Pulse: 56   SpO2: 97%   Weight: 65.4 kg (144 lb 3.2 oz)   Height: 1.626 m (5' 4\")       Body mass index is 24.75 kg/m .    Winifred Cherry CMA    "

## 2019-05-15 NOTE — LETTER
5/15/2019       RE: Genesis Cutler  [address]  [place] [address]     Dear Colleague,    Thank you for referring your patient, Genesis Cutler, to the [facility] at [facility]. Please see a copy of my visit note below.    GI CLINIC VISIT    CC:  Follow-up    ASSESSMENT/PLAN:  (K59.04) Chronic idiopathic constipation  (primary encounter diagnosis)  Comment:    Prior normal colonoscopy, Sitz marker study, and anorectal manometry/defecography. Response to laxatives (Miralax specifically) but requiring ever-increasing doses (>4x/day). Linzess 290 mcg was effective for several months, then lost effect. Now on plecanatide with good response (near daily stools with no abd symptoms). Has needed prn Miralax during periods of worsening constipation (i.e. Traveling).  Plan:  1. Continue your Trulance daily as you are.  2. Continue your efforts to stay hydrated and continue your exercise routine.  3. Agree with use of Miralax 1-2 times daily as needed for periods of worsening constipation.  4. If having severe abdominal which is impacting your ability to eat, drink, and function. Of if having fever with abdominal pain. Please seek medical care.   5. Follow-up with Dr. Haro in 6-9 months.      (R10.84) Abdominal pain, generalized  Comment:    Five discrete episodes over 2.5years. More recent episodes seem to be post-prandial. No other associated symptoms. Work-up during pain episode (in 2016) was with normal labs, US, and CT scan. HIDA scan thereafter and colonoscopy were without acute pathology.    More recent post-prandial episodes are somewhat suggestive of biliary colic, but no noted gallstones on imaging with normal HIDA scan in years past --> ? Microlithiasis and sluge?    We previously discussed a possible EGD +/- EUS (to evaluate for microlithiasis/sludge) or even CTE for better assessment of small bowel (though  notably CT during initial pain episode was normal).  Ms. Cutler would like to hold off at this time, given rare pain episodes.   Plan:  -  will continue to monitor     It was a pleasure to participate in the care of this patient; please contact us with any further questions.  A total of 10 face-to-face minutes was spent with this patient, >50% of which was counseling regarding the above delineated issues.    Gayle Haro MD   of Medicine  Division of Gastroenterology, Hepatology and Nutrition  [facility]  ---------------------------------------------------------------------------------------------------  HPI:    Ms. Cutler (formerly Rob) is a previously healthy 23 year old female who was initially seen in consultation with Jordan Du and Fabien of GI in July 2016 for constipation. She was initially seen by this writer in Nov 2016. She returns today for follow-up.       Since her last visit, she feels she's been doing well. Has been keeping busy with work and travel. Overall has no new GI concerns.      1. Constipation  Summarized/taken from my prior documentation:   Issues with constipation since early childhood but with no regular laxatives or daily medications as a child nor need for disimpactions, enemas, nor hospitalizations. She used OTC laxatives as needed through late childhood and teen years. She had two urgent care visits during these years for constipation-related abdominal pain.     Her constipation apparently worsened in her early twenties going up to 2 weeks without a satisfactory BM, (she may pass some small,[place] 1 stools with straining during this time). These periods are associated with significant bloating and LLQ pain which resolve after defecation.  She previously tried prunes/prune juice, OTC Mg citrate, an unknown laxative pill prn with minimal effect. Her Avi MD put her on daily Miralax which initially was helpful, but its effect waned.  " She reports a high vegetable and fruit intake, at least 70 oz of water daily, and daily physical activity (running) which has led to an intentional 80 lbs weight loss. Her prior work-up included a normal TSH, calcium, CMP, and hgb.    Since establishing in our clinic she has [facility] by Dr. Kaiely Acevedo. Her testing was essentially normal with no evidence of Hirschsprung's, only mild perineal descent was noted. A Sitz marker study (after one week off of laxatives) was normal (no Sitz markers were seen on AXR done 5 days later). Though notably, she was on her period at that time (expectedly moving her bowels more frequently than her typical baseline).  A colonoscopy was done and was normal. Despite being off of laxatives (for Sitz study, just prior to colonoscopy) and her only being able to tolerate 4L Golytely (due to nausea, she was intended to take additional prep) her prep was rated at \"fair\".           For treatment she was on ever-increasing doses of fiber and Miralax (up to Miralax 4 times daily with morning coffee/caffeine ingestions) and the effect was lost over time. Ultimately, she was switched to Linzess 145 mcg, then up to 290 mcg with good response for a number of months. In Jan 2018 we switched her to plecanatide/Trulance. With this she was moving her bowels nearly every day.  She continues to drink a large amount of water daily. She is ingesting 40-50 gm of fiber in her diet on an average day (tracks with [employer]els[employer]).      Today,  Ms. Cutler reports that \"Trulance is amazing\". As it is quite expensive, she is happy she's been to use savings card ($30/3 months' supply) with a prior authorization for insurance. She continues to tolerate the medication without issue. She is currently moving her bowels nearly every day. BMs are solid, formed, and easy-to-pass. She continues to have worsening of constipation with traveling, but has been able to manage this " "with Miralax prn. She does feel she's had an odor change with her flatus; she denies any significant dietary changes.      2. Abdominal pain   Summarized/taken from my prior documentation:    She has had and handful of episodes of abdominal pain in total since 2016. During the first one [place] [facility] [place], work-up with US, CT, and basic labs was negative. As the CT scan showed a large stool burden she was told this was likely due to constipation and was discharged to home. A second pain episode (precisely the same in nature) occurred around Nov 2017. She did not seek care at that time and has been monitoring her symptoms closely. She has has two more pain events in winter 2012/2018. The first one started around 2:30 pm and seemed to come on suddenly. The pain was intense and focused in the epigastric regions/RUQ with radiation to the back. The pain slowly dissipated, lasting two days in total. In March 2018 she had her fourth pain episode.  She  recalls eating  at [employer], but feels it was a typical meal. She awoke in the middle of the night with the same pain. She noted it was worse when lying down and better when sitting or hunched over. The pain is severe enough that she is unable to eat, drink, drive, perform ADLs. This pain remitted the following day. She denies f/c, n/v, or any other associated symptoms.      She has not been seen to have gallstones on prior CT or US imaging. A 2018 pelvic US (done a couple weeks ago) was normal. A HIDA scan was done sometime after her [facility] [place] and was seen to be normal.      Through GI clinic we evaluated amylase, lipase, and hepatic panel were all normal. We attempted H pylori testing, but as she's had no further pain episodes, she ultimately didn't complete the test.    Unfortunately, she reports another pain episode since her last GI clinic visit in Oct 2018.  She states she had a \"large, greasy Mexican meal\" and the pain came on " within 30 minutes. She doesn't really recall any other associated symptoms or further details. The pain, as in the past, dissipated on its own after some time.     3. Hot flashes  4. Decreased libido    Taken/summarized from my prior documentation:   Ms. Cutler does report issues with hot flashes ongoing for 2-3 years. She also notes a loss of libido which has been worsening for over a year. She did see a therapist, but this did not change things and underlying medical issue was suspected. Notably she has been on OCP since 16 (Initially monophasic, then triphasic - which gave her mood swings - and now back to monophasic.) She saw a gynecologist who recommended continuous OCPs to manage her hot flashes. This led to lots of breakthrough bleeding and no change in hot flashes. No other interventions were recommended for loss of sex drive.      After her last visit we referred her to one of our [facility] gynecologists and she was able to see Dr. Orantes. Ultimately, Ms. Cutler went off of birth control and her estrogen level was tested and reportedly normal. OCPs were restarted (and hot flashes got better).  Her decreased libido has not markedly improved. There was apparently some discussion of trying other birth control methods, though she states she was not interested in an IUD as she is thinking about starting a family in the coming year(s).     ROS:    Please see bottom of this note.     PERTINENT MEDICATIONS:  Current Outpatient Medications   Medication     norgestimate-ethinyl estradiol (ORTHO-CYCLEN/SPRINTEC) 0.25-35 MG-MCG tablet     plecanatide (TRULANCE) 3 MG tablet     polyethylene glycol (MIRALAX) powder     buPROPion (WELLBUTRIN SR) 100 MG 12 hr tablet     calcium citrate (CALCITRATE) 950 MG tablet     cloNIDine (CATAPRES) 0.1 MG tablet     Multiple Vitamins-Calcium (DAILY VITAMINS FOR WOMEN PO)     VITAMIN D, CHOLECALCIFEROL, PO     No current facility-administered medications for this visit.        PHYSICAL  "EXAMINATION:  Vitals /68   Pulse 56   Ht 1.626 m (5' 4\")   Wt 65.4 kg (144 lb 3.2 oz)   SpO2 97%   BMI 24.75 kg/m      Wt   Wt Readings from Last 2 Encounters:   05/15/19 65.4 kg (144 lb 3.2 oz)   11/13/18 66.5 kg (146 lb 11.2 oz)   Gen: Pt sitting up in NAD, interactive and cooperative on exam  Eyes: sclerae anicteric, no injection  ENT:  OP clear, MMM  Cardiac: RRR, nl S1, S2  Resp: Clear on anterior exam  GI: Normoactive BS, abd soft, flat, nontender  Skin: Warm, dry, no jaundice, nails appear healthy  Neuro: alert, oriented, answers questions appropriately    PERTINENT STUDIES:  Orders Only on 02/16/2019   Component Date Value Ref Range Status     Estradiol 02/16/2019 37  pg/mL Final     FSH 02/16/2019 6.2  IU/L Final       Gayle Haro MD      "

## 2019-05-23 DIAGNOSIS — K59.04 CHRONIC IDIOPATHIC CONSTIPATION: ICD-10-CM

## 2019-05-23 NOTE — TELEPHONE ENCOUNTER
plecanatide (TRULANCE) 3 MG tablet     Last Written Prescription Date:  5/8/18  Last Fill Quantity: 90,   # refills: 3  Last Office Visit : 5/15/19  Future Office visit:  None    Routing refill request to provider for review/approval because:  Not on protocol

## 2019-06-12 ENCOUNTER — OFFICE VISIT (OUTPATIENT)
Dept: PEDIATRICS | Facility: CLINIC | Age: 24
End: 2019-06-12
Payer: COMMERCIAL

## 2019-06-12 ENCOUNTER — ANCILLARY PROCEDURE (OUTPATIENT)
Dept: GENERAL RADIOLOGY | Facility: CLINIC | Age: 24
End: 2019-06-12
Attending: FAMILY MEDICINE
Payer: COMMERCIAL

## 2019-06-12 ENCOUNTER — TELEPHONE (OUTPATIENT)
Dept: PEDIATRICS | Facility: CLINIC | Age: 24
End: 2019-06-12

## 2019-06-12 VITALS
DIASTOLIC BLOOD PRESSURE: 78 MMHG | BODY MASS INDEX: 24.6 KG/M2 | OXYGEN SATURATION: 96 % | TEMPERATURE: 98.4 F | WEIGHT: 143.3 LBS | SYSTOLIC BLOOD PRESSURE: 120 MMHG | HEART RATE: 63 BPM

## 2019-06-12 DIAGNOSIS — M75.01 ADHESIVE CAPSULITIS OF RIGHT SHOULDER: Primary | ICD-10-CM

## 2019-06-12 DIAGNOSIS — M25.511 ACUTE PAIN OF RIGHT SHOULDER: ICD-10-CM

## 2019-06-12 DIAGNOSIS — M75.01 ADHESIVE CAPSULITIS OF RIGHT SHOULDER: ICD-10-CM

## 2019-06-12 PROCEDURE — 73030 X-RAY EXAM OF SHOULDER: CPT | Mod: RT | Performed by: RADIOLOGY

## 2019-06-12 PROCEDURE — 99213 OFFICE O/P EST LOW 20 MIN: CPT | Performed by: FAMILY MEDICINE

## 2019-06-12 RX ORDER — METHYLPREDNISOLONE 4 MG
TABLET, DOSE PACK ORAL
Qty: 21 TABLET | Refills: 0 | Status: SHIPPED | OUTPATIENT
Start: 2019-06-12 | End: 2019-08-30

## 2019-06-12 NOTE — PROGRESS NOTES
Subjective     Genesis Cutler is a 24 year old female who presents to clinic today for the following health issues:    HPI   Musculoskeletal problem/pain      Duration: a month    Description  Location: shoulder- right- feels like in the joint and is grinding    Intensity:  moderate    Accompanying signs and symptoms: radiation of pain to hand- will get weak sometimes, can feel down arm    History  Previous similar problem: YES- has been bothering her on and off for years but now is consistent  Previous evaluation:  none    Precipitating or alleviating factors:  Trauma or overuse: no   Aggravating factors include: pressure    Therapies tried and outcome: nothing        Reviewed and updated as needed this visit by Provider  Meds  Med Hx  Surg Hx  Fam Hx         Review of Systems         Objective    /78   Pulse 63   Temp 98.4  F (36.9  C)   Wt 65 kg (143 lb 4.8 oz)   LMP 06/05/2019   SpO2 96%   BMI 24.60 kg/m    Body mass index is 24.6 kg/m .  Physical Exam   Constitutional: She appears well-developed and well-nourished.   Musculoskeletal:        Right shoulder: She exhibits tenderness, bony tenderness and pain. She exhibits normal range of motion, no swelling, no effusion, no crepitus, no deformity, no laceration, no spasm, normal pulse and normal strength.            Assessment & Plan     1. Adhesive capsulitis of right shoulder  - PHYSICAL THERAPY REFERRAL; Future  - XR Shoulder Right 2 Views; Future  - methylPREDNISolone (MEDROL DOSEPAK) 4 MG tablet therapy pack; Follow Package Directions  Dispense: 21 tablet; Refill: 0    2. Acute pain of right shoulder  - XR Shoulder Right 2 Views; Future  - methylPREDNISolone (MEDROL DOSEPAK) 4 MG tablet therapy pack; Follow Package Directions  Dispense: 21 tablet; Refill: 0         Return if symptoms worsen or fail to improve.    Richard Ledbetter MD  [facility]

## 2019-06-12 NOTE — TELEPHONE ENCOUNTER
I called to notify patient of xray results with probable rotatory cuff pathology. We will continue with treatment plan discussed during appointment,

## 2019-06-12 NOTE — PATIENT INSTRUCTIONS
Patient Education     Understanding Frozen Shoulder    Frozen shoulder is a condition where the shoulder becomes painful and hard to move. The condition is sometimes called adhesive capsulitis.  The shoulder is a joint that is made up of many parts. These parts allow you to raise, rotate, and swing your arm. The parts of a normal shoulder are:    Humeral head. The ball at the top of the upper arm bone (humerus).    Scapula. The shoulder blade.    Glenoid. The shallow socket on the scapula. (The humeral head rests on the glenoid.)    Capsule. A sheet of tough tissue that encloses the joint and joins the ball to the socket.  With frozen shoulder, the capsule thickens, and shrinks and pulls in (contracts). It is not clear why this happens. It may be from swelling and irritation, or from scar tissue forming. Over time, this may result in pain, stiffness, and loss of movement in the shoulder.  What causes frozen shoulder?  Experts don t know for sure why frozen shoulder occurs. Some things can make the condition more likely. These include:    Being a woman    Being 40 to 60 years old    Having certain health conditions, such as diabetes or thyroid disease    Taking certain medicines    Not using the shoulder for a prolonged period of time, such as after an injury or surgery  Symptoms of frozen shoulder  Frozen shoulder typically occurs in 3 stages. Each stage will vary, but often lasts a few months or longer:    Freezing stage. The shoulder is very painful. Pain often gets worse when moving your arm and at night during sleep. The shoulder gradually becomes stiffer.    Frozen stage. The shoulder is very stiff and hard to move. Pain may be less than in the first stage. It may be hard to do daily tasks, such as dressing or bathing.    Thawing stage. Pain and stiffness slowly get better. In time, normal or almost normal use of the shoulder returns.  Treatment for frozen shoulder  Most cases of frozen shoulder get better,  even with no treatment. Treatment is done to help speed healing and help regain as much joint movement as possible. The best course of treatment will depend on your needs. It may include one or more of the following:    Prescription or over-the-counter pain medicines. These help relieve pain and reduce swelling.    Stretching exercises. These help restore movement to the shoulder. You may do them on your own or under the care of a physical therapist.    Cortisone shots. These are given into your shoulder joint. They can t cure frozen shoulder. But they can help give short-term relief from symptoms and allow you to do exercises without too much pain.    Cold packs and heat packs. These can help relieve symptoms.  Keep in mind that getting better is a slow process. It can take a year or longer. If your shoulder doesn t get better on its own in this time, you may need surgery. This is done to loosen the tight tissues in the shoulder joint.  When to call your healthcare provider  Call your healthcare provider right away if you have any of these:    Fever of 100.4 F (38 C) or higher, or as directed    Symptoms that don t get better, or get worse    New symptoms   Date Last Reviewed: 3/10/2016    [id number] [employer]. [address] [place] [address], [place] [address]. All rights reserved. This information is not intended as a substitute for professional medical care. Always follow your healthcare professional's instructions.

## 2019-07-05 ENCOUNTER — THERAPY VISIT (OUTPATIENT)
Dept: PHYSICAL THERAPY | Facility: CLINIC | Age: 24
End: 2019-07-05
Payer: COMMERCIAL

## 2019-07-05 DIAGNOSIS — M25.511 CHRONIC RIGHT SHOULDER PAIN: Primary | ICD-10-CM

## 2019-07-05 DIAGNOSIS — M75.01 ADHESIVE CAPSULITIS OF RIGHT SHOULDER: ICD-10-CM

## 2019-07-05 DIAGNOSIS — M54.2 NECK PAIN: ICD-10-CM

## 2019-07-05 DIAGNOSIS — G89.29 CHRONIC RIGHT SHOULDER PAIN: Primary | ICD-10-CM

## 2019-07-05 PROCEDURE — 97162 PT EVAL MOD COMPLEX 30 MIN: CPT | Mod: GP | Performed by: PHYSICAL THERAPIST

## 2019-07-05 PROCEDURE — 97530 THERAPEUTIC ACTIVITIES: CPT | Mod: GP | Performed by: PHYSICAL THERAPIST

## 2019-07-05 PROCEDURE — 97110 THERAPEUTIC EXERCISES: CPT | Mod: GP | Performed by: PHYSICAL THERAPIST

## 2019-07-05 NOTE — PROGRESS NOTES
[facility] Initial Evaluation -- Upper Extremity    Evaluation Date: July 5, 2019  Genesis Cutler is a 24 year old female with a R shoulder condition.   Referral: Dr. Ledbetter  Work mechanical stresses: prolonged standing (standing desk) -    Employment status:  Working full time  Leisure mechanical stresses: working out, walking  Functional disability score (SPADI): 16.92  VAS score (0-10): 2/10; at worst 6/10 and did wake from sleep   Handedness (R/L):  R hand dominate  Patient goals/expectations:  Alleviate R shoulder pain so that she can sleep on her R shoulder without pain, wake without pain and workout without pain    HISTORY    Present symptoms: ache deep in the shoulder,  sharp pain into the arm with weakness of the middle/ring finger.    She developed R sided neck and proximal upper trap soreness/tightness with working out in about October 2019. She is not sure if the neck has any relation to the R sided shoulder ache.    Pain quality (sharp/shooting/stabbing/aching/burning/cramping):  Achy neck and R shoulder, intermittent sharp/stab R shoulder, weakness of the middle and ring fingers R hand     Present since (onset date):  Symptoms have been constant since May 2019.  Patient relates that she has had R shoulder achiness since about 2008-09, with symptoms flaring with the change of the seasons.  Symptoms had been intermittent until about May 2019.       Symptoms (improving/unchanging/worsening):  worsening.    Symptoms commenced as a result of: unknown   Condition occurred in the following environment: home    Symptoms at onset: ache deep within the R shoulder  Paresthesia (yes/no):  Yes-weakness of the middle and ring fingers (intermittent)  Spinal history:  R sided neck stiffness/pain since starting her workouts in October 2018.  Cough/Sneeze (pos/neg):  negative    Constant symptoms: ache inside the R shoulder  Intermittent symptoms: R sided neck  pain/stiffness, weakness of the middle and ring fingers.    Symptoms are worse with the following: Sometimes Turning neck, Sometimes Reaching, Sometimes When still, Always Sleeping (side R), Time of day - Always AM and Sometimes as the day progresses and Other - working out (especially overhead lifting and chest press if hands are supinated), leaning on the R UE, bowling, cross body punch during kick boxing, catching a hook pass with the R hand (absorbing/isometric contraction)   Symptoms are better with the following: Always On the move  (moving shoulder around) and Other - has tried heat without relief, temporary relief with anti-inflammatory    Continued use makes the pain (better/worse/no effect): worse    Disturbed night (yes/no):  Only if sleeps on the R shoulder    Pain at rest (yes/no): yes  Site (neck/shoulder/elbow/wrist/hand): neck and shoulder    Other questions (swelling/catching/clicking/locking/subluxing):  Popping     Previous episodes: since high school [id number]  Previous treatments: none    Specific Questions:  General health (excellent/good/fair/poor):  good  Pertinent medical history includes: Overweight (past)  Medications (nil/NSAIDS/analg/steroids/anticoag/other):  None  Medical allergies:  Penicillin  Imaging (None/Xray/MRI/Other): X-rays - no arthritis but appeared to have a tendon type of involvement  Recent or major surgery (yes/no): no  Night pain (yes/no): no  Accidents (yes/no): no  Unexplained weight loss (yes/no): no  Barriers at home: no  Other red flags: no    Sites for physical examination (neck/shoulder/elbow/wrist/hand): neck and shoulder    EXAMINATION    Posture:  Sitting (good/fair/poor): poor  Correction of posture (better/worse/no effect/NA): no effect  Standing (good/fair/poor): good  Other observations:  Intermittently will sit with head tilted to the L     Neurological (NA/motor/sensory/reflexes/dural): decreased strength resisted ER on the R with pain, strong and  painfree otherwise.  Symmetrical and brisk UE reflexes.    Baselines (pain or functional activity): ache of the R shoulder joint, weakness middle and ring finger R hand, pain and weakness with resisted shoulder ER and elbow extension.      Extremities (Shoulder/Elbow/Wrist/Hand): R shoulder    Movement Loss Richard Mod Min Nil Pain   Flexion    x    Extension    x    Abduction    x    Internal Rotation    x Feels funny when reaching behind back, not painful   External Rotation    x    Supination        Pronation        Radial Deviation        Ulnar Deviation           Passive Movement (+/- overpressure)/(PDM/ERP):  Full and painfree o/p all motions, except with IR behind back felt funny - not painful  Resisted Test Response (pain): weak and painful resisted shoulder ER and Painful/strong resisted elbow extension  Other Tests:   (-) Neer's impingement  (-) Steel Marlon Impingement  (-) Lift off test  (-) Empty can   Without tenderness to palpation.       Spine:  Movement Loss: min loss of Rot R, SB L, extension and retraction  Effect of repeated movements: repeated retraction decreased finger symptoms and increased strength for resisted shoulder ER, elbow extension  Effect of static positioning: not testing   Spine testing (not relevant/relevant/secondary problem): relevant vs secondary    Baseline Symptoms: 1-2/10 ache in the R shoulder, moderate weak feeling in the ring/middle fingers  Repeated Tests Symptom Response Mechanical Response   Active/Passive movement, resisted test, functional test During - Produce, Abolish, Increase, Decrease, NE After -   Better, Worse, NB, NW, NE Effect -   ? or ? ROM, strength or key functional test No Effect   Rep neck retraction Increase  PDM   Inc ROM w reps    Better   decreased finger symptoms, NE shoulder, inc strength ER and dec pain resisted ER    Rep neck retraction extension Increase  PDM    No Worse     Pain with strength testing returned   Rep lateral bending R Decrease     Better    Finger sym better but more pain with resisted testing    Rep retraction  Decrease    Better    Fingers were better, painfree MMT and inc strength for ER, full strength resisted elbow ext    Effect of static positioning Not tested                   Provisional Classification (Extremity/Spine): Spine - Derangement - Asymmetrical, unilateral, symptoms below elbow      Principle of Management:  Education:  Discussed possibility of two issues - neck and shoulder.  Will assess neck first to determine if any impact on the shoulder symptoms.  Did have improvement in strength for ER and less pain with elbow extension after neck exercises.  If not changed with neck exercise then will look at the shoulder further next session.   Equipment provided:  Lumbar roll  Exercise and dosage:  Neck retraction 10 reps 6-8 times per day.    ASSESSMENT/PLAN:    Patient is a 24 year old female with cervical and right side shoulder complaints.    Patient has the following significant findings with corresponding treatment plan.                Diagnosis 1:  R shoulder pain    Pain -  manual therapy, self management, education, directional preference exercise, home program - for the neck and/or shoulder  Decreased ROM/flexibility - manual therapy, therapeutic exercise and home program  Decreased joint mobility - manual therapy, therapeutic exercise and home program  Decreased strength - therapeutic exercise, therapeutic activities and home program  Decreased function - therapeutic activities and home program  Impaired posture - neuro re-education, therapeutic activities and home program    Therapy Evaluation Codes:   1) History comprised of:   Personal factors that impact the plan of care:      Time since onset of symptoms.    Comorbidity factors that impact the plan of care are:      None.     Medications impacting care: None.  2) Examination of Body Systems comprised of:   Body structures and functions that impact the plan of care:       Cervical spine and Shoulder.   Activity limitations that impact the plan of care are:      Lifting, Sports and Sleeping.  3) Clinical presentation characteristics are:   Evolving/Changing.  4) Decision-Making    Low complexity using standardized patient assessment instrument and/or measureable assessment of functional outcome.  Cumulative Therapy Evaluation is: Moderate complexity.    Previous and current functional limitations:  (See Goal Flow Sheet for this information)    Short term and Long term goals: (See Goal Flow Sheet for this information)     Communication ability:  Patient appears to be able to clearly communicate and understand verbal and written communication and follow directions correctly.  Treatment Explanation - The following has been discussed with the patient:   RX ordered/plan of care  Anticipated outcomes  Possible risks and side effects  This patient would benefit from PT intervention to resume normal activities.   Rehab potential is good.    Frequency:  2 X week, once daily  Duration:  for 6 weeks tapering to every other week over 4 weeks  Discharge Plan:  Achieve all LTG.  Independent in home treatment program.  Reach maximal therapeutic benefit.    Please refer to the daily flowsheet for treatment today, total treatment time and time spent performing 1:1 timed codes.

## 2019-07-05 NOTE — LETTER
[facility] PHYSICAL THERAPY  [address]  [facility] [address]  [phone]    2019    Re: Genesis Cutler   :   1995  MRN:  [id number]   REFERRING PHYSICIAN:   Richard Ledbetter    [facility] PHYSICAL THERAPY    Date of Initial Evaluation:  19  Visits:  Rxs Used: 1  Reason for Referral:     Adhesive capsulitis of right shoulder  Chronic right shoulder pain  Neck pain    EVALUATION SUMMARY    [facility] Initial Evaluation -- Upper Extremity    Evaluation Date: 2019  Genesis Cutler is a 24 year old female with a R shoulder condition.   Referral: Dr. Ledbetter  Work mechanical stresses: prolonged standing (standing desk) -    Employment status:  Working full time  Leisure mechanical stresses: working out, walking  Functional disability score (SPADI): 16.92  VAS score (0-10): 2/10; at worst 6/10 and did wake from sleep   Handedness (R/L):  R hand dominate  Patient goals/expectations:  Alleviate R shoulder pain so that she can sleep on her R shoulder without pain, wake without pain and workout without pain    HISTORY  Present symptoms: ache deep in the shoulder,  sharp pain into the arm with weakness of the middle/ring finger.    She developed R sided neck and proximal upper trap soreness/tightness with working out in about 2019. She is not sure if the neck has any relation to the R sided shoulder ache.      Pain quality (sharp/shooting/stabbing/aching/burning/cramping):  Achy neck and R shoulder, intermittent sharp/stab R shoulder, weakness of the middle and ring fingers R hand     Present since (onset date):  Symptoms have been constant since May 2019.  Patient relates that she has had R shoulder achiness since about -, with symptoms flaring with the change of the seasons.  Symptoms had been intermittent until about May 2019.       Symptoms  (improving/unchanging/worsening):  worsening.    Symptoms commenced as a result of: unknown     Condition occurred in the following environment: home    Symptoms at onset: ache deep within the R shoulder    Paresthesia (yes/no):  Yes-weakness of the middle and ring fingers (intermittent)    Spinal history:  R sided neck stiffness/pain since starting her workouts in October 2018.    Cough/Sneeze (pos/neg):  negative    Constant symptoms: ache inside the R shoulder    Intermittent symptoms: R sided neck pain/stiffness, weakness of the middle and ring fingers.    Symptoms are worse with the following: Sometimes Turning neck, Sometimes Reaching, Sometimes When still, Always Sleeping (side R), Time of day - Always AM and Sometimes as the day progresses and Other - working out (especially overhead lifting and chest press if hands are supinated), leaning on the R UE, bowling, cross body punch during kick boxing, catching a hook pass with the R hand (absorbing/isometric contraction)     Symptoms are better with the following: Always On the move  (moving shoulder around) and Other - has tried heat without relief, temporary relief with anti-inflammatory    Continued use makes the pain (better/worse/no effect): worse    Disturbed night (yes/no):  Only if sleeps on the R shoulder    Pain at rest (yes/no): yes    Site (neck/shoulder/elbow/wrist/hand): neck and shoulder    Other questions (swelling/catching/clicking/locking/subluxing):  Popping     Previous episodes: since high school 6499-8958  Previous treatments: none    Specific Questions:  General health (excellent/good/fair/poor):  good  Pertinent medical history includes: Overweight (past)  Medications (nil/NSAIDS/analg/steroids/anticoag/other):  None  Medical allergies:  Penicillin  Imaging (None/Xray/MRI/Other): X-rays - no arthritis but appeared to have a tendon type of involvement  Recent or major surgery (yes/no): no  Night pain (yes/no): no  Accidents (yes/no):  no  Unexplained weight loss (yes/no): no  Barriers at home: no  Other red flags: no    Sites for physical examination (neck/shoulder/elbow/wrist/hand): neck and shoulder    EXAMINATION    Posture:  Sitting (good/fair/poor): poor  Correction of posture (better/worse/no effect/NA): no effect  Standing (good/fair/poor): good  Other observations:  Intermittently will sit with head tilted to the L     Neurological (NA/motor/sensory/reflexes/dural): decreased strength resisted ER on the R with pain, strong and painfree otherwise.  Symmetrical and brisk UE reflexes.    Baselines (pain or functional activity): ache of the R shoulder joint, weakness middle and ring finger R hand, pain and weakness with resisted shoulder ER and elbow extension.      Extremities (Shoulder/Elbow/Wrist/Hand): R shoulder    Movement Loss Richard Mod Min Nil Pain   Flexion    x    Extension    x    Abduction    x    Internal Rotation    x Feels funny when reaching behind back, not painful   External Rotation    x    Supination        Pronation        Radial Deviation        Ulnar Deviation           Passive Movement (+/- overpressure)/(PDM/ERP):  Full and painfree o/p all motions, except with IR behind back felt funny - not painful  Resisted Test Response (pain): weak and painful resisted shoulder ER and Painful/strong resisted elbow extension  Other Tests:   (-) Neer's impingement  (-) Steel Marlon Impingement  (-) Lift off test  (-) Empty can   Without tenderness to palpation.       Spine:  Movement Loss: min loss of Rot R, SB L, extension and retraction  Effect of repeated movements: repeated retraction decreased finger symptoms and increased strength for resisted shoulder ER, elbow extension  Effect of static positioning: not testing   Spine testing (not relevant/relevant/secondary problem): relevant vs secondary    Baseline Symptoms: 1-2/10 ache in the R shoulder, moderate weak feeling in the ring/middle fingers  Repeated Tests Symptom  Response Mechanical Response   Active/Passive movement, resisted test, functional test During - Produce, Abolish, Increase, Decrease, NE After -   Better, Worse, NB, NW, NE Effect -   ? or ? ROM, strength or key functional test No Effect   Rep neck retraction Increase  PDM   Inc ROM w reps    Better   decreased finger symptoms, NE shoulder, inc strength ER and dec pain resisted ER    Rep neck retraction extension Increase  PDM    No Worse     Pain with strength testing returned   Rep lateral bending R Decrease    Better    Finger sym better but more pain with resisted testing    Rep retraction  Decrease    Better    Fingers were better, painfree MMT and inc strength for ER, full strength resisted elbow ext    Effect of static positioning Not tested                   Provisional Classification (Extremity/Spine): Spine - Derangement - Asymmetrical, unilateral, symptoms below elbow      Principle of Management:  Education:  Discussed possibility of two issues - neck and shoulder.  Will assess neck first to determine if any impact on the shoulder symptoms.  Did have improvement in strength for ER and less pain with elbow extension after neck exercises.  If not changed with neck exercise then will look at the shoulder further next session.   Equipment provided:  Lumbar roll  Exercise and dosage:  Neck retraction 10 reps 6-8 times per day.    ASSESSMENT/PLAN:    Patient is a 24 year old female with cervical and right side shoulder complaints.    Patient has the following significant findings with corresponding treatment plan.                Diagnosis 1:  R shoulder pain    Pain -  manual therapy, self management, education, directional preference exercise, home program - for the neck and/or shoulder  Decreased ROM/flexibility - manual therapy, therapeutic exercise and home program  Decreased joint mobility - manual therapy, therapeutic exercise and home program  Decreased strength - therapeutic exercise, therapeutic  activities and home program  Decreased function - therapeutic activities and home program  Impaired posture - neuro re-education, therapeutic activities and home program    Therapy Evaluation Codes:   1) History comprised of:   Personal factors that impact the plan of care:      Time since onset of symptoms.    Comorbidity factors that impact the plan of care are:      None.     Medications impacting care: None.  2) Examination of Body Systems comprised of:   Body structures and functions that impact the plan of care:      Cervical spine and Shoulder.   Activity limitations that impact the plan of care are:      Lifting, Sports and Sleeping.  3) Clinical presentation characteristics are:   Evolving/Changing.  4) Decision-Making    Low complexity using standardized patient assessment instrument and/or measureable assessment of functional outcome.  Cumulative Therapy Evaluation is: Moderate complexity.    Previous and current functional limitations:  (See Goal Flow Sheet for this information)    Short term and Long term goals: (See Goal Flow Sheet for this information)     Communication ability:  Patient appears to be able to clearly communicate and understand verbal and written communication and follow directions correctly.  Treatment Explanation - The following has been discussed with the patient:   RX ordered/plan of care  Anticipated outcomes  Possible risks and side effects  This patient would benefit from PT intervention to resume normal activities.   Rehab potential is good.    Frequency:  2 X week, once daily  Duration:  for 6 weeks tapering to every other week over 4 weeks  Discharge Plan:  Achieve all LTG.  Independent in home treatment program.  Reach maximal therapeutic benefit.      Thank you for your referral.            INQUIRIES  Therapist: Alessia Trent PT, Dip MDT, FAAOMPT   [facility] PHYSICAL THERAPY  [address]  [facility] [address]  Phone: [phone]  Fax:  [phone]

## 2019-07-18 ENCOUNTER — THERAPY VISIT (OUTPATIENT)
Dept: PHYSICAL THERAPY | Facility: CLINIC | Age: 24
End: 2019-07-18
Payer: COMMERCIAL

## 2019-07-18 DIAGNOSIS — M25.511 CHRONIC RIGHT SHOULDER PAIN: Primary | ICD-10-CM

## 2019-07-18 DIAGNOSIS — G89.29 CHRONIC RIGHT SHOULDER PAIN: Primary | ICD-10-CM

## 2019-07-18 DIAGNOSIS — M54.2 NECK PAIN: ICD-10-CM

## 2019-07-18 PROCEDURE — 97112 NEUROMUSCULAR REEDUCATION: CPT | Mod: GP | Performed by: PHYSICAL THERAPIST

## 2019-07-18 PROCEDURE — 97110 THERAPEUTIC EXERCISES: CPT | Mod: GP | Performed by: PHYSICAL THERAPIST

## 2019-07-18 PROCEDURE — 97140 MANUAL THERAPY 1/> REGIONS: CPT | Mod: GP | Performed by: PHYSICAL THERAPIST

## 2019-07-24 ENCOUNTER — THERAPY VISIT (OUTPATIENT)
Dept: PHYSICAL THERAPY | Facility: CLINIC | Age: 24
End: 2019-07-24
Payer: COMMERCIAL

## 2019-07-24 DIAGNOSIS — G89.29 CHRONIC RIGHT SHOULDER PAIN: ICD-10-CM

## 2019-07-24 DIAGNOSIS — M54.2 NECK PAIN: ICD-10-CM

## 2019-07-24 DIAGNOSIS — M25.511 CHRONIC RIGHT SHOULDER PAIN: ICD-10-CM

## 2019-07-24 PROCEDURE — 97110 THERAPEUTIC EXERCISES: CPT | Mod: GP | Performed by: PHYSICAL THERAPIST

## 2019-07-24 PROCEDURE — 97112 NEUROMUSCULAR REEDUCATION: CPT | Mod: GP | Performed by: PHYSICAL THERAPIST

## 2019-08-13 ENCOUNTER — THERAPY VISIT (OUTPATIENT)
Dept: PHYSICAL THERAPY | Facility: CLINIC | Age: 24
End: 2019-08-13
Payer: COMMERCIAL

## 2019-08-13 DIAGNOSIS — M25.511 CHRONIC RIGHT SHOULDER PAIN: ICD-10-CM

## 2019-08-13 DIAGNOSIS — G89.29 CHRONIC RIGHT SHOULDER PAIN: ICD-10-CM

## 2019-08-13 DIAGNOSIS — M54.2 NECK PAIN: ICD-10-CM

## 2019-08-13 PROCEDURE — 97110 THERAPEUTIC EXERCISES: CPT | Mod: GP | Performed by: PHYSICAL THERAPIST

## 2019-08-13 PROCEDURE — 97140 MANUAL THERAPY 1/> REGIONS: CPT | Mod: GP | Performed by: PHYSICAL THERAPIST

## 2019-08-23 ENCOUNTER — THERAPY VISIT (OUTPATIENT)
Dept: PHYSICAL THERAPY | Facility: CLINIC | Age: 24
End: 2019-08-23
Payer: COMMERCIAL

## 2019-08-23 DIAGNOSIS — G89.29 CHRONIC RIGHT SHOULDER PAIN: ICD-10-CM

## 2019-08-23 DIAGNOSIS — M54.2 NECK PAIN: ICD-10-CM

## 2019-08-23 DIAGNOSIS — M25.511 CHRONIC RIGHT SHOULDER PAIN: ICD-10-CM

## 2019-08-23 PROCEDURE — 97110 THERAPEUTIC EXERCISES: CPT | Mod: GP | Performed by: PHYSICAL THERAPIST

## 2019-08-23 PROCEDURE — 97140 MANUAL THERAPY 1/> REGIONS: CPT | Mod: GP | Performed by: PHYSICAL THERAPIST

## 2019-08-26 ENCOUNTER — PATIENT OUTREACH (OUTPATIENT)
Dept: GASTROENTEROLOGY | Facility: CLINIC | Age: 24
End: 2019-08-26

## 2019-08-29 ENCOUNTER — THERAPY VISIT (OUTPATIENT)
Dept: PHYSICAL THERAPY | Facility: CLINIC | Age: 24
End: 2019-08-29
Payer: COMMERCIAL

## 2019-08-29 DIAGNOSIS — M54.2 NECK PAIN: ICD-10-CM

## 2019-08-29 DIAGNOSIS — G89.29 CHRONIC RIGHT SHOULDER PAIN: ICD-10-CM

## 2019-08-29 DIAGNOSIS — M25.511 CHRONIC RIGHT SHOULDER PAIN: ICD-10-CM

## 2019-08-29 PROCEDURE — 97110 THERAPEUTIC EXERCISES: CPT | Mod: GP | Performed by: PHYSICAL THERAPIST

## 2019-08-30 ENCOUNTER — OFFICE VISIT (OUTPATIENT)
Dept: PEDIATRICS | Facility: CLINIC | Age: 24
End: 2019-08-30
Payer: COMMERCIAL

## 2019-08-30 VITALS
HEART RATE: 63 BPM | BODY MASS INDEX: 24.48 KG/M2 | SYSTOLIC BLOOD PRESSURE: 100 MMHG | WEIGHT: 142.6 LBS | OXYGEN SATURATION: 100 % | DIASTOLIC BLOOD PRESSURE: 60 MMHG | TEMPERATURE: 97.9 F

## 2019-08-30 DIAGNOSIS — M19.012 INFLAMMATION OF JOINTS OF BILATERAL SHOULDER REGIONS: Primary | ICD-10-CM

## 2019-08-30 DIAGNOSIS — M19.011 INFLAMMATION OF JOINTS OF BILATERAL SHOULDER REGIONS: Primary | ICD-10-CM

## 2019-08-30 PROCEDURE — 99213 OFFICE O/P EST LOW 20 MIN: CPT | Performed by: FAMILY MEDICINE

## 2019-08-30 NOTE — PROGRESS NOTES
Subjective:  HPI                    Objective:  System    Physical Exam    General     ROS    Assessment/Plan:    PROGRESS  REPORT    Progress reporting period is from 7/5/2019 to 8/29/2019.       SUBJECTIVE  Subjective changes noted by patient:  Patient notes that she is relatively the same with regards to her shoulder pain.  She continues to have a constant level of anterior shoulder pain bilaterally, sharp.  Symptoms are worse on the L vs the R today, but typically the R side is worse.  She is uncertain as to why the symptoms are worse on the L.    She continues to have increased pain with push ups, shoulder flexion, sleeping on the shoulder, leaning and pushing up on the R forearm/elbow, taking off a tight top/sports bra.  She does continue to wake at least 2 times a night due to the symptoms.  She has continued with cross fit but tries to modify her workout to avoid increasing her shoulder pain.  The numbness of the R hand is better since started passive shoulder extension ROM.    Patient has not been taking medication for the symptoms.    Current pain level is 4-6/10  .     Previous pain level was  2/10 Initial Pain level: 6/10.   Changes in function:  None  Adverse reaction to treatment or activity: most all activity and movement of the shoulder increases the pain.     OBJECTIVE  Changes noted in objective findings:  None  Patient has full AROM except for ER - 65 deg on the R, 80 deg on the L.  Passively the endfeel with ER is hard.  Did become more springy after assessing the lift off test (did 10 reps lifting palm off lower back - had increased soreness temporarily, with improved PROM.  Weak and painful resisted ER on the R (gives due to pain).  Patient has a pain during motion with shoulder flexion, abduction, empty can, IR behind back.    Pain with empty can testing is increased at about 30 deg of scaption, consistent.    Pain with horizontal adduction on the R > L  Has pain with empty can testing but  good strength.  (-) lift off   (-) Neer's impingement testing  (+) Steel Marlon  Posterior glides for the shoulder have felt good - Patient feels that they help for shoulder to feel like it is in the right place afterwards.   Patient has not tolerated pendulum exercises (increased grinding in shoulder afterwards), scapular stabilization exercises, shoulder adduction to assist depressing the shoulder, isometrics.  Quadruped scapula stabilization felt good to do, but not improved afterwards.      Spine testing at evaluation was helpful but when assessed as HEP, symptoms were not altered.  Have not reassessed the spine.     ASSESSMENT/PLAN  Updated problem list and treatment plan: Diagnosis 1: R shoulder pain (has B shoulder pain L > R).  Unclear if RC pathology, labral issues and/or spine involvement.     Pain -  manual therapy, self management, education, directional preference exercise and home program  Decreased joint mobility - manual therapy, therapeutic exercise and home program  Decreased strength - therapeutic exercise, therapeutic activities and home program  Impaired muscle performance - neuro re-education and home program  Decreased function - therapeutic activities and home program  STG/LTGs have been met or progress has been made towards goals:  None  Assessment of Progress: The patient's condition is unchanged.  Self Management Plans:  Patient has been instructed in a home treatment program.  Patient  has been instructed in self management of symptoms.  I have re-evaluated this patient and find that the nature, scope, duration and intensity of the therapy is appropriate for the medical condition of the patient.  Genesis continues to require the following intervention to meet STG and LTG's:  Patient will need to follow up with MD as not significantly different with PT thus far    Recommendations:  This patient would benefit from further evaluation.  ? Whether imaging may be helpful  Also, question  whether there may be a cervical component.  Symptoms are worse on the opposite shoulder without apparent reason for this.      Please refer to the daily flowsheet for treatment today, total treatment time and time spent performing 1:1 timed codes.

## 2019-08-30 NOTE — TELEPHONE ENCOUNTER
Prior Authorization Approval    Authorization Effective Date: 8/30/2019  Authorization Expiration Date: 8/30/2020  Medication: plecanatide (TRULANCE) 3 MG tablet-PA APPROVED   Approved Dose/Quantity:   Reference #: [id number]   Insurance Company: [employer] ([facility]) - Phone [phone] Fax [phone]  Expected CoPay:       CoPay Card Available:      Foundation Assistance Needed:    Which Pharmacy is filling the prescription (Not needed for infusion/clinic administered): [facility]/PHARMACY #[id number] - [place], [place] - [address] [facility]  Pharmacy Notified: Yes- **Instructed pharmacy to notify patient when script is ready to /ship.**   Patient Notified: Yes

## 2019-08-30 NOTE — NURSING NOTE
"Chief Complaint   Patient presents with     Shoulder Pain     follow-up on bilateral shoulder pain       Initial /60 (BP Location: Right arm, Patient Position: Sitting, Cuff Size: Adult Regular)   Pulse 63   Temp 97.9  F (36.6  C) (Oral)   Wt 64.7 kg (142 lb 9.6 oz)   LMP 08/26/2019   SpO2 100%   Breastfeeding? No   BMI 24.48 kg/m   Estimated body mass index is 24.48 kg/m  as calculated from the following:    Height as of 5/15/19: 1.626 m (5' 4\").    Weight as of this encounter: 64.7 kg (142 lb 9.6 oz).  Medication Reconciliation: complete      PARESH Ellsworth      "

## 2019-08-30 NOTE — TELEPHONE ENCOUNTER
Central Prior Authorization Team   [phone]    PA Initiation    Medication: plecanatide (TRULANCE) 3 MG tablet   Insurance Company: [employer] ([facility]) - Phone [phone] Fax [phone]  Pharmacy Filling the Rx: [facility]/PHARMACY #[id number] - [place], [place] - [address], [place] [facility]  Filling Pharmacy Phone: [phone]  Filling Pharmacy Fax: [phone]  Start Date: 8/30/2019

## 2019-08-30 NOTE — PATIENT INSTRUCTIONS
Patient Education     Shoulder Impingement Syndrome  The rotator cuff is a group of muscles and tendons that surround the shoulder joint. These muscles and tendons hold the arm in its joint. They help the shoulder move. The rotator cuff muscles and tendons can become irritated from repeated rubbing against the shoulder bone. This is called shoulder impingement syndrome or rotator cuff tendonitis.     If your case is mild, you may only need to rest the shoulder and then do certain exercises to strengthen the muscles. You can also take anti-inflammatory medicines. Steroid injections into the shoulder can ease inflammation. But you can have only a limited number of these. If the condition gets worse, your shoulder muscles may become thin and weak. This can lead to a rotator cuff tear.  Symptoms of shoulder impingement syndrome may include:    Shoulder pain that gets worse when you raise your arm overhead    Weakness of the shoulder muscles when you use your arm overhead    Popping and clicking when you move your shoulder    Shoulder pain that wakes you up at night, especially when you sleep on the affected shoulder    Sudden pain in your shoulder when you lift or reach  Home care  Follow these tips to take care of yourself at home:    Avoid activities that make your pain worse. These include raising your arms overhead, repeating the same motion over and over, or lifting heavy objects.    Don t hold your arm in one position for a long time. Keep it moving.    Put an ice pack on the sore area for 20 minutes every 1 to 2 hours for the first day. You can make an ice pack by putting ice cubes in a plastic bag. Wrap the bag in a towel before putting it on your shoulder. A frozen bag of peas or something similar can also be used as an ice pack. Use the ice packs 3 to 4 times a day for the next 2 days. Continue using the ice to relieve of pain and swelling as needed.    You may take acetaminophen or ibuprofen to control pain,  unless another medicine was prescribed. If prednisone was prescribed, don t take anti-inflammatory medicines. If you have chronic liver or kidney disease or ever had a stomach ulcer or gastrointestinal bleeding, talk with your doctor before using these medicines.    After your symptoms ease, you may get physical therapy or start a home exercise program. This can strengthen your shoulder muscles and help your range of motion. Talk with your doctor about what is best for your condition.  Follow-up care  Follow up with your healthcare provider, or as advised.  When to seek medical advice  Call your healthcare provider right away if any of these occur:    Shoulder pain that gets worse and wakes you up at night    Your shoulder or arm swells    Numbness, tingling, or pain that travels down the arm to the hand    Loss of shoulder strength    Fever or chills  Date Last Reviewed: 8/1/2016 2000-2018 [email or web address]. [address] [place] [address], [place] [address]. All rights reserved. This information is not intended as a substitute for professional medical care. Always follow your healthcare professional's instructions.

## 2019-08-30 NOTE — PROGRESS NOTES
Subjective     Genesis Cutler is a 24 year old female who presents to clinic today for the following health issues:     HPI   Chronic Pain Follow-Up       Type / Location of Pain: bilateral shoulders  Analgesia/pain control:       Recent changes:  same      Overall control: Tolerable with discomfort  Activity level/function:      Daily activities:  Able to do light housework, cooking    Work:  Able to work part time without limitations  Adverse effects:  No  Adherance    Taking medication as directed?  No: advil when pain is painful    Participating in other treatments: yes, had PT for 6 appointments   Risk Factors:    Sleep:  Fair    Mood/anxiety:  slightly worsened    Recent family or social stressors:  none noted    Other aggravating factors: working out, exercising, pulling arms up, push ups  PHQ-9 SCORE 7/5/2016   PHQ-9 Total Score MyChart 5 (Mild depression)     NEGRO-7 SCORE 7/5/2016   Total Score 5 (mild anxiety)     Encounter-Level CSA:    There are no encounter-level csa.     Patient-Level CSA:    There are no patient-level csa.           How many servings of fruits and vegetables do you eat daily?  4 or more    On average, how many sweetened beverages do you drink each day (soda, juice, sweet tea, etc)?   1    How many days per week do you miss taking your medication? 0        Reviewed and updated as needed this visit by Provider  Problems  Med Hx  Surg Hx  Fam Hx         Review of Systems   ROS COMP: Constitutional, HEENT, cardiovascular, pulmonary, gi and gu systems are negative, except as otherwise noted.      Objective    /60 (BP Location: Right arm, Patient Position: Sitting, Cuff Size: Adult Regular)   Pulse 63   Temp 97.9  F (36.6  C) (Oral)   Wt 64.7 kg (142 lb 9.6 oz)   LMP 08/26/2019   SpO2 100%   Breastfeeding? No   BMI 24.48 kg/m    Body mass index is 24.48 kg/m .  Physical Exam   Constitutional: She appears well-developed and well-nourished.   Musculoskeletal:         Right shoulder: She exhibits decreased range of motion, tenderness, bony tenderness, pain and spasm. She exhibits no swelling, no effusion, no crepitus, no deformity, no laceration, normal pulse and normal strength.        Left shoulder: She exhibits decreased range of motion, tenderness, bony tenderness and pain. She exhibits no swelling, no effusion, no crepitus, no deformity, no laceration, no spasm, normal pulse and normal strength.      Results for orders placed or performed in visit on 06/12/19   XR Shoulder Right 2 Views    Narrative    Exam: XR SHOULDER 2 VIEW RIGHT, 6/12/2019 4:17 PM    Indication: Adhesive capsulitis of right shoulder; Acute pain of right  shoulder    Comparison: None    Findings:   Irregularity of the greater tuberosity likely from underlying rotator  cuff pathology. No displaced fracture. Alignment preserved. No  degenerative change.      Impression    Impression: No degenerative change.    MEGA COLINDRES MD               Assessment & Plan     1. Inflammation of joints of bilateral shoulder regions  Previously seen for right shoulder pain, managed for frozen right shoulder with oral steroids and physical therapy. Symptoms hven't improved now present left shoulder. No cervical tenderness or neurological deficit to suggest cervical radiculopathy but will have her see ortho at this time.  - ORTHOPEDICS ADULT REFERRAL         Return if symptoms worsen or fail to improve.    Richard Ledbetter MD  [facility]

## 2019-09-06 ENCOUNTER — OFFICE VISIT (OUTPATIENT)
Dept: ORTHOPEDICS | Facility: CLINIC | Age: 24
End: 2019-09-06
Attending: FAMILY MEDICINE
Payer: COMMERCIAL

## 2019-09-06 VITALS
HEART RATE: 55 BPM | WEIGHT: 145.6 LBS | HEIGHT: 65 IN | DIASTOLIC BLOOD PRESSURE: 68 MMHG | SYSTOLIC BLOOD PRESSURE: 102 MMHG | OXYGEN SATURATION: 99 % | BODY MASS INDEX: 24.26 KG/M2

## 2019-09-06 DIAGNOSIS — M25.511 CHRONIC RIGHT SHOULDER PAIN: Primary | ICD-10-CM

## 2019-09-06 DIAGNOSIS — G89.29 CHRONIC RIGHT SHOULDER PAIN: Primary | ICD-10-CM

## 2019-09-06 PROCEDURE — 99214 OFFICE O/P EST MOD 30 MIN: CPT | Performed by: FAMILY MEDICINE

## 2019-09-06 ASSESSMENT — PAIN SCALES - GENERAL
PAINLEVEL: MILD PAIN (2)
PAINLEVEL: MILD PAIN (2)

## 2019-09-06 ASSESSMENT — MIFFLIN-ST. JEOR: SCORE: 1398.38

## 2019-09-06 NOTE — LETTER
9/6/2019         RE: Genesis Cutler  [address]  [facility] [address]        Dear Colleague,    Thank you for referring your patient, Genesis Cutler, to the [facility]. Please see a copy of my visit note below.    [facility]      Patient is a 24 year old female who presents to the office today for: Chronic bilateral shoulder pain right worse than left.  Patient reports having shoulder pain, popping and grinding intermittently since she was a teenager.  No specific injury or trauma.  No instability event.  Seems to occur with the change of seasons.  Also seems worse at night in the early morning.  However over the past few months pain has been persistent.  In the past pain has improved with time and rest however the current episode has persisted now for a number of months.  Has pain with any shoulder flexion particularly with weights.  Also has pain at night.  NSAIDs have not been helpful.  Has been seen by PCP where x-ray was done and reportedly normal.  Referred to physical therapy which she attended for 5 visits as well as home exercises and did not notice any improvement.  Referred here for further evaluation.  Pain localizes to the anterior and posterior shoulder.  She does report some clicking and popping.  No stiffness.  No other joint aches or pains.  Pain does not radiate down her arm.  No tingling or numbness.  No neck pain.      Past Medical History, Current Medications, and Allergies are reviewed in the electronic medical record as appropriate.     ROS: Pertinent items are noted in HPI.  Constitutional: negative for fevers, chills and malaise  Cardiovascular: negative for dyspnea, fatigue, lower extremity edema  Integument/breast: negative for rash, skin lesion(s) and skin color change  Neurological: negative for paresthesia and weakness      EXAM:/68 (BP Location: Left arm, Patient Position: Sitting, Cuff Size: Adult  "Regular)   Pulse 55   Ht 1.638 m (5' 4.5\")   Wt 66 kg (145 lb 9.6 oz)   LMP 08/26/2019   SpO2 99%   BMI 24.61 kg/m       EXAM:  Alert, pleasant and conversational    Neck with full AROM, non-tender to midline cervical and upper thoracic spine palpation, negative Spurling's.  Elbow with FROM and non-tender to palpation      right shoulder:   Skin intact. No skin changes, deformity or atrophy    AROM: Symmetric AROM and PROM.  Pain with active full flexion and external rotation.  Crepitus noted bilaterally with passive range of motion testing.    Strength testing: Abduction: 5/5, Abduction with 30  horizontal flexion and internal rotation: 5/5, External rotation: 5/5, Internal rotation 5/5.  Some pain with external rotation against resistance.  Deltoids 5/5, Biceps 5/5, Triceps 5/5,  5/5.    Palpation: negative TTP of the Acromioclavicular joint  negative TTP of Sternoclavicular joint  negative TTP of posterior glenoid  negative TTP of scapular borders  negative TTP of the bicipital tendon.     Special Tests: negative Steel test  positive  Neer's test.   positive Daphne's test   negative Speed's test.  Positive apprehension and relocation test.  Negative biceps load test    Neurovascularly intact bilateral upper extremities.      Imaging: Reviewed previous x-rays of the right shoulder dated 6/12/2019 demonstrating no acute fracture dislocation.  No significant degenerative disease though there is irregularity of greater tuberosity.  See EMR for formal radiology report.      Assessment: Patient is a 24 year old female with acute on chronic bilateral shoulder pain, right worse than left.  She does exhibit some signs of instability and labral injury on exam though she has not had acute injury or instability event historically.    Recommendations:   Reviewed assessment with the patient in detail  Discussed options for treatment and I recommended further evaluation with MRI given the chronicity of her pain as " well as the lack of improvement with physical therapy, rest, NSAIDs.  She will follow-up after MRI to discuss next steps in treatment.  Also considered inflammatory etiology given the symmetric  and periodic nature of her pain.  If MRI yields no significant results could consider lab work-up for this cause.      Bar Cheng MD        Again, thank you for allowing me to participate in the care of your patient.        Sincerely,        Bar Cheng MD

## 2019-09-06 NOTE — NURSING NOTE
"      [facility]  9/6/2019    Genesis Myamacario Cutler's chief complaint for this visit includes:  Chief Complaint   Patient presents with     Right Shoulder - Pain     Left Shoulder - Pain     PCP: Yonatan Hill    Referring Provider:  Richard Ledbetter MD  [id number] [place] [address]    /68 (BP Location: Left arm, Patient Position: Sitting, Cuff Size: Adult Regular)   Pulse 55   Ht 1.638 m (5' 4.5\")   Wt 66 kg (145 lb 9.6 oz)   LMP 08/26/2019   SpO2 99%   BMI 24.61 kg/m    Mild Pain (2)       Reason for visit:     What part of your body is injured / painful?  bilateral shoulder    What caused the injury /pain? No inciting event     How long ago did your injury occur or pain begin? several years ago    What are your most bothersome symptoms? Pain, grinding and popping    How would you characterize your symptom?  Aching and stabbing    What makes your symptoms better? Nothing    What makes your symptoms worse? Other: laying or leaning on shoulders, various range of motion    Have you been previously seen for this problem? Yes, Dr. Ledbetter and PT    Medical History:    Any recent changes to your medical history? No    Any new medication prescribed since last visit? No    Have you had surgery on this body part before? No    Social History:    Occupation:     Handedness: Right    Exercise: kickboxing    Review of Systems:    Do you have fever, chills, weight loss? No    Do you have any vision problems? No    Do you have any chest pain or edema? No    Do you have any shortness of breath or wheezing?  No    Do you have stomach problems? No    Do you have any numbness or focal weakness? No    Do you have diabetes? No    Do you have problems with bleeding or clotting? No    Do you have an rashes or other skin lesions? No    Linda Pollack CMA           "

## 2019-09-06 NOTE — PROGRESS NOTES
"[facility]      Patient is a 24 year old female who presents to the office today for: Chronic bilateral shoulder pain right worse than left.  Patient reports having shoulder pain, popping and grinding intermittently since she was a teenager.  No specific injury or trauma.  No instability event.  Seems to occur with the change of seasons.  Also seems worse at night in the early morning.  However over the past few months pain has been persistent.  In the past pain has improved with time and rest however the current episode has persisted now for a number of months.  Has pain with any shoulder flexion particularly with weights.  Also has pain at night.  NSAIDs have not been helpful.  Has been seen by PCP where x-ray was done and reportedly normal.  Referred to physical therapy which she attended for 5 visits as well as home exercises and did not notice any improvement.  Referred here for further evaluation.  Pain localizes to the anterior and posterior shoulder.  She does report some clicking and popping.  No stiffness.  No other joint aches or pains.  Pain does not radiate down her arm.  No tingling or numbness.  No neck pain.      Past Medical History, Current Medications, and Allergies are reviewed in the electronic medical record as appropriate.     ROS: Pertinent items are noted in HPI.  Constitutional: negative for fevers, chills and malaise  Cardiovascular: negative for dyspnea, fatigue, lower extremity edema  Integument/breast: negative for rash, skin lesion(s) and skin color change  Neurological: negative for paresthesia and weakness      EXAM:/68 (BP Location: Left arm, Patient Position: Sitting, Cuff Size: Adult Regular)   Pulse 55   Ht 1.638 m (5' 4.5\")   Wt 66 kg (145 lb 9.6 oz)   LMP 08/26/2019   SpO2 99%   BMI 24.61 kg/m      EXAM:  Alert, pleasant and conversational    Neck with full AROM, non-tender to midline cervical and upper thoracic spine palpation, negative " Spurling's.  Elbow with FROM and non-tender to palpation      right shoulder:   Skin intact. No skin changes, deformity or atrophy    AROM: Symmetric AROM and PROM.  Pain with active full flexion and external rotation.  Crepitus noted bilaterally with passive range of motion testing.    Strength testing: Abduction: 5/5, Abduction with 30  horizontal flexion and internal rotation: 5/5, External rotation: 5/5, Internal rotation 5/5.  Some pain with external rotation against resistance.  Deltoids 5/5, Biceps 5/5, Triceps 5/5,  5/5.    Palpation: negative TTP of the Acromioclavicular joint  negative TTP of Sternoclavicular joint  negative TTP of posterior glenoid  negative TTP of scapular borders  negative TTP of the bicipital tendon.     Special Tests: negative Steel test  positive  Neer's test.   positive Cotton's test   negative Speed's test.  Positive apprehension and relocation test.  Negative biceps load test    Neurovascularly intact bilateral upper extremities.      Imaging: Reviewed previous x-rays of the right shoulder dated 6/12/2019 demonstrating no acute fracture dislocation.  No significant degenerative disease though there is irregularity of greater tuberosity.  See EMR for formal radiology report.      Assessment: Patient is a 24 year old female with acute on chronic bilateral shoulder pain, right worse than left.  She does exhibit some signs of instability and labral injury on exam though she has not had acute injury or instability event historically.    Recommendations:   Reviewed assessment with the patient in detail  Discussed options for treatment and I recommended further evaluation with MRI given the chronicity of her pain as well as the lack of improvement with physical therapy, rest, NSAIDs.  She will follow-up after MRI to discuss next steps in treatment.  Also considered inflammatory etiology given the symmetric  and periodic nature of her pain.  If MRI yields no significant results  could consider lab work-up for this cause.      Bar Cheng MD

## 2019-09-06 NOTE — PATIENT INSTRUCTIONS
Thanks for coming today.  Ortho/[facility] Medicine Clinic  [address] [place] [address]    To schedule future appointments in Ortho Clinic, you may call [phone].    To schedule ordered imaging by your provider:   Call Central Imaging [phone]    To schedule an injection ordered by your provider:  Call Central [facility] Injection scheduling line: [phone]  [email or web address] available online at:  [email or web address]    Please call if any further questions or concerns ([phone]).  Clinic hours 8 am to 5 pm.    Return to clinic (call) if symptoms worsen or fail to improve.

## 2019-09-17 ENCOUNTER — ANCILLARY PROCEDURE (OUTPATIENT)
Dept: MRI IMAGING | Facility: CLINIC | Age: 24
End: 2019-09-17
Attending: FAMILY MEDICINE
Payer: COMMERCIAL

## 2019-09-17 DIAGNOSIS — G89.29 CHRONIC RIGHT SHOULDER PAIN: ICD-10-CM

## 2019-09-17 DIAGNOSIS — M25.511 CHRONIC RIGHT SHOULDER PAIN: ICD-10-CM

## 2019-09-21 ENCOUNTER — OFFICE VISIT (OUTPATIENT)
Dept: ORTHOPEDICS | Facility: CLINIC | Age: 24
End: 2019-09-21
Payer: COMMERCIAL

## 2019-09-21 VITALS
SYSTOLIC BLOOD PRESSURE: 102 MMHG | BODY MASS INDEX: 24.16 KG/M2 | HEIGHT: 65 IN | WEIGHT: 145 LBS | HEART RATE: 55 BPM | DIASTOLIC BLOOD PRESSURE: 68 MMHG

## 2019-09-21 DIAGNOSIS — M75.51 SUBACROMIAL BURSITIS OF RIGHT SHOULDER JOINT: Primary | ICD-10-CM

## 2019-09-21 RX ORDER — TRIAMCINOLONE ACETONIDE 40 MG/ML
40 INJECTION, SUSPENSION INTRA-ARTICULAR; INTRAMUSCULAR
Status: DISCONTINUED | OUTPATIENT
Start: 2019-09-21 | End: 2020-02-05

## 2019-09-21 RX ORDER — LIDOCAINE HYDROCHLORIDE 10 MG/ML
3 INJECTION, SOLUTION EPIDURAL; INFILTRATION; INTRACAUDAL; PERINEURAL
Status: DISCONTINUED | OUTPATIENT
Start: 2019-09-21 | End: 2020-02-05

## 2019-09-21 RX ADMIN — TRIAMCINOLONE ACETONIDE 40 MG: 40 INJECTION, SUSPENSION INTRA-ARTICULAR; INTRAMUSCULAR at 17:00

## 2019-09-21 RX ADMIN — LIDOCAINE HYDROCHLORIDE 3 ML: 10 INJECTION, SOLUTION EPIDURAL; INFILTRATION; INTRACAUDAL; PERINEURAL at 17:00

## 2019-09-21 ASSESSMENT — MIFFLIN-ST. JEOR: SCORE: 1395.66

## 2019-09-21 NOTE — NURSING NOTE
[address] [address]  Dept: [phone]  ______________________________________________________________________________    Patient: Genesis Cutler   : 1995   MRN: [id number]   2019    INVASIVE PROCEDURE SAFETY CHECKLIST    Date: 19   Procedure:Right AC joint CSI with kenalog  Patient Name: Genesis Cutler  MRN: [id number]  YOB: 1995    Action: Complete sections as appropriate. Any discrepancy results in a HARD COPY until resolved.     PRE PROCEDURE:  Patient ID verified with 2 identifiers (name and  or MRN): Yes  Procedure and site verified with patient/designee (when able): Yes  Accurate consent documentation in medical record: Yes  H&P (or appropriate assessment) documented in medical record: NA  H&P must be up to 20 days prior to procedure and updates within 24 hours of procedure as applicable: NA  Relevant diagnostic and radiology test results appropriately labeled and displayed as applicable: Yes  Procedure site(s) marked with provider initials: NA    TIMEOUT:  Time-Out performed immediately prior to starting procedure, including verbal and active participation of all team members addressing the following:Yes  * Correct patient identify  * Confirmed that the correct side and site are marked  * An accurate procedure consent form  * Agreement on the procedure to be done  * Correct patient position  * Relevant images and results are properly labeled and appropriately displayed  * The need to administer antibiotics or fluids for irrigation purposes during the procedure as applicable   * Safety precautions based on patient history or medication use    DURING PROCEDURE: Verification of correct person, site, and procedures any time the responsibility for care of the patient is transferred to another member of the care team.       Prior to injection, verified patient identity using  patient's name and date of birth.  Due to injection administration, patient instructed to remain in clinic for 15 minutes  afterwards, and to report any adverse reaction to me immediately.    Joint injection was performed.      Drug Amount Wasted:  Yes: 2 mg/ml   Vial/Syringe: Single dose vial  Expiration Date:  1/23      Segundo Howell, [facility]  September 21, 2019

## 2019-09-21 NOTE — PATIENT INSTRUCTIONS
Ice to shoulder today and then daily as needed  May use tylenol or ibuprofen as needed  No pools or hot tubs for 48 hours  Avoid vigorous activity for 48 hours after.  Then advance as tolerated.  May do Physical Therapy as tolerated  Follow up as needed  Return to clinic with severe pain, warmth from the joint, or redness, as these may be signs of infection after your injection.

## 2019-09-21 NOTE — PROGRESS NOTES
"      SPORTS & ORTHOPEDIC WALK-IN FOLLOW-UP VISIT 9/21/2019    Interval History:     Follow up reason: MRI right shoulder    Date of injury: NA    Date last seen: 9/6/19    Following Therapeutic Plan: Yes     Pain: Unchanged    Function: Unchanged      Medical History:    Any recent changes to your medical history? No    Any new medication prescribed since last visit? No    Review of Systems:    Do you have fever, chills, weight loss? No    Do you have any vision problems? No    Do you have any chest pain or edema? No    Do you have any shortness of breath or wheezing?  No    Do you have stomach problems? No    Do you have any numbness or focal weakness? No    Do you have diabetes? No    Do you have problems with bleeding or clotting? No    Do you have an rashes or other skin lesions? No         EXAM:/68   Pulse 55   Ht 1.638 m (5' 4.5\")   Wt 65.8 kg (145 lb)   LMP 08/26/2019   BMI 24.50 kg/m      EXAM:  Alert, pleasant and conversational    right shoulder:   Skin intact. No skin changes, deformity or atrophy     AROM: Symmetric AROM and PROM.  Pain with active full flexion and external rotation.  Crepitus noted bilaterally with passive range of motion testing.      Palpation: negative TTP of the Acromioclavicular joint  negative TTP of Sternoclavicular joint  negative TTP of posterior glenoid  negative TTP of scapular borders  negative TTP of the bicipital tendon.      Neurovascularly intact bilateral upper extremities.      Imaging: MRI right shoulder dated 9/17/19 and report per radiology:   Impression:  1. On a background of tendinosis, there is a bursal sided irregularity  and attenuation of the supraspinatus tendon. Additionally, there is a  low-grade articular sided partial-thickness tear at the junctional  fibers of the supraspinatus and infraspinatus tendon. No evidence of  full thickness tear or tendon retraction.  2. Moderate amount of fluid in the subacromial/subdeltoid bursa.  3. The muscle " bulk of the right shoulder rotator cuff is preserved.   4. Normal-appearing biceps tendon.      Assessment: Patient is a 24 year old female with chronic intermittent shoulder pain. MRI demonstrates mild supraspinatus changes and subacromial bursitis.     Recommendations:   Reviewed imaging and assessment the patient in detail  Discussed options for treatment which would include considered conservative care with NSAIDs, rest, ice, PT.  Also discussed subacromial steroid injection and the patient would like to pursue this option.  See procedure note for details.  She will follow-up after 2 weeks if there is no improvement or if her pain recurs.  Otherwise follow-up PRN.    Bar Cheng MD      Large Joint Injection/Arthocentesis: R subacromial bursa  Date/Time: 9/21/2019 5:00 PM  Performed by: Bar Cheng MD  Authorized by: Bar Cheng MD     Indications:  Pain  Indications comment:  Bursitis  Needle Size:  22 G  Guidance: landmark guided    Approach:  Posterolateral  Location:  Shoulder      Site:  R subacromial bursa  Medications:  40 mg triamcinolone 40 MG/ML; 3 mL lidocaine (PF) 1 %  Procedure discussed: discussed risks, benefits, and alternatives    Consent Given by:  Patient  Timeout: timeout called immediately prior to procedure    Prep: patient was prepped and draped in usual sterile fashion     There were no complications. The patient tolerated the procedure well. There was minimal bleeding.   The patient was instructed to ice the shoulder upon leaving clinic and refrain from overuse over the next 2 days.   The patient was instructed to go to the emergency room with any unusual pain, swelling, or redness occurred in the injected area.     Bar Cheng MD

## 2019-11-27 PROBLEM — M54.2 NECK PAIN: Status: RESOLVED | Noted: 2019-07-05 | Resolved: 2019-11-27

## 2019-11-27 PROBLEM — G89.29 CHRONIC RIGHT SHOULDER PAIN: Status: RESOLVED | Noted: 2019-07-05 | Resolved: 2019-11-27

## 2019-11-27 PROBLEM — M25.511 CHRONIC RIGHT SHOULDER PAIN: Status: RESOLVED | Noted: 2019-07-05 | Resolved: 2019-11-27

## 2019-11-27 NOTE — PROGRESS NOTES
Patient did not return for further treatment and no additional progress was noted.  Please refer to the progress note and goal flowsheet completed on 08/29/19 for discharge information.

## 2019-12-30 ENCOUNTER — TELEPHONE (OUTPATIENT)
Dept: GASTROENTEROLOGY | Facility: CLINIC | Age: 24
End: 2019-12-30

## 2019-12-30 NOTE — TELEPHONE ENCOUNTER
"[facility] Call Center    Phone Message    May a detailed message be left on voicemail: yes    Reason for Call: Symptoms or Concerns     If patient has red-flag symptoms, warm transfer to triage line    Current symptom or concern: \"Occasional abdominal pain only in the morning. It is nothing severe it is just annoying.\" Caller denies fever and unable to urinate. She called in to reschedule appointment for 2/19/2020 as Dr. Haro was going to be out of clinic that day and patient is now rescheduled to 4/15/2020 for a 6 month follow up (now a 10 month follow up.) Caller would like call back to discuss her symptoms and if she should be seen sooner for this.    Symptoms have been present for:  1 month    Has patient previously been seen for this? No        Action Taken: Message routed to:  [facility] GI  "

## 2020-01-03 ENCOUNTER — OFFICE VISIT (OUTPATIENT)
Dept: ORTHOPEDICS | Facility: CLINIC | Age: 25
End: 2020-01-03
Payer: COMMERCIAL

## 2020-01-03 VITALS — HEIGHT: 65 IN | WEIGHT: 145 LBS | BODY MASS INDEX: 24.16 KG/M2

## 2020-01-03 DIAGNOSIS — M25.512 ACUTE PAIN OF LEFT SHOULDER: Primary | ICD-10-CM

## 2020-01-03 RX ADMIN — TRIAMCINOLONE ACETONIDE 40 MG: 40 INJECTION, SUSPENSION INTRA-ARTICULAR; INTRAMUSCULAR at 17:05

## 2020-01-03 RX ADMIN — LIDOCAINE HYDROCHLORIDE 5 ML: 10 INJECTION, SOLUTION EPIDURAL; INFILTRATION; INTRACAUDAL; PERINEURAL at 17:05

## 2020-01-03 ASSESSMENT — MIFFLIN-ST. JEOR: SCORE: 1395.66

## 2020-01-03 NOTE — PROGRESS NOTES
SPORTS & ORTHOPEDIC WALK-IN VISIT 1/3/2020    Primary Care Physician: Dr. Hill    She is having a similar shoulder pain that she was having originally in her right shoulder.  Suspected to be related to bursitis.  She has pain with shoulder flexion with weights and doing shoulder presses. The majority of the pain is on the anterior shoulder. She is hoping to get CSI in her left shoulder as previously discussed over the phone and via my chart.    Reason for visit:     What part of your body is injured / painful?  left shoulder    What caused the injury /pain? Unsure    How long ago did your injury occur or pain begin? several years ago    What are your most bothersome symptoms? Pain and Clicking, popping    How would you characterize your symptom?  aching and sharp    What makes your symptoms better? Ibuprofen    What makes your symptoms worse? Overhead motion    Have you been previously seen for this problem? No    Medical History:    Any recent changes to your medical history? No    Any new medication prescribed since last visit? No    Have you had surgery on this body part before? No    Social History:    Occupation:     Handedness: Right    Exercise: Kickboxing    Review of Systems:    Do you have fever, chills, weight loss? No    Do you have any vision problems? No    Do you have any chest pain or edema? No    Do you have any shortness of breath or wheezing?  No    Do you have stomach problems? No    Do you have any numbness or focal weakness? No    Do you have diabetes? No    Do you have problems with bleeding or clotting? No    Do you have an rashes or other skin lesions? No       Large Joint Injection: L subacromial bursa  Date/Time: 1/3/2020 5:05 PM  Performed by: Bar Cheng MD  Authorized by: Bar Cheng MD     Indications:  Pain  Needle Size:  22 G  Approach:  Posterior  Location:  Shoulder      Site:  L subacromial bursa  Medications:  5 mL lidocaine (PF) 1  %; 40 mg triamcinolone 40 MG/ML  Outcome:  Tolerated well, no immediate complications  Procedure discussed: discussed risks, benefits, and alternatives    Consent Given by:  Patient  Timeout: timeout called immediately prior to procedure     There were no complications. The patient tolerated the procedure well. There was minimal bleeding.   The patient was instructed to ice the shoulder upon leaving clinic and refrain from overuse over the next 2 days.   The patient was instructed to go to the emergency room with any unusual pain, swelling, or redness occurred in the injected area.     Bar Cheng MD

## 2020-01-03 NOTE — NURSING NOTE
[address] [address]  Dept: [phone]  ______________________________________________________________________________    Patient: Genesis Cutler   : 1995   MRN: [id number]   January 3, 2020    INVASIVE PROCEDURE SAFETY CHECKLIST    Date: January 3, 2020  Procedure: Left shoulder kenalog injection  Patient Name: Genesis Cutler  MRN: [id number]  YOB: 1995    Action: Complete sections as appropriate. Any discrepancy results in a HARD COPY until resolved.     PRE PROCEDURE:  Patient ID verified with 2 identifiers (name and  or MRN): Yes  Procedure and site verified with patient/designee (when able): Yes  Accurate consent documentation in medical record: Yes  H&P (or appropriate assessment) documented in medical record: Yes  H&P must be up to 20 days prior to procedure and updates within 24 hours of procedure as applicable: NA  Relevant diagnostic and radiology test results appropriately labeled and displayed as applicable: Yes  Procedure site(s) marked with provider initials: NA    TIMEOUT:  Time-Out performed immediately prior to starting procedure, including verbal and active participation of all team members addressing the following:Yes  * Correct patient identify  * Confirmed that the correct side and site are marked  * An accurate procedure consent form  * Agreement on the procedure to be done  * Correct patient position  * Relevant images and results are properly labeled and appropriately displayed  * The need to administer antibiotics or fluids for irrigation purposes during the procedure as applicable   * Safety precautions based on patient history or medication use    DURING PROCEDURE: Verification of correct person, site, and procedures any time the responsibility for care of the patient is transferred to another member of the care team.     The following medication was given:     MEDICATION:   Kenalog 40 mg  ROUTE: intra-articular  SITE: left Shoulder  DOSE: 1mL  LOT #: [id number]  : [employer]Present  EXPIRATION DATE: 09/2021  NDC#: [address]-4   Was there drug waste? No    MEDICATION:  Lidocaine without epinephrine  ROUTE: intra-articular  SITE: left Shoulder  DOSE: 4mL  LOT #: [id number]  : Novaled  EXPIRATION DATE: 01/2023  NDC#: 24583-355-39   Was there drug waste? Yes  Amount of drug waste (mL): 1ml.  Reason for waste:  As per MD    Prior to injection, verified patient identity using patient's name and date of birth.  Due to injection administration, patient instructed to remain in clinic for 15 minutes  afterwards, and to report any adverse reaction to me immediately.    Svetlana Viveros, ATC  January 3, 2020

## 2020-01-07 RX ORDER — LIDOCAINE HYDROCHLORIDE 10 MG/ML
5 INJECTION, SOLUTION EPIDURAL; INFILTRATION; INTRACAUDAL; PERINEURAL
Status: DISCONTINUED | OUTPATIENT
Start: 2020-01-03 | End: 2020-02-05

## 2020-01-07 RX ORDER — TRIAMCINOLONE ACETONIDE 40 MG/ML
40 INJECTION, SUSPENSION INTRA-ARTICULAR; INTRAMUSCULAR
Status: DISCONTINUED | OUTPATIENT
Start: 2020-01-03 | End: 2020-02-05

## 2020-02-04 ASSESSMENT — PATIENT HEALTH QUESTIONNAIRE - PHQ9
SUM OF ALL RESPONSES TO PHQ QUESTIONS 1-9: 17
10. IF YOU CHECKED OFF ANY PROBLEMS, HOW DIFFICULT HAVE THESE PROBLEMS MADE IT FOR YOU TO DO YOUR WORK, TAKE CARE OF THINGS AT HOME, OR GET ALONG WITH OTHER PEOPLE: VERY DIFFICULT
SUM OF ALL RESPONSES TO PHQ QUESTIONS 1-9: 17

## 2020-02-05 ENCOUNTER — OFFICE VISIT (OUTPATIENT)
Dept: PEDIATRICS | Facility: CLINIC | Age: 25
End: 2020-02-05
Payer: COMMERCIAL

## 2020-02-05 VITALS
BODY MASS INDEX: 25.79 KG/M2 | DIASTOLIC BLOOD PRESSURE: 58 MMHG | OXYGEN SATURATION: 97 % | TEMPERATURE: 98.3 F | SYSTOLIC BLOOD PRESSURE: 98 MMHG | HEART RATE: 71 BPM | WEIGHT: 152.6 LBS

## 2020-02-05 DIAGNOSIS — Z13.220 LIPID SCREENING: ICD-10-CM

## 2020-02-05 DIAGNOSIS — Z00.00 ROUTINE GENERAL MEDICAL EXAMINATION AT A HEALTH CARE FACILITY: Primary | ICD-10-CM

## 2020-02-05 DIAGNOSIS — F39 MOOD DISORDER (H): ICD-10-CM

## 2020-02-05 PROCEDURE — 99395 PREV VISIT EST AGE 18-39: CPT | Performed by: FAMILY MEDICINE

## 2020-02-06 NOTE — PROGRESS NOTES
SUBJECTIVE:   CC: Genesis Cutler is an 24 year old woman who presents for preventive health visit.     Healthy Habits:  Answers for HPI/ROS submitted by the patient on 2/4/2020   Annual Exam:  Frequency of exercise:: 4-5 days/week  Getting at least 3 servings of Calcium per day:: Yes  Diet:: Regular (no restrictions)  Taking medications regularly:: Yes  Medication side effects:: None  Bi-annual eye exam:: Yes  Dental care twice a year:: Yes  Sleep apnea or symptoms of sleep apnea:: None  Additional concerns today:: No  Duration of exercise:: 45-60 minutes  If you checked off any problems, how difficult have these problems made it for you to do your work, take care of things at home, or get along with other people?: Very difficult  PHQ9 TOTAL SCORE: 17  Abnormal Mood Symptoms  Onset: chronic, worse over the past 6 months    Description:   Depression: YES  Anxiety: YES  Obsessive thoughts causing anxiety    Accompanying Signs & Symptoms:  Still participating in activities that you used to enjoy: YES  Fatigue: no   Irritability: YES  Difficulty concentrating: no   Changes in appetite: no   Problems with sleep: no   Heart racing/beating fast : no   Thoughts of hurting yourself or others: none    History:   Recent stress: no   Prior depression hospitalization: None  Family history of depression: YES  Family history of anxiety: YES    Precipitating factors:   Alcohol/drug use: no     Alleviating factors:  Therapy but not helping    Therapies Tried and outcome: None        Today's PHQ-2 Score:   PHQ-2 ( 1999 [employer]) 2/5/2020 2/4/2020   Q1: Little interest or pleasure in doing things 2 2   Q2: Feeling down, depressed or hopeless 3 3   PHQ-2 Score 5 5   Q1: Little interest or pleasure in doing things - More than half the days   Q2: Feeling down, depressed or hopeless - Nearly every day   PHQ-2 Score - 5       Abuse: Current or Past(Physical, Sexual or Emotional)- No  Do you feel safe in your environment?  Yes        Social History     Tobacco Use     Smoking status: Never Smoker     Smokeless tobacco: Never Used   Substance Use Topics     Alcohol use: Yes     Alcohol/week: 0.0 standard drinks     Comment: 1-2 drinks per month     If you drink alcohol do you typically have >3 drinks per day or >7 drinks per week? Not Applicable                     Reviewed orders with patient.  Reviewed health maintenance and updated orders accordingly - Yes  BP Readings from Last 3 Encounters:   02/05/20 98/58   09/21/19 102/68   09/06/19 102/68    Wt Readings from Last 3 Encounters:   02/05/20 69.2 kg (152 lb 9.6 oz)   01/03/20 65.8 kg (145 lb)   09/21/19 65.8 kg (145 lb)                  Patient Active Problem List   Diagnosis   (none) - all problems resolved or deleted     Past Surgical History:   Procedure Laterality Date     COLONOSCOPY  03/2017       Social History     Tobacco Use     Smoking status: Never Smoker     Smokeless tobacco: Never Used   Substance Use Topics     Alcohol use: Yes     Alcohol/week: 0.0 standard drinks     Comment: 1-2 drinks per month     Family History   Problem Relation Age of Onset     Diabetes Mother      Diabetes Father      Diabetes Maternal Grandmother      Cerebrovascular Disease Maternal Grandmother      Coronary Artery Disease Maternal Grandfather      Cerebrovascular Disease Maternal Grandfather      Coronary Artery Disease Paternal Grandmother      Unknown/Adopted Paternal Grandfather          Current Outpatient Medications   Medication Sig Dispense Refill     plecanatide (TRULANCE) 3 MG tablet Take 1 tablet (3 mg) by mouth daily 90 tablet 3     norgestimate-ethinyl estradiol (ORTHO-CYCLEN/SPRINTEC) 0.25-35 MG-MCG tablet Take 1 tablet by mouth daily Take 4 continuous packs, skipping the sugar pill week. 112 tablet 3     polyethylene glycol (MIRALAX) powder Take 1 capful by mouth 4 times daily        Allergies   Allergen Reactions     Penicillins Rash     Recent Labs   Lab Test  18  1219 18  0756 16  0938 16   LDL  --   --   --   --  89   HDL  --   --   --   --  50   TRIG  --   --   --   --  63   ALT 33  --  33 37  --    CR  --   --  0.70 0.7  --    GFRESTIMATED  --   --  >90  Non  GFR Calc   >60  --    GFRESTBLACK  --   --  >90   GFR Calc   >60  --    POTASSIUM  --   --  4.1 4.7  --    TSH  --  2.31 1.86  --  1.68        Mammogram not appropriate for this patient based on age.    Pertinent mammograms are reviewed under the imaging tab.  History of abnormal Pap smear: NO - age 30-65 PAP every 5 years with negative HPV co-testing recommended  PAP / HPV 2018   PAP NIL     Reviewed and updated as needed this visit by clinical staff  Tobacco  Allergies  Med Hx  Surg Hx  Fam Hx         Reviewed and updated as needed this visit by Provider  Med Hx  Surg Hx  Fam Hx        Past Medical History:   Diagnosis Date     Chronic constipation       Past Surgical History:   Procedure Laterality Date     COLONOSCOPY  2017     OB History    Para Term  AB Living   0 0 0 0 0 0   SAB TAB Ectopic Multiple Live Births   0 0 0 0 0       ROS:  CONSTITUTIONAL: NEGATIVE for fever, chills, change in weight  INTEGUMENTARU/SKIN: NEGATIVE for worrisome rashes, moles or lesions  EYES: NEGATIVE for vision changes or irritation  ENT: NEGATIVE for ear, mouth and throat problems  RESP: NEGATIVE for significant cough or SOB  BREAST: NEGATIVE for masses, tenderness or discharge  CV: NEGATIVE for chest pain, palpitations or peripheral edema  GI: NEGATIVE for nausea, abdominal pain, heartburn, or change in bowel habits  : NEGATIVE for unusual urinary or vaginal symptoms. Periods are regular.  MUSCULOSKELETAL: NEGATIVE for significant arthralgias or myalgia  NEURO: NEGATIVE for weakness, dizziness or paresthesias  PSYCHIATRIC: As in HPI    OBJECTIVE:   BP 98/58   Pulse 71   Temp 98.3  F (36.8  C) (Oral)   Wt 69.2 kg (152 lb 9.6 oz)    LMP 01/23/2020   SpO2 97%   BMI 25.79 kg/m    EXAM:  GENERAL: healthy, alert and no distress  EYES: Eyes grossly normal to inspection, PERRL and conjunctivae and sclerae normal  HENT: ear canals and TM's normal, nose and mouth without ulcers or lesions  NECK: no adenopathy, no asymmetry, masses, or scars and thyroid normal to palpation  RESP: lungs clear to auscultation - no rales, rhonchi or wheezes  BREAST: normal without masses, tenderness or nipple discharge and no palpable axillary masses or adenopathy  CV: regular rate and rhythm, normal S1 S2, no S3 or S4, no murmur, click or rub, no peripheral edema and peripheral pulses strong  ABDOMEN: soft, nontender, no hepatosplenomegaly, no masses and bowel sounds normal  MS: no gross musculoskeletal defects noted, no edema  SKIN: no suspicious lesions or rashes  NEURO: Normal strength and tone, mentation intact and speech normal  PSYCH: mentation appears normal, affect normal/bright        ASSESSMENT/PLAN:   1. Routine general medical examination at a health care facility  See counseling    2. Lipid screening  - Lipid panel reflex to direct LDL Fasting; Future    3. Mood disorder--> Possible obsessive thoughts OCD vs Anxiety. I will have her see [facility] and then touch base on management.  We discussed the treatment for anxiety and depression in detail.  The importance of a multi faceted approach in controlling symptoms was reviewed.  The benefits of cognitive behavioral therapy reviewed, benefits of exercise, and stress reduction also discussed.       Duration of treatment is at least 9 months with medication. It may take 3-4 weeks before symptom improvement happens.  Do not stop medication suddenly, medication will need to be tapered off.  Slight increased risk of suicide with SSRI group of medications discussed.       I ended our visit today by discussing the patient's diagnoses and recommended treatment. Please refer to today's diagnoses and orders for further  "details. I briefly discussed the pathophysiology of these conditions and outlined their expected course. I discussed the warning symptoms and signs that indicate an atypical course that would need urgent or emergent care. I also discussed self care strategies for symptom relief.  Patient voiced complete understanding of plan of care and was in full agreement to proceed. After visit summary discussed and handed to patient.    Common side effects of medications prescribed at this visit were discussed with the patient. Severe side effects, including current applicable black box warnings, were discussed.      COUNSELING:   Reviewed preventive health counseling, as reflected in patient instructions       Regular exercise       Healthy diet/nutrition       Vision screening       Hearing screening       Family planning       Safe sex practices/STD prevention       Colon cancer screening       Consider Hep C screening for patients born between 1945 and 1965       HIV screeninx in teen years, 1x in adult years, and at intervals if high risk       (Gay)menopause management    Estimated body mass index is 25.79 kg/m  as calculated from the following:    Height as of 1/3/20: 1.638 m (5' 4.5\").    Weight as of this encounter: 69.2 kg (152 lb 9.6 oz).         reports that she has never smoked. She has never used smokeless tobacco.      Counseling Resources:  ATP IV Guidelines  Pooled Cohorts Equation Calculator  Breast Cancer Risk Calculator  FRAX Risk Assessment  ICSI Preventive Guidelines  Dietary Guidelines for Americans, 2010  USDA's MyPlate  ASA Prophylaxis  Lung CA Screening    Richard Ledbetter MD  [facility]  "

## 2020-02-06 NOTE — PATIENT INSTRUCTIONS
Preventive Health Recommendations  Female Ages 21 to 25     Yearly exam:     See your health care provider every year in order to  o Review health changes.   o Discuss preventive care.    o Review your medicines if your doctor has prescribed any.      You should be tested each year for STDs (sexually transmitted diseases).       Talk to your provider about how often you should have cholesterol testing.      Get a Pap test every three years. If you have an abnormal result, your doctor may have you test more often.      If you are at risk for diabetes, you should have a diabetes test (fasting glucose).     Shots:     Get a flu shot each year.     Get a tetanus shot every 10 years.     Consider getting the shot (vaccine) that prevents cervical cancer (Gardasil).    Nutrition:     Eat at least 5 servings of fruits and vegetables each day.    Eat whole-grain bread, whole-wheat pasta and brown rice instead of white grains and rice.    Get adequate Calcium and Vitamin D.     Lifestyle    Exercise at least 150 minutes a week each week (30 minutes a day, 5 days a week). This will help you control your weight and prevent disease.    Limit alcohol to one drink per day.    No smoking.     Wear sunscreen to prevent skin cancer.    See your dentist every six months for an exam and cleaning.    Please schedule with RONALD ( Meghan Rodriguez)-->depression/anxiety/

## 2020-02-07 ENCOUNTER — OFFICE VISIT (OUTPATIENT)
Dept: PSYCHOLOGY | Facility: CLINIC | Age: 25
End: 2020-02-07
Payer: COMMERCIAL

## 2020-02-07 DIAGNOSIS — F33.1 MODERATE EPISODE OF RECURRENT MAJOR DEPRESSIVE DISORDER (H): Primary | ICD-10-CM

## 2020-02-07 PROCEDURE — 90834 PSYTX W PT 45 MINUTES: CPT | Performed by: SOCIAL WORKER

## 2020-02-07 ASSESSMENT — ANXIETY QUESTIONNAIRES
GAD7 TOTAL SCORE: 17
3. WORRYING TOO MUCH ABOUT DIFFERENT THINGS: NEARLY EVERY DAY
IF YOU CHECKED OFF ANY PROBLEMS ON THIS QUESTIONNAIRE, HOW DIFFICULT HAVE THESE PROBLEMS MADE IT FOR YOU TO DO YOUR WORK, TAKE CARE OF THINGS AT HOME, OR GET ALONG WITH OTHER PEOPLE: VERY DIFFICULT
6. BECOMING EASILY ANNOYED OR IRRITABLE: NEARLY EVERY DAY
7. FEELING AFRAID AS IF SOMETHING AWFUL MIGHT HAPPEN: SEVERAL DAYS
5. BEING SO RESTLESS THAT IT IS HARD TO SIT STILL: SEVERAL DAYS
1. FEELING NERVOUS, ANXIOUS, OR ON EDGE: NEARLY EVERY DAY
2. NOT BEING ABLE TO STOP OR CONTROL WORRYING: NEARLY EVERY DAY

## 2020-02-07 ASSESSMENT — PATIENT HEALTH QUESTIONNAIRE - PHQ9: 5. POOR APPETITE OR OVEREATING: NEARLY EVERY DAY

## 2020-02-07 NOTE — PROGRESS NOTES
[facility]  February 10, 2020      Behavioral Health Clinician Progress Note    Patient Name: Genesis Cutler           Service Type:  Individual      Service Location:   Face to Face in Clinic     Session Start Time: 2: 35 pm  Session End Time: 3: 27 pm      Session Length: 38 - 52      Attendees: Patient    Visit Activities (Refresh list every visit): NEW and [facility] [place] Only    Diagnostic Assessment Date: Deferred. See below.  Treatment Plan Review Date: Deferred until completion of DA.  See Flowsheets for today's PHQ-9 and NEGRO-7 results  Previous PHQ-9:   PHQ-9 SCORE 7/5/2016 2/4/2020 2/5/2020   PHQ-9 Total Score MyChart 5 (Mild depression) 17 (Moderately severe depression) -   PHQ-9 Total Score - 17 16     Previous NEGRO-7:   NEGRO-7 SCORE 7/5/2016   Total Score 5 (mild anxiety)       MAHAMED LEVEL:  No flowsheet data found.    DATA  Extended Session (60+ minutes): No  Interactive Complexity: No  Crisis: No  [facility] Patient: No    Treatment Objective(s) Addressed in This Session:  Target Behavior(s): disease management/lifestyle changes , mental health management    Depressed Mood: Increase interest, engagement, and pleasure in doing things  Decrease frequency and intensity of feeling down, depressed, hopeless  Anxiety: will experience a reduction in anxiety and will develop more effective coping skills to manage anxiety symptoms    Current Stressors / Issues:  Patient reported that she is seeking out mental health support due to concerns about her mood and anxieties. She stated that she has been in therapy in the past, but did not find it to be helpful.  Patient stated that she has anxiety about food, has difficulties with meeting and maintaining friendships, and feels like she has lost interest and motivation to complete most daily tasks. Patient stated that once she started talking with her concerns with her , her  confirmed that there were observable  "concerns.  She shared that her mood is highly related to her appearance, and how she feels about her clothes and her weight.    Patient stated that when she is having a \"bad\" day, she will have thoughts of \"no one will notice me if I didn't show up at work or at gym.  Patient stated that she has never had thoughts of wanting to kill herself. Patient reported that she knows that her  cares. Patient denied history of SIB, denied history of suicide attempts.     Patient stated that it can be difficult for her to \"stop the spiral\" of intense emotions ,and shared that there is a mismatch between her emotions and logic.  [facility] introduced patient to grounding techniques through sensory engagement and narrating activities to self to help shift the focus.     Progress on Treatment Objective(s) / Homework:  New Objective established this session - CONTEMPLATION (Considering change and yet undecided); Intervened by assessing the negative and positive thinking (ambivalence) about behavior change    Motivational Interviewing    MI Intervention: Expressed Empathy/Understanding, Supported Autonomy, Collaboration, Evocation, Open-ended questions and Reframe     Change Talk Expressed by the Patient: Desire to change Ability to change Reasons to change    Provider Response to Change Talk: E - Evoked more info from patient about behavior change, A - Affirmed patient's thoughts, decisions, or attempts at behavior change, R - Reflected patient's change talk and S - Summarized patient's change talk statements    Also provided psychoeducation about behavioral health condition, symptoms, and treatment options    Care Plan review completed: No    Medication Review:  No current psychiatric medications prescribed    Medication Compliance:  NA    Changes in Health Issues:   None reported    Chemical Use Review:   Substance Use: Chemical use reviewed, no active concerns identified      Tobacco Use: No current tobacco use.  "     Assessment: Current Emotional / Mental Status (status of significant symptoms):  Risk status (Self / Other harm or suicidal ideation)  Patient denies a history of suicidal ideation, suicide attempts, self-injurious behavior, homicidal ideation, homicidal behavior and and other safety concerns  Patient denies current fears or concerns for personal safety.  Patient denies current or recent suicidal ideation or behaviors.  Patient denies current or recent homicidal ideation or behaviors.  Patient denies current or recent self injurious behavior or ideation.  Patient denies other safety concerns.  A safety and risk management plan has not been developed at this time, however patient was encouraged to call [place] [phone] should there be a change in any of these risk factors.    Appearance:   Appropriate   Eye Contact:   Good   Psychomotor Behavior: Normal   Attitude:   Cooperative   Orientation:   All  Speech   Rate / Production: Normal    Volume:  Normal   Mood:    Anxious   Affect:    Appropriate   Thought Content:  Clear   Thought Form:  Coherent  Logical   Insight:    Good     Diagnoses:  No diagnosis found.    Collateral Reports Completed:  Not Applicable    Plan: (Homework, other):  Patient was given information about behavioral services and encouraged to schedule a follow up appointment with the clinic [facility] in 1 week.  She was also given mindfulness techniques to assist with intense emotions.  CD Recommendations: No indications of CD issues.  CHEY Garber, [facility]      ______________________________________________________________________    Patient has reviewed and agreed to the above plan.      CHEY Garber  February 7, 2020     18 yo male with preop dx of primary hyperparathyroidism

## 2020-02-10 ENCOUNTER — DOCUMENTATION ONLY (OUTPATIENT)
Dept: PSYCHOLOGY | Facility: CLINIC | Age: 25
End: 2020-02-10

## 2020-02-10 ASSESSMENT — PATIENT HEALTH QUESTIONNAIRE - PHQ9: SUM OF ALL RESPONSES TO PHQ QUESTIONS 1-9: 19

## 2020-02-11 ENCOUNTER — OFFICE VISIT (OUTPATIENT)
Dept: PSYCHOLOGY | Facility: CLINIC | Age: 25
End: 2020-02-11
Payer: COMMERCIAL

## 2020-02-11 DIAGNOSIS — F33.1 MODERATE EPISODE OF RECURRENT MAJOR DEPRESSIVE DISORDER (H): Primary | ICD-10-CM

## 2020-02-11 DIAGNOSIS — F41.1 GAD (GENERALIZED ANXIETY DISORDER): ICD-10-CM

## 2020-02-11 PROCEDURE — 90791 PSYCH DIAGNOSTIC EVALUATION: CPT | Performed by: SOCIAL WORKER

## 2020-02-11 ASSESSMENT — ANXIETY QUESTIONNAIRES: GAD7 TOTAL SCORE: 17

## 2020-02-11 NOTE — PROGRESS NOTES
"[facility]: Integrated Behavioral Health  Integrated Behavioral Health Services   Diagnostic Assessment      PATIENT'S NAME: Genesis Cutler  MRN:   [id number]  :   1995  DATE OF SERVICE: 2020  SERVICE LOCATION: Face to Face in Clinic  Visit Activities: NEW and [facility] Only      Identifying Information:  Patient is a 24 year old year old, ,  female.  Patient attended the session alone.      Referral:  Patient was referred for an assessment by primary care provider.   Reason for referral: clarify behavioral health diagnosis and determine behavioral health treatment options.     Patient's Statement of Presenting Concern:  Patient reports the following reason(s) for seeking an assessment at this time: \"I'm not doing super great\".  She expressed concern about symptoms of depression, anxiety including ruminations and having a difficult time shifting away from intense thoughts. She stated that she also has a \"weird\" relationship with food.  Patient stated that her symptoms have resulted in the following functional impairments: home life with , management of the household and or completion of tasks, relationship(s) and work / vocational responsibilities    History of Presenting Concern:  Patient reports that these problem(s) began: Patient reported onset of symptoms in her early 20s when she was wedding planning, in school, and working.  She stated that as she transitioned to working, she noted different themes in her symptoms.    Patient has attempted to resolve these concerns in the past through: counseling and medication(s) from physician / PCP. Patient reports that other professional(s) are not involved in providing support / services.     Patient stated that she feels like she worries about \"everything\" and worries \"all the time\". She stated that she worries about making friendships, her relationship with her  and her lack of sex drive, work, " "and food/weight. She stated that she finds it difficult to focus and concentrate, and stated that it is difficult to relax because she feels like she needs to be doing something \"more productive\". She described irritability at home and at work.  Patient stated that it is difficult for her to fall asleep due to racing thoughts.    Patient reported that she has noticed decreased interest and motivation to do daily chores/activities, and has found lack of interest in activities that she once enjoyed.  She stated that she feels tired \"all the time\", feels badly about herself, and can feel hopeless and helpless about her emotions and difficulties with coping/managing with them.  Patient stated that she can have moments where she feels \"no one will notice if I don't show up at work or the gym\", but denied SI, denied wanting to die, denied history of SIB.      Patient stated that her mood is contingent on her weight and appearance.  She stated that she changes her clothes multiple times each morning since she feels like \"trash\" in certain clothes. Patient reported that her relationship with food is \"weird\". She reported history of restricting calories to less than 1000 per day, hit her goal weight, but then realized that she was always hungry, and was not eating enough. She stated that she increased her caloric intake, gained weight, and felt frustrated by it. She stated that she fears gaining weight and constantly thinks about food.  She stated that prior to eating out, she will look up what food options are there and which foods will be \"safe\" for her to eat. She stated that she feels guilty if she is hungry, and gets upset when she is hungry if she does not feel like she wants to eat. She reported that she avoids looking at her body when not wearing clothes.  She stated that she can restrict, and then will \"let myself eat\", which results in \"eating a lot\". Patient shared that she then feels guilt and shame, and will " "then compensate the following day by restricting what she eats.    Social History:  Patient reported she grew up in multiple locations including in [place], [place], [place], and [place]. Patient reported frequently moving for her father's work, denied feeling like the frequent moves were difficult for her. She stated that her parents  in 5th grade, and she moved to [place] in 6th grade to live with her father and siblings.  They were the third born of 3 children. Patient stated that her father re- when she was in 7th grade and she has three step brothers.  Patient stated that overall, her childhood had its \"ups and downs\".     Patient reported a history of 1 committed relationships or marriages. Patient has been  for 3 years, and with her  for 8 years. Patient reported having 0 children. Patient identified few stable and meaningful social connections.     Patient lives with her .  Patient is currently employed full time and reports they are able to function appropriately at work..  Patient stated that she is a  at [employer], and has had the current job for 18 months.  She stated that she is the only female .    Patient reported that she has not been involved with the legal system.  Patient's highest education level was college graduate. Patient did not identify any learning problems. There are no ethnic, cultural or Zoroastrian factors that may be relevant for therapy.  Patient did not serve in the .     Mental Health History:  Patient reported no family history of mental health issues. Patient has received the following mental health services in the past: counseling and medication(s) from physician / PCP. Patient is not currently receiving any mental health services.    Patient stated that she has previously been diagnosed with NEGRO. She stated that she has worked with multiple therapists through [facility] but did not find that the time limited nature of therapy with [facility] was " consistent with what she needed. She reported that a therapist had recommended DBT.     Patient stated that she has previously been prescribed Prozac but did not like the weight gain. She stated that she also has also been prescribed Wellbutrin, but found that it had an adverse effect on her mood and caused mood swings.    Chemical Health History:  Patient reported no family history of chemical health issues. Patient has not received chemical dependency treatment in the past. Patient is not currently receiving any chemical dependency treatment. Patient reports no problems as a result of their drinking / drug use.    Cage-AID score is: 0.  Based on Cage-Aid score and clinical interview there  are not indications of drug or alcohol abuse.    Discussed the general effects of drugs and alcohol on health and well-being.      Significant Losses / Trauma / Abuse / Neglect Issues:  There are indications or report of significant loss, trauma, abuse or neglect issues related to: death of grandma a couple of years ago.    Issues of possible neglect are not present.    Medical History:   Patient Active Problem List   Diagnosis   (none) - all problems resolved or deleted       Medication Review:  Current Outpatient Medications   Medication     norgestimate-ethinyl estradiol (ORTHO-CYCLEN/SPRINTEC) 0.25-35 MG-MCG tablet     plecanatide (TRULANCE) 3 MG tablet     polyethylene glycol (MIRALAX) powder     No current facility-administered medications for this visit.        Patient was provided recommendation to follow-up with physician.    Mental Status Assessment:  Appearance:   Appropriate   Eye Contact:   Good   Psychomotor Behavior: Normal   Attitude:   Cooperative   Orientation:   All  Speech   Rate / Production: Normal    Volume:  Normal   Mood:    Anxious  Sad   Affect:    Appropriate   Thought Content:  Clear   Thought Form:  Coherent  Logical   Insight:    Good       Safety Assessment:    Patient denies a history of suicidal  ideation, suicide attempts, self-injurious behavior, homicidal ideation, homicidal behavior and and other safety concerns  Patient denies current or recent suicidal ideation or behaviors.  Patient denies current or recent homicidal ideation or behaviors.  Patient denies current or recent self injurious behavior or ideation.  Patient denies other safety concerns.  Patient reports there are no firearms in the house  Protective Factors Sense of responsibility to family and Positive problem-solving skills   Risk Factors Intense emotionality, Lack of sleep and Sense of hopelessness and/or helplessness      Plan for Safety and Risk Management:  A safety and risk management plan has not been developed at this time, however patient was encouraged to call [place] [id number] should there be a change in any of these risk factors.      Review of Symptoms:  Depression: Sleep Interest Guilt Energy Concentration Hopeless Worthless Ruminations Irritability  [place]illa:  No symptoms  Psychosis: No symptoms  Anxiety: Worries Nervousness  Panic:  Shortness of Breath  Post Traumatic Stress Disorder: No symptoms  Obsessive Compulsive Disorder: No symptoms  Eating Disorder: Restriction  Oppositional Defiant Disorder: No symptoms  ADD / ADHD: No symptoms  Conduct Disorder: No symptoms    Patient's Strengths and Limitations:  Patient identified the following strengths or resources that will help her succeed in counseling: commitment to health and well being, family support, intelligence and work ethic. Patient identified the following supports: family. Things that may interfere with the patien'ts success in behavioral health services include:no barriers identified.    Diagnostic Criteria:  A. Excessive anxiety and worry about a number of events or activities (such as work or school performance).   B. The person finds it difficult to control the worry.  C. Select 3 or more symptoms (required for diagnosis). Only one item is required in  children.   - Restlessness or feeling keyed up or on edge.    - Difficulty concentrating or mind going blank.    - Irritability.    - Sleep disturbance (difficulty falling or staying asleep, or restless unsatisfying sleep).   D. The focus of the anxiety and worry is not confined to features of an Axis I disorder.  E. The anxiety, worry, or physical symptoms cause clinically significant distress or impairment in social, occupational, or other important areas of functioning.   F. The disturbance is not due to the direct physiological effects of a substance (e.g., a drug of abuse, a medication) or a general medical condition (e.g., hyperthyroidism) and does not occur exclusively during a Mood Disorder, a Psychotic Disorder, or a Pervasive Developmental Disorder.  A) Recurrent episode(s) - symptoms have been present during the same 2-week period and represent a change from previous functioning 5 or more symptoms (required for diagnosis)   - Depressed mood. Note: In children and adolescents, can be irritable mood.     - Diminished interest or pleasure in all, or almost all, activities.    - Psychomotor activity retardation.    - Fatigue or loss of energy.    - Feelings of worthlessness or inappropriate and excessive guilt.    - Diminished ability to think or concentrate, or indecisiveness.   B) The symptoms cause clinically significant distress or impairment in social, occupational, or other important areas of functioning  C) The episode is not attributable to the physiological effects of a substance or to another medical condition  D) The occurence of major depressive episode is not better explained by other thought / psychotic disorders  E) There has never been a manic episode or hypomanic episode      Functional Status:  Patient's symptoms are causing reduced functional status in the following areas: Activities of Daily Living - difficulties with task completion at home  Occupational / Vocational - difficulties with  focus, concentration, and task completion  Social / Relational - can isolate, withdraw, difficulties with making and maintaining friendships      DSM5 Diagnoses: (Sustained by DSM5 Criteria Listed Above)  Diagnoses: 296.32 (F33.1) Major Depressive Disorder, Recurrent Episode, Moderate _  300.02 (F41.1) Generalized Anxiety Disorder   Rule out:  Other Specified Feeding or Eating Disorder- Atypical anorexia nervosa  Psychosocial & Contextual Factors:  Recent transition to work, only female in male dominated field at work  WHODAS Score: 28  See flow sheet for completed WHODAS    Preliminary Treatment Plan:    The client reports no currently identified Episcopalian, ethnic or cultural issues relevant to therapy.    Initial Treatment will focus on: Depressed Mood - lack of interest, motivation  Anxiety - racing thoughts, ruminations     Chemical dependency recommendations: No indications of CD issues    As a preliminary treatment goal, patient will experience a reduction in depressed mood, will develop more effective coping skills to manage depressive symptoms, will develop healthy cognitive patterns and beliefs, will increase ability to function adaptively and will continue to take medications as prescribed / participate in supportive activities and services  and will experience a reduction in anxiety, will develop more effective coping skills to manage anxiety symptoms, will develop healthy cognitive patterns and beliefs and will increase ability to function adaptively.    The focus of initial interventions will be to teach CBT skills and teach DBT skills.    The patient is receiving treatment / structured support from the following professional(s) / service and treatment. Collaboration will be initiated with: primary care physician.    Patient was recommended to consider eating disorder evaluation. Patient stated that she has never considered herself to have an eating disorder because while she restricts and has other  features of anorexia, she is not of low weight/BMI.  Patient was also recommended referral to specialty mental health services to provide long-term therapy for treatment of NEGRO and MDD. As recommendations discussed, [facility] recommended return visits to continue with acceptance of diagnoses and to provide coping skill development to help prepare patient for next higher level of care. Return visit scheduled for next week.     A Release of Information is not needed at this time.    Report to child or adult protection services was NA.    Michelle Rodriguez [facility], Behavioral Health Clinician

## 2020-02-21 ENCOUNTER — OFFICE VISIT (OUTPATIENT)
Dept: PSYCHOLOGY | Facility: CLINIC | Age: 25
End: 2020-02-21
Payer: COMMERCIAL

## 2020-02-21 DIAGNOSIS — F33.1 MODERATE EPISODE OF RECURRENT MAJOR DEPRESSIVE DISORDER (H): Primary | ICD-10-CM

## 2020-02-21 DIAGNOSIS — F41.1 GAD (GENERALIZED ANXIETY DISORDER): ICD-10-CM

## 2020-02-21 PROCEDURE — 90832 PSYTX W PT 30 MINUTES: CPT | Performed by: SOCIAL WORKER

## 2020-02-21 NOTE — PROGRESS NOTES
Subjective     Genesis Cutler is a 24 year old female who presents to clinic today for the following health issues:    HPI     Depression and Anxiety Follow-Up    How are you doing with your depression since your last visit? No change    How are you doing with your anxiety since your last visit?  No change    Are you having other symptoms that might be associated with depression or anxiety? No    Have you had a significant life event? No     Do you have any concerns with your use of alcohol or other drugs? No    Social History     Tobacco Use     Smoking status: Never Smoker     Smokeless tobacco: Never Used   Substance Use Topics     Alcohol use: Yes     Alcohol/week: 0.0 standard drinks     Comment: 1-2 drinks per month     Drug use: No     PHQ 2/5/2020 2/7/2020 2/24/2020   PHQ-9 Total Score 16 19 16   Q9: Thoughts of better off dead/self-harm past 2 weeks Several days Several days Not at all   F/U: Thoughts of suicide or self-harm - - -   F/U: Safety concerns - - -     NEGRO-7 SCORE 7/5/2016 2/7/2020 2/24/2020   Total Score 5 (mild anxiety) - -   Total Score - 17 12     Last PHQ-9 2/24/2020   1.  Little interest or pleasure in doing things 3   2.  Feeling down, depressed, or hopeless 2   3.  Trouble falling or staying asleep, or sleeping too much 3   4.  Feeling tired or having little energy 2   5.  Poor appetite or overeating 1   6.  Feeling bad about yourself 2   7.  Trouble concentrating 3   8.  Moving slowly or restless 0   Q9: Thoughts of better off dead/self-harm past 2 weeks 0   PHQ-9 Total Score 16   Difficulty at work, home, or with people -   In the past two weeks have you had thoughts of suicide or self harm? -   Do you have concerns about your personal safety or the safety of others? -     NEGRO-7  2/24/2020   1. Feeling nervous, anxious, or on edge 2   2. Not being able to stop or control worrying 2   3. Worrying too much about different things 2   4. Trouble relaxing 2   5. Being so restless  that it is hard to sit still 1   6. Becoming easily annoyed or irritable 3   7. Feeling afraid, as if something awful might happen 0   NEGRO-7 Total Score 12   If you checked any problems, how difficult have they made it for you to do your work, take care of things at home, or get along with other people? -         Suicide Assessment Five-step Evaluation and Treatment (SAFE-T)      Patient Active Problem List   Diagnosis     Anxiety and depression     Past Surgical History:   Procedure Laterality Date     COLONOSCOPY  03/2017       Social History     Tobacco Use     Smoking status: Never Smoker     Smokeless tobacco: Never Used   Substance Use Topics     Alcohol use: Yes     Alcohol/week: 0.0 standard drinks     Comment: 1-2 drinks per month     Family History   Problem Relation Age of Onset     Diabetes Mother      Diabetes Father      Diabetes Maternal Grandmother      Cerebrovascular Disease Maternal Grandmother      Coronary Artery Disease Maternal Grandfather      Cerebrovascular Disease Maternal Grandfather      Coronary Artery Disease Paternal Grandmother      Unknown/Adopted Paternal Grandfather          Current Outpatient Medications   Medication Sig Dispense Refill     escitalopram (LEXAPRO) 10 MG tablet 5 mg for 1 week and 10 mg 45 tablet 0     norgestimate-ethinyl estradiol (ORTHO-CYCLEN/SPRINTEC) 0.25-35 MG-MCG tablet Take 1 tablet by mouth daily Take 4 continuous packs, skipping the sugar pill week. 112 tablet 3     plecanatide (TRULANCE) 3 MG tablet Take 1 tablet (3 mg) by mouth daily 90 tablet 3     polyethylene glycol (MIRALAX) powder Take 1 capful by mouth 4 times daily        Allergies   Allergen Reactions     Penicillins Rash     Recent Labs   Lab Test 03/14/18  1219 01/30/18  0756 07/19/16  0938 05/16/16 04/18/14   LDL  --   --   --   --  89   HDL  --   --   --   --  50   TRIG  --   --   --   --  63   ALT 33  --  33 37  --    CR  --   --  0.70 0.7  --    GFRESTIMATED  --   --  >90  Non   American GFR Calc   >60  --    GFRESTBLACK  --   --  >90   GFR Calc   >60  --    POTASSIUM  --   --  4.1 4.7  --    TSH  --  2.31 1.86  --  1.68      BP Readings from Last 3 Encounters:   02/24/20 120/77   02/05/20 98/58   09/21/19 102/68    Wt Readings from Last 3 Encounters:   02/24/20 69.2 kg (152 lb 8.9 oz)   02/05/20 69.2 kg (152 lb 9.6 oz)   01/03/20 65.8 kg (145 lb)                    Reviewed and updated as needed this visit by Provider  Med Hx  Surg Hx  Fam Hx         Review of Systems   ROS COMP: Constitutional, HEENT, cardiovascular, pulmonary, GI, , musculoskeletal, neuro, skin, endocrine and psych systems are negative, except as otherwise noted.      Objective    /77   Pulse 78   Wt 69.2 kg (152 lb 8.9 oz)   SpO2 100%   BMI 25.78 kg/m    Body mass index is 25.78 kg/m .  Physical Exam   GENERAL: healthy, alert and no distress  NECK: no adenopathy, no asymmetry, masses, or scars and thyroid normal to palpation  RESP: lungs clear to auscultation - no rales, rhonchi or wheezes  CV: regular rate and rhythm, normal S1 S2, no S3 or S4, no murmur, click or rub, no peripheral edema and peripheral pulses strong  ABDOMEN: soft, nontender, no hepatosplenomegaly, no masses and bowel sounds normal  MS: no gross musculoskeletal defects noted, no edema  NEURO: Normal strength and tone, mentation intact and speech normal  PSYCH: mentation appears normal, affect normal/bright            Assessment & Plan     1. Anxiety and depression  We discussed the treatment for anxiety and depression in detail.  The importance of a multi faceted approach in controlling symptoms was reviewed.  The benefits of cognitive behavioral therapy reviewed, benefits of exercise, and stress reduction also discussed.       Duration of treatment is at least 9 months with medication. It may take 3-4 weeks before symptom improvement happens.  Do not stop medication suddenly, medication will need to be tapered off.   Slight increased risk of suicide with SSRI group of medications discussed.       I ended our visit today by discussing the patient's diagnoses and recommended treatment. Please refer to today's diagnoses and orders for further details. I briefly discussed the pathophysiology of these conditions and outlined their expected course. I discussed the warning symptoms and signs that indicate an atypical course that would need urgent or emergent care. I also discussed self care strategies for symptom relief.  Patient voiced complete understanding of plan of care and was in full agreement to proceed. After visit summary discussed and handed to patient.    Common side effects of medications prescribed at this visit were discussed with the patient. Severe side effects, including current applicable black box warnings, were discussed.    - escitalopram (LEXAPRO) 10 MG tablet; 5 mg for 1 week and 10 mg  Dispense: 45 tablet; Refill: 0           Return in about 4 weeks (around 3/23/2020) for Routine Visit.    Richard Ledbetter MD  [facility]

## 2020-02-24 ENCOUNTER — OFFICE VISIT (OUTPATIENT)
Dept: PEDIATRICS | Facility: CLINIC | Age: 25
End: 2020-02-24
Payer: COMMERCIAL

## 2020-02-24 VITALS
HEART RATE: 78 BPM | BODY MASS INDEX: 25.78 KG/M2 | OXYGEN SATURATION: 100 % | DIASTOLIC BLOOD PRESSURE: 77 MMHG | WEIGHT: 152.56 LBS | SYSTOLIC BLOOD PRESSURE: 120 MMHG

## 2020-02-24 DIAGNOSIS — F32.A ANXIETY AND DEPRESSION: Primary | ICD-10-CM

## 2020-02-24 DIAGNOSIS — F41.9 ANXIETY AND DEPRESSION: Primary | ICD-10-CM

## 2020-02-24 PROCEDURE — 99213 OFFICE O/P EST LOW 20 MIN: CPT | Performed by: FAMILY MEDICINE

## 2020-02-24 RX ORDER — ESCITALOPRAM OXALATE 10 MG/1
TABLET ORAL
Qty: 45 TABLET | Refills: 0 | Status: SHIPPED | OUTPATIENT
Start: 2020-02-24 | End: 2020-03-24

## 2020-02-24 ASSESSMENT — ANXIETY QUESTIONNAIRES
5. BEING SO RESTLESS THAT IT IS HARD TO SIT STILL: SEVERAL DAYS
7. FEELING AFRAID AS IF SOMETHING AWFUL MIGHT HAPPEN: NOT AT ALL
6. BECOMING EASILY ANNOYED OR IRRITABLE: NEARLY EVERY DAY
1. FEELING NERVOUS, ANXIOUS, OR ON EDGE: MORE THAN HALF THE DAYS
3. WORRYING TOO MUCH ABOUT DIFFERENT THINGS: MORE THAN HALF THE DAYS
2. NOT BEING ABLE TO STOP OR CONTROL WORRYING: MORE THAN HALF THE DAYS
GAD7 TOTAL SCORE: 12

## 2020-02-24 ASSESSMENT — PATIENT HEALTH QUESTIONNAIRE - PHQ9
5. POOR APPETITE OR OVEREATING: MORE THAN HALF THE DAYS
SUM OF ALL RESPONSES TO PHQ QUESTIONS 1-9: 16

## 2020-02-24 NOTE — PROGRESS NOTES
[facility]  February 21, 2020      Behavioral Health Clinician Progress Note    Patient Name: Genesis Cutler           Service Type:  Individual      Service Location:   Face to Face in Clinic     Session Start Time: 2: 35 pm  Session End Time: 3: 06 pm      Session Length: 16 - 37      Attendees: Patient    Visit Activities (Refresh list every visit): [facility] Only    Diagnostic Assessment Date: 2/11/20  Treatment Plan Review Date: Deferred until meetings with [facility] therapist  See Flowsheets for today's PHQ-9 and NEGRO-7 results  Previous PHQ-9:   PHQ-9 SCORE 2/4/2020 2/5/2020 2/7/2020   PHQ-9 Total Score MyChart 17 (Moderately severe depression) - -   PHQ-9 Total Score 17 16 19     Previous NEGRO-7:   NEGRO-7 SCORE 7/5/2016 2/7/2020   Total Score 5 (mild anxiety) -   Total Score - 17       MAHAMED LEVEL:  No flowsheet data found.    DATA  Extended Session (60+ minutes): No  Interactive Complexity: No  Crisis: No  [facility] Patient: No    Treatment Objective(s) Addressed in This Session:  Target Behavior(s): disease management/lifestyle changes , mental health management    Depressed Mood: Increase interest, engagement, and pleasure in doing things  Decrease frequency and intensity of feeling down, depressed, hopeless  Anxiety: will experience a reduction in anxiety and will develop more effective coping skills to manage anxiety symptoms    Current Stressors / Issues:  Patient stated that she feels like her mood has improved.  She reported that she has been considering which direction she would like to focus on with her mental health.  She stated that she would like to focus first on her depression and anxiety, with considering an evaluation at [facility] for an eating disorder in the future.  Patient reported that the few brief interventions that have been provided by the [facility] have helped.  Patient stated that she also intends to follow up with her PCP to  discuss medication management.  Patient was provided with information regarding [facility] therapist, with [facility] facilitating scheduling appointment with new provider before leaving the office. Session ended by exploring with patient the concept of a body scan, identifying early signs that her symptoms are increasing, with the goal of identifying which interventions may be most helpful depending on symptom severity.      Progress on Treatment Objective(s) / Homework:  Satisfactory progress - ACTION (Actively working towards change); Intervened by reinforcing change plan / affirming steps taken    Motivational Interviewing    MI Intervention: Expressed Empathy/Understanding, Supported Autonomy, Collaboration, Evocation, Open-ended questions and Reframe     Change Talk Expressed by the Patient: Desire to change Ability to change Reasons to change    Provider Response to Change Talk: E - Evoked more info from patient about behavior change, A - Affirmed patient's thoughts, decisions, or attempts at behavior change, R - Reflected patient's change talk and S - Summarized patient's change talk statements    Also provided psychoeducation about behavioral health condition, symptoms, and treatment options    Care Plan review completed: No    Medication Review:  No current psychiatric medications prescribed    Medication Compliance:  NA    Changes in Health Issues:   None reported    Chemical Use Review:   Substance Use: Chemical use reviewed, no active concerns identified      Tobacco Use: No current tobacco use.      Assessment: Current Emotional / Mental Status (status of significant symptoms):  Risk status (Self / Other harm or suicidal ideation)  Patient denies a history of suicidal ideation, suicide attempts, self-injurious behavior, homicidal ideation, homicidal behavior and and other safety concerns  Patient denies current fears or concerns for personal safety.  Patient denies current or recent suicidal  ideation or behaviors.  Patient denies current or recent homicidal ideation or behaviors.  Patient denies current or recent self injurious behavior or ideation.  Patient denies other safety concerns.  A safety and risk management plan has not been developed at this time, however patient was encouraged to call [place] [phone] should there be a change in any of these risk factors.    Appearance:   Appropriate   Eye Contact:   Good   Psychomotor Behavior: Normal   Attitude:   Cooperative   Orientation:   All  Speech   Rate / Production: Normal    Volume:  Normal   Mood:    Anxious   Affect:    Appropriate   Thought Content:  Clear   Thought Form:  Coherent  Logical   Insight:    Good     Diagnoses:  1. Moderate episode of recurrent major depressive disorder (H)    2. NEGRO (generalized anxiety disorder)        Collateral Reports Completed:  Not Applicable    Plan: (Homework, other):  Patient was given information about behavioral services and encouraged to schedule a follow up appointment with the clinic [facility] [place] as needed.  She was also given recommendation to complete body scan to learn early signs that her symptoms are increasing. Patient referred to [facility] therapist, CHEY Snell. Next appointment scheduled on 2/26.   CD Recommendations: No indications of CD issues.  CHEY Garber, [facility] [place]      ______________________________________________________________________    Patient has reviewed and agreed to the above plan.      CHEY Garber  February 7, 2020

## 2020-02-25 ASSESSMENT — ANXIETY QUESTIONNAIRES: GAD7 TOTAL SCORE: 12

## 2020-02-26 ENCOUNTER — OFFICE VISIT (OUTPATIENT)
Dept: PSYCHOLOGY | Facility: CLINIC | Age: 25
End: 2020-02-26
Payer: COMMERCIAL

## 2020-02-26 DIAGNOSIS — F41.1 GAD (GENERALIZED ANXIETY DISORDER): ICD-10-CM

## 2020-02-26 DIAGNOSIS — F33.1 MODERATE EPISODE OF RECURRENT MAJOR DEPRESSIVE DISORDER (H): Primary | ICD-10-CM

## 2020-02-26 PROCEDURE — 90834 PSYTX W PT 45 MINUTES: CPT | Performed by: SOCIAL WORKER

## 2020-02-26 ASSESSMENT — ANXIETY QUESTIONNAIRES
1. FEELING NERVOUS, ANXIOUS, OR ON EDGE: NEARLY EVERY DAY
GAD7 TOTAL SCORE: 15
4. TROUBLE RELAXING: NEARLY EVERY DAY
3. WORRYING TOO MUCH ABOUT DIFFERENT THINGS: NEARLY EVERY DAY
6. BECOMING EASILY ANNOYED OR IRRITABLE: MORE THAN HALF THE DAYS
IF YOU CHECKED OFF ANY PROBLEMS ON THIS QUESTIONNAIRE, HOW DIFFICULT HAVE THESE PROBLEMS MADE IT FOR YOU TO DO YOUR WORK, TAKE CARE OF THINGS AT HOME, OR GET ALONG WITH OTHER PEOPLE: VERY DIFFICULT
7. FEELING AFRAID AS IF SOMETHING AWFUL MIGHT HAPPEN: NOT AT ALL
2. NOT BEING ABLE TO STOP OR CONTROL WORRYING: NEARLY EVERY DAY
5. BEING SO RESTLESS THAT IT IS HARD TO SIT STILL: SEVERAL DAYS

## 2020-02-26 ASSESSMENT — PATIENT HEALTH QUESTIONNAIRE - PHQ9: SUM OF ALL RESPONSES TO PHQ QUESTIONS 1-9: 17

## 2020-02-26 NOTE — Clinical Note
Hello,I am passing this along to inform you this patient began therapy with me today to work on managing anxiety and depression. Reach out with any questions or thoughts to coordinate care.Thanks!Amee León, [facility] Therapist

## 2020-02-26 NOTE — PROGRESS NOTES
Progress Note    Patient Name: Genesis Cutler  Date: 2/26/20         Service Type: Individual  Video Visit: No     Session Start Time: 7:00am  Session End Time: 7:50am     Session Length: 50min    Session #: 1    Attendees: Client attended alone     Treatment Plan Last Reviewed: 2/26/20  PHQ-9 / NEGRO-7 : 2/26/20    DATA  Interactive Complexity: No  Crisis: No       Progress Since Last Session (Related to Symptoms / Goals / Homework):   Symptoms: No change anxiety and depressive symptoms persist    Homework: Partially completed      Episode of Care Goals: Minimal progress - ACTION (Actively working towards change); Intervened by reinforcing change plan / affirming steps taken     Current / Ongoing Stressors and Concerns:   Recent transition to work, 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 Client reviewed symptoms, noting high anxiety and struggles to manage it. She also endorsed depressive symptoms, including low energy and interest in things she previously enjoyed. She worked through some of the self-talk that impacts her mental health, noting some shame. Therapist pointed out how impactful shame talk to self can be on mental health and worked to challenge shame and guilt. Client processed through an instance where she blamed herself, but was able to also work through challenging it. Therapist noted the benefits of restructuring those thoughts to be more compassionate to self. Client also noted struggles to fall asleep at nighttime. Therapist normalized with high anxiety and worked to explore helpful tools to manage distress. Therapist taught belly breathing and encouraged client to practice to reduce physical anxiety. Therapist also  reflected on the benefits of positive self-talk.   Client and therapist engaged in creating treatment plan of goals for therapy.         ASSESSMENT: Current Emotional / Mental Status (status of significant symptoms):   Risk status (Self / Other harm or suicidal ideation)   Patient denies current fears or concerns for personal safety.   Patient denies current or recent suicidal ideation or behaviors.   Patientdenies current or recent homicidal ideation or behaviors.   Patient denies current or recent self injurious behavior or ideation.   Patient denies other safety concerns.   Patient reports there has been no change in risk factors since their last session.     Patientreports there has been a chance in protective factors since their last session.  client was recently prescribed psychiatric medication for mental health symptoms   Recommended that patient call 911 or go to the local ED should there be a change in any of these risk factors.     Appearance:   Appropriate    Eye Contact:   Good    Psychomotor Behavior: Normal    Attitude:   Cooperative    Orientation:   All   Speech   Rate / Production: Normal    Volume:  Normal    Mood:    Anxious  Normal   Affect:    Appropriate  Worrisome    Thought Content:  Clear  Rumination    Thought Form:  Coherent  Logical    Insight:    Good      Medication Review:   Changes to psychiatric medications, see updated Medication List in EPIC. Client was recently prescribed Lexapro     Medication Compliance:   Yes     Changes in Health Issues:   None reported     Chemical Use Review:   Substance Use: Chemical use reviewed, no active concerns identified      Tobacco Use: No current tobacco use.      Diagnosis:  1. Moderate episode of recurrent major depressive disorder (H)    2. NEGRO (generalized anxiety disorder)        Collateral Reports Completed:   Routed note to PCP    PLAN: (Patient Tasks / Therapist Tasks / Other)  Client will return in two weeks. She will practice belly  breathing to manage anxiety.      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    Anticipated number of session or this episode of care: 12-16      MeasurableTreatment Goal(s) related to diagnosis / functional impairment(s)  Goal 1: Patient will learn skills to manage and reduce anxiety, as measured by NEGRO-7.    I will know I've met my goal when I can try to stop moments of intense anxiety.      Objective #A (Patient Action)    Patient will identify 3 fears / thoughts that contribute to feeling anxious.  Status: New - Date: 2/26/20     Intervention(s)  Therapist will teach emotional recognition/identification. to identify top 3 triggers for anxiety.    Objective #B  Patient will use at least 3 coping skills for anxiety management in the next 4 weeks.  Status: New - Date: 2/26/20     Intervention(s)  Therapist will teach emotional regulation skills. 3 new calming/coping skills to manage anxiety.    Objective #C  Patient will use relaxation strategies 1 times per day to reduce the physical symptoms of anxiety.  Status: New - Date: 2/26/20     Intervention(s)  Therapist will assign homework practice relaxation and mindfulness daily.      Goal 2: Patient will reduce depression and improve motivation, as measured by PHQ-9.     I will know I've met my goal when I want to do things I enjoy again and can  identify feeling guilty about things.      Objective #A (Patient Action)    Status: New - Date: 2/26/20     Patient will Identify negative self-talk and behaviors: challenge core beliefs, myths, and actions.    Intervention(s)  Therapist will teach emotional recognition/identification. to identify 3 thoughts/triggers for depression and shame.    Objective #B  Patient will Increase interest, engagement, and pleasure in doing things.    Status: New - Date: 2/26/20     Intervention(s)  Therapist will provide educational materials on depression  teach emotional regulation skills. 3 new coping skills to improve mood and motivation.    Objective #C  Patient will use positive self-affirmations daily.  Status: New - Date: 2/26/20     Intervention(s)  Therapist will assign homework practice using positive self-talk daily to combat critical self-talk and shame.      Patient has reviewed and agreed to the above plan.      Amee León, [facility]  February 26, 2020

## 2020-02-26 NOTE — PATIENT INSTRUCTIONS
Client will return in two weeks. She will practice belly breathing to manage anxiety.    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    Anticipated number of session or this episode of care: 12-16      MeasurableTreatment Goal(s) related to diagnosis / functional impairment(s)  Goal 1: Patient will learn skills to manage and reduce anxiety, as measured by NEGRO-7.    I will know I've met my goal when I can try to stop moments of intense anxiety.      Objective #A (Patient Action)    Patient will identify 3 fears / thoughts that contribute to feeling anxious.  Status: New - Date: 2/26/20     Intervention(s)  Therapist will teach emotional recognition/identification. to identify top 3 triggers for anxiety.    Objective #B  Patient will use at least 3 coping skills for anxiety management in the next 4 weeks.  Status: New - Date: 2/26/20     Intervention(s)  Therapist will teach emotional regulation skills. 3 new calming/coping skills to manage anxiety.    Objective #C  Patient will use relaxation strategies 1 times per day to reduce the physical symptoms of anxiety.  Status: New - Date: 2/26/20     Intervention(s)  Therapist will assign homework practice relaxation and mindfulness daily.      Goal 2: Patient will reduce depression and improve motivation, as measured by PHQ-9.     I will know I've met my goal when I want to do things I enjoy again and can identify feeling guilty about things.      Objective #A (Patient Action)    Status: New - Date: 2/26/20      Patient will Identify negative self-talk and behaviors: challenge core beliefs, myths, and actions.    Intervention(s)  Therapist will teach emotional recognition/identification. to identify 3 thoughts/triggers for depression and shame.    Objective #B  Patient will Increase interest, engagement, and pleasure in doing things.    Status: New - Date: 2/26/20     Intervention(s)  Therapist will provide educational materials on depression  teach emotional regulation skills. 3 new coping skills to improve mood and motivation.    Objective #C  Patient will use positive self-affirmations daily.  Status: New - Date: 2/26/20     Intervention(s)  Therapist will assign homework practice using positive self-talk daily to combat critical self-talk and shame.      Patient has reviewed and agreed to the above plan.      Amee León, [facility] CareSW  February 26, 2020

## 2020-02-27 ASSESSMENT — ANXIETY QUESTIONNAIRES: GAD7 TOTAL SCORE: 15

## 2020-03-13 ENCOUNTER — TELEPHONE (OUTPATIENT)
Dept: PSYCHOLOGY | Facility: CLINIC | Age: 25
End: 2020-03-13

## 2020-03-13 NOTE — TELEPHONE ENCOUNTER
Therapist LVM about available session time today due to client being on wait list. Therapist provided phone number to call to schedule session.     CHEY Snell

## 2020-03-18 ENCOUNTER — VIRTUAL VISIT (OUTPATIENT)
Dept: PSYCHOLOGY | Facility: CLINIC | Age: 25
End: 2020-03-18
Payer: COMMERCIAL

## 2020-03-18 DIAGNOSIS — F41.1 GAD (GENERALIZED ANXIETY DISORDER): ICD-10-CM

## 2020-03-18 DIAGNOSIS — F33.1 MODERATE EPISODE OF RECURRENT MAJOR DEPRESSIVE DISORDER (H): Primary | ICD-10-CM

## 2020-03-18 PROCEDURE — 90832 PSYTX W PT 30 MINUTES: CPT | Mod: TEL | Performed by: SOCIAL WORKER

## 2020-03-18 ASSESSMENT — ANXIETY QUESTIONNAIRES
GAD7 TOTAL SCORE: 9
7. FEELING AFRAID AS IF SOMETHING AWFUL MIGHT HAPPEN: NOT AT ALL
2. NOT BEING ABLE TO STOP OR CONTROL WORRYING: SEVERAL DAYS
5. BEING SO RESTLESS THAT IT IS HARD TO SIT STILL: MORE THAN HALF THE DAYS
IF YOU CHECKED OFF ANY PROBLEMS ON THIS QUESTIONNAIRE, HOW DIFFICULT HAVE THESE PROBLEMS MADE IT FOR YOU TO DO YOUR WORK, TAKE CARE OF THINGS AT HOME, OR GET ALONG WITH OTHER PEOPLE: SOMEWHAT DIFFICULT
3. WORRYING TOO MUCH ABOUT DIFFERENT THINGS: SEVERAL DAYS
1. FEELING NERVOUS, ANXIOUS, OR ON EDGE: MORE THAN HALF THE DAYS
4. TROUBLE RELAXING: MORE THAN HALF THE DAYS
6. BECOMING EASILY ANNOYED OR IRRITABLE: SEVERAL DAYS

## 2020-03-18 ASSESSMENT — PATIENT HEALTH QUESTIONNAIRE - PHQ9: SUM OF ALL RESPONSES TO PHQ QUESTIONS 1-9: 12

## 2020-03-18 NOTE — PROGRESS NOTES
"Genesis Cutler is a 25 year old female who is being evaluated via a billable telephone visit.      The patient has been notified of following:     \"This telephone visit will be conducted via a call between you and your physician/provider. We have found that certain health care needs can be provided without the need for a physical exam.  This service lets us provide the care you need with a short phone conversation.  If a prescription is necessary we can send it directly to your pharmacy.  If lab work is needed we can place an order for that and you can then stop by our lab to have the test done at a later time.    If during the course of the call the physician/provider feels a telephone visit is not appropriate, you will not be charged for this service.\"       Genesis Cutler complains of    Chief Complaint   Patient presents with     Anxiety       I have reviewed and updated the patient's Past Medical History, Social History, Family History and Medication List.    ALLERGIES  Penicillins      Additional provider notes:   Mental Health Symptoms      Duration: several years of depression and anxiety symptoms    Description:  Depression: YES- mild to moderate; medication seems to be helping reduce symptoms  Anxiety: YES- increased due to stress with COVID-19 and working from home. Struggles to stay asleep at night and struggles to \"shup off\" thoughts.  Sleep problems: YES- struggles to stay asleep all night  Concentration problems: YES- mild struggles due to anxiety    Therapies tried and outcome: Lexapro (Escitalopram)    See PHQ-9 and NEGRO scores, as appropriate     Depression and Anxiety Follow-Up    How are you doing with your depression since your last visit? Improved client endorsed less symptoms, likely due to recent medication    How are you doing with your anxiety since your last visit?  Improved less overall, but some additional stress and worries due to COVID-19.     Are you having other " symptoms that might be associated with depression or anxiety? Yes:  struggles to stay asleep    Have you had a significant life event? No working from home due to COVID-19    Do you have any concerns with your use of alcohol or other drugs? No    Social History     Tobacco Use     Smoking status: Never Smoker     Smokeless tobacco: Never Used   Substance Use Topics     Alcohol use: Yes     Alcohol/week: 0.0 standard drinks     Comment: 1-2 drinks per month     Drug use: No     PHQ 2/24/2020 2/26/2020 3/18/2020   PHQ-9 Total Score 16 17 12   Q9: Thoughts of better off dead/self-harm past 2 weeks Not at all Not at all Not at all   F/U: Thoughts of suicide or self-harm - - -   F/U: Safety concerns - - -     NEGRO-7 SCORE 2/24/2020 2/26/2020 3/18/2020   Total Score - - -   Total Score 12 15 9       In the past two weeks have you had thoughts of suicide or self-harm?  No.    Do you have concerns about your personal safety or the safety of others?   No    Suicide Assessment Five-step Evaluation and Treatment (SAFE-T)      Assessment/Plan:  (F33.1) Moderate episode of recurrent major depressive disorder (H)  (primary encounter diagnosis)  Comment: Client endorsed some depressive symptoms, including reduced energy and interest, struggles to concentrate, and feeling down some. However, she noted that overall, he depressive symptoms have reduced. She explored some of her self-talk, including around eating habits and body image, noting some shame. Therapist worked to challenge shame and work towards self-compassion.  Plan: Client will practice self-compassion talk to reduce shame, and also use positive self-talk to reduce distress and negative thoughts about COVID-19 and it's impacts.    (F41.1) NEGRO (generalized anxiety disorder)  Comment: Client endorsed experiencing less anxiety overall since starting medication, but noted some increased stressors in her life due to COVID-19. She reported that she is working from home, which  "is hard for her due to feeling unproductive and isolated. She also shared that her gym closed down, which stresses her out in regards to not getting workouts in. Therapist listened and validated increased distress at this time, normalizing her anxiety, while also working to calm thoughts. Client identified struggles to stay asleep at night, likely due to anxiety and noted struggles to \"turn off her brain\". Therapist explored some helpful ways to reduce worries and anxiety, including thought stopping, writing out worries before bed, deep belly breathing, and meditation. Client agreed to try these strategies.  Plan: Client will have return session next week. She will practice anxiety reducing strategies throughout the week to manage distress.    I have reviewed the note as documented above.  This accurately captures the substance of my conversation with the patient.    Phone call contact time  Call Started at 8:00am  Call Ended at 8:30am    CHEY Ruiz    "

## 2020-03-18 NOTE — PATIENT INSTRUCTIONS
Client scheduled for return visit next week. She will practice anxiety reducing strategies, including thought stopping, writing out worries before bed, deep belly breathing, and meditation. She will also practice positive self-talk to reduce shame and distress.

## 2020-03-19 ASSESSMENT — ANXIETY QUESTIONNAIRES: GAD7 TOTAL SCORE: 9

## 2020-03-24 ENCOUNTER — VIRTUAL VISIT (OUTPATIENT)
Dept: PEDIATRICS | Facility: CLINIC | Age: 25
End: 2020-03-24
Payer: COMMERCIAL

## 2020-03-24 DIAGNOSIS — F32.A ANXIETY AND DEPRESSION: Primary | ICD-10-CM

## 2020-03-24 DIAGNOSIS — F41.9 ANXIETY AND DEPRESSION: Primary | ICD-10-CM

## 2020-03-24 PROCEDURE — 96127 BRIEF EMOTIONAL/BEHAV ASSMT: CPT | Performed by: FAMILY MEDICINE

## 2020-03-24 PROCEDURE — 99213 OFFICE O/P EST LOW 20 MIN: CPT | Performed by: FAMILY MEDICINE

## 2020-03-24 RX ORDER — ESCITALOPRAM OXALATE 20 MG/1
20 TABLET ORAL DAILY
Qty: 90 TABLET | Refills: 0 | Status: SHIPPED | OUTPATIENT
Start: 2020-03-24 | End: 2020-06-15

## 2020-03-24 ASSESSMENT — ANXIETY QUESTIONNAIRES
5. BEING SO RESTLESS THAT IT IS HARD TO SIT STILL: MORE THAN HALF THE DAYS
GAD7 TOTAL SCORE: 9
3. WORRYING TOO MUCH ABOUT DIFFERENT THINGS: MORE THAN HALF THE DAYS
1. FEELING NERVOUS, ANXIOUS, OR ON EDGE: SEVERAL DAYS
7. FEELING AFRAID AS IF SOMETHING AWFUL MIGHT HAPPEN: NOT AT ALL
6. BECOMING EASILY ANNOYED OR IRRITABLE: NOT AT ALL
2. NOT BEING ABLE TO STOP OR CONTROL WORRYING: SEVERAL DAYS

## 2020-03-24 ASSESSMENT — PATIENT HEALTH QUESTIONNAIRE - PHQ9
5. POOR APPETITE OR OVEREATING: NEARLY EVERY DAY
SUM OF ALL RESPONSES TO PHQ QUESTIONS 1-9: 12

## 2020-03-24 NOTE — PROGRESS NOTES
"Genesis Cutler is a 25 year old female who is being evaluated via a billable telephone visit.      The patient has been notified of following:     \"This telephone visit will be conducted via a call between you and your physician/provider. We have found that certain health care needs can be provided without the need for a physical exam.  This service lets us provide the care you need with a short phone conversation.  If a prescription is necessary we can send it directly to your pharmacy.  If lab work is needed we can place an order for that and you can then stop by our lab to have the test done at a later time.    If during the course of the call the physician/provider feels a telephone visit is not appropriate, you will not be charged for this service.\"     Genesis Cutler complains of  No chief complaint on file.      I have reviewed and updated the patient's Past Medical History, Social History, Family History and Medication List.    ALLERGIES  Penicillins    Depression and Anxiety Follow-Up    How are you doing with your depression since your last visit? Improved     How are you doing with your anxiety since your last visit?  No change    Are you having other symptoms that might be associated with depression or anxiety? No    Have you had a significant life event? No     Do you have any concerns with your use of alcohol or other drugs? No    Social History     Tobacco Use     Smoking status: Never Smoker     Smokeless tobacco: Never Used   Substance Use Topics     Alcohol use: Yes     Alcohol/week: 0.0 standard drinks     Comment: 1-2 drinks per month     Drug use: No     PHQ 3/18/2020 3/23/2020 3/24/2020   PHQ-9 Total Score 12 11 12   Q9: Thoughts of better off dead/self-harm past 2 weeks Not at all Not at all Not at all   F/U: Thoughts of suicide or self-harm - - -   F/U: Safety concerns - - -     NEGRO-7 SCORE 3/18/2020 3/23/2020 3/24/2020   Total Score - 9 (mild anxiety) -   Total Score 9 " 9 9     Last PHQ-9 3/24/2020   1.  Little interest or pleasure in doing things 1   2.  Feeling down, depressed, or hopeless 1   3.  Trouble falling or staying asleep, or sleeping too much 3   4.  Feeling tired or having little energy 3   5.  Poor appetite or overeating 0   6.  Feeling bad about yourself 1   7.  Trouble concentrating 2   8.  Moving slowly or restless 1   Q9: Thoughts of better off dead/self-harm past 2 weeks 0   PHQ-9 Total Score 12   Difficulty at work, home, or with people -   In the past two weeks have you had thoughts of suicide or self harm? -   Do you have concerns about your personal safety or the safety of others? -     NEGRO-7  3/24/2020   1. Feeling nervous, anxious, or on edge 1   2. Not being able to stop or control worrying 1   3. Worrying too much about different things 2   4. Trouble relaxing 3   5. Being so restless that it is hard to sit still 2   6. Becoming easily annoyed or irritable 0   7. Feeling afraid, as if something awful might happen 0   NEGRO-7 Total Score 9   If you checked any problems, how difficult have they made it for you to do your work, take care of things at home, or get along with other people? -     In the past two weeks have you had thoughts of suicide or self-harm?  No.    Do you have concerns about your personal safety or the safety of others?   No    Suicide Assessment Five-step Evaluation and Treatment (SAFE-T)        Assessment/Plan:  1. Anxiety and depression  Needs improvement, increase lexapro to 20 mg, continue with [facility] [place]  - escitalopram (LEXAPRO) 20 MG tablet; Take 1 tablet (20 mg) by mouth daily  Dispense: 90 tablet; Refill: 0    Phone call duration:  15 minutes    Richard Ledbetter MD

## 2020-03-25 ASSESSMENT — ANXIETY QUESTIONNAIRES: GAD7 TOTAL SCORE: 9

## 2020-03-26 NOTE — PATIENT INSTRUCTIONS
Call your insurance to discuss coverage of Vitamin D lab and TSH lab.     Please call to schedule your lab only appointment.    After your next menses, start taking your birth control continuously, skipping the sugar pills, for a total of 4 packs. Then, take a week of the sugar pills.     Return in 6 months for recheck of your birth control.    Return sooner if any concerns.   
Improved

## 2020-03-27 ENCOUNTER — VIRTUAL VISIT (OUTPATIENT)
Dept: PSYCHOLOGY | Facility: CLINIC | Age: 25
End: 2020-03-27
Payer: COMMERCIAL

## 2020-03-27 DIAGNOSIS — F33.1 MODERATE EPISODE OF RECURRENT MAJOR DEPRESSIVE DISORDER (H): Primary | ICD-10-CM

## 2020-03-27 DIAGNOSIS — F41.1 GAD (GENERALIZED ANXIETY DISORDER): ICD-10-CM

## 2020-03-27 PROCEDURE — 90834 PSYTX W PT 45 MINUTES: CPT | Mod: GT | Performed by: SOCIAL WORKER

## 2020-03-27 NOTE — PROGRESS NOTES
Progress Note    Patient Name: Genesis Cutler  Date: 3/27/20         Service Type: Individual telehealth      Session Start Time: 8:05am  Session End Time: 8:50am     Session Length: 45min    Session #: 3    Attendees: Client attended alone    Telemedicine Visit: The patient's condition can be safely assessed and treated via synchronous audio and visual telemedicine encounter.      Reason for Telemedicine Visit: Services only offered telehealth    Originating Site (Patient Location): Patient's home    Distant Site (Provider Location): Provider Remote Setting    Consent:  The patient/guardian has verbally consented to: the potential risks and benefits of telemedicine (video visit) versus in person care; bill my insurance or make self-payment for services provided; and responsibility for payment of non-covered services.      Treatment Plan Last Reviewed: 2/26/20  PHQ-9 / NEGRO-7 : 3/24/20    DATA  Interactive Complexity: No  Crisis: No       Progress Since Last Session (Related to Symptoms / Goals / Homework):   Symptoms: No change client endorsed ongoing emotional distress with needing to work from home and poor self-talk related to food and body    Homework: Partially completed      Episode of Care Goals: Satisfactory progress - PREPARATION (Decided to change - considering how); Intervened by negotiating a change plan and determining options / strategies for behavior change, identifying triggers, exploring social supports, and working towards setting a date to begin behavior change     Current / Ongoing Stressors and Concerns:   Recent transition to work, 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 She reported that she has struggled to be out of a routine with working out as well. She processed some of her thoughts, noting some negative talk around not eating healthy and working out as much. She worked through these thoughts and was able to identify a time early on when these messages impacted her and led to negative core beliefs, related to her relationship with food and her body. She worked through that messaging and noted feeling guilty often. Therapist noted this guilt and worked to reduce and challenge shame, noting the negative impacts on mental health. Therapist also noted the black and white thinking and worked to challenge that to soften her thoughts so as to reduce guitl, stress and depression. Client agreed and was willing to explore. Client agreed she would like to practice more healthy appraochs with food. She agreed to try to not count the calories at one meal over the next week and use positive self-talk to reduce any shame about it.  Emotion Focused Therapy: Client reflected on a message she received from her doctor about her weight while growing up, noting feeling angry and sad. Therapist encouraged her to explore, express and validate these feelings, while therapist also validated. Client then put forth efforts to express her anger through words. Therapist educated on the benefits of this release and self validation of emotions and encouraged her to continue to practice and she agreed.        ASSESSMENT: Current Emotional / Mental Status (status of significant symptoms):   Risk status (Self / Other harm or suicidal ideation)   Patient denies current fears or concerns for personal safety.   Patient denies current or recent suicidal ideation or behaviors.   Patient denies current or recent homicidal ideation or behaviors.   Patient denies current or  recent self injurious behavior or ideation.   Patient denies other safety concerns.   Patient reports there has been no change in risk factors since their last session.     Patient reports there has been no change in protective factors since their last session.     Recommended that patient call 911 or go to the local ED should there be a change in any of these risk factors.     Appearance:   Appropriate    Eye Contact:   Good    Psychomotor Behavior: Normal    Attitude:   Cooperative    Orientation:   All   Speech    Rate / Production: Normal/ Responsive Normal     Volume:  Normal    Mood:    Angry  Anxious  Sad    Affect:    Appropriate    Thought Content:  Clear    Thought Form:  Coherent  Logical    Insight:    Good      Medication Review:   No changes to current psychiatric medication(s)     Medication Compliance:   Yes     Changes in Health Issues:   None reported     Chemical Use Review:   Substance Use: Chemical use reviewed, no active concerns identified      Tobacco Use: No current tobacco use.      Diagnosis:  1. Moderate episode of recurrent major depressive disorder (H)    2. NEGRO (generalized anxiety disorder)        Collateral Reports Completed:   Not Applicable    PLAN: (Patient Tasks / Therapist Tasks / Other)  Client will call to schedule a follow up session. She will practice expressing and validating her emotions as they arise. She will also work on healthy mental practices related to her eating, including trying to not count calories at one meal over the next week.         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    Anticipated number of session or this episode of care: 12-16      MeasurableTreatment Goal(s) related to diagnosis / functional impairment(s)  Goal 1: Patient will learn skills to manage and reduce anxiety, as measured by NEGRO-7.    I will know I've met my goal when I can try to stop moments of intense anxiety.      Objective #A (Patient Action)    Patient will identify 3 fears / thoughts that contribute to feeling anxious.  Status: New - Date: 2/26/20     Intervention(s)  Therapist will teach emotional recognition/identification. to identify top 3 triggers for anxiety.    Objective #B  Patient will use at least 3 coping skills for anxiety management in the next 4 weeks.  Status: New - Date: 2/26/20     Intervention(s)  Therapist will teach emotional regulation skills. 3 new calming/coping skills to manage anxiety.    Objective #C  Patient will use relaxation strategies 1 times per day to reduce the physical symptoms of anxiety.  Status: New - Date: 2/26/20     Intervention(s)  Therapist will assign homework practice relaxation and mindfulness daily.      Goal 2: Patient will reduce depression and improve motivation, as measured by PHQ-9.     I will know I've met my goal when I want to do things I enjoy again and can identify feeling guilty about things.      Objective #A (Patient Action)    Status: New - Date: 2/26/20     Patient will Identify negative self-talk and behaviors: challenge core beliefs, myths, and actions.    Intervention(s)  Therapist will teach emotional recognition/identification. to identify 3 thoughts/triggers for depression and shame.    Objective #B  Patient will Increase interest, engagement, and pleasure  in doing things.    Status: New - Date: 2/26/20     Intervention(s)  Therapist will provide educational materials on depression  teach emotional regulation skills. 3 new coping skills to improve mood and motivation.    Objective #C  Patient will use positive self-affirmations daily.  Status: New - Date: 2/26/20     Intervention(s)  Therapist will assign homework practice using positive self-talk daily to combat critical self-talk and shame.      Patient has reviewed and agreed to the above plan.      Amee León, [facility]  February 26, 2020

## 2020-03-27 NOTE — PATIENT INSTRUCTIONS
Client will call to schedule a follow up session. She will practice expressing and validating her emotions as they arise. She will also work on healthy mental practices related to her eating, including trying to not count calories at one meal over the next week.

## 2020-04-10 ENCOUNTER — VIRTUAL VISIT (OUTPATIENT)
Dept: PSYCHOLOGY | Facility: CLINIC | Age: 25
End: 2020-04-10
Payer: COMMERCIAL

## 2020-04-10 DIAGNOSIS — F41.1 GAD (GENERALIZED ANXIETY DISORDER): ICD-10-CM

## 2020-04-10 DIAGNOSIS — F33.1 MODERATE EPISODE OF RECURRENT MAJOR DEPRESSIVE DISORDER (H): Primary | ICD-10-CM

## 2020-04-10 PROCEDURE — 90834 PSYTX W PT 45 MINUTES: CPT | Mod: 95 | Performed by: SOCIAL WORKER

## 2020-04-10 ASSESSMENT — ANXIETY QUESTIONNAIRES
3. WORRYING TOO MUCH ABOUT DIFFERENT THINGS: NOT AT ALL
1. FEELING NERVOUS, ANXIOUS, OR ON EDGE: NOT AT ALL
GAD7 TOTAL SCORE: 1
4. TROUBLE RELAXING: NOT AT ALL
7. FEELING AFRAID AS IF SOMETHING AWFUL MIGHT HAPPEN: NOT AT ALL
2. NOT BEING ABLE TO STOP OR CONTROL WORRYING: NOT AT ALL
6. BECOMING EASILY ANNOYED OR IRRITABLE: SEVERAL DAYS
5. BEING SO RESTLESS THAT IT IS HARD TO SIT STILL: NOT AT ALL
IF YOU CHECKED OFF ANY PROBLEMS ON THIS QUESTIONNAIRE, HOW DIFFICULT HAVE THESE PROBLEMS MADE IT FOR YOU TO DO YOUR WORK, TAKE CARE OF THINGS AT HOME, OR GET ALONG WITH OTHER PEOPLE: NOT DIFFICULT AT ALL

## 2020-04-10 ASSESSMENT — PATIENT HEALTH QUESTIONNAIRE - PHQ9: SUM OF ALL RESPONSES TO PHQ QUESTIONS 1-9: 9

## 2020-04-10 NOTE — PATIENT INSTRUCTIONS
Client will return in two weeks at 7am. She will continue to practice positive and kind self-talk, especially regarding food and eating. She will practice not counting calories for one meal per day.

## 2020-04-10 NOTE — PROGRESS NOTES
Progress Note    Patient Name: Genesis Cutler  Date: 4/10/20         Service Type: Individual telehealth      Session Start Time: 7:00am  Session End Time: 7:50am     Session Length: 50min    Session #: 4    Attendees: Client attended alone    Telemedicine Visit: The patient's condition can be safely assessed and treated via synchronous audio and visual telemedicine encounter.      Reason for Telemedicine Visit: Services only offered telehealth    Originating Site (Patient Location): Patient's home    Distant Site (Provider Location): Provider Remote Setting    Consent:  The patient/guardian has verbally consented to: the potential risks and benefits of telemedicine (video visit) versus in person care; bill my insurance or make self-payment for services provided; and responsibility for payment of non-covered services.      Treatment Plan Last Reviewed: 2/26/20  PHQ-9 / NEGRO-7 : 4/9/20    DATA  Interactive Complexity: No  Crisis: No       Progress Since Last Session (Related to Symptoms / Goals / Homework):   Symptoms: Improving significantly less anxiety and depression    Homework: Partially completed      Episode of Care Goals: Satisfactory progress - ACTION (Actively working towards change); Intervened by reinforcing change plan / affirming steps taken     Current / Ongoing Stressors and Concerns:   Recent transition to work, only female in male dominated field at work     Treatment Objective(s) Addressed in This Session:   Identify negative self-talk and behaviors: challenge core beliefs, myths, and actions  Practice healthy mental practices while eating  use positive self-affirmations daily       Intervention:   CBT: Client reviewed the past two weeks and endorsed progress with reduced anxiety and depression, noting that her medications appear to be helping a lot, as well as her efforts to do yoga. Therapist praised client and worked to build up positive  self-talk around efforts. Client endorsed continued struggles with eating and feeling the need to control it by counting calories. She processed through some of her thoughts and worries if she were to not do this, including gaining all the weight back. Therapist noted her black and white thinking and worked to challenge it. Client agreed to practice exposing herself to not always counting and using positive self-talk when doing so, rather than shame.         ASSESSMENT: Current Emotional / Mental Status (status of significant symptoms):   Risk status (Self / Other harm or suicidal ideation)   Patient denies current fears or concerns for personal safety.   Patient denies current or recent suicidal ideation or behaviors.   Patient denies current or recent homicidal ideation or behaviors.   Patient denies current or recent self injurious behavior or ideation.   Patient denies other safety concerns.   Patient reports there has been no change in risk factors since their last session.     Patient reports there has been no change in protective factors since their last session.     Recommended that patient call 911 or go to the local ED should there be a change in any of these risk factors.     Appearance:   Appropriate    Eye Contact:   Good    Psychomotor Behavior: Normal    Attitude:   Cooperative    Orientation:   All   Speech    Rate / Production: Normal/ Responsive    Volume:  Normal    Mood:    Irritable  Sad    Affect:    Appropriate    Thought Content:  Clear    Thought Form:  Coherent  Logical    Insight:    Good      Medication Review:   No changes to current psychiatric medication(s)     Medication Compliance:   Yes     Changes in Health Issues:   None reported     Chemical Use Review:   Substance Use: Chemical use reviewed, no active concerns identified      Tobacco Use: No current tobacco use.      Diagnosis:  1. Moderate episode of recurrent major depressive disorder (H)    2. NEGRO (generalized anxiety  disorder)        Collateral Reports Completed:   Not Applicable    PLAN: (Patient Tasks / Therapist Tasks / Other)  Client will return in two weeks at 7am. She will continue to practice positive and kind self-talk, especially regarding food and eating. She will practice not counting calories for one meal per day.      Amee León, [facility]                                                                                                        ______________________________________________________________________    Treatment Plan    Patient's Name: Genesis Cutler  YOB: 1995    Date: 2/26/20    DSM5 Diagnoses: 296.32 (F33.1) Major Depressive Disorder, Recurrent Episode, Moderate _ or 300.02 (F41.1) Generalized Anxiety Disorder  Psychosocial / Contextual Factors: Recent transition to work, only female in male dominated field at work  WHODAS:   WHODAS 2.0 Total Score 2/7/2020   Total Score 28       Referral / Collaboration:  Referral to another professional/service is not indicated at this time..    Anticipated number of session or this episode of care: 12-16      MeasurableTreatment Goal(s) related to diagnosis / functional impairment(s)  Goal 1: Patient will learn skills to manage and reduce anxiety, as measured by NEGRO-7.    I will know I've met my goal when I can try to stop moments of intense anxiety.      Objective #A (Patient Action)    Patient will identify 3 fears / thoughts that contribute to feeling anxious.  Status: New - Date: 2/26/20     Intervention(s)  Therapist will teach emotional recognition/identification. to identify top 3 triggers for anxiety.    Objective #B  Patient will use at least 3 coping skills for anxiety management in the next 4 weeks.  Status: New - Date: 2/26/20     Intervention(s)  Therapist will teach emotional regulation skills. 3 new calming/coping skills to manage anxiety.    Objective #C  Patient will use relaxation strategies 1 times per day to reduce  the physical symptoms of anxiety.  Status: New - Date: 2/26/20     Intervention(s)  Therapist will assign homework practice relaxation and mindfulness daily.      Goal 2: Patient will reduce depression and improve motivation, as measured by PHQ-9.     I will know I've met my goal when I want to do things I enjoy again and can identify feeling guilty about things.      Objective #A (Patient Action)    Status: New - Date: 2/26/20     Patient will Identify negative self-talk and behaviors: challenge core beliefs, myths, and actions.    Intervention(s)  Therapist will teach emotional recognition/identification. to identify 3 thoughts/triggers for depression and shame.    Objective #B  Patient will Increase interest, engagement, and pleasure in doing things.    Status: New - Date: 2/26/20     Intervention(s)  Therapist will provide educational materials on depression  teach emotional regulation skills. 3 new coping skills to improve mood and motivation.    Objective #C  Patient will use positive self-affirmations daily.  Status: New - Date: 2/26/20     Intervention(s)  Therapist will assign homework practice using positive self-talk daily to combat critical self-talk and shame.      Patient has reviewed and agreed to the above plan.      Amee León, [facility]  February 26, 2020

## 2020-04-11 ASSESSMENT — ANXIETY QUESTIONNAIRES: GAD7 TOTAL SCORE: 1

## 2020-04-14 ENCOUNTER — PATIENT OUTREACH (OUTPATIENT)
Dept: GASTROENTEROLOGY | Facility: CLINIC | Age: 25
End: 2020-04-14

## 2020-04-14 NOTE — PROGRESS NOTES
Returned voice mail message from pt to discuss switching appt to video visit. Discussed the process  Sent my chart instructions  Pt would like to be contacted for visit start via email  Confirmed  her email

## 2020-04-15 ENCOUNTER — VIRTUAL VISIT (OUTPATIENT)
Dept: GASTROENTEROLOGY | Facility: CLINIC | Age: 25
End: 2020-04-15
Payer: COMMERCIAL

## 2020-04-15 DIAGNOSIS — R10.84 ABDOMINAL PAIN, GENERALIZED: ICD-10-CM

## 2020-04-15 DIAGNOSIS — K59.04 CHRONIC IDIOPATHIC CONSTIPATION: Primary | ICD-10-CM

## 2020-04-15 DIAGNOSIS — R39.15 URINARY URGENCY: ICD-10-CM

## 2020-04-15 ASSESSMENT — PAIN SCALES - GENERAL: PAINLEVEL: NO PAIN (0)

## 2020-04-15 NOTE — PROGRESS NOTES
"Genesis Cutler is a 25 year old female who is being evaluated via a billable video visit.      The patient has been notified of following:     \"This video visit will be conducted via a call between you and your physician/provider. We have found that certain health care needs can be provided without the need for an in-person physical exam.  This service lets us provide the care you need with a video conversation.  If a prescription is necessary we can send it directly to your pharmacy.  If lab work is needed we can place an order for that and you can then stop by our lab to have the test done at a later time.    Video visits are billed at different rates depending on your insurance coverage.  Please reach out to your insurance provider with any questions.    If during the course of the call the physician/provider feels a video visit is not appropriate, you will not be charged for this service.\"    Patient has given verbal consent for Video visit? Yes    How would you like to obtain your AVS? Christie    Patient would like the video invitation sent by: Send to e-mail at: [email or web address]      Video Start Time: 8:29    Additional provider notes:       GI CLINIC VISIT    CC: follow-up constipation      ASSESSMENT/PLAN:  (K59.04) Chronic idiopathic constipation  (primary encounter diagnosis)  Comment:    Prior normal colonoscopy, Sitz marker study, and essentially normal anorectal manometry/defecography. Response to laxatives (Miralax specifically) but requiring ever-increasing doses (>4x/day). Linzess 290 mcg was effective for several months, then lost effect. Now on plecanatide with good response. Has needed prn Miralax during periods of worsening constipation (i.e. Traveling). More recently having issues with change in stool consistency (now loose to liquid). May be due to start of Lexapro vs lifestyle changes during pandemic vs stress vs other.     Would start by stopping stool softeners (pt " prefers to taper due to concern for recurrent firm stools). Could consider fiber for stool bulking [place]the-road. If worsens with up-titration of Lexapro, may need to consider switch to alternative med if not able to manage with bowel med changes.   Plan:  1. Continue your Trulance daily as you are.  2. Continue your efforts to stay hydrated and continue your exercise routine.  3. Agree with use of Miralax 1-2 times daily as needed for periods of worsening constipation.  4. Would decrease use of stool softener - start with decrease to 1 pill daily, then 1 pill every other day, then stop.   5. Consider use of fiber for stool bulking if no response.  6. Follow-up with Dr. Haro in 6-9 months.      (R10.84) Abdominal pain, generalized  Comment:    From prior documentation: Five discrete episodes over 2.5years. More recent episodes seem to be post-prandial. No other associated symptoms. Work-up during pain episode (in 2016) was with normal labs, US, and CT scan. HIDA scan thereafter and colonoscopy were without acute pathology.    More recent post-prandial episodes are somewhat suggestive of biliary colic, but no noted gallstones on imaging with normal HIDA scan in years past --> ? Microlithiasis and sluge?    We previously discussed a possible EGD +/- EUS (to evaluate for microlithiasis/sludge) or even CTE for better assessment of small bowel (though notably CT during initial pain episode was normal)-   Ms. Cutler elected to continue to monitor at that time, given rare pain episodes.   Plan:  -  will continue to monitor       (R39.15) Urinary urgency  Comment:   Urinary urgency with standing (after prolonged sitting, behavioral change now that working from home at sitting desk, may be getting into tasks and neglecting use of restroom until task completed). Timed urination (+/- setting a alarm) discussed.    We reviewed prior pelvic floor evaluation - very minimal changes which suggest known constipation, but no  significant dysfunction. Would not re-test pelvic floor at this point. We discussed that if symptoms progress would recommend discussing with PCP with potential consideration of urology referral (I.e. frequent incontinence, bladder pain, etc).  Plan:  - Track water and caffeine intake  - recommend timed urination (every 2-3 hours)         It was a pleasure to participate in the care of this patient; please contact us with any further questions.  A total of 24 face-to-face minutes was spent with this patient, >50% of which was counseling regarding the above delineated issues.    Gayle Haro MD   of Medicine  Division of Gastroenterology, Hepatology and Nutrition  [facility]    ---------------------------------------------------------------------------------------------------  HPI:  Ms. Cutler (formerly Rob) is a previously healthy 25 year old female who was initially seen in consultation with Jordan Du and Fabien of GI in July 2016 for constipation. She was initially seen by this writer in Nov 2016. Her last GI clinic visit was in May 2019. She returns today for follow-up.       Since her last visit, Ms. Cutler has been feeling well. She reports no major changes in her health since we last met. She has been working from home for 5-6 weeks due to the COVID-pandemic. She does not feel her diet has changed significantly due to this though her activity has to some degree. She typically uses a standing desk at work, but while at home has been sitting a lot more. She is not able to go to in-person kickboxing classes and is doing yoga at-home instead.       1. Constipation  Summarized/taken from my prior documentation:   Issues with constipation since early childhood but with no regular laxatives or daily medications as a child nor need for disimpactions, enemas, nor hospitalizations. She used OTC laxatives as needed through late childhood and teen years. She had two urgent  "care visits during these years for constipation-related abdominal pain.     Her constipation apparently worsened in her early twenties going up to 2 weeks without a satisfactory BM, (she may pass some small,Pritchett 1 stools with straining during this time). These periods are associated with significant bloating and LLQ pain which resolve after defecation.  She previously tried prunes/prune juice, OTC Mg citrate, an unknown laxative pill prn with minimal effect. Her Avi MD put her on daily Miralax which initially was helpful, but its effect waned.  She reports a high vegetable and fruit intake, at least 70 oz of water daily, and daily physical activity which led to an intentional 80 lbs weight loss before 2016. Her prior work-up included a normal TSH, calcium, CMP, and hgb.    Since establishing in our clinic she has [facility]2016 by Dr. Kailey Acevedo. Her testing was essentially normal with no evidence of Hirschsprung's, only mild perineal descent was noted. A Sitz marker study (after one week off of laxatives) was normal (no Sitz markers were seen on AXR done 5 days later). Though notably, she was on her period at that time (expectedly moving her bowels more frequently than her typical baseline).  A colonoscopy was done and was normal. Despite being off of laxatives (for Sitz study, just prior to colonoscopy) and her only being able to tolerate 4L Golytely (due to nausea, she was intended to take additional prep) her prep was rated at \"fair\".           For treatment she was on ever-increasing doses of fiber and Miralax (up to Miralax 4 times daily with morning coffee/caffeine ingestions) and the effect was lost over time. Ultimately, she was switched to Linzess 145 mcg, then up to 290 mcg with good response for a number of months, before response waned. In Jan 2018 we switched her to plecanatide/Trulance. With this she was moving her bowels nearly every day.  She continues to " "drink a large amount of water daily. She is ingesting 40-50 gm of fiber in her diet on an average day (tracks with [employer] nelsy).      Today,  Ms. Cutler continues to tolerate the medication without issue. She is moving her bowels 1-2 times daily, but has noted in the last month that her stool consistency is now loose to liquid. She shares that she has been taking 2 Colace pills daily for some time as she was having harder stools in the past. She has some urgency, but has not had any incontinence. She denies abdominal pain or blood in her stools. A couple months ago she was noticing \"gas pains\" primarily in the morning, but these seemed to have abated with no recurrence for many weeks.      She denies any issues with constipation since her last visit and has not required any prn MiraLax on top of her plecanatide. In February (before COVID-19 restrictions) she traveled to [place] with her  and had no issues with her bowels during the trip or upon her return.      The only new change is that she was started on escitalopram/Lexapro about 6-8 weeks ago. She is tolerating this well. Her change in stool consistency started a couple weeks after initiating this med. She denies any new OTC meds/supplements.         2. Abdominal pain   Summarized/taken from my prior documentation:    She has had and handful of episodes of abdominal pain in total since 2016. During the first one she presented to the [facility] ED in Nov 2016, work-up with US, CT, and basic labs was negative. As the CT scan showed a large stool burden she was told this was likely due to constipation and was discharged to home. A second pain episode (precisely the same in nature) occurred around Nov 2017. She did not seek care at that time and has been monitoring her symptoms closely. She has has two more pain events in winter 2012/2018. The first one started around 2:30 pm and seemed to come on suddenly. The pain was intense and focused in the " "epigastric regions/RUQ with radiation to the back. The pain slowly dissipated, lasting two days in total. In March 2018 she had her fourth pain episode.  She recalls eating  at [employer] and [employer], but feels it was a typical meal. She awoke in the middle of the night with the same pain. She noted it was worse when lying down and better when sitting or hunched over. The pain is severe enough that she is unable to eat, drink, drive, perform ADLs. This pain remitted the following day. She denies f/c, n/v, or any other associated symptoms.      She has not been seen to have gallstones on prior CT or US imaging. A 2018 pelvic US (done a couple weeks ago) was normal. A HIDA scan was done sometime after her [facility] ED visit and was seen to be normal.     Through GI clinic we evaluated amylase, lipase, and hepatic panel were all normal. We attempted H pylori testing, but as she's had no further pain episodes, she ultimately didn't complete the test.   She reported another pain episode in Oct 2018.  She states she had a \"large, greasy Mexican meal\" and the pain came on within 30 minutes. She doesn't really recall any other associated symptoms or further details. The pain, as in the past, dissipated on its own after some time.    Today, this has not been an issue since her last visit.        3. Urinary urgency.    Ms. Cutler wonders if she has \"pelvic floor issues\" as she's been having more urinary urgency. She describes that when she stands she often feels that she suddenly needs to use the restroom. On occasion she has leaked urine with standing. She denies any urinary leakage/incontinence with activity/exercise, coughing, sneezing, or laughing. She continues to ingest  fl oz of water daily. She drinks caffeine, but doesn't think her intake has dramatically changed while working from home. As stated above she is no longer using a standing desk but sitting for hours doing work. She denies any dysuria or pyuria. "          PROBLEM LIST  Patient Active Problem List    Diagnosis Date Noted     Anxiety and depression 02/24/2020     Priority: Medium       PERTINENT MEDICATIONS:  Current Outpatient Medications   Medication     escitalopram (LEXAPRO) 20 MG tablet     norgestimate-ethinyl estradiol (ORTHO-CYCLEN/SPRINTEC) 0.25-35 MG-MCG tablet     plecanatide (TRULANCE) 3 MG tablet     polyethylene glycol (MIRALAX) powder     No current facility-administered medications for this visit.          PHYSICAL EXAMINATION:  Vitals There were no vitals taken for this visit.   Wt   Wt Readings from Last 2 Encounters:   02/24/20 69.2 kg (152 lb 8.9 oz)   02/05/20 69.2 kg (152 lb 9.6 oz)   Gen: Pt sitting up in NAD, interactive and cooperative on exam  Eyes: sclerae anicteric, no injection  ENT: MMM  Resp: No respiratory distress, breathing comfortably, speaking in full sentences   Skin: No jaundice  Neuro: alert, oriented, answers questions appropriately      PERTINENT STUDIES:    Orders Only on 02/16/2019   Component Date Value Ref Range Status     Estradiol 02/16/2019 37  pg/mL Final     FSH 02/16/2019 6.2  IU/L Final       Video-Visit Details    Type of service:  Video Visit    Video End Time (time video stopped): 8:53    Originating Location (pt. Location): Home    Distant Location (provider location):  [facility]     Mode of Communication:  Video Conference via Al Haro MD

## 2020-04-15 NOTE — NURSING NOTE
Chief Complaint   Patient presents with     RECHECK     Constipation and Abdominal Pain       There were no vitals filed for this visit.    There is no height or weight on file to calculate BMI.      Becca Velasco LPN

## 2020-04-15 NOTE — PATIENT INSTRUCTIONS
1. Continue your Trulance daily as you are.  2. Continue your efforts to stay hydrated and continue your exercise routine.  3. Agree with use of Miralax 1-2 times daily as needed for periods of worsening constipation.  4. Would decrease use of stool softener - start with decrease to 1 pill daily, then 1 pill every other day, then stop.   5. Consider use of fiber for stool bulking if no response.  6. Track water and caffeine intake  7. Recommend timed urination (every 2-3 hours)  8. Follow-up with Dr. Haro in 6-9 months.

## 2020-04-24 ENCOUNTER — VIRTUAL VISIT (OUTPATIENT)
Dept: PSYCHOLOGY | Facility: CLINIC | Age: 25
End: 2020-04-24
Payer: COMMERCIAL

## 2020-04-24 DIAGNOSIS — F41.1 GAD (GENERALIZED ANXIETY DISORDER): ICD-10-CM

## 2020-04-24 DIAGNOSIS — F33.1 MODERATE EPISODE OF RECURRENT MAJOR DEPRESSIVE DISORDER (H): Primary | ICD-10-CM

## 2020-04-24 PROCEDURE — 90834 PSYTX W PT 45 MINUTES: CPT | Mod: 95 | Performed by: SOCIAL WORKER

## 2020-04-24 NOTE — PROGRESS NOTES
Progress Note    Patient Name: Genesis Cutler  Date: 4/24/20         Service Type: Individual      Session Start Time: 7:00am  Session End Time: 7:50am     Session Length: 50min    Session #: 5    Attendees: Client attended alone    Telemedicine Visit: The patient's condition can be safely assessed and treated via synchronous audio and visual telemedicine encounter.      Reason for Telemedicine Visit: Services only offered telehealth    Originating Site (Patient Location): Patient's home    Distant Site (Provider Location): Provider Remote Setting    Consent:  The patient/guardian has verbally consented to: the potential risks and benefits of telemedicine (video visit) versus in person care; bill my insurance or make self-payment for services provided; and responsibility for payment of non-covered services.      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 She also reported that she has been working to count calories less, which has been going pretty well. Client reflected on some past impacts on her body image and  insecurities. She processed through moments of being teased in high school and some of the messages she got that led her to feeling angry and sad. therapist listened and validated, educating on how messages and core beliefs can lead to lasting impacts on mental health. Therapist worked to validate feelings, while challenging the beliefs/messages and the power they have over client. client reflected on how she would nurture a friend who experienced something similar, and therapist encouraged her to work on self-compassion. She agreed to and noted efforts to journal that could work to further process this.         ASSESSMENT: Current Emotional / Mental Status (status of significant symptoms):   Risk status (Self / Other harm or suicidal ideation)   Patient denies current fears or concerns for personal safety.   Patient denies current or recent suicidal ideation or behaviors.   Patient denies current or recent homicidal ideation or behaviors.   Patient denies current or recent self injurious behavior or ideation.   Patient denies other safety concerns.   Patient reports there has been no change in risk factors since their last session.     Patient reports there has been no change in protective factors since their last session.     Recommended that patient call 911 or go to the local ED should there be a change in any of these risk factors.     Appearance:   Appropriate    Eye Contact:   Good    Psychomotor Behavior: Normal    Attitude:   Cooperative    Orientation:   All   Speech    Rate / Production: Normal/ Responsive    Volume:  Normal    Mood:    Irritable  Sad    Affect:    Appropriate    Thought Content:  Clear    Thought Form:  Coherent  Logical    Insight:    Good      Medication Review:   No changes to current psychiatric medication(s)     Medication Compliance:   Yes     Changes in Health Issues:   None reported     Chemical Use Review:   Substance Use: Chemical use reviewed, no active concerns identified       Tobacco Use: No current tobacco use.      Diagnosis:  1. Moderate episode of recurrent major depressive disorder (H)    2. NEGRO (generalized anxiety disorder)        Collateral Reports Completed:   Not Applicable    PLAN: (Patient Tasks / Therapist Tasks / Other)  Client will return May 15 at 7am. She will use journaling to process through past wounds and explore emotions and thoughts, while working to practice self-compassion. She will also continue less calorie counting with food.      Amee León, [facility]                                                                                                        ______________________________________________________________________    Treatment Plan    Patient's Name: Genesis Cutler  YOB: 1995    Date: 2/26/20    DSM5 Diagnoses: 296.32 (F33.1) Major Depressive Disorder, Recurrent Episode, Moderate _ or 300.02 (F41.1) Generalized Anxiety Disorder  Psychosocial / Contextual Factors: Recent transition to work, only female in male dominated field at work  WHODAS:   WHODAS 2.0 Total Score 2/7/2020   Total Score 28       Referral / Collaboration:  Referral to another professional/service is not indicated at this time..    Anticipated number of session or this episode of care: 12-16      MeasurableTreatment Goal(s) related to diagnosis / functional impairment(s)  Goal 1: Patient will learn skills to manage and reduce anxiety, as measured by NEGRO-7.    I will know I've met my goal when I can try to stop moments of intense anxiety.      Objective #A (Patient Action)    Patient will identify 3 fears / thoughts that contribute to feeling anxious.  Status: New - Date: 2/26/20     Intervention(s)  Therapist will teach emotional recognition/identification. to identify top 3 triggers for anxiety.    Objective #B  Patient will use at least 3 coping skills for anxiety management in the next 4 weeks.  Status: New - Date: 2/26/20      Intervention(s)  Therapist will teach emotional regulation skills. 3 new calming/coping skills to manage anxiety.    Objective #C  Patient will use relaxation strategies 1 times per day to reduce the physical symptoms of anxiety.  Status: New - Date: 2/26/20     Intervention(s)  Therapist will assign homework practice relaxation and mindfulness daily.      Goal 2: Patient will reduce depression and improve motivation, as measured by PHQ-9.     I will know I've met my goal when I want to do things I enjoy again and can identify feeling guilty about things.      Objective #A (Patient Action)    Status: New - Date: 2/26/20     Patient will Identify negative self-talk and behaviors: challenge core beliefs, myths, and actions.    Intervention(s)  Therapist will teach emotional recognition/identification. to identify 3 thoughts/triggers for depression and shame.    Objective #B  Patient will Increase interest, engagement, and pleasure in doing things.    Status: New - Date: 2/26/20     Intervention(s)  Therapist will provide educational materials on depression  teach emotional regulation skills. 3 new coping skills to improve mood and motivation.    Objective #C  Patient will use positive self-affirmations daily.  Status: New - Date: 2/26/20     Intervention(s)  Therapist will assign homework practice using positive self-talk daily to combat critical self-talk and shame.      Patient has reviewed and agreed to the above plan.      Amee León, [facility]  February 26, 2020

## 2020-04-24 NOTE — PATIENT INSTRUCTIONS
Client will return May 15 at 7am. She will use journaling to process through past wounds and explore emotions and thoughts, while working to practice self-compassion. She will also continue less calorie counting with food.

## 2020-04-27 ENCOUNTER — VIRTUAL VISIT (OUTPATIENT)
Dept: PEDIATRICS | Facility: CLINIC | Age: 25
End: 2020-04-27
Payer: COMMERCIAL

## 2020-04-27 DIAGNOSIS — F41.9 ANXIETY AND DEPRESSION: Primary | ICD-10-CM

## 2020-04-27 DIAGNOSIS — F32.A ANXIETY AND DEPRESSION: Primary | ICD-10-CM

## 2020-04-27 PROCEDURE — 96127 BRIEF EMOTIONAL/BEHAV ASSMT: CPT | Performed by: FAMILY MEDICINE

## 2020-04-27 PROCEDURE — 99213 OFFICE O/P EST LOW 20 MIN: CPT | Mod: 95 | Performed by: FAMILY MEDICINE

## 2020-04-27 ASSESSMENT — ANXIETY QUESTIONNAIRES
5. BEING SO RESTLESS THAT IT IS HARD TO SIT STILL: NOT AT ALL
3. WORRYING TOO MUCH ABOUT DIFFERENT THINGS: NOT AT ALL
6. BECOMING EASILY ANNOYED OR IRRITABLE: SEVERAL DAYS
GAD7 TOTAL SCORE: 2
1. FEELING NERVOUS, ANXIOUS, OR ON EDGE: NOT AT ALL
2. NOT BEING ABLE TO STOP OR CONTROL WORRYING: NOT AT ALL
IF YOU CHECKED OFF ANY PROBLEMS ON THIS QUESTIONNAIRE, HOW DIFFICULT HAVE THESE PROBLEMS MADE IT FOR YOU TO DO YOUR WORK, TAKE CARE OF THINGS AT HOME, OR GET ALONG WITH OTHER PEOPLE: NOT DIFFICULT AT ALL
7. FEELING AFRAID AS IF SOMETHING AWFUL MIGHT HAPPEN: NOT AT ALL

## 2020-04-27 ASSESSMENT — PATIENT HEALTH QUESTIONNAIRE - PHQ9
SUM OF ALL RESPONSES TO PHQ QUESTIONS 1-9: 6
5. POOR APPETITE OR OVEREATING: SEVERAL DAYS

## 2020-04-27 NOTE — PROGRESS NOTES
"Genesis Cutler is a 25 year old female who is being evaluated via a billable telephone visit.      The patient has been notified of following:     \"This telephone visit will be conducted via a call between you and your physician/provider. We have found that certain health care needs can be provided without the need for a physical exam.  This service lets us provide the care you need with a short phone conversation.  If a prescription is necessary we can send it directly to your pharmacy.  If lab work is needed we can place an order for that and you can then stop by our lab to have the test done at a later time.    Telephone visits are billed at different rates depending on your insurance coverage. During this emergency period, for some insurers they may be billed the same as an in-person visit.  Please reach out to your insurance provider with any questions.    If during the course of the call the physician/provider feels a telephone visit is not appropriate, you will not be charged for this service.\"    Patient has given verbal consent for Telephone visit?  Yes    How would you like to obtain your AVS? Shabnamt    Yvonne     Genesis Cutler is a 25 year old female who presents to clinic today for the following health issues:    HPI  Depression and Anxiety Follow-Up    How are you doing with your depression since your last visit? Improved     How are you doing with your anxiety since your last visit?  Improved     Are you having other symptoms that might be associated with depression or anxiety? Yes:  Not sleeping at night/states she is waking up every hour    Have you had a significant life event? No     Do you have any concerns with your use of alcohol or other drugs? No    Social History     Tobacco Use     Smoking status: Never Smoker     Smokeless tobacco: Never Used   Substance Use Topics     Alcohol use: Yes     Alcohol/week: 0.0 standard drinks     Comment: 1-2 drinks per month     " Drug use: No     PHQ 3/24/2020 4/10/2020 4/27/2020   PHQ-9 Total Score 12 9 6   Q9: Thoughts of better off dead/self-harm past 2 weeks Not at all Not at all Not at all   F/U: Thoughts of suicide or self-harm - - -   F/U: Safety concerns - - -     NEGRO-7 SCORE 3/24/2020 4/10/2020 4/27/2020   Total Score - - -   Total Score 9 1 2     Last PHQ-9 4/27/2020   1.  Little interest or pleasure in doing things 0   2.  Feeling down, depressed, or hopeless 0   3.  Trouble falling or staying asleep, or sleeping too much 3   4.  Feeling tired or having little energy 1   5.  Poor appetite or overeating 0   6.  Feeling bad about yourself 1   7.  Trouble concentrating 1   8.  Moving slowly or restless 0   Q9: Thoughts of better off dead/self-harm past 2 weeks 0   PHQ-9 Total Score 6   Difficulty at work, home, or with people Not difficult at all   In the past two weeks have you had thoughts of suicide or self harm? -   Do you have concerns about your personal safety or the safety of others? -     NEGRO-7  4/27/2020   1. Feeling nervous, anxious, or on edge 0   2. Not being able to stop or control worrying 0   3. Worrying too much about different things 0   4. Trouble relaxing 1   5. Being so restless that it is hard to sit still 0   6. Becoming easily annoyed or irritable 1   7. Feeling afraid, as if something awful might happen 0   NEGRO-7 Total Score 2   If you checked any problems, how difficult have they made it for you to do your work, take care of things at home, or get along with other people? Not difficult at all     In the past two weeks have you had thoughts of suicide or self-harm?  No.    Do you have concerns about your personal safety or the safety of others?   No    Suicide Assessment Five-step Evaluation and Treatment (SAFE-T)      How many servings of fruits and vegetables do you eat daily?  3-4    On average, how many sweetened beverages do you drink each day (Examples: soda, juice, sweet tea, etc.  Do NOT count diet or  artificially sweetened beverages)?   1    How many days per week do you exercise enough to make your heart beat faster? 5-6    How many minutes a day do you exercise enough to make your heart beat faster? 30 - 60    How many days per week do you miss taking your medication? 0             Patient Active Problem List   Diagnosis     Anxiety and depression     Past Surgical History:   Procedure Laterality Date     COLONOSCOPY  03/2017       Social History     Tobacco Use     Smoking status: Never Smoker     Smokeless tobacco: Never Used   Substance Use Topics     Alcohol use: Yes     Alcohol/week: 0.0 standard drinks     Comment: 1-2 drinks per month     Family History   Problem Relation Age of Onset     Diabetes Mother      Diabetes Father      Diabetes Maternal Grandmother      Cerebrovascular Disease Maternal Grandmother      Coronary Artery Disease Maternal Grandfather      Cerebrovascular Disease Maternal Grandfather      Coronary Artery Disease Paternal Grandmother      Unknown/Adopted Paternal Grandfather          Current Outpatient Medications   Medication Sig Dispense Refill     escitalopram (LEXAPRO) 20 MG tablet Take 1 tablet (20 mg) by mouth daily 90 tablet 0     norgestimate-ethinyl estradiol (ORTHO-CYCLEN/SPRINTEC) 0.25-35 MG-MCG tablet Take 1 tablet by mouth daily Take 4 continuous packs, skipping the sugar pill week. 112 tablet 3     plecanatide (TRULANCE) 3 MG tablet Take 1 tablet (3 mg) by mouth daily 90 tablet 3     polyethylene glycol (MIRALAX) powder Take 1 capful by mouth 4 times daily        Allergies   Allergen Reactions     Penicillins Rash     Recent Labs   Lab Test 03/14/18  1219 01/30/18  0756 07/19/16  0938 05/16/16 04/18/14   LDL  --   --   --   --  89   HDL  --   --   --   --  50   TRIG  --   --   --   --  63   ALT 33  --  33 37  --    CR  --   --  0.70 0.7  --    GFRESTIMATED  --   --  >90  Non  GFR Calc   >60  --    GFRESTBLACK  --   --  >90  African American GFR Calc    >60  --    POTASSIUM  --   --  4.1 4.7  --    TSH  --  2.31 1.86  --  1.68      BP Readings from Last 3 Encounters:   02/24/20 120/77   02/05/20 98/58   09/21/19 102/68    Wt Readings from Last 3 Encounters:   02/24/20 69.2 kg (152 lb 8.9 oz)   02/05/20 69.2 kg (152 lb 9.6 oz)   01/03/20 65.8 kg (145 lb)                    Reviewed and updated as needed this visit by Provider         Review of Systems   ROS COMP: Constitutional, HEENT, cardiovascular, pulmonary, GI, , musculoskeletal, neuro, skin, endocrine and psych systems are negative, except as otherwise noted.       Objective   Reported vitals:  There were no vitals taken for this visit.   healthy, alert and no distress  PSYCH: Alert and oriented times 3; coherent speech, normal   rate and volume, able to articulate logical thoughts, able   to abstract reason, no tangential thoughts, no hallucinations   or delusions  Her affect is normal  RESP: No cough, no audible wheezing, able to talk in full sentences  Remainder of exam unable to be completed due to telephone visits            Assessment/Plan:  1. Anxiety and depression  Improved, continue present management      No follow-ups on file.      Phone call duration:  11 minutes    Richard Ledbetter MD

## 2020-04-27 NOTE — PATIENT INSTRUCTIONS
Patient Education             Coping with Depression and Anxiety During a Serious Illness  For informational purposes only. Not to replace the advice of your health care provider.  Copyright   2018 [employer]. All rights reserved.

## 2020-04-28 ASSESSMENT — ANXIETY QUESTIONNAIRES: GAD7 TOTAL SCORE: 2

## 2020-05-15 ENCOUNTER — VIRTUAL VISIT (OUTPATIENT)
Dept: PSYCHOLOGY | Facility: CLINIC | Age: 25
End: 2020-05-15
Payer: COMMERCIAL

## 2020-05-15 DIAGNOSIS — F41.1 GAD (GENERALIZED ANXIETY DISORDER): ICD-10-CM

## 2020-05-15 DIAGNOSIS — F33.1 MODERATE EPISODE OF RECURRENT MAJOR DEPRESSIVE DISORDER (H): Primary | ICD-10-CM

## 2020-05-15 PROCEDURE — 90834 PSYTX W PT 45 MINUTES: CPT | Mod: 95 | Performed by: SOCIAL WORKER

## 2020-05-15 NOTE — PATIENT INSTRUCTIONS
"Client will continue to practice challenging negative core beliefs, including \"I am not enough\" through journaling and positive self-talk. She will also continue to count calories less often.  She will return June 5 at 7am.     "

## 2020-05-15 NOTE — PROGRESS NOTES
Progress Note    Patient Name: Genesis Cutler  Date: 5/15/20         Service Type: Individual      Session Start Time: 7:01am  Session End Time: 7:50am     Session Length: 49min    Session #: 6    Attendees: Client attended alone    Telemedicine Visit: The patient's condition can be safely assessed and treated via synchronous audio and visual telemedicine encounter.      Reason for Telemedicine Visit: Services only offered telehealth    Originating Site (Patient Location): Patient's home    Distant Site (Provider Location): Provider Remote Setting: home office    Consent:  The patient/guardian has verbally consented to: the potential risks and benefits of telemedicine (video visit) versus in person care; bill my insurance or make self-payment for services provided; and responsibility for payment of non-covered services.      Treatment Plan Last Reviewed: 2/26/20  PHQ-9 / NEGRO-7 : 4/27/20    DATA  Interactive Complexity: No  Crisis: No       Progress Since Last Session (Related to Symptoms / Goals / Homework):   Symptoms: Improving continued management of anxiety and depression    Homework: Partially completed      Episode of Care Goals: Satisfactory progress - ACTION (Actively working towards change); Intervened by reinforcing change plan / affirming steps taken     Current / Ongoing Stressors and Concerns:   Only female in male dominated field at work, COVID distress     Treatment Objective(s) Addressed in This Session:   Identify negative self-talk and behaviors: challenge core beliefs, myths, and actions  Practice healthy mental practices while eating  use positive self-affirmations daily       Intervention:   CBT: Client reviewed the past few weeks and not continued improvement with her mood, stating she has felt more happy. She reported that she has been enjoying helping others. She reported that she has been journaling some and reflecting on past impacts for  "her negative views of her body image. She endorsed some negative messages from her parents that led her to feeling negative about herself and identified the core belief \"I am not enough\". Therapist educated her on the impacts of core beliefs, while working to challenge this belief. Client agreed that she would like to make efforts towards being more kind to herself. SHe agreed to continue journaling and therapist encouraged her to write 5 positives about herself.         ASSESSMENT: Current Emotional / Mental Status (status of significant symptoms):   Risk status (Self / Other harm or suicidal ideation)   Patient denies current fears or concerns for personal safety.   Patient denies current or recent suicidal ideation or behaviors.   Patient denies current or recent homicidal ideation or behaviors.   Patient denies current or recent self injurious behavior or ideation.   Patient denies other safety concerns.   Patient reports there has been no change in risk factors since their last session.     Patient reports there has been no change in protective factors since their last session.     Recommended that patient call 911 or go to the local ED should there be a change in any of these risk factors.     Appearance:   Appropriate    Eye Contact:   Good    Psychomotor Behavior: Normal    Attitude:   Cooperative    Orientation:   All   Speech    Rate / Production: Normal/ Responsive    Volume:  Normal    Mood:    Irritable  Sad    Affect:    Appropriate    Thought Content:  Clear    Thought Form:  Coherent  Logical    Insight:    Good      Medication Review:   No changes to current psychiatric medication(s)     Medication Compliance:   Yes     Changes in Health Issues:   None reported     Chemical Use Review:   Substance Use: Chemical use reviewed, no active concerns identified      Tobacco Use: No current tobacco use.      Diagnosis:  1. Moderate episode of recurrent major depressive disorder (H)    2. NEGRO (generalized " "anxiety disorder)        Collateral Reports Completed:   Not Applicable    PLAN: (Patient Tasks / Therapist Tasks / Other)  Client will continue to practice challenging negative core beliefs, including \"I am not enough\" through journaling and positive self-talk. She will also continue to count calories less often.  She will return June 5 at 7am.       Ameeboy León, [facility] 5/15/2020                                                                                                        ______________________________________________________________________    Treatment Plan    Patient's Name: Genesis Culter  YOB: 1995    Date: 2/26/20    DSM5 Diagnoses: 296.32 (F33.1) Major Depressive Disorder, Recurrent Episode, Moderate _ or 300.02 (F41.1) Generalized Anxiety Disorder  Psychosocial / Contextual Factors: Recent transition to work, only female in male dominated field at work  WHODAS:   WHODAS 2.0 Total Score 2/7/2020   Total Score 28       Referral / Collaboration:  Referral to another professional/service is not indicated at this time..    Anticipated number of session or this episode of care: 12-16      MeasurableTreatment Goal(s) related to diagnosis / functional impairment(s)  Goal 1: Patient will learn skills to manage and reduce anxiety, as measured by NEGRO-7.    I will know I've met my goal when I can try to stop moments of intense anxiety.      Objective #A (Patient Action)    Patient will identify 3 fears / thoughts that contribute to feeling anxious.  Status: New - Date: 2/26/20     Intervention(s)  Therapist will teach emotional recognition/identification. to identify top 3 triggers for anxiety.    Objective #B  Patient will use at least 3 coping skills for anxiety management in the next 4 weeks.  Status: New - Date: 2/26/20     Intervention(s)  Therapist will teach emotional regulation skills. 3 new calming/coping skills to manage anxiety.    Objective #C  Patient will use " relaxation strategies 1 times per day to reduce the physical symptoms of anxiety.  Status: New - Date: 2/26/20     Intervention(s)  Therapist will assign homework practice relaxation and mindfulness daily.      Goal 2: Patient will reduce depression and improve motivation, as measured by PHQ-9.     I will know I've met my goal when I want to do things I enjoy again and can identify feeling guilty about things.      Objective #A (Patient Action)    Status: New - Date: 2/26/20     Patient will Identify negative self-talk and behaviors: challenge core beliefs, myths, and actions.    Intervention(s)  Therapist will teach emotional recognition/identification. to identify 3 thoughts/triggers for depression and shame.    Objective #B  Patient will Increase interest, engagement, and pleasure in doing things.    Status: New - Date: 2/26/20     Intervention(s)  Therapist will provide educational materials on depression  teach emotional regulation skills. 3 new coping skills to improve mood and motivation.    Objective #C  Patient will use positive self-affirmations daily.  Status: New - Date: 2/26/20     Intervention(s)  Therapist will assign homework practice using positive self-talk daily to combat critical self-talk and shame.      Patient has reviewed and agreed to the above plan.      Amee León, [facility]  February 26, 2020

## 2020-06-05 ENCOUNTER — VIRTUAL VISIT (OUTPATIENT)
Dept: PSYCHOLOGY | Facility: CLINIC | Age: 25
End: 2020-06-05
Payer: COMMERCIAL

## 2020-06-05 DIAGNOSIS — F33.1 MODERATE EPISODE OF RECURRENT MAJOR DEPRESSIVE DISORDER (H): Primary | ICD-10-CM

## 2020-06-05 DIAGNOSIS — F41.1 GAD (GENERALIZED ANXIETY DISORDER): ICD-10-CM

## 2020-06-05 PROCEDURE — 90834 PSYTX W PT 45 MINUTES: CPT | Mod: GT | Performed by: SOCIAL WORKER

## 2020-06-05 ASSESSMENT — ANXIETY QUESTIONNAIRES
6. BECOMING EASILY ANNOYED OR IRRITABLE: SEVERAL DAYS
3. WORRYING TOO MUCH ABOUT DIFFERENT THINGS: MORE THAN HALF THE DAYS
GAD7 TOTAL SCORE: 7
1. FEELING NERVOUS, ANXIOUS, OR ON EDGE: MORE THAN HALF THE DAYS
4. TROUBLE RELAXING: SEVERAL DAYS
2. NOT BEING ABLE TO STOP OR CONTROL WORRYING: SEVERAL DAYS
7. FEELING AFRAID AS IF SOMETHING AWFUL MIGHT HAPPEN: NOT AT ALL
5. BEING SO RESTLESS THAT IT IS HARD TO SIT STILL: NOT AT ALL

## 2020-06-05 ASSESSMENT — PATIENT HEALTH QUESTIONNAIRE - PHQ9: SUM OF ALL RESPONSES TO PHQ QUESTIONS 1-9: 8

## 2020-06-05 NOTE — PROGRESS NOTES
Progress Note    Patient Name: Genesis Cutler  Date: 6/5/20         Service Type: Individual      Session Start Time: 7:00am  Session End Time: 7:50am     Session Length: 50min    Session #: 7    Attendees: Client attended alone    Mode of Communication: Rithmio Video     Telemedicine Visit: The patient's condition can be safely assessed and treated via synchronous audio and visual telemedicine encounter.      Reason for Telemedicine Visit: Services only offered telehealth    Originating Site (Patient Location): Patient's home    Distant Site (Provider Location): Provider Remote Setting: home office    Consent:  The patient/guardian has verbally consented to: the potential risks and benefits of telemedicine (video visit) versus in person care; bill my insurance or make self-payment for services provided; and responsibility for payment of non-covered services.      Treatment Plan Last Reviewed: 6/5/20  PHQ-9 / NEGRO-7 : 6/5/20    DATA  Interactive Complexity: No  Crisis: No       Progress Since Last Session (Related to Symptoms / Goals / Homework):   Symptoms: Improving improved eating habits, and emotional distress well managed despite some increased anger and anxiety due to current events    Homework: Achieved / completed to satisfaction      Episode of Care Goals: Satisfactory progress - ACTION (Actively working towards change); Intervened by reinforcing change plan / affirming steps taken     Current / Ongoing Stressors and Concerns:   Only female in male dominated field at work, COVID distress     Treatment Objective(s) Addressed in This Session:   identify 3 strategies to more effectively address stressors  Identify negative self-talk and behaviors: challenge core beliefs, myths, and actions  Practice healthy mental practices while eating  use positive self-affirmations daily       Intervention:   CBT: Client reviewed the past few weeks and shared some  positives, as well as some distress. She reported that she has felt angry with all the events going on in the state/world and processed through that. She endorsed some anxiety due to this as well, but stated that she has been able to manage it. She reported that she has not counted calories at all for two weeks and noted feeling good about it. Therapist praised her and worked to explore her thought processes related to this. She identified that using journaling has helped her to reflect and challenge negative messages from the past. She endorsed efforts with positive self-talk as well which has helped. Therapist praised her and reviewed the benefits of continued positive self-talk and processing to work on improving mental health and self-worth; client agreed.        ASSESSMENT: Current Emotional / Mental Status (status of significant symptoms):   Risk status (Self / Other harm or suicidal ideation)   Patient denies current fears or concerns for personal safety.   Patient denies current or recent suicidal ideation or behaviors.   Patient denies current or recent homicidal ideation or behaviors.   Patient denies current or recent self injurious behavior or ideation.   Patient denies other safety concerns.   Patient reports there has been no change in risk factors since their last session.     Patient reports there has been no change in protective factors since their last session.     Recommended that patient call 911 or go to the local ED should there be a change in any of these risk factors.     Appearance:   Appropriate    Eye Contact:   Fair    Psychomotor Behavior: Normal    Attitude:   Cooperative    Orientation:   All   Speech    Rate / Production: Normal/ Responsive    Volume:  Normal    Mood:    Anxious  Irritable  Sad    Affect:    Appropriate    Thought Content:  Clear    Thought Form:  Coherent  Logical    Insight:    Good      Medication Review:   No changes to current psychiatric  medication(s)     Medication Compliance:   Yes     Changes in Health Issues:   None reported     Chemical Use Review:   Substance Use: Chemical use reviewed, no active concerns identified      Tobacco Use: No current tobacco use.      Diagnosis:  1. Moderate episode of recurrent major depressive disorder (H)    2. NEGRO (generalized anxiety disorder)        Collateral Reports Completed:   Not Applicable    PLAN: (Patient Tasks / Therapist Tasks / Other)  Client will return July 10 at 7am. She will continue to use journaling to reflect on past impacts on her mental health and her body image and work to challenge negative messages, replacing them with positive self-talk so as to reduce depression and anxiety and improve self-worth.    Amee León, [facility] 6/5/2020                                                                                                        ______________________________________________________________________    Treatment Plan    Patient's Name: Genesis Cutler  YOB: 1995    Date: 6/5/20    DSM5 Diagnoses: 296.32 (F33.1) Major Depressive Disorder, Recurrent Episode, Moderate _ or 300.02 (F41.1) Generalized Anxiety Disorder  Psychosocial / Contextual Factors: Recent transition to work, only female in male dominated field at work  WHODAS:   WHODAS 2.0 Total Score 2/7/2020   Total Score 28       Referral / Collaboration:  Referral to another professional/service is not indicated at this time..    Anticipated number of session or this episode of care: 10-14    *Progress noted, but continued work needed      MeasurableTreatment Goal(s) related to diagnosis / functional impairment(s)  Goal 1: Patient will learn skills to manage and reduce anxiety, as measured by NEGRO-7.    I will know I've met my goal when I can try to stop moments of intense anxiety.      Objective #A (Patient Action)    Patient will identify 3 fears / thoughts that contribute to feeling  anxious.  Status: Continued - Date(s):6/5/20     Intervention(s)  Therapist will teach emotional recognition/identification. to identify top 3 triggers for anxiety.    Objective #B  Patient will use at least 3 coping skills for anxiety management in the next 4 weeks.  Status: Continued - Date(s):6/5/20     Intervention(s)  Therapist will teach emotional regulation skills. 3 new calming/coping skills to manage anxiety.    Objective #C  Patient will use relaxation strategies 1 times per day to reduce the physical symptoms of anxiety.  Status: Continued - Date(s):6/5/20     Intervention(s)  Therapist will assign homework practice relaxation and mindfulness daily.      Goal 2: Patient will reduce depression and improve motivation, as measured by PHQ-9.     I will know I've met my goal when I want to do things I enjoy again.      Objective #A (Patient Action)    Status: Continued - Date(s):6/5/20     Patient will Identify negative self-talk and behaviors: challenge core beliefs, myths, and actions.    Intervention(s)  Therapist will teach emotional recognition/identification. to identify 3 thoughts/triggers for depression and shame.    Objective #B  Patient will Increase interest, engagement, and pleasure in doing things.    Status: Continued - Date(s):6/5/20     Intervention(s)  Therapist will provide educational materials on depression  teach emotional regulation skills. 3 new coping skills to improve mood and motivation.    Objective #C  Patient will use positive self-affirmations daily.  Status: Continued - Date(s):6/5/20     Intervention(s)  Therapist will assign homework practice using positive self-talk daily to combat critical self-talk and shame.    Goal 3: Client will improve self-esteem and reduce restrictive eating habits while reducing distress     I will know I've met my goal when I can be ok not counting caloires at each meal.      Objective #A (Client Action)    Client will relieve distress about body  image by stop counting calories at mealtime.  Status: New - Date: 6/5/20     Intervention(s)  Therapist will assign homework client is to stop counting calories at meals.    Objective #B  Client will identify 2 messages and impacts related to body image and challenge negative messages and replace with positive self messages.    Status: New - Date: 6/5/20     Intervention(s)  Therapist will assign homework use journaling to process impacts of eating habits and challenge negative messages from others regarding body image.        Patient has reviewed and agreed to the above plan.      Amee León, [facility]  June 5, 2020

## 2020-06-05 NOTE — PATIENT INSTRUCTIONS
Client will return July 10 at 7am. She will continue to use journaling to reflect on past impacts on her mental health and her body image and work to challenge negative messages, replacing them with positive self-talk so as to reduce depression and anxiety and improve self-worth.

## 2020-06-06 ASSESSMENT — ANXIETY QUESTIONNAIRES: GAD7 TOTAL SCORE: 7

## 2020-06-15 ENCOUNTER — MYC REFILL (OUTPATIENT)
Dept: OBGYN | Facility: CLINIC | Age: 25
End: 2020-06-15

## 2020-06-15 ENCOUNTER — MYC REFILL (OUTPATIENT)
Dept: PEDIATRICS | Facility: CLINIC | Age: 25
End: 2020-06-15

## 2020-06-15 DIAGNOSIS — Z30.41 ENCOUNTER FOR BIRTH CONTROL PILLS MAINTENANCE: ICD-10-CM

## 2020-06-15 DIAGNOSIS — F41.9 ANXIETY AND DEPRESSION: ICD-10-CM

## 2020-06-15 DIAGNOSIS — F32.A ANXIETY AND DEPRESSION: ICD-10-CM

## 2020-06-16 DIAGNOSIS — F32.A ANXIETY AND DEPRESSION: ICD-10-CM

## 2020-06-16 DIAGNOSIS — F41.9 ANXIETY AND DEPRESSION: ICD-10-CM

## 2020-06-16 RX ORDER — ESCITALOPRAM OXALATE 20 MG/1
20 TABLET ORAL DAILY
Qty: 90 TABLET | Refills: 1 | Status: SHIPPED | OUTPATIENT
Start: 2020-06-16 | End: 2020-06-24

## 2020-06-16 RX ORDER — ESCITALOPRAM OXALATE 20 MG/1
TABLET ORAL
Qty: 90 TABLET | Refills: 0 | OUTPATIENT
Start: 2020-06-16

## 2020-06-17 RX ORDER — NORGESTIMATE AND ETHINYL ESTRADIOL 0.25-0.035
1 KIT ORAL DAILY
Qty: 112 TABLET | Refills: 3 | Status: SHIPPED | OUTPATIENT
Start: 2020-06-17 | End: 2021-02-23

## 2020-06-18 ENCOUNTER — MYC MEDICAL ADVICE (OUTPATIENT)
Dept: PEDIATRICS | Facility: CLINIC | Age: 25
End: 2020-06-18

## 2020-06-24 ENCOUNTER — VIRTUAL VISIT (OUTPATIENT)
Dept: PEDIATRICS | Facility: CLINIC | Age: 25
End: 2020-06-24
Payer: COMMERCIAL

## 2020-06-24 DIAGNOSIS — F41.9 ANXIETY AND DEPRESSION: Primary | ICD-10-CM

## 2020-06-24 DIAGNOSIS — F32.A ANXIETY AND DEPRESSION: Primary | ICD-10-CM

## 2020-06-24 PROCEDURE — 99213 OFFICE O/P EST LOW 20 MIN: CPT | Mod: 95 | Performed by: FAMILY MEDICINE

## 2020-06-24 NOTE — PROGRESS NOTES
"Genesis Cutler is a 25 year old female who is being evaluated via a billable video visit.      The patient has been notified of following:     \"This video visit will be conducted via a call between you and your physician/provider. We have found that certain health care needs can be provided without the need for an in-person physical exam.  This service lets us provide the care you need with a video conversation.  If a prescription is necessary we can send it directly to your pharmacy.  If lab work is needed we can place an order for that and you can then stop by our lab to have the test done at a later time.    Video visits are billed at different rates depending on your insurance coverage.  Please reach out to your insurance provider with any questions.    If during the course of the call the physician/provider feels a video visit is not appropriate, you will not be charged for this service.\"    Patient has given verbal consent for Video visit? Yes    Will anyone else be joining your video visit? No    Subjective     Genesis Cutler is a 25 year old female who presents today via video visit for the following health issues:    HPI    Patient is stable on lexapro 20 mg for anxiety/depression. Deciding to get pregnant august 2020, doing well otherwise, I advised to start a prenatal vitamin.      Video Start Time: 4:10PM    Depression and Anxiety Follow-Up    How are you doing with your depression since your last visit? Improved    How are you doing with your anxiety since your last visit?  Improved    Are you having other symptoms that might be associated with depression or anxiety? No    Have you had a significant life event? No     Do you have any concerns with your use of alcohol or other drugs? No    Social History     Tobacco Use     Smoking status: Never Smoker     Smokeless tobacco: Never Used   Substance Use Topics     Alcohol use: Yes     Alcohol/week: 0.0 standard drinks     Comment: 1-2 " drinks per month     Drug use: No     PHQ 4/27/2020 6/5/2020 6/24/2020   PHQ-9 Total Score 6 8 6   Q9: Thoughts of better off dead/self-harm past 2 weeks Not at all Not at all Not at all   F/U: Thoughts of suicide or self-harm - - -   F/U: Safety concerns - - -     NEGRO-7 SCORE 4/27/2020 6/5/2020 6/24/2020   Total Score - - 2 (minimal anxiety)   Total Score 2 7 2     Last PHQ-9 6/24/2020   1.  Little interest or pleasure in doing things 0   2.  Feeling down, depressed, or hopeless 0   3.  Trouble falling or staying asleep, or sleeping too much 3   4.  Feeling tired or having little energy 2   5.  Poor appetite or overeating 0   6.  Feeling bad about yourself 0   7.  Trouble concentrating 1   8.  Moving slowly or restless 0   Q9: Thoughts of better off dead/self-harm past 2 weeks 0   PHQ-9 Total Score 6   Difficulty at work, home, or with people -   In the past two weeks have you had thoughts of suicide or self harm? -   Do you have concerns about your personal safety or the safety of others? -     NEGRO-7  6/24/2020   1. Feeling nervous, anxious, or on edge 0   2. Not being able to stop or control worrying 0   3. Worrying too much about different things 0   4. Trouble relaxing 1   5. Being so restless that it is hard to sit still 0   6. Becoming easily annoyed or irritable 1   7. Feeling afraid, as if something awful might happen 0   NEGRO-7 Total Score 2   If you checked any problems, how difficult have they made it for you to do your work, take care of things at home, or get along with other people? -     In the past two weeks have you had thoughts of suicide or self-harm?  No.    Do you have concerns about your personal safety or the safety of others?   No    Suicide Assessment Five-step Evaluation and Treatment (SAFE-T)      Patient Active Problem List   Diagnosis     Anxiety and depression     Past Surgical History:   Procedure Laterality Date     COLONOSCOPY  03/2017       Social History     Tobacco Use     Smoking  status: Never Smoker     Smokeless tobacco: Never Used   Substance Use Topics     Alcohol use: Yes     Alcohol/week: 0.0 standard drinks     Comment: 1-2 drinks per month     Family History   Problem Relation Age of Onset     Diabetes Mother      Diabetes Father      Diabetes Maternal Grandmother      Cerebrovascular Disease Maternal Grandmother      Coronary Artery Disease Maternal Grandfather      Cerebrovascular Disease Maternal Grandfather      Coronary Artery Disease Paternal Grandmother      Unknown/Adopted Paternal Grandfather          Current Outpatient Medications   Medication Sig Dispense Refill     norgestimate-ethinyl estradiol (ORTHO-CYCLEN) 0.25-35 MG-MCG tablet Take 1 tablet by mouth daily Take 4 continuous packs, skipping the sugar pill week. 112 tablet 3     plecanatide (TRULANCE) 3 MG tablet Take 1 tablet (3 mg) by mouth daily 90 tablet 3     polyethylene glycol (MIRALAX) powder Take 1 capful by mouth 4 times daily        sertraline (ZOLOFT) 50 MG tablet Take 1 tablet (50 mg) by mouth daily 45 tablet 1     Allergies   Allergen Reactions     Penicillins Rash       Reviewed and updated as needed this visit by Provider  Med Hx  Surg Hx  Fam Hx         Review of Systems   Constitutional, HEENT, cardiovascular, pulmonary, GI, , musculoskeletal, neuro, skin, endocrine and psych systems are negative, except as otherwise noted.      Objective             Physical Exam     GENERAL: Healthy, alert and no distress  EYES: Eyes grossly normal to inspection.  No discharge or erythema, or obvious scleral/conjunctival abnormalities.  RESP: No audible wheeze, cough, or visible cyanosis.  No visible retractions or increased work of breathing.    SKIN: Visible skin clear. No significant rash, abnormal pigmentation or lesions.  NEURO: Cranial nerves grossly intact.  Mentation and speech appropriate for age.  PSYCH: Mentation appears normal, affect normal/bright, judgement and insight intact, normal speech and  appearance well-groomed.          Assessment & Plan     1. Anxiety and depression: The risks of recurrence and maternal depression, as well as the risks of using antidepressants during pregnancy was discussed with patient. After an informed decision, we will switch lexapro to setraline at this time.    - sertraline (ZOLOFT) 50 MG tablet; Take 1 tablet (50 mg) by mouth daily  Dispense: 45 tablet; Refill: 1         Return in about 1 month (around 7/24/2020) for Phone Visit, Video Visit SONY Ledbetter MD  [facility]      Video-Visit Details    Type of service:  Video Visit    Video End Time:4:30PM    Originating Location (pt. Location): Home    Distant Location (provider location):  [facility]     Platform used for Video Visit: AmWell    Return in about 1 month (around 7/24/2020) for Phone Visit, Video Visit SONY Ledbetter MD

## 2020-06-25 ENCOUNTER — TELEPHONE (OUTPATIENT)
Dept: PEDIATRICS | Facility: CLINIC | Age: 25
End: 2020-06-25

## 2020-06-25 NOTE — TELEPHONE ENCOUNTER
Left message for patient to return clinic call regarding scheduling. Patient needs a Virtual return  appointment for NEGRO with Richard Ledbetter MD on or after 7/24/20. Number to clinic and Mychart option given, please assist in scheduling once patient returns clinic call.     Call Center OKAY TO SCHEDULE.    Thanks,   Patricia Zaldivar  Primary Care   [facility] [place]

## 2020-06-26 ENCOUNTER — TELEPHONE (OUTPATIENT)
Dept: PEDIATRICS | Facility: CLINIC | Age: 25
End: 2020-06-26

## 2020-06-26 NOTE — TELEPHONE ENCOUNTER
Left message for patient to return clinic call regarding scheduling. Patient needs a Virtual return  appointment for 1 month med check with Richard Ledbetter MD around 7/24/2020. Number to clinic and Mychart option given, please assist in scheduling once patient returns clinic call.     Call Center OKAY TO SCHEDULE.    Thanks,   Patricia Zaldivar  Primary Care   [facility] [place]

## 2020-06-29 ENCOUNTER — PRE VISIT (OUTPATIENT)
Dept: ORTHOPEDICS | Facility: CLINIC | Age: 25
End: 2020-06-29

## 2020-06-29 ENCOUNTER — TELEPHONE (OUTPATIENT)
Dept: ORTHOPEDICS | Facility: CLINIC | Age: 25
End: 2020-06-29

## 2020-06-29 DIAGNOSIS — G89.29 CHRONIC PAIN OF BOTH SHOULDERS: Primary | ICD-10-CM

## 2020-06-29 DIAGNOSIS — M25.512 CHRONIC PAIN OF BOTH SHOULDERS: Primary | ICD-10-CM

## 2020-06-29 DIAGNOSIS — M25.511 CHRONIC PAIN OF BOTH SHOULDERS: Primary | ICD-10-CM

## 2020-06-29 NOTE — TELEPHONE ENCOUNTER
6/29 Called and left voicemail. Explained we need to reschedule appointment on 7/2 to earlier in the day. Provided phone number [phone] to reschedule.     Marlen Da Silva   Procedure    Ortho/Sports Med/Ent/Eye   [facility] [place]   [phone]

## 2020-07-01 NOTE — PROGRESS NOTES
CHIEF CONCERN:  Bilateral shoulder pain    HISTORY OF PRESENT ILLNESS:  Sarah is a pleasant 25 year old right hand dominant woman who presents for evaluation of the above concern.  She has had intermittent bilateral shoulder pain with, popping and grinding since she was a teenager.  She denies any trauma or injury.  She saw her PCP early last year who referred her to physical therapy (which she did with Roberto Calros).  She was then referred to Dr. Cheng in Sports Medicine who administered a Right AC injection on 9/21/2019 and a Left subacromial injection on 1/3/2020. Pain is worse during the night and in early morning.  She has been taking NSAIDs with little to no relief.  She has had difficulty with exercise including yoga and has been waking at night due to pain.     Past Medical History:   Diagnosis Date     Chronic constipation        Past Surgical History:   Procedure Laterality Date     COLONOSCOPY  03/2017       Current Outpatient Medications   Medication Sig Dispense Refill     norgestimate-ethinyl estradiol (ORTHO-CYCLEN) 0.25-35 MG-MCG tablet Take 1 tablet by mouth daily Take 4 continuous packs, skipping the sugar pill week. 112 tablet 3     plecanatide (TRULANCE) 3 MG tablet Take 1 tablet (3 mg) by mouth daily 90 tablet 3     polyethylene glycol (MIRALAX) powder Take 1 capful by mouth 4 times daily        sertraline (ZOLOFT) 50 MG tablet Take 1 tablet (50 mg) by mouth daily 45 tablet 1          Allergies   Allergen Reactions     Penicillins Rash       SOCIAL HISTORY:    Social History     Socioeconomic History     Marital status:      Spouse name: Not on file     Number of children: Not on file     Years of education: Not on file     Highest education level: Not on file   Occupational History     Not on file   Social Needs     Financial resource strain: Not on file     Food insecurity     Worry: Not on file     Inability: Not on file     Transportation needs     Medical: Not on file     Non-medical: Not on  file   Tobacco Use     Smoking status: Never Smoker     Smokeless tobacco: Never Used   Substance and Sexual Activity     Alcohol use: Yes     Alcohol/week: 0.0 standard drinks     Comment: 1-2 drinks per month     Drug use: No     Sexual activity: Yes     Partners: Male     Birth control/protection: Pull-out method, Pill   Lifestyle     Physical activity     Days per week: Not on file     Minutes per session: Not on file     Stress: Not on file   Relationships     Social connections     Talks on phone: Not on file     Gets together: Not on file     Attends Adventism service: Not on file     Active member of club or organization: Not on file     Attends meetings of clubs or organizations: Not on file     Relationship status: Not on file     Intimate partner violence     Fear of current or ex partner: Not on file     Emotionally abused: Not on file     Physically abused: Not on file     Forced sexual activity: Not on file   Other Topics Concern     Parent/sibling w/ CABG, MI or angioplasty before 65F 55M? No   Social History Narrative     Not on file       FAMILY HISTORY: Reviewed in EMR      REVIEW OF SYSTEMS: Positive for that noted in past medical history and history of present illness and otherwise reviewed in EMR     PHYSICAL EXAM:    Adult female in no acute distress. Articulates and communicates with normal affect.  Respirations even and unlabored  Focused upper extremity exam: Skin intact. No erythema. Sensation intact all dermatomes into the hand to light touch. EPL, FPL, and Intrinsics intact. Right shoulder active motion is FE to 175, ER at side to 55, and IR to T6. Left shoulder active motion is FE to 175, ER to 55, and IR to T6.  Negative Neer and Steel. No pain on palpation over the AC joint. Focal and significant pain on palpation over the long head of the biceps on the right more than the left(reproduces patient's pain). Grossly positive OBriens bilaterally.    IMAGING:  Bilateral shoulder XR  demonstrate the GH joint space is well preserved.    Right shoulder MRI 9/17/19 was reviewed and I agree with the impression below:  Impression:  1. On a background of tendinosis, there is a bursal sided irregularity and attenuation of the supraspinatus tendon. Additionally, there is a low-grade articular sided partial-thickness tear at the junctional  fibers of the supraspinatus and infraspinatus tendon. No evidence of full thickness tear or tendon retraction.  2. Moderate amount of fluid in the subacromial/subdeltoid bursa.  3. The muscle bulk of the right shoulder rotator cuff is preserved.   4. Normal-appearing biceps tendon.    ASSESSMENT:    1. 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 Her clinical exam is highly suggestive of long head biceps etiology as pain generator. I discussed treatment options including activity modifications, NSAIDs, injections, additional PT. She has done all of these things without any lasting relief. Her exam suggests that she may be a reasonable candidate for arthroscopy and biceps tenodesis. We discussed the risk of incomplete pain relief as well as surgical risks of bleeding, infection, injury to nerves/arteries which power the arm or hand. As her symptoms have been present and limiting for over a year she would like to pursue surgical intervention. Her right shoulder is more symptomatic so we would address that first. We discussed Right shoulder diagnostic arthroscopy and open biceps tenodesis. She would like to proceed.    I completed the informed consent process with the patient including a discussion of the risks, benefits, alternatives for the above named procedure. I discussed the post operative rehabilitation process/expectations, activity/work restrictions, and anticipated recovery time. We discussed the surgical risks including but not limited to risk of infection, bleeding, permanent injury to nerves  and vessels, medical complications such as heart attack, stroke, or blood clots. We discussed the risks of post operative weakness, stiffness, pain which is no better or worse, and/or need for additional procedures. The patient had the opportunity to ask questions and these were answered in lay language.      Aline Amador MD

## 2020-07-02 ENCOUNTER — OFFICE VISIT (OUTPATIENT)
Dept: ORTHOPEDICS | Facility: CLINIC | Age: 25
End: 2020-07-02
Payer: COMMERCIAL

## 2020-07-02 ENCOUNTER — MYC MEDICAL ADVICE (OUTPATIENT)
Dept: PEDIATRICS | Facility: CLINIC | Age: 25
End: 2020-07-02

## 2020-07-02 ENCOUNTER — TELEPHONE (OUTPATIENT)
Dept: ORTHOPEDICS | Facility: CLINIC | Age: 25
End: 2020-07-02

## 2020-07-02 ENCOUNTER — ANCILLARY PROCEDURE (OUTPATIENT)
Dept: GENERAL RADIOLOGY | Facility: CLINIC | Age: 25
End: 2020-07-02
Attending: ORTHOPAEDIC SURGERY
Payer: COMMERCIAL

## 2020-07-02 VITALS
WEIGHT: 150.4 LBS | BODY MASS INDEX: 25.06 KG/M2 | HEIGHT: 65 IN | SYSTOLIC BLOOD PRESSURE: 126 MMHG | OXYGEN SATURATION: 100 % | DIASTOLIC BLOOD PRESSURE: 90 MMHG | HEART RATE: 70 BPM

## 2020-07-02 DIAGNOSIS — M25.512 CHRONIC PAIN OF BOTH SHOULDERS: ICD-10-CM

## 2020-07-02 DIAGNOSIS — G89.29 CHRONIC PAIN OF BOTH SHOULDERS: ICD-10-CM

## 2020-07-02 DIAGNOSIS — M67.921 BICEPS TENDINOPATHY, RIGHT: Primary | ICD-10-CM

## 2020-07-02 DIAGNOSIS — M25.511 CHRONIC PAIN OF BOTH SHOULDERS: ICD-10-CM

## 2020-07-02 PROCEDURE — 99203 OFFICE O/P NEW LOW 30 MIN: CPT | Performed by: ORTHOPAEDIC SURGERY

## 2020-07-02 PROCEDURE — 73030 X-RAY EXAM OF SHOULDER: CPT | Mod: RT | Performed by: RADIOLOGY

## 2020-07-02 ASSESSMENT — PAIN SCALES - GENERAL
PAINLEVEL: MILD PAIN (3)
PAINLEVEL: MODERATE PAIN (4)

## 2020-07-02 ASSESSMENT — MIFFLIN-ST. JEOR: SCORE: 1415.15

## 2020-07-02 NOTE — LETTER
7/2/2020         RE: Genesis Cutler  [address]  [place] [address]        Dear Colleague,    Thank you for referring your patient, Genesis Cutler, to the [facility]. Please see a copy of my visit note below.    CHIEF CONCERN:  Bilateral shoulder pain    HISTORY OF PRESENT ILLNESS:  Sarah is a pleasant 25 year old right hand dominant woman who presents for evaluation of the above concern.  She has had intermittent bilateral shoulder pain with, popping and grinding since she was a teenager.  She denies any trauma or injury.  She saw her PCP early last year who referred her to physical therapy (which she did with Roberto Carlos).  She was then referred to Dr. Cheng in Sports Medicine who administered a Right AC injection on 9/21/2019 and a Left subacromial injection on 1/3/2020. Pain is worse during the night and in early morning.  She has been taking NSAIDs with little to no relief.  She has had difficulty with exercise including yoga and has been waking at night due to pain.     Past Medical History:   Diagnosis Date     Chronic constipation        Past Surgical History:   Procedure Laterality Date     COLONOSCOPY  03/2017       Current Outpatient Medications   Medication Sig Dispense Refill     norgestimate-ethinyl estradiol (ORTHO-CYCLEN) 0.25-35 MG-MCG tablet Take 1 tablet by mouth daily Take 4 continuous packs, skipping the sugar pill week. 112 tablet 3     plecanatide (TRULANCE) 3 MG tablet Take 1 tablet (3 mg) by mouth daily 90 tablet 3     polyethylene glycol (MIRALAX) powder Take 1 capful by mouth 4 times daily        sertraline (ZOLOFT) 50 MG tablet Take 1 tablet (50 mg) by mouth daily 45 tablet 1          Allergies   Allergen Reactions     Penicillins Rash       SOCIAL HISTORY:    Social History     Socioeconomic History     Marital status:      Spouse name: Not on file     Number of children: Not on file     Years of education: Not on file     Highest  education level: Not on file   Occupational History     Not on file   Social Needs     Financial resource strain: Not on file     Food insecurity     Worry: Not on file     Inability: Not on file     Transportation needs     Medical: Not on file     Non-medical: Not on file   Tobacco Use     Smoking status: Never Smoker     Smokeless tobacco: Never Used   Substance and Sexual Activity     Alcohol use: Yes     Alcohol/week: 0.0 standard drinks     Comment: 1-2 drinks per month     Drug use: No     Sexual activity: Yes     Partners: Male     Birth control/protection: Pull-out method, Pill   Lifestyle     Physical activity     Days per week: Not on file     Minutes per session: Not on file     Stress: Not on file   Relationships     Social connections     Talks on phone: Not on file     Gets together: Not on file     Attends Jew service: Not on file     Active member of club or organization: Not on file     Attends meetings of clubs or organizations: Not on file     Relationship status: Not on file     Intimate partner violence     Fear of current or ex partner: Not on file     Emotionally abused: Not on file     Physically abused: Not on file     Forced sexual activity: Not on file   Other Topics Concern     Parent/sibling w/ CABG, MI or angioplasty before 65F 55M? No   Social History Narrative     Not on file       FAMILY HISTORY: Reviewed in EMR      REVIEW OF SYSTEMS: Positive for that noted in past medical history and history of present illness and otherwise reviewed in EMR     PHYSICAL EXAM:    Adult female in no acute distress. Articulates and communicates with normal affect.  Respirations even and unlabored  Focused upper extremity exam: Skin intact. No erythema. Sensation intact all dermatomes into the hand to light touch. EPL, FPL, and Intrinsics intact. Right shoulder active motion is FE to 175, ER at side to 55, and IR to T6. Left shoulder active motion is FE to 175, ER to 55, and IR to T6.  Negative  Efrain and Milvia. No pain on palpation over the AC joint. Focal and significant pain on palpation over the long head of the biceps on the right more than the left(reproduces patient's pain). Grossly positive OBriens bilaterally.    IMAGING:  Bilateral shoulder XR demonstrate the GH joint space is well preserved.    Right shoulder MRI 9/17/19 was reviewed and I agree with the impression below:  Impression:  1. On a background of tendinosis, there is a bursal sided irregularity and attenuation of the supraspinatus tendon. Additionally, there is a low-grade articular sided partial-thickness tear at the junctional  fibers of the supraspinatus and infraspinatus tendon. No evidence of full thickness tear or tendon retraction.  2. Moderate amount of fluid in the subacromial/subdeltoid bursa.  3. The muscle bulk of the right shoulder rotator cuff is preserved.   4. Normal-appearing biceps tendon.    ASSESSMENT:    1. 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 Her clinical exam is highly suggestive of long head biceps etiology as pain generator. I discussed treatment options including activity modifications, NSAIDs, injections, additional PT. She has done all of these things without any lasting relief. Her exam suggests that she may be a reasonable candidate for arthroscopy and biceps tenodesis. We discussed the risk of incomplete pain relief as well as surgical risks of bleeding, infection, injury to nerves/arteries which power the arm or hand. As her symptoms have been present and limiting for over a year she would like to pursue surgical intervention. Her right shoulder is more symptomatic so we would address that first. We discussed Right shoulder diagnostic arthroscopy and open biceps tenodesis. She would like to proceed.    I completed the informed consent process with the patient including a discussion of the risks, benefits, alternatives for the above  named procedure. I discussed the post operative rehabilitation process/expectations, activity/work restrictions, and anticipated recovery time. We discussed the surgical risks including but not limited to risk of infection, bleeding, permanent injury to nerves and vessels, medical complications such as heart attack, stroke, or blood clots. We discussed the risks of post operative weakness, stiffness, pain which is no better or worse, and/or need for additional procedures. The patient had the opportunity to ask questions and these were answered in lay language.      Aline Amador MD      Again, thank you for allowing me to participate in the care of your patient.        Sincerely,        Aline Amador MD

## 2020-07-02 NOTE — PATIENT INSTRUCTIONS
[facility]  [address] [place] [address]  Phone [phone]  Fax [phone]    SURGICAL INFORMATION & INSTRUCTIONS  Dr. Aline Amador  Name of Surgery: Right shoulder arthroscopy, open biceps tenodesis    Date of Surgery:     If you need to reschedule/schedule your surgery, please contact Bri, our surgery scheduler at [place], at [phone].    Arrival Time:     Time of Surgery:      Please arrive early so that we can prepare you for surgery. If you arrive later than your scheduled arrival time, your surgery may be cancelled.  Please note that scheduled times may change, but you will always be notified if there is a change.       Location of Surgery:     ? [facility]  [address] [place] [address]  5th floor check-in  Phone [phone]  Fax [phone]  [email or web address]     - OR -     ? [facility]  [id number] [place] [address]  2nd floor check-in  Phone [phone]  Fax [phone]  [email or web address]    Prior to surgery    ? Medical Leave Forms  Please fax any medical leave forms from your employer/school to [phone] (if seen in [place]). It can take up to 5 business days to complete the forms. We will fax them back to the number listed on the forms, if you would like a copy, please let us know and we will mail a copy to you. Do not bring with on day of surgery as the forms may get lost.    ? Call your insurance company  Ask if you need pre-approval for your surgery.  If pre-approval is needed, please call our surgery scheduler for assistance with the pre-approval process.   If you do not have insurance, please let us know.     ? Schedule an appointment with a Primary Care Provider for a Pre-Op Physical.  This should be done within 30 days of surgery  If you do not have a primary care provider, you may call [facility] at [phone], for an appointment.   Please have your office note and any labs or tests faxed to the facility where you are having surgery. Please be sure to request a copy of your pre-op physical and bring it with you on the day of surgery.      Tell your provider if you have any of the following:   - IF you have a pacemaker or an ICD (implanted cardiac defibrillator). If you do, please bring the ID card with you on the day of surgery  - IF you're a smoker. People who smoke have a higher risk of infection after surgery. Ask your provider how you can quit smoking.  - If you have diabetes, work with your provider to control your blood sugar. If its not well-controlled, we may need to delay surgery (or you may have problems healing afterward).  - If your surgeon asks you to see your dentist: You'll need to complete any dental work before surgery. Your dentist must send a letter to your surgery center saying it's ok to do the surgery.    ? Pre-Op Phone Call  You will receive a pre-op phone call 1-3 days before surgery to review your eating and drinking restrictions, review medications, and confirm surgery times.      ? 7-10 days BEFORE surgery  ? Stop taking aspirin, Plavix or aspirin products 10 days before surgery or as directed by your doctor.  ? Stop taking non-steroidal anti-inflammatory medications (naproxen/Aleve, ibuprofen/Advil/Motrin, celecoxib/Celebrex, meloxicam/Mobic) 3 days before surgery or as directed by your surgeon.  This will reduce the risk of bleeding during surgery.  ? Stop taking fish oil (Omega-3-fatty acid) 1 week before surgery.  ? It is OK to take acetaminophen (Tylenol) up until 2 hours prior to surgery.  ? Take prescription medications as directed by your doctor.  Discuss which medications to take or hold prior to your surgery, with your primary care doctor.   ? If you have diabetes, ask your primary care doctor or endocrinologist how you should take your medication(s).    ? COVID-19 Testing Prior to Surgery (see included  handout)    ? 24 hours BEFORE surgery  Stop drinking alcohol (beer, wine, liquor) at least 24-hours before and after your surgery.     ? Evening BEFORE surgery  - You may eat a normal meal the night before surgery, but eat nothing after midnight.     - Take a shower - to help wash away bacteria (germs) from your skin.  It s normal to have bacteria on your skin and skin protects us from these germs.  When you have surgery, we cut the skin.  Sometimes germs get into the cuts and cause infection (illness caused by germs).  By following the showering instructions and using the special soap, you will lower the number of germs on your skin.  This decreases your chance of an infection.    - Buy or get 8 ounces of antiseptic surgical soap called 4% CHG.  Common brands of this soap are Hibiclens and Exidine.    - You can find it this soap at your local pharmacy, clinic or retail store.  If you have trouble finding it, ask your pharmacist to help you find the right substitute.    - Do not shave within 12 inches of your incision (surgical cut) area for at least 3 days before surgery.  Shaving can make small cuts in the skin. This puts you at a higher risk of infection.    Items you will need for each shower:   - Newly washed towel  - 4 ounces of one of the recommended soaps    Follow these instructions, the evening before surgery  - Wash your hair and body with your regular shampoo and soap. Make sure you rinse the shampoo and soap from your hair and body.  - Using clean hands, apply about 2 ounces of soap gently on your skin from the neck to your toes. Use on your groin area last. Do not use this soap on your face or head. If you get any soap in your eyes, ears or mouth, rinse right away.  - Once the soap has been on your skin for at least 1 minute, rinse off completely and repeat washing with the surgical soap one more time.  - Rinse well and dry off using a clean towel.  If you feel any tingling, itching or other  irritation, rinse right away. It is normal to feel some coolness on the skin after using the antiseptic soap. Your skin may feel a bit dry after the shower, but do not use any lotions, creams or moisturizers. Do not use hair spray or other products in your hair.  - Dress in freshly washed clothes or pajamas. Use fresh pillowcases and sheets on your bed.    ? Day of Surgery  - You may drink clear liquids up to 2 hours before surgery or as directed by your surgeon.  Clear liquids include: Water, Pedialyte, Gatorade, apple juice and liquids you can read through. Please avoid liquids that are red or orange in color.   - Do NOT drink: milk, coffee, liquids that have pulp, orange juice, and lemonade or tomato juice.   - Do NOT chew gum, chew tobacco or suck on hard candy.    - Take another shower          Follow these instructions:      - Wash your hair and body with your regular shampoo and soap. Make sure you rinse the shampoo and soap from your hair and body.  - Using clean hands, apply about 2 ounces of soap gently on your skin from the neck to your toes. Use on your groin area last. Do not use this soap on your face or head. If you get any soap in your eyes, ears or mouth, rinse right away.  - Once the soap has been on your skin for at least 1 minute, rinse off completely and repeat washing with the surgical soap one more time.  - Rinse well and dry off using a clean towel.  If you feel any tingling, itching or other irritation, rinse right away. It is normal to feel some coolness on the skin after using the antiseptic soap. Your skin may feel a bit dry after the shower, but do not use any lotions, creams or moisturizers. Do not use hair spray or other products in your hair.  - Dress in freshly washed clothes.  - Do not wear deodorant, cologne, lotion, makeup, nail polish or jewelry to surgery.    ? If there is any change in your health PRIOR to your surgery, please contact your surgeon's office.  Such as a fever,  body aches, fatigue, any flu-like symptoms, rash, or any new injury to related body part.    ? Arrange for someone to drive you home after surgery.    will need to be a responsible adult (18 years or older) that will provide transportation to and from surgery and stay in the waiting room during your surgery. You may not drive yourself or take public transportation to and from surgery.    ? Arrange for someone to stay with you for 24 hours after you go home.   This person must be a responsible adult (18 years or older).    ? Bring these items to the hospital/surgery center:   ? Insurance card(s)  ? Money for parking and co-pays, if needed  ? A list of all the medications you take, including vitamins, minerals, herbs and over the counter medications.    ? A copy of your Advance Health Care Directive, if you have one.  This tells us what treatment(s) you would or would not want, and who would make health care decisions, if you could no longer speak for yourself.    ? A case for glasses, contact lenses, hearing aids or dentures.   ? Your inhaler or CPAP machine, if you use these at home    ? Leave extra cash, jewelry and other valuables at home.       ? Other information:   Sleep Apnea: Let your nurse know if you have a history of sleep apnea, only if you are having surgery at the [facility].    When you arrive for surgery  When you get to the surgery center/hospital, you will:  - Check in. If you are under age 18, you must be with a parent or legal guardian.  - Sign consent forms, if you haven t already. These forms state that you know the risks and benefits of surgery. When you sign the forms, you give us permission to do the surgery. Do not sign them unless you understand what will happen during and after your surgery. If you have any questions about your surgery, ask to speak with your doctor before you sign the forms. If you don t understand the answers, ask again.  - Receive a copy of the  Patient s Bill of Rights. If you do not receive a copy, please ask for one.  - Change into hospital clothes. Your belongings will be placed in a bag. We will return them to you after surgery.  - Meet with the anesthesia provider. He or she will tell you what kind of anesthesia (medicine) will be used to keep you comfortable during surgery.  - Remember: it s okay to remind doctors and nurses to wash their hands before touching you.  - In most cases, your surgeon will use a marker to write his or her initials on the surgery site. This ensures that the exact site is operated on.  - For safety reasons, we will ask you the same questions many times. For example, we may ask your name and birth date over and over again.  - Friends and family can stay with you until it s time for surgery. While you re in surgery, they will be in the waiting area. Please note that cell phones are not allowed in some patient care areas.  - If you have questions about what will happen in the operating room, talk to your care team.  - You will meet with an anesthesiologist, before your surgery.  He or she may reference types of anesthesia commonly used for surgeries:   o General:  This involves the use of an IV for injection of medication and anesthesia. You are put into a sleep and have a breathing tube to assist you with breathing.  o Sedation:  You are asleep, but not so deply that you need a breathing tube.   o Local or Regional: a nerve is injected to numb the surgical area.  o Spinal: you are numbed from the waist down with medicine injected into your back.  o Femoral Nerve Block:  Anesthesia injected into the groin of leg which you are having surgery on.      After surgery  We will move you to a recovery room, where we will watch you closely. If you have any pain or discomfort, tell your nurse. He or she will try to make you comfortable.    - If you are staying overnight, we will move you to your hospital room after you are awake.  - If  you are going home, we will move you to another room. Friends and family may be able to join you. The length of time you spend in recovery depend on the type of medicine you received, your medical condition, the type of surgery you had, or your response to the anesthesia given during your procedure.  - When you are discharged from the recovery room, the nurses will review instructions with you and your caregiver.  - Please wash your hands every time you touch the wound or change bandages or dressings.  - Do not submerge the wound in water.  You may not use a bathtub or hot tub until the wound is closed. The wait time frame is generally 3 weeks, but any open area can be a source of incoming bacteria, so it is better to be on the safe side and avoid water submersion until your wound is fully healed.  Keep all dressings clean and dry.   - You may put ice on the surgical area for comfort, keeping ice on area for up to 20 minutes then off for 40 minutes.  You may do this the first few days after surgery to help reduce pain and swelling.   - Many surgical wounds will have small white strips of tape on them called steri-strips. These are to help support the incision and surrounding skin. Do not remove these. The edges will curl and fall off on their own, typically within 7-10 days with normal showering and hygiene.   - Drink at least 8-10 glasses of liquid each day to help your body heal.  - Keep your lungs clear by coughing and taking deep breaths every couple hours.  This is especially important the first 48 hours after surgery.  - Please keep your sling on at all times except for when bathing or dressing.   - No lifting more than a couple pounds with your operative side while in the sling unless instructed otherwise.  - Often times, it is helpful to have a simple sling for using during bathing, then change into the UltraSling IV when you are dressed.  - Be sure to take your arm out of your sling to do elbow extensions  (like a bicep curl without weight) about 3-4 times per day. This will help with swelling, numbness, and keep the elbow from locking up and causing more pain.  - If you have concerns about how your sling is fitting, please call the clinic to discuss so that we can ensure it is on properly (elbow at 90 degree angle, fist at about middle of body).  - Your need for a sling and restrictions may be different depending on what specific surgery you had done. The above restrictions are intended for rotator cuff repairs, but some apply to all shoulder arthroscopies.    - Notify your doctor if you have any of the following:   o Fever of 101 F or higher  o Numbness and/or tingling  o Increased pain, redness or swelling  o Drainage from wound  o Prolonged or uncontrolled bleeding  o Difficulty with movement    ? Physical Therapy  Think about where you would like to have physical therapy (PT) after your surgery. You will be instructed by your surgical team on when to start PT after surgery. If you have questions regarding this, please contact the orthopedic clinic.    Follow-up Appointments, in Clinic  If you don't already have an appointment scheduled, please call to set up an appointment at [phone].      ? Post-Op appointments with provider. (2 and 6 weeks post op)    Dealing with pain  A nurse will check your comfort level often during your stay. He or she will work with you to manage your pain.  It s expected that you will have pain after surgery.  Our goal is to reduce or minimize pain by:   - Medicine doesn t work the same for everyone. If your medicine isn t working, tell your nurse. There may be other medicines or treatments we can try.  - Medication Refills.  If you need refills on your pain medication, please call the clinic as soon as possible.  It may take 72-business hours to obtain a refill.  Refills must be picked up at check-in 2, [facility] or mailed to a pharmacy of your choice.    - It  is expected that you will wean off the pain medications in a timely manner.   - Constipation is a common side effect of pain medication, decreased activity and anesthesia from surgery.  Take a stool softener as prescribed by your doctor at the time of discharge.  You may also use over the counter medications as needed.  Be sure to increase your fiber (fruits and vegetables) and your water intake.      Please call the clinic at [phone], if you experience any problems or have questions.  If you are having an emergency, always call [phone] or seek immediate evaluation at the Emergency Room.    Thank you for selecting [facility] for your care!  ---------------------------------------------

## 2020-07-02 NOTE — TELEPHONE ENCOUNTER
Procedure: Right shoulder arthroscopy, possible open biceps tenodesis  Facility:  or [facility]  Length: 120 minutes  Anesthesia: Choice, Interscalene Block  Post-op appointments needed: 2 weeks provider only, 6 weeks with provider only.  Surgery packet/instructions given to patient?  Yes     Az Nolen RN

## 2020-07-02 NOTE — NURSING NOTE
"Genesis Cutler's chief complaint for this visit includes:  Chief Complaint   Patient presents with     Right Shoulder - Pain     Right shoulder pain > Left     Left Shoulder - Pain     PCP: Richard Ledbetter    Referring Provider:  Bar Cheng MD  [address] [place] [id number]    BP (!) 126/90 (BP Location: Left arm, Patient Position: Sitting, Cuff Size: Adult Regular)   Pulse 70   Ht 1.638 m (5' 4.5\")   Wt 68.2 kg (150 lb 6.4 oz)   SpO2 100%   BMI 25.42 kg/m    Mild Pain (3)     Do you need any medication refills at today's visit? No    Linda Pollack CMA        "

## 2020-07-03 DIAGNOSIS — Z11.59 ENCOUNTER FOR SCREENING FOR OTHER VIRAL DISEASES: Primary | ICD-10-CM

## 2020-07-03 PROBLEM — M67.921 BICEPS TENDINOPATHY, RIGHT: Status: ACTIVE | Noted: 2020-07-03

## 2020-07-10 ENCOUNTER — VIRTUAL VISIT (OUTPATIENT)
Dept: PSYCHOLOGY | Facility: CLINIC | Age: 25
End: 2020-07-10
Payer: COMMERCIAL

## 2020-07-10 DIAGNOSIS — F41.1 GAD (GENERALIZED ANXIETY DISORDER): ICD-10-CM

## 2020-07-10 DIAGNOSIS — F33.1 MODERATE EPISODE OF RECURRENT MAJOR DEPRESSIVE DISORDER (H): Primary | ICD-10-CM

## 2020-07-10 PROCEDURE — 90834 PSYTX W PT 45 MINUTES: CPT | Mod: 95 | Performed by: SOCIAL WORKER

## 2020-07-10 ASSESSMENT — ANXIETY QUESTIONNAIRES
1. FEELING NERVOUS, ANXIOUS, OR ON EDGE: SEVERAL DAYS
3. WORRYING TOO MUCH ABOUT DIFFERENT THINGS: SEVERAL DAYS
5. BEING SO RESTLESS THAT IT IS HARD TO SIT STILL: NOT AT ALL
6. BECOMING EASILY ANNOYED OR IRRITABLE: SEVERAL DAYS
GAD7 TOTAL SCORE: 4
4. TROUBLE RELAXING: SEVERAL DAYS
7. FEELING AFRAID AS IF SOMETHING AWFUL MIGHT HAPPEN: NOT AT ALL
2. NOT BEING ABLE TO STOP OR CONTROL WORRYING: NOT AT ALL

## 2020-07-10 ASSESSMENT — PATIENT HEALTH QUESTIONNAIRE - PHQ9: SUM OF ALL RESPONSES TO PHQ QUESTIONS 1-9: 3

## 2020-07-10 NOTE — PROGRESS NOTES
Progress Note    Patient Name: Genesis Cutler  Date: 7/10/20         Service Type: Individual      Session Start Time: 7:00am  Session End Time: 7:50am     Session Length: 50min    Session #: 8    Attendees: Client attended alone    Mode of Communication: FSI Video     Telemedicine Visit: The patient's condition can be safely assessed and treated via synchronous audio and visual telemedicine encounter.      Reason for Telemedicine Visit: Services only offered telehealth    Originating Site (Patient Location): Patient's home    Distant Site (Provider Location): Provider Remote Setting: home office    Consent:  The patient/guardian has verbally consented to: the potential risks and benefits of telemedicine (video visit) versus in person care; bill my insurance or make self-payment for services provided; and responsibility for payment of non-covered services.      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 She processed through her improved thoughts related to weight, reflecting on seeing an old picture and processed through this. She endorsed efforts to catch negative self-talk and challenge it. She noted some anxiety about an upcoming surgery on her shoulder, but also dispalyed efforts to challenge anxiety. Her efforts to challenge negative self-talk were praised and therapist encouraged continued efforts to do so to improve mood and self-esteem.        ASSESSMENT: Current Emotional / Mental Status (status of significant symptoms):   Risk status (Self / Other harm or suicidal ideation)   Patient denies current fears or concerns for personal safety.   Patient denies current or recent suicidal ideation or behaviors.   Patient denies current or recent homicidal ideation or behaviors.   Patient denies current or recent self injurious behavior or ideation.   Patient denies other safety concerns.   Patient reports there has been no change in risk factors since their last session.     Patient reports there has been no change in protective factors since their last session.     Recommended that patient call 911 or go to the local ED should there be a change in any of these risk factors.     Appearance:   Appropriate    Eye Contact:   Fair    Psychomotor Behavior: Normal    Attitude:   Cooperative    Orientation:   All   Speech    Rate / Production: Normal/ Responsive    Volume:  Normal    Mood:    Anxious  Depressed  Irritable    Affect:    Appropriate    Thought Content:  Clear    Thought Form:  Coherent  Logical    Insight:    Good      Medication Review:   Changes to psychiatric medications, see updated Medication List in EPIC.      Medication Compliance:   Yes     Changes in Health Issues:   None reported     Chemical Use Review:   Substance Use: Chemical use reviewed, no active concerns identified      Tobacco Use: No current tobacco use.      Diagnosis:  1. Moderate episode of recurrent  major depressive disorder (H)    2. NEGRO (generalized anxiety disorder)        Collateral Reports Completed:   Not Applicable    PLAN: (Patient Tasks / Therapist Tasks / Other)  She will continue to use journaling to reflect on past impacts on her mental health and her body image and work to challenge negative messages, replacing them with positive self-talk so as to reduce depression and anxiety and improve self-worth. She will return Aug 6 at 3pm.     Amee León, 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    Anticipated number of session or this episode of care: 10-14    *Progress noted, but continued work needed      MeasurableTreatment Goal(s) related to diagnosis / functional impairment(s)  Goal 1: Patient will learn skills to manage and reduce anxiety, as measured by NEGRO-7.    I will know I've met my goal when I can try to stop moments of intense anxiety.      Objective #A (Patient Action)    Patient will identify 3 fears / thoughts that contribute to feeling anxious.  Status: Continued - Date(s):6/5/20     Intervention(s)  Therapist will teach emotional recognition/identification. to identify top 3 triggers for anxiety.    Objective #B  Patient will use at least 3 coping skills for anxiety management in the next 4 weeks.  Status: Continued -  Date(s):6/5/20     Intervention(s)  Therapist will teach emotional regulation skills. 3 new calming/coping skills to manage anxiety.    Objective #C  Patient will use relaxation strategies 1 times per day to reduce the physical symptoms of anxiety.  Status: Continued - Date(s):6/5/20     Intervention(s)  Therapist will assign homework practice relaxation and mindfulness daily.      Goal 2: Patient will reduce depression and improve motivation, as measured by PHQ-9.     I will know I've met my goal when I want to do things I enjoy again.      Objective #A (Patient Action)    Status: Continued - Date(s):6/5/20     Patient will Identify negative self-talk and behaviors: challenge core beliefs, myths, and actions.    Intervention(s)  Therapist will teach emotional recognition/identification. to identify 3 thoughts/triggers for depression and shame.    Objective #B  Patient will Increase interest, engagement, and pleasure in doing things.    Status: Continued - Date(s):6/5/20     Intervention(s)  Therapist will provide educational materials on depression  teach emotional regulation skills. 3 new coping skills to improve mood and motivation.    Objective #C  Patient will use positive self-affirmations daily.  Status: Continued - Date(s):6/5/20     Intervention(s)  Therapist will assign homework practice using positive self-talk daily to combat critical self-talk and shame.    Goal 3: Client will improve self-esteem and reduce restrictive eating habits while reducing distress     I will know I've met my goal when I can be ok not counting caloires at each meal.      Objective #A (Client Action)    Client will relieve distress about body image by stop counting calories at mealtime.  Status: New - Date: 6/5/20     Intervention(s)  Therapist will assign homework client is to stop counting calories at meals.    Objective #B  Client will identify 2 messages and impacts related to body image and challenge negative messages and  replace with positive self messages.    Status: New - Date: 6/5/20     Intervention(s)  Therapist will assign homework use journaling to process impacts of eating habits and challenge negative messages from others regarding body image.        Patient has reviewed and agreed to the above plan.      Amee León, [facility]  June 5, 2020

## 2020-07-10 NOTE — TELEPHONE ENCOUNTER
Date Scheduled: 7-  Facility: [facility]  Surgeon: Dr. Amador   Post-op appointment scheduled:   Next 5 appointments (look out 90 days)    Jul 15, 2020  3:10 PM CDT  Pre-Op physical with Richard Ledbetter MD  [facility]) [address] [place] [id number]  [phone]   Aug 10, 2020  3:15 PM CDT  Return Visit with Aline Amador MD  [facility]) [address] [place] [id number] [phone]   Sep 03, 2020  8:15 AM CDT  Return Visit with Aline Amador MD  [facility]) [address] [place] [phone]  [phone]           scheduled?: No  Surgery packet/instructions confirmed received?  Yes  Special Considerations:   Bri Ford, Surgery Scheduling Coordinator

## 2020-07-10 NOTE — PATIENT INSTRUCTIONS
She will continue to use journaling to reflect on past impacts on her mental health and her body image and work to challenge negative messages, replacing them with positive self-talk so as to reduce depression and anxiety and improve self-worth. She will return Aug 6 at 3pm.

## 2020-07-11 ASSESSMENT — ANXIETY QUESTIONNAIRES: GAD7 TOTAL SCORE: 4

## 2020-07-15 ENCOUNTER — OFFICE VISIT (OUTPATIENT)
Dept: PEDIATRICS | Facility: CLINIC | Age: 25
End: 2020-07-15
Payer: COMMERCIAL

## 2020-07-15 VITALS
WEIGHT: 148 LBS | BODY MASS INDEX: 25.01 KG/M2 | DIASTOLIC BLOOD PRESSURE: 60 MMHG | HEART RATE: 62 BPM | SYSTOLIC BLOOD PRESSURE: 108 MMHG | TEMPERATURE: 97.8 F

## 2020-07-15 DIAGNOSIS — F32.A ANXIETY AND DEPRESSION: ICD-10-CM

## 2020-07-15 DIAGNOSIS — Z01.818 PREOP GENERAL PHYSICAL EXAM: Primary | ICD-10-CM

## 2020-07-15 DIAGNOSIS — M67.921 BICEPS TENDINOPATHY, RIGHT: ICD-10-CM

## 2020-07-15 DIAGNOSIS — F41.9 ANXIETY AND DEPRESSION: ICD-10-CM

## 2020-07-15 LAB
ALBUMIN SERPL-MCNC: 3.7 G/DL (ref 3.4–5)
ALP SERPL-CCNC: 51 U/L (ref 40–150)
ALT SERPL W P-5'-P-CCNC: 26 U/L (ref 0–50)
ANION GAP SERPL CALCULATED.3IONS-SCNC: 3 MMOL/L (ref 3–14)
AST SERPL W P-5'-P-CCNC: 24 U/L (ref 0–45)
BILIRUB SERPL-MCNC: 0.2 MG/DL (ref 0.2–1.3)
BUN SERPL-MCNC: 9 MG/DL (ref 7–30)
CALCIUM SERPL-MCNC: 8.6 MG/DL (ref 8.5–10.1)
CHLORIDE SERPL-SCNC: 108 MMOL/L (ref 94–109)
CO2 SERPL-SCNC: 29 MMOL/L (ref 20–32)
CREAT SERPL-MCNC: 0.56 MG/DL (ref 0.52–1.04)
GFR SERPL CREATININE-BSD FRML MDRD: >90 ML/MIN/{1.73_M2}
GLUCOSE SERPL-MCNC: 77 MG/DL (ref 70–99)
HCG SERPL QL: NEGATIVE
POTASSIUM SERPL-SCNC: 4.1 MMOL/L (ref 3.4–5.3)
PROT SERPL-MCNC: 6.9 G/DL (ref 6.8–8.8)
SODIUM SERPL-SCNC: 140 MMOL/L (ref 133–144)

## 2020-07-15 PROCEDURE — 80053 COMPREHEN METABOLIC PANEL: CPT | Performed by: FAMILY MEDICINE

## 2020-07-15 PROCEDURE — 36415 COLL VENOUS BLD VENIPUNCTURE: CPT | Performed by: FAMILY MEDICINE

## 2020-07-15 PROCEDURE — 84703 CHORIONIC GONADOTROPIN ASSAY: CPT | Performed by: FAMILY MEDICINE

## 2020-07-15 PROCEDURE — 99214 OFFICE O/P EST MOD 30 MIN: CPT | Performed by: FAMILY MEDICINE

## 2020-07-15 NOTE — PATIENT INSTRUCTIONS
Before Your Surgery      Call your surgeon if there is any change in your health. This includes signs of a cold or flu (such as a sore throat, runny nose, cough, rash or fever).    Do not smoke, drink alcohol or take over the counter medicine (unless your surgeon or primary care doctor tells you to) for the 24 hours before and after surgery.    If you take prescribed drugs: Follow your doctor s orders about which medicines to take and which to stop until after surgery.    Eating and drinking prior to surgery: follow the instructions from your surgeon    Take a shower or bath the night before surgery. Use the soap your surgeon gave you to gently clean your skin. If you do not have soap from your surgeon, use your regular soap. Do not shave or scrub the surgery site.  Wear clean pajamas and have clean sheets on your bed.       sertraline 75 ( 1.5 tablet),   Hold Aspirin, aleve/naproxen, ibuprofen/advil 5 days prior to surgery can take acetaminophen/tylenol.

## 2020-07-15 NOTE — PROGRESS NOTES
[address] [address]  [phone]  Dept: [phone]    PRE-OP EVALUATION:  Today's date: 7/15/2020    Genesis Cutler (: 1995) presents for pre-operative evaluation assessment as requested by Dr. Amador.  She requires evaluation and anesthesia risk assessment prior to undergoing surgery/procedure for treatment of Biceps tendinopathy, right .    Proposed Surgery/ Procedure: right shoulder arthroscopy, possible open biceps tenodesis  Date of Surgery/ Procedure: 2020  Time of Surgery/ Procedure: [address] [place] [address]/Surgical Facility: [facility]  Primary Physician: Richard Ledbetter  Type of Anesthesia Anticipated: Choice    Patient has a Health Care Directive or Living Will:  NO    1. NO - Do you have a history of heart attack, stroke, stent, bypass or surgery on an artery in the head, neck, heart or legs?  2. NO - Do you ever have any pain or discomfort in your chest?  3. NO - Do you have a history of  Heart Failure?  4. NO - Are you troubled by shortness of breath when: walking on the level, up a slight hill or at night?  5. NO - Do you currently have a cold, bronchitis or other respiratory infection?  6. NO - Do you have a cough, shortness of breath or wheezing?  7. NO - Do you sometimes get pains in the calves of your legs when you walk?  8. NO - Do you or anyone in your family have previous history of blood clots?  9. NO - Do you or does anyone in your family have a serious bleeding problem such as prolonged bleeding following surgeries or cuts?  10. NO - Have you ever had problems with anemia or been told to take iron pills?  11. NO - Have you had any abnormal blood loss such as black, tarry or bloody stools, or abnormal vaginal bleeding?  12. NO - Have you ever had a blood transfusion?  13. NO - Have you or any of your relatives ever had problems with anesthesia?  14. NO - Do you have sleep apnea, excessive  snoring or daytime drowsiness?  15. NO - Do you have any prosthetic heart valves?  16. NO - Do you have prosthetic joints?  17. NO - Is there any chance that you may be pregnant?      HPI:     HPI related to upcoming procedure: Patient with concerns for bilateral shoulder pain, R>L due to long head biceps etiology, failed conservative management and now scheduled for the above procedure.      DEPRESSION - Patient has a long history of Depression of moderate severity requiring medication for control with recent symptoms being stable..Current symptoms of depression include none.       MEDICAL HISTORY:     Patient Active Problem List    Diagnosis Date Noted     Biceps tendinopathy, right 07/03/2020     Priority: Medium     Added automatically from request for surgery [id number]       Anxiety and depression 02/24/2020     Priority: Medium      Past Medical History:   Diagnosis Date     Chronic constipation      Past Surgical History:   Procedure Laterality Date     COLONOSCOPY  03/2017     Current Outpatient Medications   Medication Sig Dispense Refill     norgestimate-ethinyl estradiol (ORTHO-CYCLEN) 0.25-35 MG-MCG tablet Take 1 tablet by mouth daily Take 4 continuous packs, skipping the sugar pill week. 112 tablet 3     plecanatide (TRULANCE) 3 MG tablet Take 1 tablet (3 mg) by mouth daily 90 tablet 3     polyethylene glycol (MIRALAX) powder Take 1 capful by mouth 4 times daily        Prenatal Vit-Fe Fumarate-FA (PRENATAL VITAMINS PO)        sertraline (ZOLOFT) 50 MG tablet Take 1.5 tablets (75 mg) by mouth daily 135 tablet 1     OTC products: None, except as noted above    Allergies   Allergen Reactions     Penicillins Rash      Latex Allergy: NO    Social History     Tobacco Use     Smoking status: Never Smoker     Smokeless tobacco: Never Used   Substance Use Topics     Alcohol use: Yes     Alcohol/week: 0.0 standard drinks     Comment: 1-2 drinks per month     History   Drug Use No       REVIEW OF SYSTEMS:    CONSTITUTIONAL: NEGATIVE for fever, chills, change in weight  INTEGUMENTARY/SKIN: NEGATIVE for worrisome rashes, moles or lesions  EYES: NEGATIVE for vision changes or irritation  ENT/MOUTH: NEGATIVE for ear, mouth and throat problems  RESP: NEGATIVE for significant cough or SOB  BREAST: NEGATIVE for masses, tenderness or discharge  CV: NEGATIVE for chest pain, palpitations or peripheral edema  GI: NEGATIVE for nausea, abdominal pain, heartburn, or change in bowel habits  : NEGATIVE for frequency, dysuria, or hematuria  MUSCULOSKELETAL: as in HPI  NEURO: NEGATIVE for weakness, dizziness or paresthesias  ENDOCRINE: NEGATIVE for temperature intolerance, skin/hair changes  HEME: NEGATIVE for bleeding problems  PSYCHIATRIC: NEGATIVE for changes in mood or affect    EXAM:   /60   Pulse 62   Temp 97.8  F (36.6  C) (Temporal)   Wt 67.1 kg (148 lb)   BMI 25.01 kg/m        GENERAL APPEARANCE: healthy, alert and no distress     EYES: EOMI, PERRL     HENT: ear canals and TM's normal and nose and mouth without ulcers or lesions     NECK: no adenopathy, no asymmetry, masses, or scars and thyroid normal to palpation     RESP: lungs clear to auscultation - no rales, rhonchi or wheezes     CV: regular rates and rhythm, normal S1 S2, no S3 or S4 and no murmur, click or rub     ABDOMEN:  soft, nontender, no HSM or masses and bowel sounds normal     MS: normal range of motion,Focused upper extremity exam: Skin intact. No erythema. Sensation intact all dermatomes into the hand to light touch. EPL, FPL, and Intrinsics intact. Right shoulder active motion is FE to 175, ER at side to 55, and IR to T6. Left shoulder active motion is FE to 175, ER to 55, and IR to T6.  Negative Neer and Steel. No pain on palpation over the AC joint. Focal and significant pain on palpation over the long head of the biceps on the right more than the left(reproduces patient's pain). Grossly positive OBriens bilaterally     SKIN: no suspicious  lesions or rashes     NEURO: Normal strength and tone, sensory exam grossly normal, mentation intact and speech normal     PSYCH: mentation appears normal. and affect normal/bright     LYMPHATICS: No cervical adenopathy    DIAGNOSTICS:     Recent Labs   Lab Test 11/13/18  0945 07/19/16  0938 05/16/16   HGB 14.9 13.5  --      --   --    NA  --  140  --    POTASSIUM  --  4.1 4.7   CR  --  0.70 0.7        IMPRESSION:   Genesis was seen today for pre-op exam.    Diagnoses and all orders for this visit:    Preop general physical exam  -     Comprehensive metabolic panel  -     HCG qualitative, Blood ([id number])    Biceps tendinopathy, right    Anxiety and depression  -     sertraline (ZOLOFT) 50 MG tablet; Take 1.5 tablets (75 mg) by mouth daily--> Stable.        The proposed surgical procedure is considered INTERMEDIATE risk.    REVISED CARDIAC RISK INDEX  The patient has the following serious cardiovascular risks for perioperative complications such as (MI, PE, VFib and 3  AV Block):  No serious cardiac risks  INTERPRETATION: 1 risks: Class II (low risk - 0.9% complication rate)    The patient has the following additional risks for perioperative complications:  No identified additional risks        RECOMMENDATIONS:         APPROVAL GIVEN to proceed with proposed procedure     Signed Electronically by: Richard Ledbetter MD    Copy of this evaluation report is provided to requesting physician.    [facility] Preop Guidelines    Revised Cardiac Risk Index

## 2020-07-20 DIAGNOSIS — Z11.59 ENCOUNTER FOR SCREENING FOR OTHER VIRAL DISEASES: ICD-10-CM

## 2020-07-20 PROCEDURE — U0003 INFECTIOUS AGENT DETECTION BY NUCLEIC ACID (DNA OR RNA); SEVERE ACUTE RESPIRATORY SYNDROME CORONAVIRUS 2 (SARS-COV-2) (CORONAVIRUS DISEASE [COVID-19]), AMPLIFIED PROBE TECHNIQUE, MAKING USE OF HIGH THROUGHPUT TECHNOLOGIES AS DESCRIBED BY CMS-2020-01-R: HCPCS | Performed by: ORTHOPAEDIC SURGERY

## 2020-07-20 NOTE — LETTER
July 22, 2020        Genesis Cutler  [address]  [place] [address]    This letter provides a written record that you were tested for COVID-19 on ***.       Your result was negative. This means that we didn t find the virus that causes COVID-19 in your sample. A test may show negative when you do actually have the virus. This can happen when the virus is in the early stages of infection, before you feel illness symptoms.    If you have symptoms   Stay home and away from others (self-isolate) until you meet ALL of the guidelines below:    You ve had no fever--and no medicine that reduces fever--for 3 full days (72 hours). And      Your other symptoms have gotten better. For example, your cough or breathing has improved. And     At least 10 days have passed since your symptoms started.    During this time:    Stay home. Don t go to work, school or anywhere else.     Stay in your own room, including for meals. Use your own bathroom if you can.    Stay away from others in your home. No hugging, kissing or shaking hands. No visitors.    Clean  high touch  surfaces often (doorknobs, counters, handles, etc.). Use a household cleaning spray or wipes. You can find a full list on the EPA website at www.epa.gov/pesticide-registration/list-n-disinfectants-use-against-sars-cov-2.    Cover your mouth and nose with a mask, tissue or washcloth to avoid spreading germs.    Wash your hands and face often with soap and water.    Going back to work  Check with your employer for any guidelines to follow for going back to work.    Employers: This document serves as formal notice that your employee tested negative for COVID-19, as of the testing date shown above.

## 2020-07-21 LAB
SARS-COV-2 RNA SPEC QL NAA+PROBE: NOT DETECTED
SPECIMEN SOURCE: NORMAL

## 2020-07-22 ENCOUNTER — ANESTHESIA EVENT (OUTPATIENT)
Dept: SURGERY | Facility: AMBULATORY SURGERY CENTER | Age: 25
End: 2020-07-22

## 2020-07-23 ENCOUNTER — ANESTHESIA (OUTPATIENT)
Dept: SURGERY | Facility: AMBULATORY SURGERY CENTER | Age: 25
End: 2020-07-23
Payer: COMMERCIAL

## 2020-07-23 ENCOUNTER — HOSPITAL ENCOUNTER (OUTPATIENT)
Facility: AMBULATORY SURGERY CENTER | Age: 25
Discharge: HOME | End: 2020-07-23
Attending: ORTHOPAEDIC SURGERY | Admitting: ORTHOPAEDIC SURGERY
Payer: COMMERCIAL

## 2020-07-23 ENCOUNTER — SURGERY (OUTPATIENT)
Age: 25
End: 2020-07-23
Payer: COMMERCIAL

## 2020-07-23 VITALS
OXYGEN SATURATION: 97 % | RESPIRATION RATE: 16 BRPM | DIASTOLIC BLOOD PRESSURE: 67 MMHG | HEART RATE: 80 BPM | TEMPERATURE: 97.9 F | SYSTOLIC BLOOD PRESSURE: 108 MMHG

## 2020-07-23 DIAGNOSIS — M67.921 BICEPS TENDINOPATHY, RIGHT: Primary | ICD-10-CM

## 2020-07-23 LAB — HCG UR QL: NEGATIVE

## 2020-07-23 PROCEDURE — 81025 URINE PREGNANCY TEST: CPT | Performed by: ANESTHESIOLOGY

## 2020-07-23 PROCEDURE — 23430 REPAIR BICEPS TENDON: CPT | Mod: RT | Performed by: ORTHOPAEDIC SURGERY

## 2020-07-23 PROCEDURE — 29822 SHO ARTHRS SRG LMTD DBRDMT: CPT | Mod: RT

## 2020-07-23 PROCEDURE — G8916 PT W IV AB GIVEN ON TIME: HCPCS

## 2020-07-23 PROCEDURE — 23430 REPAIR BICEPS TENDON: CPT | Mod: RT

## 2020-07-23 PROCEDURE — 29822 SHO ARTHRS SRG LMTD DBRDMT: CPT | Mod: 51 | Performed by: ORTHOPAEDIC SURGERY

## 2020-07-23 PROCEDURE — G8907 PT DOC NO EVENTS ON DISCHARG: HCPCS

## 2020-07-23 DEVICE — IMPLANTABLE DEVICE: Type: IMPLANTABLE DEVICE | Site: SHOULDER | Status: FUNCTIONAL

## 2020-07-23 RX ORDER — ONDANSETRON 4 MG/1
4-8 TABLET, ORALLY DISINTEGRATING ORAL EVERY 8 HOURS PRN
Qty: 4 TABLET | Refills: 0 | Status: SHIPPED | OUTPATIENT
Start: 2020-07-23 | End: 2020-08-24

## 2020-07-23 RX ORDER — DEXAMETHASONE SODIUM PHOSPHATE 4 MG/ML
INJECTION, SOLUTION INTRA-ARTICULAR; INTRALESIONAL; INTRAMUSCULAR; INTRAVENOUS; SOFT TISSUE PRN
Status: DISCONTINUED | OUTPATIENT
Start: 2020-07-23 | End: 2020-07-23

## 2020-07-23 RX ORDER — GABAPENTIN 300 MG/1
300 CAPSULE ORAL ONCE
Status: COMPLETED | OUTPATIENT
Start: 2020-07-23 | End: 2020-07-23

## 2020-07-23 RX ORDER — OXYCODONE HYDROCHLORIDE 5 MG/1
5 TABLET ORAL EVERY 4 HOURS PRN
Status: DISCONTINUED | OUTPATIENT
Start: 2020-07-23 | End: 2020-07-24 | Stop reason: HOSPADM

## 2020-07-23 RX ORDER — ONDANSETRON 2 MG/ML
INJECTION INTRAMUSCULAR; INTRAVENOUS PRN
Status: DISCONTINUED | OUTPATIENT
Start: 2020-07-23 | End: 2020-07-23

## 2020-07-23 RX ORDER — ACETAMINOPHEN 325 MG/1
650 TABLET ORAL
Status: DISCONTINUED | OUTPATIENT
Start: 2020-07-23 | End: 2020-07-24 | Stop reason: HOSPADM

## 2020-07-23 RX ORDER — SODIUM CHLORIDE, SODIUM LACTATE, POTASSIUM CHLORIDE, CALCIUM CHLORIDE 600; 310; 30; 20 MG/100ML; MG/100ML; MG/100ML; MG/100ML
INJECTION, SOLUTION INTRAVENOUS CONTINUOUS
Status: DISCONTINUED | OUTPATIENT
Start: 2020-07-23 | End: 2020-07-24 | Stop reason: HOSPADM

## 2020-07-23 RX ORDER — LIDOCAINE 40 MG/G
CREAM TOPICAL
Status: DISCONTINUED | OUTPATIENT
Start: 2020-07-23 | End: 2020-07-24 | Stop reason: HOSPADM

## 2020-07-23 RX ORDER — KETOROLAC TROMETHAMINE 30 MG/ML
INJECTION, SOLUTION INTRAMUSCULAR; INTRAVENOUS PRN
Status: DISCONTINUED | OUTPATIENT
Start: 2020-07-23 | End: 2020-07-23

## 2020-07-23 RX ORDER — EPHEDRINE SULFATE 50 MG/ML
INJECTION, SOLUTION INTRAMUSCULAR; INTRAVENOUS; SUBCUTANEOUS PRN
Status: DISCONTINUED | OUTPATIENT
Start: 2020-07-23 | End: 2020-07-23

## 2020-07-23 RX ORDER — ACETAMINOPHEN 325 MG/1
650 TABLET ORAL EVERY 4 HOURS PRN
Qty: 50 TABLET | Refills: 0 | Status: SHIPPED | OUTPATIENT
Start: 2020-07-23 | End: 2020-09-03

## 2020-07-23 RX ORDER — ACETAMINOPHEN 325 MG/1
975 TABLET ORAL ONCE
Status: COMPLETED | OUTPATIENT
Start: 2020-07-23 | End: 2020-07-23

## 2020-07-23 RX ORDER — MEPERIDINE HYDROCHLORIDE 25 MG/ML
12.5 INJECTION INTRAMUSCULAR; INTRAVENOUS; SUBCUTANEOUS
Status: DISCONTINUED | OUTPATIENT
Start: 2020-07-23 | End: 2020-07-24 | Stop reason: HOSPADM

## 2020-07-23 RX ORDER — CLINDAMYCIN PHOSPHATE 900 MG/50ML
900 INJECTION, SOLUTION INTRAVENOUS
Status: DISCONTINUED | OUTPATIENT
Start: 2020-07-23 | End: 2020-07-24 | Stop reason: HOSPADM

## 2020-07-23 RX ORDER — ONDANSETRON 4 MG/1
4 TABLET, ORALLY DISINTEGRATING ORAL
Status: DISCONTINUED | OUTPATIENT
Start: 2020-07-23 | End: 2020-07-24 | Stop reason: HOSPADM

## 2020-07-23 RX ORDER — BUPIVACAINE HYDROCHLORIDE 5 MG/ML
INJECTION, SOLUTION EPIDURAL; INTRACAUDAL PRN
Status: DISCONTINUED | OUTPATIENT
Start: 2020-07-23 | End: 2020-07-23

## 2020-07-23 RX ORDER — ONDANSETRON 2 MG/ML
4 INJECTION INTRAMUSCULAR; INTRAVENOUS EVERY 30 MIN PRN
Status: DISCONTINUED | OUTPATIENT
Start: 2020-07-23 | End: 2020-07-24 | Stop reason: HOSPADM

## 2020-07-23 RX ORDER — BUPIVACAINE HYDROCHLORIDE 2.5 MG/ML
INJECTION, SOLUTION INFILTRATION; PERINEURAL PRN
Status: DISCONTINUED | OUTPATIENT
Start: 2020-07-23 | End: 2020-07-23 | Stop reason: HOSPADM

## 2020-07-23 RX ORDER — CLINDAMYCIN PHOSPHATE 900 MG/50ML
900 INJECTION, SOLUTION INTRAVENOUS SEE ADMIN INSTRUCTIONS
Status: DISCONTINUED | OUTPATIENT
Start: 2020-07-23 | End: 2020-07-24 | Stop reason: HOSPADM

## 2020-07-23 RX ORDER — AMOXICILLIN 250 MG
1-2 CAPSULE ORAL 2 TIMES DAILY
Qty: 30 TABLET | Refills: 0 | Status: SHIPPED | OUTPATIENT
Start: 2020-07-23 | End: 2020-08-24

## 2020-07-23 RX ORDER — FENTANYL CITRATE-0.9 % NACL/PF 10 MCG/ML
PLASTIC BAG, INJECTION (ML) INTRAVENOUS CONTINUOUS PRN
Status: DISCONTINUED | OUTPATIENT
Start: 2020-07-23 | End: 2020-07-23

## 2020-07-23 RX ORDER — NALOXONE HYDROCHLORIDE 0.4 MG/ML
.1-.4 INJECTION, SOLUTION INTRAMUSCULAR; INTRAVENOUS; SUBCUTANEOUS
Status: DISCONTINUED | OUTPATIENT
Start: 2020-07-23 | End: 2020-07-24 | Stop reason: HOSPADM

## 2020-07-23 RX ORDER — NEOSTIGMINE METHYLSULFATE 1 MG/ML
VIAL (ML) INJECTION PRN
Status: DISCONTINUED | OUTPATIENT
Start: 2020-07-23 | End: 2020-07-23

## 2020-07-23 RX ORDER — LIDOCAINE HYDROCHLORIDE 20 MG/ML
INJECTION, SOLUTION INFILTRATION; PERINEURAL PRN
Status: DISCONTINUED | OUTPATIENT
Start: 2020-07-23 | End: 2020-07-23

## 2020-07-23 RX ORDER — HYDROMORPHONE HYDROCHLORIDE 1 MG/ML
.3-.5 INJECTION, SOLUTION INTRAMUSCULAR; INTRAVENOUS; SUBCUTANEOUS EVERY 10 MIN PRN
Status: DISCONTINUED | OUTPATIENT
Start: 2020-07-23 | End: 2020-07-24 | Stop reason: HOSPADM

## 2020-07-23 RX ORDER — FENTANYL CITRATE 50 UG/ML
25-50 INJECTION, SOLUTION INTRAMUSCULAR; INTRAVENOUS
Status: DISCONTINUED | OUTPATIENT
Start: 2020-07-23 | End: 2020-07-24 | Stop reason: HOSPADM

## 2020-07-23 RX ORDER — PROPOFOL 10 MG/ML
INJECTION, EMULSION INTRAVENOUS PRN
Status: DISCONTINUED | OUTPATIENT
Start: 2020-07-23 | End: 2020-07-23

## 2020-07-23 RX ORDER — OXYCODONE HYDROCHLORIDE 5 MG/1
5 TABLET ORAL
Status: DISCONTINUED | OUTPATIENT
Start: 2020-07-23 | End: 2020-07-24 | Stop reason: HOSPADM

## 2020-07-23 RX ORDER — GLYCOPYRROLATE 0.2 MG/ML
INJECTION, SOLUTION INTRAMUSCULAR; INTRAVENOUS PRN
Status: DISCONTINUED | OUTPATIENT
Start: 2020-07-23 | End: 2020-07-23

## 2020-07-23 RX ORDER — OXYCODONE HYDROCHLORIDE 5 MG/1
5-10 TABLET ORAL EVERY 4 HOURS PRN
Qty: 20 TABLET | Refills: 0 | Status: SHIPPED | OUTPATIENT
Start: 2020-07-23 | End: 2020-08-24

## 2020-07-23 RX ORDER — ONDANSETRON 4 MG/1
4 TABLET, ORALLY DISINTEGRATING ORAL EVERY 30 MIN PRN
Status: DISCONTINUED | OUTPATIENT
Start: 2020-07-23 | End: 2020-07-24 | Stop reason: HOSPADM

## 2020-07-23 RX ORDER — PROPOFOL 10 MG/ML
INJECTION, EMULSION INTRAVENOUS CONTINUOUS PRN
Status: DISCONTINUED | OUTPATIENT
Start: 2020-07-23 | End: 2020-07-23

## 2020-07-23 RX ORDER — FLUMAZENIL 0.1 MG/ML
0.2 INJECTION, SOLUTION INTRAVENOUS
Status: DISCONTINUED | OUTPATIENT
Start: 2020-07-23 | End: 2020-07-24 | Stop reason: HOSPADM

## 2020-07-23 RX ADMIN — Medication 100 MCG: at 13:50

## 2020-07-23 RX ADMIN — Medication 50 MCG: at 13:43

## 2020-07-23 RX ADMIN — LIDOCAINE HYDROCHLORIDE 70 MG: 20 INJECTION, SOLUTION INFILTRATION; PERINEURAL at 13:04

## 2020-07-23 RX ADMIN — Medication 100 MCG: at 13:30

## 2020-07-23 RX ADMIN — DEXAMETHASONE SODIUM PHOSPHATE 4 MG: 4 INJECTION, SOLUTION INTRA-ARTICULAR; INTRALESIONAL; INTRAMUSCULAR; INTRAVENOUS; SOFT TISSUE at 13:19

## 2020-07-23 RX ADMIN — BUPIVACAINE HYDROCHLORIDE 10 ML: 5 INJECTION, SOLUTION EPIDURAL; INTRACAUDAL at 12:07

## 2020-07-23 RX ADMIN — FENTANYL CITRATE 50 MCG: 50 INJECTION, SOLUTION INTRAMUSCULAR; INTRAVENOUS at 12:10

## 2020-07-23 RX ADMIN — Medication 100 MCG: at 13:34

## 2020-07-23 RX ADMIN — Medication 50 MCG: at 13:22

## 2020-07-23 RX ADMIN — PROPOFOL 150 MG: 10 INJECTION, EMULSION INTRAVENOUS at 13:04

## 2020-07-23 RX ADMIN — Medication 50 MCG: at 13:27

## 2020-07-23 RX ADMIN — EPHEDRINE SULFATE 5 MG: 50 INJECTION, SOLUTION INTRAMUSCULAR; INTRAVENOUS; SUBCUTANEOUS at 13:51

## 2020-07-23 RX ADMIN — GLYCOPYRROLATE 0.3 MG: 0.2 INJECTION, SOLUTION INTRAMUSCULAR; INTRAVENOUS at 14:10

## 2020-07-23 RX ADMIN — CLINDAMYCIN PHOSPHATE 900 MG: 900 INJECTION, SOLUTION INTRAVENOUS at 13:02

## 2020-07-23 RX ADMIN — Medication 30 MG: at 13:04

## 2020-07-23 RX ADMIN — Medication 100 MCG: at 13:54

## 2020-07-23 RX ADMIN — Medication 50 MCG: at 13:20

## 2020-07-23 RX ADMIN — BUPIVACAINE HYDROCHLORIDE 10 ML: 2.5 INJECTION, SOLUTION INFILTRATION; PERINEURAL at 14:06

## 2020-07-23 RX ADMIN — Medication 50 MCG: at 13:47

## 2020-07-23 RX ADMIN — Medication 30 MCG/MIN: at 14:02

## 2020-07-23 RX ADMIN — EPHEDRINE SULFATE 5 MG: 50 INJECTION, SOLUTION INTRAMUSCULAR; INTRAVENOUS; SUBCUTANEOUS at 13:56

## 2020-07-23 RX ADMIN — Medication 2.5 MG: at 14:10

## 2020-07-23 RX ADMIN — KETOROLAC TROMETHAMINE 15 MG: 30 INJECTION, SOLUTION INTRAMUSCULAR; INTRAVENOUS at 14:07

## 2020-07-23 RX ADMIN — ONDANSETRON 4 MG: 2 INJECTION INTRAMUSCULAR; INTRAVENOUS at 14:07

## 2020-07-23 RX ADMIN — Medication 50 MCG: at 13:45

## 2020-07-23 RX ADMIN — Medication 10 MG: at 13:32

## 2020-07-23 RX ADMIN — GABAPENTIN 300 MG: 300 CAPSULE ORAL at 11:33

## 2020-07-23 RX ADMIN — Medication 50 MCG: at 13:32

## 2020-07-23 RX ADMIN — SODIUM CHLORIDE, SODIUM LACTATE, POTASSIUM CHLORIDE, CALCIUM CHLORIDE: 600; 310; 30; 20 INJECTION, SOLUTION INTRAVENOUS at 11:41

## 2020-07-23 RX ADMIN — ACETAMINOPHEN 975 MG: 325 TABLET ORAL at 11:33

## 2020-07-23 RX ADMIN — PROPOFOL 100 MCG/KG/MIN: 10 INJECTION, EMULSION INTRAVENOUS at 13:03

## 2020-07-23 RX ADMIN — Medication 50 MCG: at 13:41

## 2020-07-23 NOTE — DISCHARGE INSTRUCTIONS
[facility]  Same-Day Surgery   Adult Discharge Orders & Instructions   For 24 hours after surgery  1. Get plenty of rest.  A responsible adult must stay with you for at least 24 hours after you leave the hospital.   2. Do not drive or use heavy equipment.  If you have weakness or tingling, don't drive or use heavy equipment until this feeling goes away.  3. Do not drink alcohol.  4. Avoid strenuous or risky activities.  Ask for help when climbing stairs.   5. You may feel lightheaded.  IF so, sit for a few minutes before standing.  Have someone help you get up.   6. If you have nausea (feel sick to your stomach): Drink only clear liquids such as apple juice, ginger ale, broth or 7-Up.  Rest may also help.  Be sure to drink enough fluids.  Move to a regular diet as you feel able.  7. You may have a slight fever. Call the doctor if your fever is over 100 F (37.7 C) (taken under the tongue) or lasts longer than 24 hours.  8. You may have a dry mouth, a sore throat, muscle aches or trouble sleeping.  These should go away after 24 hours.  9. Do not make important or legal decisions.   Call your doctor for any of the followin.  Signs of infection (fever, growing tenderness at the surgery site, a large amount of drainage or bleeding, severe pain, foul-smelling drainage, redness, swelling).    2. It has been over 8 to 10 hours since surgery and you are still not able to urinate (pass water).    3.  Headache for over 24 hours.    4.  Numbness, tingling or weakness the day after surgery (if you had spinal anesthesia).  To contact a doctor, call _[phone]__________________________   .  [place] [facility]   Adult Discharge Orders & Instructions     For 24 hours after surgery    1. Get plenty of rest.  A responsible adult must stay with you for at least 24 hours after you leave the hospital.   2. Do not drive or use heavy equipment.  If you have weakness or tingling, don't drive or use  heavy equipment until this feeling goes away.  3. Do not drink alcohol.  4. Avoid strenuous or risky activities.  Ask for help when climbing stairs.   5. You may feel lightheaded.  IF so, sit for a few minutes before standing.  Have someone help you get up.   6. If you have nausea (feel sick to your stomach): Drink only clear liquids such as apple juice, ginger ale, broth or 7-Up.  Rest may also help.  Be sure to drink enough fluids.  Move to a regular diet as you feel able.  7. You may have a slight fever. Call the doctor if your fever is over 100 F (37.7 C) (taken under the tongue) or lasts longer than 24 hours.  8. You may have a dry mouth, a sore throat, muscle aches or trouble sleeping.  These should go away after 24 hours.  9. Do not make important or legal decisions.     Call your doctor for any of the followin.  Signs of infection (fever, growing tenderness at the surgery site, a large amount of drainage or bleeding, severe pain, foul-smelling drainage, redness, swelling).    2. It has been over 8 to 10 hours since surgery and you are still not able to urinate (pass water).    3.  Headache for over 24 hours.    4.  Numbness, tingling or weakness the day after surgery (if you had spinal anesthesia).                  5. Signs of Covid-19 infection (temperature over 100 degrees, shortness of breath, cough, loss of taste/smell, generalized body aches, persistent headache,                  chills, sore throat, nausea/vomiting/diarrhea).    To contact a doctor, call __[phone]______________________________________    Tylenol was given at 11:30 AM

## 2020-07-23 NOTE — ANESTHESIA POSTPROCEDURE EVALUATION
Anesthesia POST Procedure Evaluation    Patient: Genesis Cutler   MRN:     [id number] Gender:   female   Age:    25 year old :      1995        Preoperative Diagnosis: Biceps tendinopathy, right [M67.921]   Procedure(s):  Right shoulder arthroscopy, open biceps tenodesis   Postop Comments: No value filed.     Anesthesia Type: General, Peripheral Nerve Block, For Post-op pain in coordination with surgeon       Disposition: Outpatient   Postop Pain Control: Uneventful            Sign Out: Well controlled pain   PONV: No   Neuro/Psych: Uneventful            Sign Out: Acceptable/Baseline neuro status   Airway/Respiratory: Uneventful            Sign Out: Acceptable/Baseline resp. status   CV/Hemodynamics: Uneventful            Sign Out: Acceptable CV status   Other NRE: NONE   DID A NON-ROUTINE EVENT OCCUR? No    Event details/Postop Comments:  Patient doing well post-operatively.  No significant issues.  Hemodynamically stable, pain well controlled, nausea well controlled.  Stable for discharge from the PACU           Last Anesthesia Record Vitals:  CRNA VITALS  2020 1400 - 2020 1500      2020             Pulse:  84    SpO2:  99 %    Resp Rate (observed):  14    EKG:  NSR          Last PACU Vitals:  Vitals Value Taken Time   /62 2020  2:45 PM   Temp     Pulse     Resp 16 2020  2:45 PM   SpO2 97 % 2020  2:45 PM   Temp src     NIBP     Pulse     SpO2     Resp     Temp     Ht Rate     Temp 2           Electronically Signed By: Fabrizio Gastelum MD, 2020, 3:35 PM

## 2020-07-23 NOTE — BRIEF OP NOTE
[facility]    Orthopaedic Surgery  Brief Operative Note    Pre-operative diagnosis: Biceps tendinopathy, right [M67.921]   Post-operative diagnosis Same   Procedure: Procedure(s):  Right shoulder arthroscopy, open biceps tenodesis   Surgeon: Aline Amador MD   Assistants(s): None   Anesthesia: Choice    Estimated blood loss: Minimal   Total IV fluids: (See anesthesia record)   Blood transfusion: (See anesthesia record)   Total urine output: (See anesthesia record)   Drains: None   Specimens: * No specimens in log *   Findings: [place] complex, cord like middle glenohumeral ligament. Atrophic superior labrum with apparent superior labral mobile tear.   Complications: None   Implants: 1.9 Fibertak biceps

## 2020-07-23 NOTE — ANESTHESIA PROCEDURE NOTES
Peripheral Nerve Block Procedure Note  Staff -   Anesthesiologist:  Farbizio Gastelum MD      Performed By: anesthesiologist        Location: Pre-op  Procedure Start/Stop TImes:      7/23/2020 12:02 PM     7/23/2020 12:08 PM    patient identified, IV checked, site marked, risks and benefits discussed, informed consent, monitors and equipment checked, pre-op evaluation, at physician/surgeon's request and post-op pain management      Correct Patient: Yes      Correct Position: Yes      Correct Site: Yes      Correct Procedure: Yes      Correct Laterality:  Yes    Site Marked:  Yes  Procedure details:     Procedure:  Interscalene    ASA:  1    Diagnosis:  Postoperative pain relief    Laterality:  Right    Position:  Supine    Sterile Prep: chloraprep, mask and sterile gloves      Local skin infiltration:  2% lidocaine    amount (mL):  3    Needle:  Insulated    Needle gauge:  21    Needle length (mm):  55    Ultrasound: Yes      Ultrasound used to identify targeted nerve, plexus, or vascular structure and placed a needle adjacent to it      Permanent Image entered into patiient's record      Abnormal pain on injection: No      Blood Aspirated: No      Paresthesias:  No    Bleeding at site: No      Bolus via:  Needle    Infusion Method:  Single Shot    Complications:  None  Assessment/Narrative:     Injection made incrementally with aspirations every (mL):  5

## 2020-07-23 NOTE — ANESTHESIA PREPROCEDURE EVALUATION
"Anesthesia Pre-Procedure Evaluation    Patient: Genesis Cutler   MRN:     [id number] Gender:   female   Age:    25 year old :      1995        Preoperative Diagnosis: Biceps tendinopathy, right [M67.921]   Procedure(s):  Right shoulder arthroscopy, possible open biceps tenodesis     LABS:  CBC:   Lab Results   Component Value Date    WBC 4.6 2018    HGB 14.9 2018    HGB 13.5 2016    HCT 44.4 2018     2018     BMP:   Lab Results   Component Value Date     07/15/2020     2016    POTASSIUM 4.1 07/15/2020    POTASSIUM 4.1 2016    CHLORIDE 108 07/15/2020    CHLORIDE 108 2016    CO2 29 07/15/2020    CO2 29 2016    BUN 9 07/15/2020    BUN 13 2016    CR 0.56 07/15/2020    CR 0.70 2016    GLC 77 07/15/2020    GLC 68 (L) 2016     COAGS: No results found for: PTT, INR, FIBR  POC:   Lab Results   Component Value Date    HCGS Negative 07/15/2020     OTHER:   Lab Results   Component Value Date    GLYNN 8.6 07/15/2020    ALBUMIN 3.7 07/15/2020    PROTTOTAL 6.9 07/15/2020    ALT 26 07/15/2020    AST 24 07/15/2020    ALKPHOS 51 07/15/2020    BILITOTAL 0.2 07/15/2020    LIPASE 159 2018    AMYLASE 70 2018    TSH 2.31 2018        Preop Vitals    BP Readings from Last 3 Encounters:   07/15/20 108/60   20 (!) 126/90   20 120/77    Pulse Readings from Last 3 Encounters:   07/15/20 62   20 70   20 78      Resp Readings from Last 3 Encounters:   No data found for Resp    SpO2 Readings from Last 3 Encounters:   20 100%   20 100%   20 97%      Temp Readings from Last 1 Encounters:   07/15/20 36.6  C (97.8  F) (Temporal)    Ht Readings from Last 1 Encounters:   20 1.638 m (5' 4.5\")      Wt Readings from Last 1 Encounters:   07/15/20 67.1 kg (148 lb)    Estimated body mass index is 25.01 kg/m  as calculated from the following:    Height as of 20: 1.638 m (5' 4.5\").    " Weight as of 7/15/20: 67.1 kg (148 lb).     LDA:        Past Medical History:   Diagnosis Date     Chronic constipation       Past Surgical History:   Procedure Laterality Date     COLONOSCOPY  03/2017      Allergies   Allergen Reactions     Penicillins Rash        Anesthesia Evaluation     .             ROS/MED HX    ENT/Pulmonary:  - neg pulmonary ROS     Neurologic:  - neg neurologic ROS     Cardiovascular:  - neg cardiovascular ROS       METS/Exercise Tolerance:     Hematologic:  - neg hematologic  ROS       Musculoskeletal: Comment: Right shoulder pain presenting for surgery        GI/Hepatic:  - neg GI/hepatic ROS       Renal/Genitourinary:  - ROS Renal section negative       Endo:  - neg endo ROS       Psychiatric:     (+) psychiatric history depression      Infectious Disease:  - neg infectious disease ROS       Malignancy:      - no malignancy   Other:                         PHYSICAL EXAM:   Mental Status/Neuro: A/A/O   Airway: Facies: Feasible  Mallampati: I  Mouth/Opening: Full  TM distance: > 6 cm  Neck ROM: Full   Respiratory: Auscultation: CTAB     Resp. Rate: Normal     Resp. Effort: Normal      CV: Rhythm: Regular  Rate: Age appropriate  Heart: Normal Sounds  Edema: None   Comments:      Dental: Normal Dentition                Assessment:   ASA SCORE: 1    H&P: History and physical reviewed and following examination; no interval change.    NPO Status: NPO Appropriate     Plan:   Anes. Type:  General; Peripheral Nerve Block; For Post-op pain in coordination with surgeon     Block Details: Single Shot; Exparel; Interscalene   Pre-Medication: None   Induction:  IV (Standard)   Airway: ETT; Oral   Access/Monitoring: PIV   Maintenance: Balanced     Postop Plan:   Postop Pain: Opioids  Postop Sedation/Airway: Not planned  Disposition: Outpatient     PONV Management:   Adult Risk Factors: Female, Postop Opioids   Prevention: Ondansetron, Dexamethasone     CONSENT: Direct conversation      Blood Products:  Consent Deferred (Minimal Blood Loss)                   Fabrizio Gastelum MD

## 2020-07-31 NOTE — OP NOTE
DATE OF PROCEDURE: 7/23/2020     PREOPERATIVE DIAGNOSES:   1. Right long head biceps tendinopathy    POSTOPERATIVE DIAGNOSES:   1. Right long head biceps tendinopathy     PROCEDURES:   1. Right shoulder arthroscopy  2. Right arthroscopic glenohumeral debridement, limited  3. Right open biceps tenodesis     STAFF SURGEON: Aline Amador MD     ANESTHESIA: General endotracheal anesthesia with supplementary interscalene block.   ESTIMATED BLOOD LOSS: 5 mL.     IMPLANTS: Arthrex 1.9 fibertak biceps  COMPLICATIONS: None.     BRIEF PATIENT HISTORY: Genesis Cutler is a 25 year old female I have seen in clinic. Please refer to that documentation. She has an MRI which demonstrated some changes in the biceps anchor and intra-articular long head biceps. The patient has had nonoperative management including physical therapy and prior corticosteroid injection. She has not had adequate lasting relief of pain and is at this time having lifestyle limiting symptoms. She wishes at this time to proceed with surgical intervention. We had discussed the risks and benefits of both non-operative and surgical management. We discussed the expected postoperative restrictions. We discussed the risks of surgery including risk of being no better or even worse if she  became stiff, infected, had an injury to the nerves or arteries which power the arm or hand or had a reaction to anesthesia. We discussed the postoperative rehabilitation course. The patient wished to proceed with surgery and informed consent was completed.    DESCRIPTION OF PROCEDURE: The patient was identified in the preoperative area and the correct right shoulder was marked for surgery. The patient was provided an interscalene block by our Anesthesia colleagues and was taken to the operating room where she was surrendered to general endotracheal anesthesia. The patient was moved to the operating table in the beachchair position with all bony prominences well  padded. The head was placed in a head pierce with the neck in neutral position. The right upper extremity was prepped and draped in the usual sterile fashion. A timeout was held in accordance with hospital policy, confirming correct patient, side, site, procedure and administration of IV antibiotics prior to incision.   I initiated shoulder arthroscopy through a posterior viewing portal. I created an anterior working portal under direct visualization. I performed a diagnostic arthroscopy. There was a shonda complex and a cord like middle glenohumeral ligament. The superior labrum was thin but was peeled off the superior glenoid rim. The long head of the biceps was intact but the anchor was affected by the mobile superior labrum. There was mild fraying of the undersurface of the supra. The subscap, teres, infra were intact. There were no loose bodies in the axillary pouch. The chondral surfaces were preserved.   I tenotomized the long head of the biceps and allowed it to retract out of the shoulder. I debrided the stump back to a stable edge. I debrided the anterior, superior, and posterior labrum to a stable edge. I then moved to the subacromial space and inspected the subacromial space. There was no significant bursitis. I inspected the bursal surface of the cuff and it was normal and intact.    I made a longitudinal incision near the axilla centered over the inferior edge of the pectoralis. I dissected down to the pec and came underneath the muscle where I identified the long head of the biceps. This tendon was delivered into the wound and removed proximal tendon down to 2 cm proximal to the muscle tendon junction. I then exposed the site for tenodesis beneath the pectoralis below the biceps groove. I placed a skyler retractor and long thin rich retractor to safely expose the site. I placed a 1.9 fibertak biceps and passed the sutures in running locking fashion. The biceps was then tensioned to the anchor site  and tied securely at that position. There was appropriate tension on the biceps after the repair.    All instruments were removed from the shoulder and then portals were closed with interrupted 3-0 Monocryl. Steri-Strips and a soft sterile dressing were applied. The arm was placed into an abduction sling and the patient was extubated and transported to the recovery room in stable condition. There were no apparent intraoperative complications.     POSTOPERATIVE PLAN:   1. The patient will be discharged to home when she meets Same Day discharge criteria.   2. The patient will have active hand, wrist and (suppoted) elbow range of motion.   3. At 2 weeks she can do unrestricted gentle shoulder motion. No lifting more than 2-5 pounds for the first month and no lifting more than 5-8 pounds in the second month.    BRYCE BALTAZAR MD

## 2020-08-06 ENCOUNTER — TELEPHONE (OUTPATIENT)
Dept: GASTROENTEROLOGY | Facility: CLINIC | Age: 25
End: 2020-08-06

## 2020-08-06 ENCOUNTER — VIRTUAL VISIT (OUTPATIENT)
Dept: PSYCHOLOGY | Facility: CLINIC | Age: 25
End: 2020-08-06
Payer: COMMERCIAL

## 2020-08-06 DIAGNOSIS — F41.1 GAD (GENERALIZED ANXIETY DISORDER): ICD-10-CM

## 2020-08-06 DIAGNOSIS — F33.1 MODERATE EPISODE OF RECURRENT MAJOR DEPRESSIVE DISORDER (H): Primary | ICD-10-CM

## 2020-08-06 DIAGNOSIS — K59.00 CONSTIPATION: Primary | ICD-10-CM

## 2020-08-06 PROCEDURE — 90834 PSYTX W PT 45 MINUTES: CPT | Mod: 95 | Performed by: SOCIAL WORKER

## 2020-08-06 NOTE — PROGRESS NOTES
Progress Note    Patient Name: Genesis Cutler  Date: 8/6/20         Service Type: Individual      Session Start Time: 3:00pm  Session End Time: 3:50pm     Session Length: 50min    Session #: 9    Attendees: Client attended alone    Mode of Communication: Kicksend Video     Telemedicine Visit: The patient's condition can be safely assessed and treated via synchronous audio and visual telemedicine encounter.      Reason for Telemedicine Visit: Services only offered telehealth    Originating Site (Patient Location): Patient's home    Distant Site (Provider Location): Provider Remote Setting: home office    Consent:  The patient/guardian has verbally consented to: the potential risks and benefits of telemedicine (video visit) versus in person care; bill my insurance or make self-payment for services provided; and responsibility for payment of non-covered services.      Treatment Plan Last Reviewed: 6/5/20  PHQ-9 / NEGRO-7 : 7/10/20    DATA  Interactive Complexity: No  Crisis: No       Progress Since Last Session (Related to Symptoms / Goals / Homework):   Symptoms: Improving continued improvements with stable mood, however, experiencing anger due to environmental factors    Homework: Achieved / completed to satisfaction      Episode of Care Goals: Satisfactory progress - ACTION (Actively working towards change); Intervened by reinforcing change plan / affirming steps taken     Current / Ongoing Stressors and Concerns:   Only female in male dominated field at work, COVID distress, upcoming shoulder  surgery     Treatment Objective(s) Addressed in This Session:   identify 3 strategies to more effectively address stressors  Practice perspective taking  Identify negative self-talk and behaviors: challenge core beliefs, myths, and actions  Practice healthy mental practices while eating  use positive self-affirmations daily       Intervention:   Emotion Focused Therapy: Client  reviewed the past month, noting that things are going well overall and her surgery went well. However, she stated that she has had some interactions with family members that have caused her to feel angry. She processed through the conversations and endorsed not understanding their perspectives and feeling angry. Therapist listened and validated, while working to reflect on the grief she appears to be feeling. Therapist reflected on the sadness she appears to be feeling due to the disconnect in beliefs. Client agreed. Therapist encouraged client to validate her emotions and process them, client agreed.  Interpersonal Therapy: Therapist also reflected on healthy communication styles to utilize during disagreements. Client endorsed struggling to be able to talk with them yet, but agreed after some processing, she may be able to do so..        ASSESSMENT: Current Emotional / Mental Status (status of significant symptoms):   Risk status (Self / Other harm or suicidal ideation)   Patient denies current fears or concerns for personal safety.   Patient denies current or recent suicidal ideation or behaviors.   Patient denies current or recent homicidal ideation or behaviors.   Patient denies current or recent self injurious behavior or ideation.   Patient denies other safety concerns.   Patient reports there has been no change in risk factors since their last session.     Patient reports there has been no change in protective factors since their last session.     Recommended that patient call 911 or go to the local ED should there be a change in any of these risk factors.     Appearance:   Appropriate    Eye Contact:   Fair    Psychomotor Behavior: Normal    Attitude:   Cooperative    Orientation:   All   Speech    Rate / Production: Normal/ Responsive    Volume:  Normal    Mood:    Angry  Anxious  Sad    Affect:    Appropriate    Thought Content:  Clear    Thought Form:  Coherent  Logical    Insight:    Good       Medication Review:   No changes to current psychiatric medication(s)     Medication Compliance:   Yes     Changes in Health Issues:   None reported     Chemical Use Review:   Substance Use: Chemical use reviewed, no active concerns identified      Tobacco Use: No current tobacco use.      Diagnosis:  1. Moderate episode of recurrent major depressive disorder (H)    2. NEGRO (generalized anxiety disorder)        Collateral Reports Completed:   Not Applicable    PLAN: (Patient Tasks / Therapist Tasks / Other)  Client will work on perspective taking to manage and reduce anger. She will continue taking medication and practice positive self-talk to reduce depression and anxiety. She will call to schedule follow up session.       Amee León, [facility] 8/6/2020                                                                                                          ______________________________________________________________________    Treatment Plan    Patient's Name: Genesis Cutler  YOB: 1995    Date: 6/5/20    DSM5 Diagnoses: 296.32 (F33.1) Major Depressive Disorder, Recurrent Episode, Moderate _ or 300.02 (F41.1) Generalized Anxiety Disorder  Psychosocial / Contextual Factors: Recent transition to work, only female in male dominated field at work  WHODAS:   WHODAS 2.0 Total Score 2/7/2020   Total Score 28       Referral / Collaboration:  Referral to another professional/service is not indicated at this time..    Anticipated number of session or this episode of care: 10-14    *Progress noted, but continued work needed      MeasurableTreatment Goal(s) related to diagnosis / functional impairment(s)  Goal 1: Patient will learn skills to manage and reduce anxiety, as measured by NEGRO-7.    I will know I've met my goal when I can try to stop moments of intense anxiety.      Objective #A (Patient Action)    Patient will identify 3 fears / thoughts that contribute to feeling anxious.  Status:  Continued - Date(s):6/5/20     Intervention(s)  Therapist will teach emotional recognition/identification. to identify top 3 triggers for anxiety.    Objective #B  Patient will use at least 3 coping skills for anxiety management in the next 4 weeks.  Status: Continued - Date(s):6/5/20     Intervention(s)  Therapist will teach emotional regulation skills. 3 new calming/coping skills to manage anxiety.    Objective #C  Patient will use relaxation strategies 1 times per day to reduce the physical symptoms of anxiety.  Status: Continued - Date(s):6/5/20     Intervention(s)  Therapist will assign homework practice relaxation and mindfulness daily.      Goal 2: Patient will reduce depression and improve motivation, as measured by PHQ-9.     I will know I've met my goal when I want to do things I enjoy again.      Objective #A (Patient Action)    Status: Continued - Date(s):6/5/20     Patient will Identify negative self-talk and behaviors: challenge core beliefs, myths, and actions.    Intervention(s)  Therapist will teach emotional recognition/identification. to identify 3 thoughts/triggers for depression and shame.    Objective #B  Patient will Increase interest, engagement, and pleasure in doing things.    Status: Continued - Date(s):6/5/20     Intervention(s)  Therapist will provide educational materials on depression  teach emotional regulation skills. 3 new coping skills to improve mood and motivation.    Objective #C  Patient will use positive self-affirmations daily.  Status: Continued - Date(s):6/5/20     Intervention(s)  Therapist will assign homework practice using positive self-talk daily to combat critical self-talk and shame.    Goal 3: Client will improve self-esteem and reduce restrictive eating habits while reducing distress     I will know I've met my goal when I can be ok not counting caloires at each meal.      Objective #A (Client Action)    Client will relieve distress about body image by stop  counting calories at mealtime.  Status: New - Date: 6/5/20     Intervention(s)  Therapist will assign homework client is to stop counting calories at meals.    Objective #B  Client will identify 2 messages and impacts related to body image and challenge negative messages and replace with positive self messages.    Status: New - Date: 6/5/20     Intervention(s)  Therapist will assign homework use journaling to process impacts of eating habits and challenge negative messages from others regarding body image.        Patient has reviewed and agreed to the above plan.      Amee León, [facility]  June 5, 2020

## 2020-08-06 NOTE — LETTER
"2020    INSURER[facility] Plan: [employer] / Product Type: HMO /   Re: Prior Authorization Request  Patient: Genesis Cutler  Policy ID#:  [id number]  : 1995       To Whom It May Concern:          I am writing to formally request a prior authorization of coverage for my patient, Genesis Cutler (: 1995), for treatment using plecanatide (Trulance).  I am requesting authorization for applicable provider professional and facility services associated with this therapy.     The therapy involves plecanatide 3 mg daily for management of chronic idiopathic constipation which she has been maintained on with success for over two years.      This patient has failed prior interventions (and combinations of these prior interventions) including Miralax, fiber, prunes/prune juice, stool softeners, various forms of magnesium, and stimulant laxatives. She has also failed the daily medication linactolide (Linzess) at the highest available dose.      The benefits of this therapy include regulation of bowel pattern and reduction of associated gastrointestinal symptoms which impact her quality of life and ability to continue to work. She has already sought treatment for this from multi-disciplinary services (i.e. primary care clinic, gastroenterology, colorectal surgery, emergency medicine etc.).        Plecanatide is appropriate treatment to regulate the bowel pattern, as detailed in the citations that follow. (Saira Hernandez; Toi Haddad; Anuel Johns; Regla West; Francoise Ellis; Steph Garcia; Tan Ford. (2013). \"Plecanatide, an Oral Guanylate Cyclase C Agonist Acting Locally in the Gastrointestinal Tract, Is Safe and Well-Tolerated in Single Doses\". Digestive Diseases and Sciences. 58 (9): 7084-9583 and Leandra PB, Anaid RUDD, So GJ, et al. A randomized phase III trial of plecanatide, a uroguanylin analog, in patients with chronic idiopathic constipation. Am J " Gastroenterol 2017; 112: 613-621).     Treating her chronic idiopathic constipation with plecanatide (Trulance) has led to decreased use of medical resources such as therapeutic gastrograffin enemas, additional clinic visits, and emergency department visits. Without treatment, her constipation symptoms can contribute to increased use of these medical resources, worsen her quality of life, and impact her ability to continue to work (leading to missed days of work).     I have included medical records pertaining to the patient s medical history, current condition, and treatment plan. ?     Please contact me at Dept: [phone] if you require additional information to ensure the prompt approval for coverage.          Please send your written decision to me at this address:     [facility]   [address] [address] [phone]          Sincerely,      Gayle Haro MD    of Medicine   Division of Gastroenterology, Hepatology and Nutrition   [facility]

## 2020-08-06 NOTE — TELEPHONE ENCOUNTER
PA Initiation    Medication: plecanatide (TRULANCE) 3 MG tablet -   Insurance Company: [employer] ([facility]) - Phone [phone] Fax [phone]  Pharmacy Filling the Rx: [facility]ACY #[id number] - [place] - [address] AT [facility]  Filling Pharmacy Phone: [phone]  Filling Pharmacy Fax: [phone]  Start Date: 8/6/2020

## 2020-08-06 NOTE — PATIENT INSTRUCTIONS
Client will work on perspective taking to manage and reduce anger. She will continue taking medication and practice positive self-talk to reduce depression and anxiety. She will call to schedule follow up session.

## 2020-08-10 ENCOUNTER — OFFICE VISIT (OUTPATIENT)
Dept: ORTHOPEDICS | Facility: CLINIC | Age: 25
End: 2020-08-10
Payer: COMMERCIAL

## 2020-08-10 VITALS — HEART RATE: 66 BPM | OXYGEN SATURATION: 100 % | SYSTOLIC BLOOD PRESSURE: 106 MMHG | DIASTOLIC BLOOD PRESSURE: 76 MMHG

## 2020-08-10 DIAGNOSIS — M67.921 BICEPS TENDINOPATHY, RIGHT: Primary | ICD-10-CM

## 2020-08-10 PROCEDURE — 99024 POSTOP FOLLOW-UP VISIT: CPT | Performed by: ORTHOPAEDIC SURGERY

## 2020-08-10 ASSESSMENT — PAIN SCALES - GENERAL: PAINLEVEL: NO PAIN (1)

## 2020-08-10 NOTE — LETTER
8/10/2020         RE: Genesis Cutler  [address]  [place] [address]        Dear Colleague,    Thank you for referring your patient, Genesis Cutler, to the [facility]. Please see a copy of my visit note below.    CHIEF CONCERN: Status post right shoulder arthroscopy, open biceps tenodesis  DATE OF SURGERY: 07/23/2020    HISTORY OF PRESENT ILLNESS: Sarah is a pleasant 25 year-old right hand dominant woman who is two weeks status post the above procedure.  She feels healing is going well.  She did remove he strap on the sling that went across her chest.  She was getting a rash on her left arm and left side of her torso from this.  She has been wearing her sling at all times with the exception of dressing, bathing and performing range of motion of elbow.  She has minimal pain in her shoulder today that she would rate as a 1.5 on a scale of 1-10.  She relates this pain may be more of discomfort along the incision due to adhesives. She has been covering the incision with bandages and changes these daily.  She has no concerns with the incision today.    EXAM:  Pleasant adult female in NAD  Respirations even and unlabored.  Right upper extremity: Incisions clean, dry, and intact. Distally neurovascularly intact without deficits. Shoulder range of motion not tested due to postop restrictions.    ASSESSMENT:  1. Two weeks status post above procedure    PLAN:    Arthroscopic images and intra-operative findings reviewed    Sling: On at all times except for bathing or dressing, rehab exercises    Pain medication: Reviewed. Discussed plan to wean from narcotics. NSAIDs OK.     Follow up: 4 weeks with no new radiographs needed.     PT progress per op note plan below  1. The patient will have active hand, wrist and (suppoted) elbow range of motion.   2. At 2 weeks she can do unrestricted gentle shoulder motion. No lifting more than 2-5 pounds for the first month and no lifting  more than 5-8 pounds in the second month.        Again, thank you for allowing me to participate in the care of your patient.        Sincerely,        Aline Amador MD

## 2020-08-10 NOTE — PATIENT INSTRUCTIONS
Thanks for coming today.  Ortho[facility] Medicine Clinic  [address] [place] [address]    To schedule future appointments in Ortho Clinic, you may call [phone].    To schedule ordered imaging by your provider:   Call Central Imaging [phone]    To schedule an injection ordered by your provider:  Call Central Imaging Injection scheduling line: [phone]  [employer] available online at:  [email or web address]    Please call if any further questions or concerns ([phone]).  Clinic hours 8 am to 5 pm.    Return to clinic (call) if symptoms worsen or fail to improve.

## 2020-08-10 NOTE — PROGRESS NOTES
CHIEF CONCERN: Status post right shoulder arthroscopy, open biceps tenodesis  DATE OF SURGERY: 07/23/2020    HISTORY OF PRESENT ILLNESS: Sarah is a pleasant 25 year-old right hand dominant woman who is two weeks status post the above procedure.  She feels healing is going well.  She did remove he strap on the sling that went across her chest.  She was getting a rash on her left arm and left side of her torso from this.  She has been wearing her sling at all times with the exception of dressing, bathing and performing range of motion of elbow.  She has minimal pain in her shoulder today that she would rate as a 1.5 on a scale of 1-10.  She relates this pain may be more of discomfort along the incision due to adhesives. She has been covering the incision with bandages and changes these daily.  She has no concerns with the incision today.    EXAM:  Pleasant adult female in NAD  Respirations even and unlabored.  Right upper extremity: Incisions clean, dry, and intact. Distally neurovascularly intact without deficits. Shoulder range of motion not tested due to postop restrictions.    ASSESSMENT:  1. Two weeks status post above procedure    PLAN:    Arthroscopic images and intra-operative findings reviewed    Sling: On at all times except for bathing or dressing, rehab exercises    Pain medication: Reviewed. Discussed plan to wean from narcotics. NSAIDs OK.     Follow up: 4 weeks with no new radiographs needed.     PT progress per op note plan below  1. The patient will have active hand, wrist and (suppoted) elbow range of motion.   2. At 2 weeks she can do unrestricted gentle shoulder motion. No lifting more than 2-5 pounds for the first month and no lifting more than 5-8 pounds in the second month.

## 2020-08-10 NOTE — NURSING NOTE
Genesis Cutler's chief complaint for this visit includes:  Chief Complaint   Patient presents with     Right Shoulder - Surgical Followup     2 weeks S/p Right shoulder arthroscopy, open biceps tenodesis DOS: 7/23/2020     PCP: Richard Ledbetter    Referring Provider:  No referring provider defined for this encounter.    /76 (BP Location: Left arm, Patient Position: Sitting, Cuff Size: Adult Regular)   Pulse 66   SpO2 100%   No Pain (1)     Do you need any medication refills at today's visit? No    Linda Pollack CMA

## 2020-08-11 NOTE — TELEPHONE ENCOUNTER
"PRIOR AUTHORIZATION DENIED    Medication: plecanatide (TRULANCE) 3 MG tablet - DENIED    Denial Date: 8/7/2020    Denial Rational:         Appeal Information:     **Please advise if appeal is necessary and place a letter of medical necessity with clinical rationale under the \"letters\" tab in patient's chart and route back to [facility] [[id number]]        "

## 2020-08-22 ENCOUNTER — MYC MEDICAL ADVICE (OUTPATIENT)
Dept: PEDIATRICS | Facility: CLINIC | Age: 25
End: 2020-08-22

## 2020-08-26 ENCOUNTER — VIRTUAL VISIT (OUTPATIENT)
Dept: PSYCHOLOGY | Facility: CLINIC | Age: 25
End: 2020-08-26
Payer: COMMERCIAL

## 2020-08-26 DIAGNOSIS — F33.1 MODERATE EPISODE OF RECURRENT MAJOR DEPRESSIVE DISORDER (H): Primary | ICD-10-CM

## 2020-08-26 DIAGNOSIS — F41.1 GAD (GENERALIZED ANXIETY DISORDER): ICD-10-CM

## 2020-08-26 PROCEDURE — 90834 PSYTX W PT 45 MINUTES: CPT | Mod: 95 | Performed by: SOCIAL WORKER

## 2020-08-26 ASSESSMENT — ANXIETY QUESTIONNAIRES
GAD7 TOTAL SCORE: 12
1. FEELING NERVOUS, ANXIOUS, OR ON EDGE: MORE THAN HALF THE DAYS
4. TROUBLE RELAXING: SEVERAL DAYS
3. WORRYING TOO MUCH ABOUT DIFFERENT THINGS: MORE THAN HALF THE DAYS
6. BECOMING EASILY ANNOYED OR IRRITABLE: MORE THAN HALF THE DAYS
7. FEELING AFRAID AS IF SOMETHING AWFUL MIGHT HAPPEN: SEVERAL DAYS
5. BEING SO RESTLESS THAT IT IS HARD TO SIT STILL: MORE THAN HALF THE DAYS
2. NOT BEING ABLE TO STOP OR CONTROL WORRYING: MORE THAN HALF THE DAYS

## 2020-08-26 ASSESSMENT — PATIENT HEALTH QUESTIONNAIRE - PHQ9: SUM OF ALL RESPONSES TO PHQ QUESTIONS 1-9: 9

## 2020-08-26 NOTE — PROGRESS NOTES
Progress Note    Patient Name: Genesis Cutler  Date: 8/26/20         Service Type: Individual      Session Start Time: 8:00am  Session End Time: 8:50am     Session Length: 50min    Session #: 10    Attendees: Client attended alone    Mode of Communication: [employer] Video     Telemedicine Visit: The patient's condition can be safely assessed and treated via synchronous audio and visual telemedicine encounter.      Reason for Telemedicine Visit: Services only offered telehealth    Originating Site (Patient Location): Patient's home    Distant Site (Provider Location): Provider Remote Setting: home office    Consent:  The patient/guardian has verbally consented to: the potential risks and benefits of telemedicine (video visit) versus in person care; bill my insurance or make self-payment for services provided; and responsibility for payment of non-covered services.      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 She processed through the incidents and noted feeling angry and hurt. Therapist listened and validated emotions, and encouraged client to acknowledge and release emotions. She practiced doing so in session.  Interpersonal Therapy: Client endorsed anger towards family members with differing beliefs. She worked through her struggles to understand and therapist validated, while working to explore perspectives to help manage anger. Therapist worked to explore where client has control, including regulatoin and perspective taking. Client agreed to work on doing so as she is ready.        ASSESSMENT: Current Emotional / Mental Status (status of significant symptoms):   Risk status (Self / Other harm or suicidal ideation)   Patient denies current fears or concerns for personal safety.   Patient denies current or recent suicidal ideation or behaviors.   Patient denies current or recent homicidal ideation or behaviors.   Patient denies current or recent self injurious behavior or ideation.   Patient denies other safety concerns.   Patient reports there has been no change in risk factors since their last session.     Patient reports there has been no change in protective factors since their last session.     Recommended that patient call 911 or go to the local ED should there be a change in any of these risk factors.     Appearance:   Appropriate    Eye Contact:   Fair    Psychomotor Behavior: Normal    Attitude:   Cooperative    Orientation:   All   Speech    Rate / Production: Normal/ Responsive    Volume:  Normal    Mood:    Angry  Anxious  Sad    Affect:    Appropriate    Thought Content:  Clear    Thought Form:  Coherent  Logical    Insight:    Fair      Medication Review:   No changes to current psychiatric medication(s)     Medication Compliance:   Yes     Changes in Health Issues:   None reported     Chemical Use Review:   Substance Use:  Chemical use reviewed, no active concerns identified      Tobacco Use: No current tobacco use.      Diagnosis:  1. Moderate episode of recurrent major depressive disorder (H)    2. NEGRO (generalized anxiety disorder)        Collateral Reports Completed:   Not Applicable    PLAN: (Patient Tasks / Therapist Tasks / Other)  Client will return Sept 17 at 8am. She will practice regulation skills, perspective taking, and self-care to reduce anger and anxiety.        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    Anticipated number of session or this episode of care: 10-14    *Progress noted, but continued work needed      MeasurableTreatment Goal(s) related to diagnosis / functional impairment(s)  Goal 1: Patient will learn skills to manage and reduce anxiety, as measured by NEGRO-7.    I will know I've met my goal when I can try to stop moments of intense anxiety.      Objective #A (Patient Action)    Patient will identify 3 fears / thoughts that contribute to feeling anxious.  Status: Continued - Date(s):6/5/20     Intervention(s)  Therapist will teach emotional recognition/identification. to identify top 3 triggers for anxiety.    Objective #B  Patient will use at least 3 coping skills for anxiety management in the next 4 weeks.  Status: Continued -  Date(s):6/5/20     Intervention(s)  Therapist will teach emotional regulation skills. 3 new calming/coping skills to manage anxiety.    Objective #C  Patient will use relaxation strategies 1 times per day to reduce the physical symptoms of anxiety.  Status: Continued - Date(s):6/5/20     Intervention(s)  Therapist will assign homework practice relaxation and mindfulness daily.      Goal 2: Patient will reduce depression and improve motivation, as measured by PHQ-9.     I will know I've met my goal when I want to do things I enjoy again.      Objective #A (Patient Action)    Status: Continued - Date(s):6/5/20     Patient will Identify negative self-talk and behaviors: challenge core beliefs, myths, and actions.    Intervention(s)  Therapist will teach emotional recognition/identification. to identify 3 thoughts/triggers for depression and shame.    Objective #B  Patient will Increase interest, engagement, and pleasure in doing things.    Status: Continued - Date(s):6/5/20     Intervention(s)  Therapist will provide educational materials on depression  teach emotional regulation skills. 3 new coping skills to improve mood and motivation.    Objective #C  Patient will use positive self-affirmations daily.  Status: Continued - Date(s):6/5/20     Intervention(s)  Therapist will assign homework practice using positive self-talk daily to combat critical self-talk and shame.    Goal 3: Client will improve self-esteem and reduce restrictive eating habits while reducing distress     I will know I've met my goal when I can be ok not counting caloires at each meal.      Objective #A (Client Action)    Client will relieve distress about body image by stop counting calories at mealtime.  Status: New - Date: 6/5/20     Intervention(s)  Therapist will assign homework client is to stop counting calories at meals.    Objective #B  Client will identify 2 messages and impacts related to body image and challenge negative messages and  replace with positive self messages.    Status: New - Date: 6/5/20     Intervention(s)  Therapist will assign homework use journaling to process impacts of eating habits and challenge negative messages from others regarding body image.        Patient has reviewed and agreed to the above plan.      Amee León, [facility]  June 5, 2020

## 2020-08-26 NOTE — PATIENT INSTRUCTIONS
Client will return Sept 17 at 8am. She will practice regulation skills, perspective taking, and self-care to reduce anger and anxiety.

## 2020-08-27 ENCOUNTER — THERAPY VISIT (OUTPATIENT)
Dept: PHYSICAL THERAPY | Facility: CLINIC | Age: 25
End: 2020-08-27
Attending: ORTHOPAEDIC SURGERY
Payer: COMMERCIAL

## 2020-08-27 DIAGNOSIS — M67.921 BICEPS TENDINOPATHY, RIGHT: ICD-10-CM

## 2020-08-27 DIAGNOSIS — Z47.89 AFTERCARE FOLLOWING SURGERY OF THE MUSCULOSKELETAL SYSTEM: ICD-10-CM

## 2020-08-27 DIAGNOSIS — M25.519 PAIN IN JOINT, SHOULDER REGION: ICD-10-CM

## 2020-08-27 PROCEDURE — 97161 PT EVAL LOW COMPLEX 20 MIN: CPT | Mod: GP | Performed by: PHYSICAL THERAPIST

## 2020-08-27 PROCEDURE — 97110 THERAPEUTIC EXERCISES: CPT | Mod: GP | Performed by: PHYSICAL THERAPIST

## 2020-08-27 ASSESSMENT — ANXIETY QUESTIONNAIRES: GAD7 TOTAL SCORE: 12

## 2020-09-01 ENCOUNTER — MYC MEDICAL ADVICE (OUTPATIENT)
Dept: PEDIATRICS | Facility: CLINIC | Age: 25
End: 2020-09-01

## 2020-09-01 DIAGNOSIS — F32.A ANXIETY AND DEPRESSION: Primary | ICD-10-CM

## 2020-09-01 DIAGNOSIS — F41.9 ANXIETY AND DEPRESSION: Primary | ICD-10-CM

## 2020-09-01 RX ORDER — ESCITALOPRAM OXALATE 20 MG/1
20 TABLET ORAL DAILY
Qty: 90 TABLET | Refills: 0 | Status: SHIPPED | OUTPATIENT
Start: 2020-09-01 | End: 2020-11-28

## 2020-09-03 ENCOUNTER — THERAPY VISIT (OUTPATIENT)
Dept: PHYSICAL THERAPY | Facility: CLINIC | Age: 25
End: 2020-09-03
Attending: ORTHOPAEDIC SURGERY
Payer: COMMERCIAL

## 2020-09-03 ENCOUNTER — OFFICE VISIT (OUTPATIENT)
Dept: ORTHOPEDICS | Facility: CLINIC | Age: 25
End: 2020-09-03
Payer: COMMERCIAL

## 2020-09-03 DIAGNOSIS — Z47.89 AFTERCARE FOLLOWING SURGERY OF THE MUSCULOSKELETAL SYSTEM: ICD-10-CM

## 2020-09-03 DIAGNOSIS — M25.519 PAIN IN JOINT, SHOULDER REGION: ICD-10-CM

## 2020-09-03 DIAGNOSIS — M67.921 BICEPS TENDINOPATHY, RIGHT: Primary | ICD-10-CM

## 2020-09-03 PROCEDURE — 99024 POSTOP FOLLOW-UP VISIT: CPT | Performed by: ORTHOPAEDIC SURGERY

## 2020-09-03 PROCEDURE — 97110 THERAPEUTIC EXERCISES: CPT | Mod: GP | Performed by: PHYSICAL THERAPIST

## 2020-09-03 ASSESSMENT — PAIN SCALES - GENERAL: PAINLEVEL: MILD PAIN (2)

## 2020-09-03 NOTE — PATIENT INSTRUCTIONS
Thanks for coming today.  Ortho/Sports Medicine Clinic  [address] [place] [address]    To schedule future appointments in Ortho Clinic, you may call [phone].    To schedule ordered imaging by your provider:   Call Central Imaging [phone]    To schedule an injection ordered by your provider:  Call Central Imaging Injection scheduling line: [phone]  [employer] available online at:  [email or web address]    Please call if any further questions or concerns ([phone]).  Clinic hours 8 am to 5 pm.    Return to clinic (call) if symptoms worsen or fail to improve.

## 2020-09-03 NOTE — LETTER
9/3/2020         RE: Genesis Cutler  [address]  [place] [address]        Dear Colleague,    Thank you for referring your patient, Genesis Cutler, to the [facility]. Please see a copy of my visit note below.    CHIEF CONCERN: Status post right shoulder arthroscopy, open biceps tenodesis  DATE OF SURGERY: 07/23/2020    HISTORY OF PRESENT ILLNESS: Sarah is a pleasant 25 year-old right hand dominant woman who is six weeks status post the above procedure.  She is seeing [facility] for her PT and progressing, though her motion has been a bit slower to progress than average.     EXAM:  Pleasant adult female in NAD  Respirations even and unlabored.  Right upper extremity:  Distally neurovascularly intact without deficits. Shoulder range of motion demonstrates assisted FE to only 145 with ER to 55.    ASSESSMENT:  1. Six weeks status post above procedure    PLAN:    Range of Motion: Progress ROM of the shoulder with physical therapy per operative note. May do some joint mobs (gentle)    Sling: Sling DCd.     Pain medication: NSAIDs and Tylenol PRN    Follow up: 6 weeks with no new radiographs needed.                 Again, thank you for allowing me to participate in the care of your patient.        Sincerely,        Aline Amador MD

## 2020-09-03 NOTE — PROGRESS NOTES
CHIEF CONCERN: Status post right shoulder arthroscopy, open biceps tenodesis  DATE OF SURGERY: 07/23/2020    HISTORY OF PRESENT ILLNESS: Sarah is a pleasant 25 year-old right hand dominant woman who is six weeks status post the above procedure.  She is seeing [facility] for her PT and progressing, though her motion has been a bit slower to progress than average.     EXAM:  Pleasant adult female in NAD  Respirations even and unlabored.  Right upper extremity:  Distally neurovascularly intact without deficits. Shoulder range of motion demonstrates assisted FE to only 145 with ER to 55.    ASSESSMENT:  1. Six weeks status post above procedure    PLAN:    Range of Motion: Progress ROM of the shoulder with physical therapy per operative note. May do some joint mobs (gentle)    Sling: Sling DCd.     Pain medication: NSAIDs and Tylenol PRN    Follow up: 6 weeks with no new radiographs needed.

## 2020-09-03 NOTE — PROGRESS NOTES
CHIEF CONCERN: Status post right shoulder arthroscopy, possible open biceps tenodesis    DATE OF SURGERY: 07/23/2020    HISTORY OF PRESENT ILLNESS: *** is a pleasant *** year-old *** who is *** status post the above procedure.    EXAM:  Pleasant adult *** in NAD  Respirations even and unlabored.  *** upper extremity: Incisions clean, dry, and intact. Distally neurovascularly intact without deficits. Shoulder range of motion not tested due to postop restrictions.    ASSESSMENT:  1. *** status post above procedure    PLAN:  ***

## 2020-09-03 NOTE — NURSING NOTE
Genesis Cutler's chief complaint for this visit includes:  Chief Complaint   Patient presents with     Right Shoulder - Surgical Followup     6 wks S/p Right shoulder arthroscopy, possible open biceps tenodesis, sx: 7/23/20       PCP: Richard Ledbetter    Referring Provider:  No referring provider defined for this encounter.    There were no vitals taken for this visit.  Mild Pain (2) , up to a 4/10 at worse, seems to linger.    Do you need any medication refills at today's visit? n/a

## 2020-09-10 ENCOUNTER — THERAPY VISIT (OUTPATIENT)
Dept: PHYSICAL THERAPY | Facility: CLINIC | Age: 25
End: 2020-09-10
Attending: ORTHOPAEDIC SURGERY
Payer: COMMERCIAL

## 2020-09-10 DIAGNOSIS — M25.519 PAIN IN JOINT, SHOULDER REGION: ICD-10-CM

## 2020-09-10 DIAGNOSIS — Z47.89 AFTERCARE FOLLOWING SURGERY OF THE MUSCULOSKELETAL SYSTEM: ICD-10-CM

## 2020-09-10 PROCEDURE — 97110 THERAPEUTIC EXERCISES: CPT | Mod: GP | Performed by: PHYSICAL THERAPIST

## 2020-09-11 NOTE — TELEPHONE ENCOUNTER
Letter of medical necessity placed into the patients chart for trulace will update PA team.  Also called and updated patient and left voicemail with my direct number.

## 2020-09-14 NOTE — TELEPHONE ENCOUNTER
Medication Appeal Initiation    We have initiated an appeal for the requested medication:  Medication: plecanatide (TRULANCE) 3 MG tablet- Denied- Appeal -   Appeal Start Date:  9/14/2020  Insurance Company: [employer] ([facility]) - Phone [phone] Fax [phone]  Comments:  Sent in appeal with letter

## 2020-09-15 DIAGNOSIS — F32.A ANXIETY AND DEPRESSION: ICD-10-CM

## 2020-09-15 DIAGNOSIS — F41.9 ANXIETY AND DEPRESSION: ICD-10-CM

## 2020-09-17 ENCOUNTER — VIRTUAL VISIT (OUTPATIENT)
Dept: PSYCHOLOGY | Facility: CLINIC | Age: 25
End: 2020-09-17
Payer: COMMERCIAL

## 2020-09-17 DIAGNOSIS — F41.1 GAD (GENERALIZED ANXIETY DISORDER): ICD-10-CM

## 2020-09-17 DIAGNOSIS — F33.1 MODERATE EPISODE OF RECURRENT MAJOR DEPRESSIVE DISORDER (H): Primary | ICD-10-CM

## 2020-09-17 PROCEDURE — 90834 PSYTX W PT 45 MINUTES: CPT | Mod: 95 | Performed by: SOCIAL WORKER

## 2020-09-17 NOTE — PROGRESS NOTES
Progress Note    Patient Name: Genesis Cutler  Date: 9/17/20         Service Type: Individual      Session Start Time: 8:00am  Session End Time: 8:50am     Session Length: 50min    Session #: 11    Attendees: Client attended alone    Mode of Communication: [employer] Video     Telemedicine Visit: The patient's condition can be safely assessed and treated via synchronous audio and visual telemedicine encounter.      Reason for Telemedicine Visit: Services only offered telehealth    Originating Site (Patient Location): Patient's home    Distant Site (Provider Location): Provider Remote Setting: home office    Consent:  The patient/guardian has verbally consented to: the potential risks and benefits of telemedicine (video visit) versus in person care; bill my insurance or make self-payment for services provided; and responsibility for payment of non-covered services.      Treatment Plan Last Reviewed: 9/17/20  PHQ-9 / NEGRO-7 :  9/17/20    DATA  Interactive Complexity: No  Crisis: No       Progress Since Last Session (Related to Symptoms / Goals / Homework):   Symptoms: Improving stable, reduced irritability, mild anxiety and depression    Homework: Partially completed      Episode of Care Goals: Satisfactory progress - ACTION (Actively working towards change); Intervened by reinforcing change plan / affirming steps taken     Current / Ongoing Stressors and Concerns:   Only female in male dominated field at work, COVID distress, upcoming shoulder  surgery     Treatment Objective(s) Addressed in This Session:   identify 3 strategies to more effectively address stressors  Practice perspective taking  Identify negative self-talk and behaviors: challenge core beliefs, myths, and actions  Practice healthy mental practices while eating  use positive self-affirmations daily       Intervention:   Emotion Focused Therapy: Client reviewed the past few weeks and noted that things  have been better with her family and she has been feeling good overall. She endorsed some mild anxiety and depression, but denied significant distress. She processed through some frustration with her family and her job, while therapist listened and validate emotions, and also worked to perspective take. Client also endorsed some moments of comparisons that lead to negative emotions. Therapist reviewed helpful self-talk and kindess to enhance self-esteem.  Solution Focused: Therapist explored what helps client feel better and client endorsed connecting with others and enjoyable hobbies as helpful for her mood.        ASSESSMENT: Current Emotional / Mental Status (status of significant symptoms):   Risk status (Self / Other harm or suicidal ideation)   Patient denies current fears or concerns for personal safety.   Patient denies current or recent suicidal ideation or behaviors.   Patient denies current or recent homicidal ideation or behaviors.   Patient denies current or recent self injurious behavior or ideation.   Patient denies other safety concerns.   Patient reports there has been no change in risk factors since their last session.     Patient reports there has been no change in protective factors since their last session.     Recommended that patient call 911 or go to the local ED should there be a change in any of these risk factors.     Appearance:   Appropriate    Eye Contact:   Fair    Psychomotor Behavior: Normal    Attitude:   Cooperative    Orientation:   All   Speech    Rate / Production: Normal/ Responsive    Volume:  Normal    Mood:    Angry  Anxious  Depressed    Affect:    Appropriate    Thought Content:  Clear    Thought Form:  Coherent  Logical    Insight:    Fair      Medication Review:   Changes to psychiatric medications, see updated Medication List in EPIC.     Back on Lexapro     Medication Compliance:   Yes     Changes in Health Issues:   None reported     Chemical Use Review:   Substance  Use: Chemical use reviewed, no active concerns identified      Tobacco Use: No current tobacco use.      Diagnosis:  1. Moderate episode of recurrent major depressive disorder (H)    2. NEGRO (generalized anxiety disorder)        Collateral Reports Completed:   Not Applicable    PLAN: (Patient Tasks / Therapist Tasks / Other)  Client will continue to engage in positive activities, positive self-talk and self-care to enhance mood and mental health. She will return Oct 7 at 7am.      Amee León, [facility] 9/17/2020                                                                                                            ______________________________________________________________________    Treatment Plan    Patient's Name: Genesis Cutler  YOB: 1995    Date: 9/17/20    DSM5 Diagnoses: 296.32 (F33.1) Major Depressive Disorder, Recurrent Episode, Moderate _ or 300.02 (F41.1) Generalized Anxiety Disorder  Psychosocial / Contextual Factors: Recent transition to work, only female in male dominated field at work  WHODAS:   WHODAS 2.0 Total Score 2/7/2020   Total Score 28       Referral / Collaboration:  Referral to another professional/service is not indicated at this time..    Anticipated number of session or this episode of care: 10-14    *Progress noted, but continued work needed    MeasurableTreatment Goal(s) related to diagnosis / functional impairment(s)  Goal 1: Patient will learn skills to manage and reduce anxiety, as measured by NEGRO-7.    I will know I've met my goal when I can try to stop moments of intense anxiety.      Objective #A (Patient Action)    Patient will identify 3 fears / thoughts that contribute to feeling anxious.  Status: Continued - Date(s):  9/17/20    Intervention(s)  Therapist will teach emotional recognition/identification. to identify top 3 triggers for anxiety.    Objective #B  Patient will use at least 3 coping skills for anxiety management in the next 4  weeks.  Status: Continued - Date(s): 9/17/20     Intervention(s)  Therapist will teach emotional regulation skills. 3 new calming/coping skills to manage anxiety.    Objective #C  Patient will use relaxation strategies 1 times per day to reduce the physical symptoms of anxiety.  Status: Continued - Date(s):  9/17/20    Intervention(s)  Therapist will assign homework practice relaxation and mindfulness daily.      Goal 2: Patient will reduce depression and improve motivation, as measured by PHQ-9.     I will know I've met my goal when I want to do things I enjoy again.      Objective #A (Patient Action)    Status: Continued - Date(s):   9/17/20    Patient will Identify negative self-talk and behaviors: challenge core beliefs, myths, and actions.    Intervention(s)  Therapist will teach emotional recognition/identification. to identify 3 thoughts/triggers for depression and shame.    Objective #B  Patient will Increase interest, engagement, and pleasure in doing things.    Status: Continued - Date(s): 9/17/20    Intervention(s)  Therapist will provide educational materials on depression  teach emotional regulation skills. 3 new coping skills to improve mood and motivation.    Objective #C  Patient will use positive self-affirmations daily.  Status: Continued - Date(s):  9/17/20    Intervention(s)  Therapist will assign homework practice using positive self-talk daily to combat critical self-talk and shame.    Goal 3: Client will improve self-esteem and reduce restrictive eating habits while reducing distress     I will know I've met my goal when I can be ok not counting calories at each meal.      Objective #A (Client Action)    Client will relieve distress about body image by stop counting calories at mealtime.  Status: Continued - Date(s): 9/17/20    Intervention(s)  Therapist will assign homework client is to stop counting calories at meals.    Objective #B  Client will identify 2 messages and impacts related to  body image and challenge negative messages and replace with positive self messages.    Status: Continued - Date(s): 9/17/20    Intervention(s)  Therapist will assign homework use journaling to process impacts of eating habits and challenge negative messages from others regarding body image.      Patient has reviewed and agreed to the above plan.      Amee León, [facility]  September 17, 2020

## 2020-09-17 NOTE — PATIENT INSTRUCTIONS
Client will continue to engage in positive activities, positive self-talk and self-care to enhance mood and mental health. She will return Oct 7 at 7am.

## 2020-09-17 NOTE — TELEPHONE ENCOUNTER
SERTRALINE HCL 50 MG TABLET     sertraline (ZOLOFT) 50 MG tablet (Discontinued)  135 tablet  1  7/15/2020  9/3/2020  No    Sig - Route: Take 1.5 tablets (75 mg) by mouth daily - Oral    Sent to pharmacy as: Sertraline HCl 50 MG Oral Tablet (ZOLOFT)    Class: E-Prescribe    Notes to Pharmacy: Patient will call for refills    Reason for Discontinue: Stopped by Patient    Order: [id number]    E-Prescribing Status: Receipt confirmed by pharmacy (7/15/2020  3:20 PM CDT)    Printout Tracking     External Result Report    Pharmacy     [id number] - [place] - [address], [place] [facility]    This Order Has Been Discontinued     Order Status  Reason  By  On    Discontinued  Stopped by Patient  Dewayne Nolen, RN  9/3/20 0897

## 2020-09-21 NOTE — TELEPHONE ENCOUNTER
MEDICATION APPEAL DENIED    Medication: plecanatide (TRULANCE) 3 MG tablet- Denied- Appeal - DENIED    Denial Date: 9/16/2020    Denial Rational:           Second Level Appeal Information:     Second level appeals will be managed by the clinic staff and provider. Please contact the [employer] Prior Authorization Team if additional information about the denial is needed.

## 2020-09-24 ENCOUNTER — THERAPY VISIT (OUTPATIENT)
Dept: PHYSICAL THERAPY | Facility: CLINIC | Age: 25
End: 2020-09-24
Payer: COMMERCIAL

## 2020-09-24 DIAGNOSIS — M25.519 PAIN IN JOINT, SHOULDER REGION: ICD-10-CM

## 2020-09-24 DIAGNOSIS — Z47.89 AFTERCARE FOLLOWING SURGERY OF THE MUSCULOSKELETAL SYSTEM: ICD-10-CM

## 2020-09-24 PROCEDURE — 97110 THERAPEUTIC EXERCISES: CPT | Mod: GP | Performed by: PHYSICAL THERAPIST

## 2020-09-24 PROCEDURE — 97112 NEUROMUSCULAR REEDUCATION: CPT | Mod: GP | Performed by: PHYSICAL THERAPIST

## 2020-10-01 ENCOUNTER — THERAPY VISIT (OUTPATIENT)
Dept: PHYSICAL THERAPY | Facility: CLINIC | Age: 25
End: 2020-10-01
Payer: COMMERCIAL

## 2020-10-01 DIAGNOSIS — M25.519 PAIN IN JOINT, SHOULDER REGION: ICD-10-CM

## 2020-10-01 DIAGNOSIS — Z47.89 AFTERCARE FOLLOWING SURGERY OF THE MUSCULOSKELETAL SYSTEM: ICD-10-CM

## 2020-10-01 PROCEDURE — 97112 NEUROMUSCULAR REEDUCATION: CPT | Mod: GP | Performed by: PHYSICAL THERAPIST

## 2020-10-01 PROCEDURE — 97110 THERAPEUTIC EXERCISES: CPT | Mod: GP | Performed by: PHYSICAL THERAPIST

## 2020-10-01 NOTE — PROGRESS NOTES
Subjective:  HPI  Physical Exam                    Objective:  System    Physical Exam    General     ROS    Assessment/Plan:    PROGRESS  REPORT    Progress reporting period is from 8/27/2020 to 10/1/2020.       SUBJECTIVE  Subjective changes noted by patient:  Patient reports the shoulder is progressing well.   Has been completing exercises daily-was initially sore with progression of strength after last session but this has decreased throughout the week.     Current pain level is 0/10 PL (up to 3/10).     Changes in function:  Yes (See Goal flowsheet attached for changes in current functional level)  Adverse reaction to treatment or activity: None    OBJECTIVE  Changes noted in objective findings:  Yes,     AROM   Flexion 160   Extension 48   Abduction 162   IR T8   ER 75     ASSESSMENT/PLAN  Updated problem list and treatment plan: Diagnosis 1:  S/P Right shoulder  Pain -  manual therapy, self management and education  Decreased strength - therapeutic exercise and therapeutic activities  Impaired muscle performance - neuro re-education  Decreased function - therapeutic activities  STG/LTGs have been met or progress has been made towards goals:  Yes (See Goal flow sheet completed today.)  Assessment of Progress: The patient's condition is improving.  Self Management Plans:  Patient is independent in a home treatment program.  Patient is independent in self management of symptoms.  I have re-evaluated this patient and find that the nature, scope, duration and intensity of the therapy is appropriate for the medical condition of the patient.  Genesis continues to require the following intervention to meet STG and LTG's:  PT    Recommendations:  This patient would benefit from continued therapy.     Frequency:  1 X week, once daily  Duration:  for 5 weeks        Please refer to the daily flowsheet for treatment today, total treatment time and time spent performing 1:1 timed codes.

## 2020-10-07 ENCOUNTER — VIRTUAL VISIT (OUTPATIENT)
Dept: PSYCHOLOGY | Facility: CLINIC | Age: 25
End: 2020-10-07
Payer: COMMERCIAL

## 2020-10-07 DIAGNOSIS — F33.1 MODERATE EPISODE OF RECURRENT MAJOR DEPRESSIVE DISORDER (H): Primary | ICD-10-CM

## 2020-10-07 DIAGNOSIS — F41.1 GAD (GENERALIZED ANXIETY DISORDER): ICD-10-CM

## 2020-10-07 PROCEDURE — 90834 PSYTX W PT 45 MINUTES: CPT | Mod: 95 | Performed by: SOCIAL WORKER

## 2020-10-07 NOTE — PROGRESS NOTES
Progress Note    Patient Name: Genesis Cutler  Date: 10/7/20         Service Type: Individual      Session Start Time: 7:01am  Session End Time: 7:46am     Session Length: 45min    Session #: 12    Attendees: Client attended alone    Mode of Communication: [employer]     Telemedicine Visit: The patient's condition can be safely assessed and treated via synchronous audio and visual telemedicine encounter.      Reason for Telemedicine Visit: Services only offered telehealth    Originating Site (Patient Location): Patient's home    Distant Site (Provider Location): Provider Remote Setting: home office    Consent:  The patient/guardian has verbally consented to: the potential risks and benefits of telemedicine (video visit) versus in person care; bill my insurance or make self-payment for services provided; and responsibility for payment of non-covered services.      Treatment Plan Last Reviewed: 9/17/20  PHQ-9 / NEGRO-7 :  10/7/20    DATA  Interactive Complexity: No  Crisis: No       Progress Since Last Session (Related to Symptoms / Goals / Homework):   Symptoms: Improving less depressed and well managed mental health, despite anxiety regarding life decision    Homework: Partially completed      Episode of Care Goals: Satisfactory progress - ACTION (Actively working towards change); Intervened by reinforcing change plan / affirming steps taken     Current / Ongoing Stressors and Concerns:   Only female in male dominated field at work, COVID distress, upcoming shoulder surgery     Treatment Objective(s) Addressed in This Session:   identify 3 strategies to more effectively address stressors  Practice perspective taking  Identify negative self-talk and behaviors: challenge core beliefs, myths, and actions  Practice healthy mental practices while eating  use positive self-affirmations daily       Intervention:   CBT: Client reported that things are going well overall,  but that she has had some anxiety about making a decision with her  about children. She processed through her fears about it, while also identifying pressure and worries to ensure he is happy. Therapist listened and validated distress, while working to explore helpful thinking patterns to reduce anxiety, and working through the option to take time, rather than pressure self to make decision immediately. Therapist reflected on her efforts to please  and normalized, while also challenging her guilt about being selfish. Client endorsed distress about this and was able to reflect on reducing black and white thinking. Client reviewed helpful coping as well as exploring helpful communication with her .        ASSESSMENT: Current Emotional / Mental Status (status of significant symptoms):   Risk status (Self / Other harm or suicidal ideation)   Patient denies current fears or concerns for personal safety.   Patient denies current or recent suicidal ideation or behaviors.   Patient denies current or recent homicidal ideation or behaviors.   Patient denies current or recent self injurious behavior or ideation.   Patient denies other safety concerns.   Patient reports there has been no change in risk factors since their last session.     Patient reports there has been no change in protective factors since their last session.     Recommended that patient call 911 or go to the local ED should there be a change in any of these risk factors.     Appearance:   Appropriate    Eye Contact:   Fair    Psychomotor Behavior: Normal    Attitude:   Cooperative    Orientation:   All   Speech    Rate / Production: Normal/ Responsive    Volume:  Normal    Mood:    Anxious  Irritable    Affect:    Appropriate  Worrisome    Thought Content:  Clear    Thought Form:  Coherent  Logical    Insight:    Fair      Medication Review:   No changes to current psychiatric medication(s)        Medication Compliance:   Yes     Changes  in Health Issues:   None reported     Chemical Use Review:   Substance Use: Chemical use reviewed, no active concerns identified      Tobacco Use: No current tobacco use.      Diagnosis:  1. Moderate episode of recurrent major depressive disorder (H)    2. NEGRO (generalized anxiety disorder)        Collateral Reports Completed:   Not Applicable    PLAN: (Patient Tasks / Therapist Tasks / Other)  Client will return Nov 4 at 7am. She will work to reduce anxiety by giving herself time to process through decision regarding family and use open, expressive communication with  to work through distress and feelings.      Amee León, [facility] 10/7/2020                                                                                                              ______________________________________________________________________    Treatment Plan    Patient's Name: Genesis Cutler  YOB: 1995    Date: 9/17/20    DSM5 Diagnoses: 296.32 (F33.1) Major Depressive Disorder, Recurrent Episode, Moderate _ or 300.02 (F41.1) Generalized Anxiety Disorder  Psychosocial / Contextual Factors: Recent transition to work, only female in male dominated field at work  WHODAS:   WHODAS 2.0 Total Score 2/7/2020   Total Score 28       Referral / Collaboration:  Referral to another professional/service is not indicated at this time..    Anticipated number of session or this episode of care: 10-14    *Progress noted, but continued work needed    MeasurableTreatment Goal(s) related to diagnosis / functional impairment(s)  Goal 1: Patient will learn skills to manage and reduce anxiety, as measured by NEGRO-7.    I will know I've met my goal when I can try to stop moments of intense anxiety.      Objective #A (Patient Action)    Patient will identify 3 fears / thoughts that contribute to feeling anxious.  Status: Continued - Date(s):  9/17/20    Intervention(s)  Therapist will teach emotional  recognition/identification. to identify top 3 triggers for anxiety.    Objective #B  Patient will use at least 3 coping skills for anxiety management in the next 4 weeks.  Status: Continued - Date(s): 9/17/20     Intervention(s)  Therapist will teach emotional regulation skills. 3 new calming/coping skills to manage anxiety.    Objective #C  Patient will use relaxation strategies 1 times per day to reduce the physical symptoms of anxiety.  Status: Continued - Date(s):  9/17/20    Intervention(s)  Therapist will assign homework practice relaxation and mindfulness daily.      Goal 2: Patient will reduce depression and improve motivation, as measured by PHQ-9.     I will know I've met my goal when I want to do things I enjoy again.      Objective #A (Patient Action)    Status: Continued - Date(s):   9/17/20    Patient will Identify negative self-talk and behaviors: challenge core beliefs, myths, and actions.    Intervention(s)  Therapist will teach emotional recognition/identification. to identify 3 thoughts/triggers for depression and shame.    Objective #B  Patient will Increase interest, engagement, and pleasure in doing things.    Status: Continued - Date(s): 9/17/20    Intervention(s)  Therapist will provide educational materials on depression  teach emotional regulation skills. 3 new coping skills to improve mood and motivation.    Objective #C  Patient will use positive self-affirmations daily.  Status: Continued - Date(s):  9/17/20    Intervention(s)  Therapist will assign homework practice using positive self-talk daily to combat critical self-talk and shame.    Goal 3: Client will improve self-esteem and reduce restrictive eating habits while reducing distress     I will know I've met my goal when I can be ok not counting calories at each meal.      Objective #A (Client Action)    Client will relieve distress about body image by stop counting calories at mealtime.  Status: Continued - Date(s):  9/17/20    Intervention(s)  Therapist will assign homework client is to stop counting calories at meals.    Objective #B  Client will identify 2 messages and impacts related to body image and challenge negative messages and replace with positive self messages.    Status: Continued - Date(s): 9/17/20    Intervention(s)  Therapist will assign homework use journaling to process impacts of eating habits and challenge negative messages from others regarding body image.      Patient has reviewed and agreed to the above plan.      Amee eLón, [facility]  September 17, 2020

## 2020-10-07 NOTE — PATIENT INSTRUCTIONS
Client will return Nov 4 at 7am. She will work to reduce anxiety by giving herself time to process through decision regarding family and use open, expressive communication with  to work through distress and feelings.

## 2020-10-15 ENCOUNTER — OFFICE VISIT (OUTPATIENT)
Dept: ORTHOPEDICS | Facility: CLINIC | Age: 25
End: 2020-10-15
Payer: COMMERCIAL

## 2020-10-15 ENCOUNTER — THERAPY VISIT (OUTPATIENT)
Dept: PHYSICAL THERAPY | Facility: CLINIC | Age: 25
End: 2020-10-15
Payer: COMMERCIAL

## 2020-10-15 VITALS — OXYGEN SATURATION: 97 % | SYSTOLIC BLOOD PRESSURE: 108 MMHG | DIASTOLIC BLOOD PRESSURE: 74 MMHG | HEART RATE: 85 BPM

## 2020-10-15 DIAGNOSIS — Z47.89 AFTERCARE FOLLOWING SURGERY OF THE MUSCULOSKELETAL SYSTEM: ICD-10-CM

## 2020-10-15 DIAGNOSIS — M25.519 PAIN IN JOINT, SHOULDER REGION: ICD-10-CM

## 2020-10-15 DIAGNOSIS — M67.921 BICEPS TENDINOPATHY, RIGHT: Primary | ICD-10-CM

## 2020-10-15 PROCEDURE — 97112 NEUROMUSCULAR REEDUCATION: CPT | Mod: GP | Performed by: PHYSICAL THERAPIST

## 2020-10-15 PROCEDURE — 99024 POSTOP FOLLOW-UP VISIT: CPT | Performed by: ORTHOPAEDIC SURGERY

## 2020-10-15 PROCEDURE — 97110 THERAPEUTIC EXERCISES: CPT | Mod: GP | Performed by: PHYSICAL THERAPIST

## 2020-10-15 ASSESSMENT — PAIN SCALES - GENERAL: PAINLEVEL: NO PAIN (1)

## 2020-10-15 NOTE — PATIENT INSTRUCTIONS
Thanks for coming today.  [facility]  [address] [place] [address]    To schedule future appointments in Ortho Clinic, you may call [phone].    To schedule ordered imaging by your provider:   Call Central Imaging [phone]    To schedule an injection ordered by your provider:  Call Central Imaging Injection scheduling line: [phone]  [facility] available online at:  [email or web address]    Please call if any further questions or concerns ([phone]).  Clinic hours 8 am to 5 pm.    Return to clinic (call) if symptoms worsen or fail to improve.

## 2020-10-15 NOTE — NURSING NOTE
Genesis Cutler's chief complaint for this visit includes:  Chief Complaint   Patient presents with     Right Shoulder - Surgical Followup     12 weeks S/p Right shoulder arthroscopy, open biceps tenodesis DOS: 7/23/2020     PCP: Richard Ledbetter    Referring Provider:  No referring provider defined for this encounter.    /74 (BP Location: Left arm, Patient Position: Sitting, Cuff Size: Adult Regular)   Pulse 85   SpO2 97%   No Pain (1)     Do you need any medication refills at today's visit? No    Linda Pollack CMA

## 2020-10-15 NOTE — LETTER
10/15/2020         RE: Genesis Cutler  [address]  [facility] [address]        Dear Colleague,    Thank you for referring your patient, Genesis Cutler, to the [facility]. Please see a copy of my visit note below.    CHIEF CONCERN: Status post right shoulder arthroscopy, open biceps tenodesis  DATE OF SURGERY: 07/23/2020     HISTORY OF PRESENT ILLNESS: Sarah is a pleasant 25 year-old right hand dominant woman who is twelve weeks status post the above procedure.  She has been seeing Taty for PT the last few weeks and continues to make progress.  She notes some discomfort but pain is greatly improved.       EXAM:  Pleasant adult female in NAD  Respirations even and unlabored.  Right upper extremity:  Distally neurovascularly intact without deficits. Shoulder range of motion demonstrates assisted FE to only 165 with ER to 65.     ASSESSMENT:  1. Three months status post above procedure     PLAN:    Activity:     Range of motion as tolerated    Strengthening as tolerated    Activities as tolerated with expectation of some limitations due to weakness. Plan to progress as symptoms allow.       Work: Activities reviewed and note provided if needed    Follow up: 3 months with no new radiographs needed      Again, thank you for allowing me to participate in the care of your patient.        Sincerely,        Aline Amador MD

## 2020-10-15 NOTE — PROGRESS NOTES
CHIEF CONCERN: Status post right shoulder arthroscopy, open biceps tenodesis  DATE OF SURGERY: 07/23/2020     HISTORY OF PRESENT ILLNESS: Sarah is a pleasant 25 year-old right hand dominant woman who is twelve weeks status post the above procedure.  She has been seeing Taty for PT the last few weeks and continues to make progress.  She notes some discomfort but pain is greatly improved.       EXAM:  Pleasant adult female in NAD  Respirations even and unlabored.  Right upper extremity:  Distally neurovascularly intact without deficits. Shoulder range of motion demonstrates assisted FE to only 165 with ER to 65.     ASSESSMENT:  1. Three months status post above procedure     PLAN:    Activity:     Range of motion as tolerated    Strengthening as tolerated    Activities as tolerated with expectation of some limitations due to weakness. Plan to progress as symptoms allow.       Work: Activities reviewed and note provided if needed    Follow up: 3 months with no new radiographs needed

## 2020-10-29 ENCOUNTER — THERAPY VISIT (OUTPATIENT)
Dept: PHYSICAL THERAPY | Facility: CLINIC | Age: 25
End: 2020-10-29
Payer: COMMERCIAL

## 2020-10-29 DIAGNOSIS — Z47.89 AFTERCARE FOLLOWING SURGERY OF THE MUSCULOSKELETAL SYSTEM: ICD-10-CM

## 2020-10-29 DIAGNOSIS — M25.519 PAIN IN JOINT, SHOULDER REGION: ICD-10-CM

## 2020-10-29 PROCEDURE — 97110 THERAPEUTIC EXERCISES: CPT | Mod: GP | Performed by: PHYSICAL THERAPIST

## 2020-10-29 PROCEDURE — 97140 MANUAL THERAPY 1/> REGIONS: CPT | Mod: GP | Performed by: PHYSICAL THERAPIST

## 2020-11-04 ENCOUNTER — VIRTUAL VISIT (OUTPATIENT)
Dept: PSYCHOLOGY | Facility: CLINIC | Age: 25
End: 2020-11-04
Payer: COMMERCIAL

## 2020-11-04 DIAGNOSIS — F41.1 GAD (GENERALIZED ANXIETY DISORDER): ICD-10-CM

## 2020-11-04 DIAGNOSIS — F33.1 MODERATE EPISODE OF RECURRENT MAJOR DEPRESSIVE DISORDER (H): Primary | ICD-10-CM

## 2020-11-04 PROCEDURE — 90834 PSYTX W PT 45 MINUTES: CPT | Mod: 95 | Performed by: SOCIAL WORKER

## 2020-11-04 NOTE — PATIENT INSTRUCTIONS
Client will return Dec 4 at 7am. She will practice deep breathing, calming self-talk and self-care to manage stress and anxiety. She will also remind herself of where she has control.

## 2020-11-04 NOTE — PROGRESS NOTES
Progress Note    Patient Name: Genesis Cutler  Date: 11/4/20         Service Type: Individual      Session Start Time: 7:00am  Session End Time: 7:51am     Session Length: 51min    Session #: 13    Attendees: Client attended alone    Mode of Communication: [employer] Video     Telemedicine Visit: The patient's condition can be safely assessed and treated via synchronous audio and visual telemedicine encounter.      Reason for Telemedicine Visit: Services only offered telehealth    Originating Site (Patient Location): Patient's home    Distant Site (Provider Location): Provider Remote Setting: home office    Consent:  The patient/guardian has verbally consented to: the potential risks and benefits of telemedicine (video visit) versus in person care; bill my insurance or make self-payment for services provided; and responsibility for payment of non-covered services.      Treatment Plan Last Reviewed: 9/17/20  PHQ-9 / NEGRO-7 :  11/4/20    DATA  Interactive Complexity: No  Crisis: No       Progress Since Last Session (Related to Symptoms / Goals / Homework):   Symptoms: Improving endorsed reduced depression and managing anxiety    Homework: Partially completed      Episode of Care Goals: Satisfactory progress - ACTION (Actively working towards change); Intervened by reinforcing change plan / affirming steps taken     Current / Ongoing Stressors and Concerns:   Only female in male dominated field at work, COVID distress, upcoming shoulder surgery     Treatment Objective(s) Addressed in This Session:   identify 3 strategies to more effectively address stressors  Practice perspective taking  Identify negative self-talk and behaviors: challenge core beliefs, myths, and actions  Practice healthy mental practices while eating  use positive self-affirmations daily       Intervention:   Solution Focused: Client reviewed the past month, noting some distress. She worked through her  distress about the election, as well as processing through the decision about having children. She endorsed some worries about dynamics with her family due to politics and processed through this. Therapist listened and validated distress during this time, while working to explore helpful ways to interact and reduce discord in relationships. Client endorsed some efforts to set boundaries in conversations, which has been helpful and noted plans to continue to do so as needed. She identified feeling relief regarding her and her 's choice about adopting. She processed through this while therapist noted her positive attitude and relief. Client endorsed overall progress with reduced depression and managed mental health. Client and therapist reviewed helpful ways to manage distress and reflect on locus of control during this time.        ASSESSMENT: Current Emotional / Mental Status (status of significant symptoms):   Risk status (Self / Other harm or suicidal ideation)   Patient denies current fears or concerns for personal safety.   Patient denies current or recent suicidal ideation or behaviors.   Patient denies current or recent homicidal ideation or behaviors.   Patient denies current or recent self injurious behavior or ideation.   Patient denies other safety concerns.   Patient reports there has been no change in risk factors since their last session.     Patient reports there has been no change in protective factors since their last session.     Recommended that patient call 911 or go to the local ED should there be a change in any of these risk factors.     Appearance:   Appropriate    Eye Contact:   Fair    Psychomotor Behavior: Normal    Attitude:   Cooperative    Orientation:   All   Speech    Rate / Production: Normal/ Responsive    Volume:  Normal    Mood:    Anxious  Irritable  Normal   Affect:    Appropriate    Thought Content:  Clear    Thought Form:  Coherent  Logical    Insight:    Fair       Medication Review:   No changes to current psychiatric medication(s)        Medication Compliance:   Yes     Changes in Health Issues:   None reported     Chemical Use Review:   Substance Use: Chemical use reviewed, no active concerns identified      Tobacco Use: No current tobacco use.      Diagnosis:  1. Moderate episode of recurrent major depressive disorder (H)    2. NEGRO (generalized anxiety disorder)        Collateral Reports Completed:   Not Applicable    PLAN: (Patient Tasks / Therapist Tasks / Other)  Client will return Dec 4 at 7am. She will practice deep breathing, calming self-talk and self-care to manage stress and anxiety. She will also remind herself of where she has control.       Amee León, [facility] 11/4/2020                                                                                                                ______________________________________________________________________    Treatment Plan    Patient's Name: Genesis Cutler  YOB: 1995    Date: 9/17/20    DSM5 Diagnoses: 296.32 (F33.1) Major Depressive Disorder, Recurrent Episode, Moderate _ or 300.02 (F41.1) Generalized Anxiety Disorder  Psychosocial / Contextual Factors: Recent transition to work, only female in male dominated field at work  WHODAS:   WHODAS 2.0 Total Score 2/7/2020   Total Score 28       Referral / Collaboration:  Referral to another professional/service is not indicated at this time..    Anticipated number of session or this episode of care: 10-14    *Progress noted, but continued work needed    MeasurableTreatment Goal(s) related to diagnosis / functional impairment(s)  Goal 1: Patient will learn skills to manage and reduce anxiety, as measured by NEGRO-7.    I will know I've met my goal when I can try to stop moments of intense anxiety.      Objective #A (Patient Action)    Patient will identify 3 fears / thoughts that contribute to feeling anxious.  Status: Continued - Date(s):   9/17/20    Intervention(s)  Therapist will teach emotional recognition/identification. to identify top 3 triggers for anxiety.    Objective #B  Patient will use at least 3 coping skills for anxiety management in the next 4 weeks.  Status: Continued - Date(s): 9/17/20     Intervention(s)  Therapist will teach emotional regulation skills. 3 new calming/coping skills to manage anxiety.    Objective #C  Patient will use relaxation strategies 1 times per day to reduce the physical symptoms of anxiety.  Status: Continued - Date(s):  9/17/20    Intervention(s)  Therapist will assign homework practice relaxation and mindfulness daily.      Goal 2: Patient will reduce depression and improve motivation, as measured by PHQ-9.     I will know I've met my goal when I want to do things I enjoy again.      Objective #A (Patient Action)    Status: Continued - Date(s):   9/17/20    Patient will Identify negative self-talk and behaviors: challenge core beliefs, myths, and actions.    Intervention(s)  Therapist will teach emotional recognition/identification. to identify 3 thoughts/triggers for depression and shame.    Objective #B  Patient will Increase interest, engagement, and pleasure in doing things.    Status: Continued - Date(s): 9/17/20    Intervention(s)  Therapist will provide educational materials on depression  teach emotional regulation skills. 3 new coping skills to improve mood and motivation.    Objective #C  Patient will use positive self-affirmations daily.  Status: Continued - Date(s):  9/17/20    Intervention(s)  Therapist will assign homework practice using positive self-talk daily to combat critical self-talk and shame.    Goal 3: Client will improve self-esteem and reduce restrictive eating habits while reducing distress     I will know I've met my goal when I can be ok not counting calories at each meal.      Objective #A (Client Action)    Client will relieve distress about body image by stop counting calories  at mealtime.  Status: Continued - Date(s): 9/17/20    Intervention(s)  Therapist will assign homework client is to stop counting calories at meals.    Objective #B  Client will identify 2 messages and impacts related to body image and challenge negative messages and replace with positive self messages.    Status: Continued - Date(s): 9/17/20    Intervention(s)  Therapist will assign homework use journaling to process impacts of eating habits and challenge negative messages from others regarding body image.      Patient has reviewed and agreed to the above plan.      Amee León, [facility]  September 17, 2020

## 2020-11-12 ENCOUNTER — THERAPY VISIT (OUTPATIENT)
Dept: PHYSICAL THERAPY | Facility: CLINIC | Age: 25
End: 2020-11-12
Payer: COMMERCIAL

## 2020-11-12 DIAGNOSIS — Z47.89 AFTERCARE FOLLOWING SURGERY OF THE MUSCULOSKELETAL SYSTEM: ICD-10-CM

## 2020-11-12 DIAGNOSIS — M25.519 PAIN IN JOINT, SHOULDER REGION: ICD-10-CM

## 2020-11-12 PROCEDURE — 97140 MANUAL THERAPY 1/> REGIONS: CPT | Mod: GP | Performed by: PHYSICAL THERAPIST

## 2020-11-12 PROCEDURE — 97110 THERAPEUTIC EXERCISES: CPT | Mod: GP | Performed by: PHYSICAL THERAPIST

## 2020-11-13 PROBLEM — Z47.89 AFTERCARE FOLLOWING SURGERY OF THE MUSCULOSKELETAL SYSTEM: Status: RESOLVED | Noted: 2020-08-27 | Resolved: 2020-11-13

## 2020-11-13 PROBLEM — M25.519 PAIN IN JOINT, SHOULDER REGION: Status: RESOLVED | Noted: 2020-08-27 | Resolved: 2020-11-13

## 2020-11-13 NOTE — TELEPHONE ENCOUNTER
Called to talk to Sarah as we were unable to obtain approval through a third party review I left a message for the patient to call me to discuss the next steps

## 2020-11-13 NOTE — PROGRESS NOTES
"Subjective:  HPI  Physical Exam                    Objective:  System    Physical Exam    General     ROS    Assessment/Plan:    DISCHARGE REPORT    Progress reporting period is from 10/1/2020 to 11/13/2020.       SUBJECTIVE  Subjective changes noted by patient: Patient reports she continues to strenthen every other day.   Strength is going well and continues to improve.  Does feel sshe is ready to contiue to progress with HEP independnely.  Did wake up with a \"kink\" in her nexk today.  Exercises were discussed and if she does not contiue to improve will follow up with MD.    Current pain level is 0/10  Changes in function:  Yes (See Goal flowsheet attached for changes in current functional level)  Adverse reaction to treatment or activity: None    OBJECTIVE  Changes noted in objective findings:  Yes,   AROM   Flexion 165   Abduction 168   ER 80   Extension 55   IR T8      MMT   Flexion 5/5   Scaption 4+/5   Abduction 4+/5   IR 5/5   ER 4+/5   Biceps 5/5     ASSESSMENT/PLAN  Updated problem list and treatment plan: Diagnosis 1:  S/P Shoulder  Decreased strength - home program  STG/LTGs have been met or progress has been made towards goals:  Yes (See Goal flow sheet completed today.)  Assessment of Progress: The patient's condition is improving.  Self Management Plans:  Patient is independent in a home treatment program.  Patient is independent in self management of symptoms.    Recommendations:  This patient is ready to be discharged from therapy and continue their home treatment program.    Please refer to the daily flowsheet for treatment today, total treatment time and time spent performing 1:1 timed codes.          "

## 2020-11-16 ENCOUNTER — PATIENT OUTREACH (OUTPATIENT)
Dept: GASTROENTEROLOGY | Facility: CLINIC | Age: 25
End: 2020-11-16

## 2020-11-16 RX ORDER — PRUCALOPRIDE 2 MG/1
2 TABLET, FILM COATED ORAL DAILY
Qty: 30 TABLET | Refills: 3 | Status: SHIPPED | OUTPATIENT
Start: 2020-11-16 | End: 2021-03-10 | Stop reason: SINTOL

## 2020-11-16 NOTE — PROGRESS NOTES
Prior Authorization Retail Medication Request    Medication/Dose:   Prucalopride Succinate (MOTEGRITY) 2 MG TABS 30 tablet 3 11/16/2020  --   Sig - Route: Take 2 mg by mouth daily - Oral       ICD code (if different than what is on RX):    Previously Tried and Failed:  marisol Doshi.  Was doing fine on Trulance but the insurance company will not continue to pay for the medication.  They are making her switch to Motegrity.   Rationale:  This is the medication the insurance is making her try     Insurance Name:    Insurance ID:        Pharmacy Information (if different than what is on RX)  Name:    Phone:

## 2020-11-17 NOTE — TELEPHONE ENCOUNTER
Central Prior Authorization Team   [phone]    PA Initiation    Medication: Prucalopride Succinate (MOTEGRITY) 2 MG TABS  Insurance Company: [email or web address] ([facility]) - Phone [phone] Fax [phone]  Pharmacy Filling the Rx: [facility]ACY #[id number] - [place], [place] - [address], [place] AT [facility]  Filling Pharmacy Phone: [phone]  Filling Pharmacy Fax: [phone]  Start Date: 11/17/2020

## 2020-11-18 NOTE — TELEPHONE ENCOUNTER
Prior Authorization Approval    Authorization Effective Date: 11/17/2020  Authorization Expiration Date: 11/17/2021  Medication: Prucalopride Succinate (MOTEGRITY) 2 MG TABS-PA APPROVED   Approved Dose/Quantity:   Reference #:     Insurance Company: [employer] ([facility]) - Phone [phone] Fax [phone]  Expected CoPay:       CoPay Card Available:      Foundation Assistance Needed:    Which Pharmacy is filling the prescription (Not needed for infusion/clinic administered): [facility]ARMACY #[id number] - [place] - [address] [facility]  Pharmacy Notified: Yes-Pharmacy stated that they placed an order for medication which will be available 11/19/2020 and will notify patient when medication is ready for .   Patient Notified: Yes

## 2020-11-24 DIAGNOSIS — F41.9 ANXIETY AND DEPRESSION: ICD-10-CM

## 2020-11-24 DIAGNOSIS — F32.A ANXIETY AND DEPRESSION: ICD-10-CM

## 2020-11-27 ENCOUNTER — MYC REFILL (OUTPATIENT)
Dept: PEDIATRICS | Facility: CLINIC | Age: 25
End: 2020-11-27

## 2020-11-27 ENCOUNTER — MYC MEDICAL ADVICE (OUTPATIENT)
Dept: PEDIATRICS | Facility: CLINIC | Age: 25
End: 2020-11-27

## 2020-11-27 DIAGNOSIS — F41.9 ANXIETY AND DEPRESSION: ICD-10-CM

## 2020-11-27 DIAGNOSIS — F32.A ANXIETY AND DEPRESSION: ICD-10-CM

## 2020-11-27 RX ORDER — ESCITALOPRAM OXALATE 20 MG/1
20 TABLET ORAL DAILY
Qty: 90 TABLET | Refills: 0 | Status: CANCELLED | OUTPATIENT
Start: 2020-11-27

## 2020-11-28 RX ORDER — ESCITALOPRAM OXALATE 20 MG/1
20 TABLET ORAL DAILY
Qty: 90 TABLET | Refills: 0 | Status: SHIPPED | OUTPATIENT
Start: 2020-11-28 | End: 2021-02-19

## 2020-11-28 NOTE — TELEPHONE ENCOUNTER
escitalopram (LEXAPRO) 20 MG tablet     Last Written Prescription Date:  9/1/20  Last Fill Quantity: 90,   # refills: 0  Last Office Visit : 7/15/20  Future Office visit:  none    phq9 11/4/20   5.    90 day SENT to pharmacy

## 2020-12-04 ENCOUNTER — VIRTUAL VISIT (OUTPATIENT)
Dept: PSYCHOLOGY | Facility: CLINIC | Age: 25
End: 2020-12-04
Payer: COMMERCIAL

## 2020-12-04 DIAGNOSIS — F41.1 GAD (GENERALIZED ANXIETY DISORDER): ICD-10-CM

## 2020-12-04 DIAGNOSIS — F33.1 MODERATE EPISODE OF RECURRENT MAJOR DEPRESSIVE DISORDER (H): Primary | ICD-10-CM

## 2020-12-04 PROCEDURE — 90834 PSYTX W PT 45 MINUTES: CPT | Mod: 95 | Performed by: SOCIAL WORKER

## 2020-12-04 NOTE — PATIENT INSTRUCTIONS
Client will return Dylan 15 at 7am. She will use journaling to further process her anger. She will also use perspective taking in efforts to manage distress.

## 2020-12-04 NOTE — PROGRESS NOTES
Progress Note    Patient Name: Genesis Cutler  Date: 12/4/20         Service Type: Individual      Session Start Time: 7:00am  Session End Time: 7:52am     Session Length: 52min    Session #: 14    Attendees: Client attended alone    Mode of Communication: [employer] Video     Telemedicine Visit: The patient's condition can be safely assessed and treated via synchronous audio and visual telemedicine encounter.      Reason for Telemedicine Visit: Services only offered telehealth    Originating Site (Patient Location): Patient's home    Distant Site (Provider Location): Provider Remote Setting: home office    Consent:  The patient/guardian has verbally consented to: the potential risks and benefits of telemedicine (video visit) versus in person care; bill my insurance or make self-payment for services provided; and responsibility for payment of non-covered services.      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 She identified experiencing anger and processed through some triggers with family and with work. She processed through the trigger of differing opinions and beliefs that cause her distress. Therapist listened and validated, reflecting on the information the anger triggers is giving her about her beliefs. Therapist normalized and validated, while working to explore helpful skills to manage her emotoinal distress. Client agreed to use journaling to further process and express emotions in efforts to find some relief. Client endorsed progress on all goals as well, including positive efforts with eating habits and managing mood. Therapist praised her efforts and progress.        ASSESSMENT: Current Emotional / Mental Status (status of significant symptoms):   Risk status (Self / Other harm or suicidal ideation)   Patient denies current fears or concerns for personal safety.   Patient denies current or recent suicidal ideation or behaviors.   Patient denies current or recent homicidal ideation or behaviors.   Patient denies current or recent self injurious behavior or ideation.   Patient denies other safety concerns.   Patient reports there has been no change in risk factors since their last session.     Patient reports there has been no change in protective factors since their last session.     Recommended that patient call 911 or go to the local ED should there be a change in any of these risk factors.     Appearance:   Appropriate    Eye Contact:   Fair    Psychomotor Behavior: Normal    Attitude:   Cooperative    Orientation:   All   Speech    Rate / Production: Normal/ Responsive    Volume:  Normal    Mood:    Angry  Anxious  Normal   Affect:    Appropriate    Thought Content:  Clear    Thought Form:  Coherent  Logical    Insight:    Fair      Medication Review:   No changes to current psychiatric medication(s)        Medication Compliance:   Yes     Changes in Health  Issues:   None reported     Chemical Use Review:   Substance Use: Chemical use reviewed, no active concerns identified      Tobacco Use: No current tobacco use.      Diagnosis:  1. Moderate episode of recurrent major depressive disorder (H)    2. NEGRO (generalized anxiety disorder)        Collateral Reports Completed:   Not Applicable    PLAN: (Patient Tasks / Therapist Tasks / Other)  Client will return Dylan 15 at 7am. She will use journaling to further process her anger. She will also use perspective taking in efforts to manage distress.       Amee León, [facility] 12/4/2020                                                                                                        ______________________________________________________________________    Treatment Plan    Patient's Name: Genesis Cutler  YOB: 1995    Date: 12/4/20    DSM5 Diagnoses: 296.32 (F33.1) Major Depressive Disorder, Recurrent Episode, Moderate _ or 300.02 (F41.1) Generalized Anxiety Disorder  Psychosocial / Contextual Factors: Recent transition to work, only female in male dominated field at work  WHODAS:   WHODAS 2.0 Total Score 2/7/2020   Total Score 28       Referral / Collaboration:  Referral to another professional/service is not indicated at this time..    Anticipated number of session or this episode of care: 10-14    *Client endorsed progress, but goals maintained for enhanced progress    MeasurableTreatment Goal(s) related to diagnosis / functional impairment(s)  Goal 1: Patient will learn skills to manage and reduce anxiety, as measured by NEGRO-7.    I will know I've met my goal when I can try to stop moments of intense anxiety.      Objective #A (Patient Action)    Patient will identify 3 fears / thoughts that contribute to feeling anxious.  Status: Continued - Date(s):  12/4/20    Intervention(s)  Therapist will teach emotional recognition/identification. to identify top 3 triggers for anxiety.    Objective  #B  Patient will use at least 3 coping skills for anxiety management in the next 4 weeks.  Status: Continued - Date(s): 12/4/20    Intervention(s)  Therapist will teach emotional regulation skills. 3 new calming/coping skills to manage anxiety.    Objective #C  Patient will use relaxation strategies 1 times per day to reduce the physical symptoms of anxiety.  Status: Continued - Date(s):  12/4/20    Intervention(s)  Therapist will assign homework practice relaxation and mindfulness daily.      Goal 2: Patient will reduce depression and improve motivation, as measured by PHQ-9.     I will know I've met my goal when I want to do things I enjoy again.      Objective #A (Patient Action)    Status: Continued - Date(s):   12/4/20    Patient will Identify negative self-talk and behaviors: challenge core beliefs, myths, and actions.    Intervention(s)  Therapist will teach emotional recognition/identification. to identify 3 thoughts/triggers for depression and shame.    Objective #B  Patient will Increase interest, engagement, and pleasure in doing things.    Status: Continued - Date(s): 12/4/20    Intervention(s)  Therapist will provide educational materials on depression  teach emotional regulation skills. 3 new coping skills to improve mood and motivation.    Objective #C  Patient will use positive self-affirmations daily.  Status: Continued - Date(s):  12/4/20    Intervention(s)  Therapist will assign homework practice using positive self-talk daily to combat critical self-talk and shame.    Goal 3: Client will improve self-esteem and reduce restrictive eating habits while reducing distress     I will know I've met my goal when I can be ok not counting calories at each meal.      Objective #A (Client Action)    Client will relieve distress about body image by stop counting calories at mealtime.  Status: Continued - Date(s): 12/4/20    Intervention(s)  Therapist will assign homework client is to stop counting calories at  meals.    Objective #B  Client will identify 2 messages and impacts related to body image and challenge negative messages and replace with positive self messages.    Status: Continued - Date(s): 12/4/20    Intervention(s)  Therapist will assign homework use journaling to process impacts of eating habits and challenge negative messages from others regarding body image.      Patient has reviewed and agreed to the above plan.      Amee León, [facility]  December 4, 2020

## 2021-01-04 ENCOUNTER — MYC MEDICAL ADVICE (OUTPATIENT)
Dept: GASTROENTEROLOGY | Facility: CLINIC | Age: 26
End: 2021-01-04

## 2021-01-04 NOTE — TELEPHONE ENCOUNTER
Spoke to or pharmacist Dr Dennison in relation to the patients medication. It does say that patients are more apt to have depressive symptoms and suicidal ideations while taking  Motegrity.  patient was informed via voicemail and  Told to stop.  Did request patient give me a call to discuss next steps

## 2021-01-14 ENCOUNTER — TELEPHONE (OUTPATIENT)
Dept: GASTROENTEROLOGY | Facility: CLINIC | Age: 26
End: 2021-01-14

## 2021-01-14 DIAGNOSIS — K59.04 CHRONIC IDIOPATHIC CONSTIPATION: ICD-10-CM

## 2021-01-14 NOTE — TELEPHONE ENCOUNTER
Order placed for the patient to continue on Trulance Letter of medical necessity in the patients chart and will inform the PA team.

## 2021-01-14 NOTE — LETTER
2021    INSUR[facility] Plan: [employer] / Product Type: HMO /   Re: Prior Authorization Request  Patient: Genesis Cutler  Policy ID#:  [id number]  : 1995      To Whom It May Concern:    I am writing to formally request a prior authorization of coverage for my patient, Genesis Cutler (: 1995), for treatment using plecanatide (Trulance).    The therapy involves plecanatide 3 mg daily for management of chronic idiopathic constipation. She had previously been maintained on this medication with success for over two years. Unfortunately, your company did not approve continued coverage for her plecanatide when requested in 2020. Ms. Cutler was not able to pay out-of-pocket for this medication. Your company reported that prucalopride (Motegrity) would be covered and Ms. Cutler was then transitioned to this medication.    Concerningly, Ms. Cutler developed marked symptoms of depression shortly after starting prucalopride (Motegrity). As you may be aware this is a known adverse effect as reported by the , [employer], with suicidal ideation, suicides, and suicide attempts occurring in the initial clinical trials (Motegrity (prucalopride) Prescribing Information. ERNESTINA Quinonez: [employer] and suicidal ideation and new-onset or worsening depression reported in post-marketing reports.    In addition to developing this severe adverse effect from prucalopride, Ms. Cutler has failed many other constipation interventions (and combinations of these prior interventions) including Miralax, fiber, prunes/prune juice, stool softeners, various forms of magnesium, and stimulant laxatives. She has also failed the daily medication linactolide (Linzess) at the highest available dose.    The benefits of plecanatide use include regulation of bowel pattern and reduction of associated gastrointestinal symptoms which impact her  "quality of life and ability to continue to work. She has already sought treatment for this from multi-disciplinary services (i.e., primary care clinic, gastroenterology, colorectal surgery, emergency medicine etc.).    Plecanatide is appropriate treatment to regulate the bowel pattern, as detailed in the citations that follow. (Saira Hernandez; Toi Haddad; Anuel Johns; Regla West; Francoise Ellis; Steph Garcia; Tan Ford. (2013-09-01). \"Plecanatide, an Oral Guanylate Cyclase C Agonist Acting Locally in the Gastrointestinal Tract, Is Safe and Well-Tolerated in Single Doses\". Digestive Diseases and Sciences. 58 (9): 7290-6274 and Leandra PB, Anaid WD, So GJ, et al. A randomized phase III trial of plecanatide, a uroguanylin analog, in patients with chronic idiopathic constipation. Am J Gastroenterol 2017; 112: 613-621).    Treating her chronic idiopathic constipation with plecanatide (Trulance) has led to decreased use of medical resources such as therapeutic gastrograffin enemas, additional clinic visits, and emergency department visits. Without adequate treatment, her constipation symptoms can contribute to increased use of these medical resources, worsen her quality of life, and impact her ability to continue to work (leading to missed days of work).    I have included medical records pertaining to the patient s medical history, current condition, and treatment plan.    Please contact me at Dept: [phone] if you require additional information to ensure the prompt approval for coverage.    Please send your written decision to me at this address:    [facility]  [address] [place] [id number] [phone]    Sincerely,    Gayle Haro MD   of Medicine  Division of Gastroenterology, Hepatology and Nutrition  [facility]  "

## 2021-01-14 NOTE — TELEPHONE ENCOUNTER
Prior Authorization Retail Medication Request    Medication/Dose:   plecanatide (TRULANCE) 3 MG tablet 90 tablet 3 1/14/2021  No   Sig - Route: Take 1 tablet (3 mg) by mouth daily - Oral   Sent to pharmacy as: Plecanatide 3 MG Oral Tablet (TRULANCE       ICD code (if different than what is on RX):    Previously Tried and Failed:  All over the counter Linzess and Motegirty.   Rationale:      Insurance Name:    Insurance ID:        Pharmacy Information (if different than what is on RX)  Name:    Phone:        THERE IS A LETTER OF MEDICAL NECESSITY IN THE PATIENTS CHART

## 2021-01-15 ENCOUNTER — VIRTUAL VISIT (OUTPATIENT)
Dept: PSYCHOLOGY | Facility: CLINIC | Age: 26
End: 2021-01-15
Payer: COMMERCIAL

## 2021-01-15 DIAGNOSIS — F33.1 MODERATE EPISODE OF RECURRENT MAJOR DEPRESSIVE DISORDER (H): Primary | ICD-10-CM

## 2021-01-15 DIAGNOSIS — F41.1 GAD (GENERALIZED ANXIETY DISORDER): ICD-10-CM

## 2021-01-15 PROCEDURE — 90834 PSYTX W PT 45 MINUTES: CPT | Mod: 95 | Performed by: SOCIAL WORKER

## 2021-01-15 NOTE — PROGRESS NOTES
Progress Note    Patient Name: Genesis Cutler  Date: 1/15/21         Service Type: Individual      Session Start Time: 7:01am  Session End Time: 7:53am     Session Length: 52min    Session #: 15    Attendees: Client attended alone    Mode of Communication: diaDexus Video     Telemedicine Visit: The patient's condition can be safely assessed and treated via synchronous audio and visual telemedicine encounter.      Reason for Telemedicine Visit: Services only offered telehealth    Originating Site (Patient Location): Patient's home    Distant Site (Provider Location): Provider Remote Setting: home office    Consent:  The patient/guardian has verbally consented to: the potential risks and benefits of telemedicine (video visit) versus in person care; bill my insurance or make self-payment for services provided; and responsibility for payment of non-covered services.      Treatment Plan Last Reviewed: 12/4/20  PHQ-9 / NEGRO-7 :  1/15/21    DATA  Interactive Complexity: No  Crisis: No       Progress Since Last Session (Related to Symptoms / Goals / Homework):   Symptoms: No change stable, but endorsed some increased distress with family dynamics and body image    Homework: Partially completed      Episode of Care Goals: Satisfactory progress - ACTION (Actively working towards change); Intervened by reinforcing change plan / affirming steps taken     Current / Ongoing Stressors and Concerns:   Only female in male dominated field at work, COVID distress, upcoming shoulder surgery     Treatment Objective(s) Addressed in This Session:   identify 3 strategies to more effectively address stressors  Practice perspective taking  Identify negative self-talk and behaviors: challenge core beliefs, myths, and actions  Practice healthy mental practices while eating  use positive self-affirmations daily       Intervention:   CBT: Client reviewed the past month and endorsed some  increased distress over the holidays. She processed through this and endorsed some negative and critical self-talk. Therapist reviewed the benefits of positive and kind self-talk to manage emotoinal distress. Client endorsed some struggles around body image and therapist validated, while working to explore helpful self-talk and exposure.  Interpersonal Therapy: Client processed through dynamics with her father that have caused distress and anger. Therapist listened and validated emotions, while working to perspective take and explore locus of control. Therapist reflected on the grief and adjusment within the relationship and normalized the distress.        ASSESSMENT: Current Emotional / Mental Status (status of significant symptoms):   Risk status (Self / Other harm or suicidal ideation)   Patient denies current fears or concerns for personal safety.   Patient denies current or recent suicidal ideation or behaviors.   Patient denies current or recent homicidal ideation or behaviors.   Patient denies current or recent self injurious behavior or ideation.   Patient denies other safety concerns.   Patient reports there has been no change in risk factors since their last session.     Patient reports there has been no change in protective factors since their last session.     Recommended that patient call 911 or go to the local ED should there be a change in any of these risk factors.     Appearance:   Appropriate    Eye Contact:   Fair    Psychomotor Behavior: Normal    Attitude:   Cooperative    Orientation:   All   Speech    Rate / Production: Normal/ Responsive    Volume:  Normal    Mood:    Angry  Depressed    Affect:    Appropriate    Thought Content:  Clear    Thought Form:  Coherent  Logical    Insight:    Fair      Medication Review:   No changes to current psychiatric medication(s)        Medication Compliance:   Yes     Changes in Health Issues:   None reported     Chemical Use Review:   Substance Use:  Chemical use reviewed, no active concerns identified      Tobacco Use: No current tobacco use.      Diagnosis:  1. Moderate episode of recurrent major depressive disorder (H)    2. NEGRO (generalized anxiety disorder)        Collateral Reports Completed:   Not Applicable    PLAN: (Patient Tasks / Therapist Tasks / Other)  Client will return Feb 19 at 7am. She will continue to practice positive and kind self-talk to reduce depression and improve self-worth. She will also continue to work on perspective taking and recognizing locus of control to manage distress in interpersonal relationships.         Amee León, [facility] 1/15/2021                                                                                                          ______________________________________________________________________    Treatment Plan    Patient's Name: Genesis Cutler  YOB: 1995    Date: 12/4/20    DSM5 Diagnoses: 296.32 (F33.1) Major Depressive Disorder, Recurrent Episode, Moderate _ or 300.02 (F41.1) Generalized Anxiety Disorder  Psychosocial / Contextual Factors: Recent transition to work, only female in male dominated field at work  WHODAS:   WHODAS 2.0 Total Score 2/7/2020   Total Score 28       Referral / Collaboration:  Referral to another professional/service is not indicated at this time..    Anticipated number of session or this episode of care: 10-14    *Client endorsed progress, but goals maintained for enhanced progress    MeasurableTreatment Goal(s) related to diagnosis / functional impairment(s)  Goal 1: Patient will learn skills to manage and reduce anxiety, as measured by NEGRO-7.    I will know I've met my goal when I can try to stop moments of intense anxiety.      Objective #A (Patient Action)    Patient will identify 3 fears / thoughts that contribute to feeling anxious.  Status: Continued - Date(s):  12/4/20    Intervention(s)  Therapist will teach emotional  recognition/identification. to identify top 3 triggers for anxiety.    Objective #B  Patient will use at least 3 coping skills for anxiety management in the next 4 weeks.  Status: Continued - Date(s): 12/4/20    Intervention(s)  Therapist will teach emotional regulation skills. 3 new calming/coping skills to manage anxiety.    Objective #C  Patient will use relaxation strategies 1 times per day to reduce the physical symptoms of anxiety.  Status: Continued - Date(s):  12/4/20    Intervention(s)  Therapist will assign homework practice relaxation and mindfulness daily.      Goal 2: Patient will reduce depression and improve motivation, as measured by PHQ-9.     I will know I've met my goal when I want to do things I enjoy again.      Objective #A (Patient Action)    Status: Continued - Date(s):   12/4/20    Patient will Identify negative self-talk and behaviors: challenge core beliefs, myths, and actions.    Intervention(s)  Therapist will teach emotional recognition/identification. to identify 3 thoughts/triggers for depression and shame.    Objective #B  Patient will Increase interest, engagement, and pleasure in doing things.    Status: Continued - Date(s): 12/4/20    Intervention(s)  Therapist will provide educational materials on depression  teach emotional regulation skills. 3 new coping skills to improve mood and motivation.    Objective #C  Patient will use positive self-affirmations daily.  Status: Continued - Date(s):  12/4/20    Intervention(s)  Therapist will assign homework practice using positive self-talk daily to combat critical self-talk and shame.    Goal 3: Client will improve self-esteem and reduce restrictive eating habits while reducing distress     I will know I've met my goal when I can be ok not counting calories at each meal.      Objective #A (Client Action)    Client will relieve distress about body image by stop counting calories at mealtime.  Status: Continued - Date(s):  12/4/20    Intervention(s)  Therapist will assign homework client is to stop counting calories at meals.    Objective #B  Client will identify 2 messages and impacts related to body image and challenge negative messages and replace with positive self messages.    Status: Continued - Date(s): 12/4/20    Intervention(s)  Therapist will assign homework use journaling to process impacts of eating habits and challenge negative messages from others regarding body image.      Patient has reviewed and agreed to the above plan.      Amee León, [facility]  December 4, 2020

## 2021-01-15 NOTE — PATIENT INSTRUCTIONS
Client will return Feb 19 at 7am. She will continue to practice positive and kind self-talk to reduce depression and improve self-worth. She will also continue to work on perspective taking and recognizing locus of control to manage distress in interpersonal relationships

## 2021-01-18 NOTE — TELEPHONE ENCOUNTER
Central Prior Authorization Team   [phone]    PA Initiation    Medication: plecanatide (TRULANCE) 3 MG tablet  Insurance Company: [employer] ([facility]) - Phone [phone] Fax [phone]  Pharmacy Filling the Rx: [facility]/PH[facility]ACY #[id number] - [place], [place] - [address], [place] AT [facility]  Filling Pharmacy Phone: [phone]  Filling Pharmacy Fax: [phone]  Start Date: 1/18/2021

## 2021-01-19 NOTE — TELEPHONE ENCOUNTER
Prior Authorization Approval    Authorization Effective Date: 1/18/2021  Authorization Expiration Date: 1/18/2022  Medication: plecanatide (TRULANCE) 3 MG tablet-PA APPROVED   Approved Dose/Quantity:   Reference #:     Insurance Company: [employer] ([facility]) - Phone [phone] Fax [phone]  Expected CoPay:       CoPay Card Available:      Foundation Assistance Needed:    Which Pharmacy is filling the prescription (Not needed for infusion/clinic administered): [id number] [place] [facility] [address] [facility]  Pharmacy Notified: Yes- **Instructed pharmacy to notify patient when script is ready to /ship.**  Patient Notified: Yes

## 2021-02-19 ENCOUNTER — VIRTUAL VISIT (OUTPATIENT)
Dept: PSYCHOLOGY | Facility: CLINIC | Age: 26
End: 2021-02-19
Payer: COMMERCIAL

## 2021-02-19 DIAGNOSIS — F41.1 GAD (GENERALIZED ANXIETY DISORDER): ICD-10-CM

## 2021-02-19 DIAGNOSIS — F33.1 MODERATE EPISODE OF RECURRENT MAJOR DEPRESSIVE DISORDER (H): Primary | ICD-10-CM

## 2021-02-19 DIAGNOSIS — F32.A ANXIETY AND DEPRESSION: ICD-10-CM

## 2021-02-19 DIAGNOSIS — F41.9 ANXIETY AND DEPRESSION: ICD-10-CM

## 2021-02-19 PROCEDURE — 90834 PSYTX W PT 45 MINUTES: CPT | Mod: GT | Performed by: SOCIAL WORKER

## 2021-02-19 RX ORDER — ESCITALOPRAM OXALATE 20 MG/1
TABLET ORAL
Qty: 90 TABLET | Refills: 0 | Status: SHIPPED | OUTPATIENT
Start: 2021-02-19 | End: 2021-02-23

## 2021-02-19 NOTE — TELEPHONE ENCOUNTER
Pending Prescriptions:                       Disp   Refills    escitalopram (LEXAPRO) 20 MG tablet [Pharm*90 tab*0        Sig: TAKE 1 TABLET BY MOUTH EVERY DAY      Routing refill request to provider for review/approval because:  Labs out of range:  phq9    Maddie Christine RN on 2/19/2021 at 10:40 AM

## 2021-02-19 NOTE — PATIENT INSTRUCTIONS
Client will return March 12 at 7am. She will work on finding helpful coping and self-care that brings her sulma and connection to reduce grief regarding the loss over the past year. She will also continue to work on perspective taking to manage emotional distress.

## 2021-02-19 NOTE — PROGRESS NOTES
Progress Note    Patient Name: Genesis Cutler  Date: 2/19/2021         Service Type: Individual      Session Start Time: 7:00am  Session End Time: 7:52am     Session Length: 52min    Session #: 16    Attendees: Client attended alone    Mode of Communication: AmSynthox Video     Telemedicine Visit: The patient's condition can be safely assessed and treated via synchronous audio and visual telemedicine encounter.      Reason for Telemedicine Visit: Services only offered telehealth    Originating Site (Patient Location): Patient's home    Distant Site (Provider Location): Provider Remote Setting: home office    Consent:  The patient/guardian has verbally consented to: the potential risks and benefits of telemedicine (video visit) versus in person care; bill my insurance or make self-payment for services provided; and responsibility for payment of non-covered services.      Treatment Plan Last Reviewed: 12/4/20  PHQ-9 / NEGRO-7 :  2/18/21    DATA  Interactive Complexity: No  Crisis: No       Progress Since Last Session (Related to Symptoms / Goals / Homework):   Symptoms: Improving endorsed less depression and anxiety, however, work distress    Homework: Partially completed      Episode of Care Goals: Satisfactory progress - ACTION (Actively working towards change); Intervened by reinforcing change plan / affirming steps taken     Current / Ongoing Stressors and Concerns:   Only female in male dominated field at work, COVID distress, upcoming shoulder surgery     Treatment Objective(s) Addressed in This Session:   identify 3 strategies to more effectively address stressors  Practice perspective taking  Identify negative self-talk and behaviors: challenge core beliefs, myths, and actions  Practice healthy mental practices while eating  use positive self-affirmations daily       Intervention:   Interpersonal Therapy: Client reviewed the past month and noted some distress  with work and a coworker. She processed through this and identified anger and some hurt. Therapist listened and validated and worked to explore her locus of control. Client was able to express and process emotions, working to explore options for managing distress.   Solution Focused: Client endorsed less anxiety and depression, but noted some grief due to the pandemic. Therapist worked to explore helpful ways client can have hope and find sulma and connection to reduce sadness and improve mental health.        ASSESSMENT: Current Emotional / Mental Status (status of significant symptoms):   Risk status (Self / Other harm or suicidal ideation)   Patient denies current fears or concerns for personal safety.   Patient denies current or recent suicidal ideation or behaviors.   Patient denies current or recent homicidal ideation or behaviors.   Patient denies current or recent self injurious behavior or ideation.   Patient denies other safety concerns.   Patient reports there has been no change in risk factors since their last session.     Patient reports there has been no change in protective factors since their last session.     Recommended that patient call 911 or go to the local ED should there be a change in any of these risk factors.     Appearance:   Appropriate    Eye Contact:   Fair    Psychomotor Behavior: Normal    Attitude:   Cooperative    Orientation:   All   Speech    Rate / Production: Normal/ Responsive    Volume:  Normal    Mood:    Irritable  Normal Sad    Affect:    Appropriate    Thought Content:  Clear    Thought Form:  Coherent  Logical    Insight:    Fair      Medication Review:   No changes to current psychiatric medication(s)        Medication Compliance:   Yes     Changes in Health Issues:   None reported     Chemical Use Review:   Substance Use: Chemical use reviewed, no active concerns identified      Tobacco Use: No current tobacco use.      Diagnosis:  1. Moderate episode of recurrent major  depressive disorder (H)    2. NEGRO (generalized anxiety disorder)        Collateral Reports Completed:   Not Applicable    PLAN: (Patient Tasks / Therapist Tasks / Other)  Client will return March 12 at 7am. She will work on finding helpful coping and self-care that brings her sulma and connection to reduce grief regarding the loss over the past year. She will also continue to work on perspective taking to manage emotional distress.        Amee León, [facility] 2/19/2021                                                                                                            ______________________________________________________________________    Treatment Plan    Patient's Name: Genesis Cutler  YOB: 1995    Date: 12/4/20    DSM5 Diagnoses: 296.32 (F33.1) Major Depressive Disorder, Recurrent Episode, Moderate _ or 300.02 (F41.1) Generalized Anxiety Disorder  Psychosocial / Contextual Factors: Recent transition to work, only female in male dominated field at work  WHODAS:   WHODAS 2.0 Total Score 2/7/2020   Total Score 28       Referral / Collaboration:  Referral to another professional/service is not indicated at this time..    Anticipated number of session or this episode of care: 10-14    *Client endorsed progress, but goals maintained for enhanced progress    MeasurableTreatment Goal(s) related to diagnosis / functional impairment(s)  Goal 1: Patient will learn skills to manage and reduce anxiety, as measured by NEGRO-7.    I will know I've met my goal when I can try to stop moments of intense anxiety.      Objective #A (Patient Action)    Patient will identify 3 fears / thoughts that contribute to feeling anxious.  Status: Continued - Date(s):  12/4/20    Intervention(s)  Therapist will teach emotional recognition/identification. to identify top 3 triggers for anxiety.    Objective #B  Patient will use at least 3 coping skills for anxiety management in the next 4 weeks.  Status:  Continued - Date(s): 12/4/20    Intervention(s)  Therapist will teach emotional regulation skills. 3 new calming/coping skills to manage anxiety.    Objective #C  Patient will use relaxation strategies 1 times per day to reduce the physical symptoms of anxiety.  Status: Continued - Date(s):  12/4/20    Intervention(s)  Therapist will assign homework practice relaxation and mindfulness daily.      Goal 2: Patient will reduce depression and improve motivation, as measured by PHQ-9.     I will know I've met my goal when I want to do things I enjoy again.      Objective #A (Patient Action)    Status: Continued - Date(s):   12/4/20    Patient will Identify negative self-talk and behaviors: challenge core beliefs, myths, and actions.    Intervention(s)  Therapist will teach emotional recognition/identification. to identify 3 thoughts/triggers for depression and shame.    Objective #B  Patient will Increase interest, engagement, and pleasure in doing things.    Status: Continued - Date(s): 12/4/20    Intervention(s)  Therapist will provide educational materials on depression  teach emotional regulation skills. 3 new coping skills to improve mood and motivation.    Objective #C  Patient will use positive self-affirmations daily.  Status: Continued - Date(s):  12/4/20    Intervention(s)  Therapist will assign homework practice using positive self-talk daily to combat critical self-talk and shame.    Goal 3: Client will improve self-esteem and reduce restrictive eating habits while reducing distress     I will know I've met my goal when I can be ok not counting calories at each meal.      Objective #A (Client Action)    Client will relieve distress about body image by stop counting calories at mealtime.  Status: Continued - Date(s): 12/4/20    Intervention(s)  Therapist will assign homework client is to stop counting calories at meals.    Objective #B  Client will identify 2 messages and impacts related to body image and  challenge negative messages and replace with positive self messages.    Status: Continued - Date(s): 12/4/20    Intervention(s)  Therapist will assign homework use journaling to process impacts of eating habits and challenge negative messages from others regarding body image.      Patient has reviewed and agreed to the above plan.      Amee León, [facility]  December 4, 2020

## 2021-02-22 NOTE — PROGRESS NOTES
SUBJECTIVE:   CC: Genesis Cutler is an 25 year old woman who presents for preventive health visit.         Patient has been advised of split billing requirements and indicates understanding: Yes     Healthy Habits:     Getting at least 3 servings of Calcium per day:  Yes    Bi-annual eye exam:  Yes    Dental care twice a year:  Yes    Sleep apnea or symptoms of sleep apnea:  Daytime drowsiness    Diet:  Regular (no restrictions)    Frequency of exercise:  4-5 days/week    Duration of exercise:  30-45 minutes    Taking medications regularly:  Yes    Medication side effects:  None    PHQ-2 Total Score: 1    Additional concerns today:  Yes      Depression and Anxiety Follow-Up    How are you doing with your depression since your last visit? Stable    How are you doing with your anxiety since your last visit?  Stable    Are you having other symptoms that might be associated with depression or anxiety? No    Have you had a significant life event? No     Do you have any concerns with your use of alcohol or other drugs? No    -Patient also notes short menstrual cycles, she is usually on her period on day 2-3 of her placebo OCP. She denies pelvic pain or cramps.    Social History     Tobacco Use     Smoking status: Never Smoker     Smokeless tobacco: Never Used   Substance Use Topics     Alcohol use: Yes     Alcohol/week: 0.0 standard drinks     Comment: 1-2 drinks per month     Drug use: No     PHQ 2/18/2021 2/23/2021 3/12/2021   PHQ-9 Total Score 7 7 5   Q9: Thoughts of better off dead/self-harm past 2 weeks Not at all Not at all Not at all   F/U: Thoughts of suicide or self-harm - - -   F/U: Safety concerns - - -     NEGRO-7 SCORE 1/15/2021 2/18/2021 3/12/2021   Total Score 4 (minimal anxiety) 1 (minimal anxiety) 2 (minimal anxiety)   Total Score 4 1 2             Today's PHQ-2 Score:   PHQ-2 ( 1999 [employer]) 2/23/2021   Q1: Little interest or pleasure in doing things 1   Q2: Feeling down, depressed or hopeless  0   PHQ-2 Score 1   Q1: Little interest or pleasure in doing things Several days   Q2: Feeling down, depressed or hopeless Not at all   PHQ-2 Score 1       Abuse: Current or Past (Physical, Sexual or Emotional) - No  Do you feel safe in your environment? Yes    Have you ever done Advance Care Planning? (For example, a Health Directive, POLST, or a discussion with a medical provider or your loved ones about your wishes): No, advance care planning information given to patient to review.  Advanced care planning was discussed at today's visit.    Social History     Tobacco Use     Smoking status: Never Smoker     Smokeless tobacco: Never Used   Substance Use Topics     Alcohol use: Yes     Alcohol/week: 0.0 standard drinks     Comment: 1-2 drinks per month       Alcohol Use 2/23/2021   Prescreen: >3 drinks/day or >7 drinks/week? Not Applicable   Prescreen: >3 drinks/day or >7 drinks/week? -   No flowsheet data found.    Any new diagnosis of family breast, ovarian, or bowel cancer? Yes     Reviewed orders with patient.  Reviewed health maintenance and updated orders accordingly - Yes  BP Readings from Last 3 Encounters:   02/23/21 104/68   10/15/20 108/74   08/10/20 106/76    Wt Readings from Last 3 Encounters:   03/10/21 73.9 kg (163 lb)   02/23/21 73.9 kg (163 lb)   07/15/20 67.1 kg (148 lb)                  Patient Active Problem List   Diagnosis     Anxiety and depression     Biceps tendinopathy, right     Past Surgical History:   Procedure Laterality Date     ARTHROSCOPY SHLDR ROTATOR CUFF REPAIR, SUBACROMIAL DECOMP, DIST CLAVICLE RESECTION, BICEP TENODESIS Right 7/23/2020    Procedure: Right shoulder arthroscopy, open biceps tenodesis;  Surgeon: Aline Amador MD;  Location: [facility]     COLONOSCOPY  03/2017       Social History     Tobacco Use     Smoking status: Never Smoker     Smokeless tobacco: Never Used   Substance Use Topics     Alcohol use: Yes     Alcohol/week: 0.0 standard drinks     Comment: 1-2  drinks per month     Family History   Problem Relation Age of Onset     Diabetes Mother      Diabetes Father      Diabetes Maternal Grandmother      Cerebrovascular Disease Maternal Grandmother      Coronary Artery Disease Maternal Grandfather      Cerebrovascular Disease Maternal Grandfather      Coronary Artery Disease Paternal Grandmother      Unknown/Adopted Paternal Grandfather          Current Outpatient Medications   Medication Sig Dispense Refill     escitalopram (LEXAPRO) 20 MG tablet Take 1 tablet (20 mg) by mouth daily 90 tablet 3     norgestimate-ethinyl estradiol (ORTHO-CYCLEN) 0.25-35 MG-MCG tablet Take 1 tablet by mouth daily Take 4 continuous packs, skipping the sugar pill week. 112 tablet 3     plecanatide (TRULANCE) 3 MG tablet Take 1 tablet (3 mg) by mouth daily 90 tablet 3     polyethylene glycol (MIRALAX) powder Take 1 capful by mouth 4 times daily        Allergies   Allergen Reactions     Penicillins Rash     Recent Labs   Lab Test 02/23/21  1654 07/15/20  1535 03/14/18  1219 01/30/18  0756 07/19/16  0938 04/18/14  0000 04/18/14   LDL 89  --   --   --   --   --  89   HDL 61  --   --   --   --   --  50   TRIG 59  --   --   --   --   --  63   ALT  --  26 33  --  33   < >  --    CR  --  0.56  --   --  0.70   < >  --    GFRESTIMATED  --  >90  --   --  >90  Non  GFR Calc     < >  --    GFRESTBLACK  --  >90  --   --  >90  African American GFR Calc     < >  --    POTASSIUM  --  4.1  --   --  4.1   < >  --    TSH 2.31  --   --  2.31 1.86  --  1.68    < > = values in this interval not displayed.        Breast CA Risk Screening:  No flowsheet data found.  No flowsheet data found.  click delete button to remove this line now  Mammogram Screening - Offered annual screening and updated Health Maintenance for mutual plan based on risk factor consideration  , Mammogram Screening: Recommended annual mammography and Mammogram Screening: Recommended mammography every 1-2 years with patient  "discussion and risk factor consideration  Pertinent mammograms are reviewed under the imaging tab.    PAP / HPV 2/23/2021 1/29/2018   PAP NIL NIL     Reviewed and updated as needed this visit by clinical staff  Tobacco  Allergies  Meds  Problems  Med Hx  Surg Hx  Fam Hx  Soc Hx          Reviewed and updated as needed this visit by Provider  Tobacco    Problems  Med Hx  Surg Hx  Fam Hx  Soc Hx       Review of Systems   Constitutional: Negative for chills and fever.   HENT: Negative for congestion, ear pain, hearing loss and sore throat.    Eyes: Negative for pain and visual disturbance.   Respiratory: Negative for cough and shortness of breath.    Cardiovascular: Negative for chest pain and palpitations.   Gastrointestinal: Negative for abdominal pain, constipation, diarrhea, heartburn, hematochezia and nausea.   Breasts:  Positive for tenderness. Negative for breast mass and discharge.   Genitourinary: Negative for dysuria, frequency, genital sores, hematuria, pelvic pain, urgency, vaginal bleeding and vaginal discharge.   Musculoskeletal: Positive for arthralgias. Negative for joint swelling and myalgias.   Skin: Negative for rash.   Neurological: Positive for headaches. Negative for dizziness, weakness and paresthesias.   Psychiatric/Behavioral: Negative for mood changes. The patient is nervous/anxious.           OBJECTIVE:   /68 (BP Location: Left arm, Patient Position: Chair, Cuff Size: Adult Regular)   Pulse 74   Temp 98  F (36.7  C) (Temporal)   Resp 16   Ht 1.645 m (5' 4.75\")   Wt 73.9 kg (163 lb)   LMP 02/07/2021 (Exact Date)   SpO2 100%   Breastfeeding No   BMI 27.33 kg/m    Physical Exam  GENERAL: healthy, alert and no distress  EYES: Eyes grossly normal to inspection, PERRL and conjunctivae and sclerae normal  HENT: ear canals and TM's normal, nose and mouth without ulcers or lesions  NECK: no adenopathy, no asymmetry, masses, or scars and thyroid normal to palpation  RESP: " lungs clear to auscultation - no rales, rhonchi or wheezes  BREAST: normal without masses, tenderness or nipple discharge and no palpable axillary masses or adenopathy  CV: regular rate and rhythm, normal S1 S2, no S3 or S4, no murmur, click or rub, no peripheral edema and peripheral pulses strong  ABDOMEN: soft, nontender, no hepatosplenomegaly, no masses and bowel sounds normal  MS: no gross musculoskeletal defects noted, no edema  SKIN: no suspicious lesions or rashes  NEURO: Normal strength and tone, mentation intact and speech normal  PSYCH: mentation appears normal, affect normal/bright    Results for orders placed or performed in visit on 02/23/21   Hepatitis C Screen Reflex to HCV RNA Quant and Genotype     Status: None   Result Value Ref Range    Hepatitis C Antibody Nonreactive NR^Nonreactive   Pap imaged thin layer screen reflex to HPV if ASCUS - recommend age 25 - 29     Status: None   Result Value Ref Range    PAP NIL     Copath Report         Patient Name: SANIYA MORE  MR#: [id number]  Specimen #: [id number]  Collected: 2/23/2021  Received: 2/25/2021  Reported: 3/1/2021 13:50  Ordering Phy(s): GERMAIN JACKSON    For improved result formatting, select 'View Enhanced Report Format' under   Linked Documents section.    SPECIMEN/STAIN PROCESS:  Pap imaged thin layer prep screening (Surepath, FocalPoint with guided   screening)       Pap-Cyto x 1, Pap with reflex to HPV if ASCUS x 1    SOURCE: Cervical, endocervical  ----------------------------------------------------------------   Pap imaged thin layer prep screening (Surepath, FocalPoint with guided   screening)  SPECIMEN ADEQUACY:  Satisfactory for evaluation.  -Transformation zone component absent.    CYTOLOGIC INTERPRETATION:    Negative for intraepithelial lesion or malignancy    Electronically signed out by:  ZARINA Webb (ASCP)    CLINICAL HISTORY:    A previous normal pap  Date of Last Pap: 1/29/18,    Papanicolaou Test  Limitations:  Cervical c ytology is a screening test with   limited sensitivity; regular  screening is critical for cancer prevention; Pap tests are primarily   effective for the diagnosis/prevention of  squamous cell carcinoma, not adenocarcinomas or other cancers.    COLLECTION SITE:  Client:  [facility]  Location: [facility] (B)    The technical component of this testing was completed at the [facility], with the professional component performed   at the [facility], [address] [place] [phone] ([phone])       Lipid panel reflex to direct LDL Fasting     Status: None   Result Value Ref Range    Cholesterol 162 <200 mg/dL    Triglycerides 59 <150 mg/dL    HDL Cholesterol 61 >49 mg/dL    LDL Cholesterol Calculated 89 <100 mg/dL    Non HDL Cholesterol 101 <130 mg/dL   TSH with free T4 reflex     Status: None   Result Value Ref Range    TSH 2.31 0.40 - 4.00 mU/L   Vitamin D Deficiency     Status: None   Result Value Ref Range    Vitamin D Deficiency screening 37 20 - 75 ug/L   HCG qualitative urine     Status: None   Result Value Ref Range    HCG Qual Urine Negative NEG^Negative         ASSESSMENT/PLAN:   1. Routine general medical examination at a health care facility  See Counseling    2. Need for hepatitis C screening test  - Hepatitis C Screen Reflex to HCV RNA Quant and Genotype    3. Screening for malignant neoplasm of cervix  - Pap imaged thin layer screen reflex to HPV if ASCUS - recommend age 25 - 29    4. Encounter for birth control pills maintenance*  - norgestimate-ethinyl estradiol (ORTHO-CYCLEN) 0.25-35 MG-MCG tablet; Take 1 tablet by mouth daily Take 4 continuous packs, skipping the sugar pill week.  Dispense: 112 tablet; Refill: 3  -Advised to continue monitoring menstrual cycles.    5. Anxiety and depression  Stable.  Continue  "present management  - escitalopram (LEXAPRO) 20 MG tablet; Take 1 tablet (20 mg) by mouth daily  Dispense: 90 tablet; Refill: 3    6. Screening for endocrine, metabolic and immunity disorder  - Comprehensive metabolic panel; Future  - TSH with free T4 reflex  - Vitamin D Deficiency    7. CARDIOVASCULAR SCREENING; LDL GOAL LESS THAN 100  - Lipid panel reflex to direct LDL Fasting    8. Absence of menstruation  - HCG qualitative urine    COUNSELING:  Reviewed preventive health counseling, as reflected in patient instructions       Regular exercise       Healthy diet/nutrition       Vision screening       Hearing screening       Contraception       Colon cancer screening       Consider Hep C screening for all patients one time for ages 18-79 years       HIV screeninx in teen years, 1x in adult years, and at intervals if high risk       Advance Care Planning    Estimated body mass index is 27.33 kg/m  as calculated from the following:    Height as of this encounter: 1.645 m (5' 4.75\").    Weight as of this encounter: 73.9 kg (163 lb).    Weight management plan: Discussed healthy diet and exercise guidelines    She reports that she has never smoked. She has never used smokeless tobacco.      Counseling Resources:  ATP IV Guidelines  Pooled Cohorts Equation Calculator  Breast Cancer Risk Calculator  BRCA-Related Cancer Risk Assessment: FHS-7 Tool  FRAX Risk Assessment  ICSI Preventive Guidelines  Dietary Guidelines for Americans,   ITDatabase's MyPlate  ASA Prophylaxis  Lung CA Screening    Richard Ledbetter MD  [facility] [place]  Answers for HPI/ROS submitted by the patient on 2021   Annual Exam:  If you checked off any problems, how difficult have these problems made it for you to do your work, take care of things at home, or get along with other people?: Somewhat difficult  PHQ9 TOTAL SCORE: 7    "

## 2021-02-23 ENCOUNTER — OFFICE VISIT (OUTPATIENT)
Dept: FAMILY MEDICINE | Facility: CLINIC | Age: 26
End: 2021-02-23
Payer: COMMERCIAL

## 2021-02-23 VITALS
OXYGEN SATURATION: 100 % | SYSTOLIC BLOOD PRESSURE: 104 MMHG | WEIGHT: 163 LBS | BODY MASS INDEX: 27.16 KG/M2 | DIASTOLIC BLOOD PRESSURE: 68 MMHG | RESPIRATION RATE: 16 BRPM | TEMPERATURE: 98 F | HEIGHT: 65 IN | HEART RATE: 74 BPM

## 2021-02-23 DIAGNOSIS — F41.9 ANXIETY AND DEPRESSION: ICD-10-CM

## 2021-02-23 DIAGNOSIS — N91.2 ABSENCE OF MENSTRUATION: ICD-10-CM

## 2021-02-23 DIAGNOSIS — Z13.29 SCREENING FOR ENDOCRINE, METABOLIC AND IMMUNITY DISORDER: ICD-10-CM

## 2021-02-23 DIAGNOSIS — Z30.41 ENCOUNTER FOR BIRTH CONTROL PILLS MAINTENANCE: ICD-10-CM

## 2021-02-23 DIAGNOSIS — Z12.4 SCREENING FOR MALIGNANT NEOPLASM OF CERVIX: ICD-10-CM

## 2021-02-23 DIAGNOSIS — Z00.00 ROUTINE GENERAL MEDICAL EXAMINATION AT A HEALTH CARE FACILITY: Primary | ICD-10-CM

## 2021-02-23 DIAGNOSIS — Z13.0 SCREENING FOR ENDOCRINE, METABOLIC AND IMMUNITY DISORDER: ICD-10-CM

## 2021-02-23 DIAGNOSIS — Z13.6 CARDIOVASCULAR SCREENING; LDL GOAL LESS THAN 100: ICD-10-CM

## 2021-02-23 DIAGNOSIS — Z13.228 SCREENING FOR ENDOCRINE, METABOLIC AND IMMUNITY DISORDER: ICD-10-CM

## 2021-02-23 DIAGNOSIS — F32.A ANXIETY AND DEPRESSION: ICD-10-CM

## 2021-02-23 DIAGNOSIS — Z11.59 NEED FOR HEPATITIS C SCREENING TEST: ICD-10-CM

## 2021-02-23 LAB — HCG UR QL: NEGATIVE

## 2021-02-23 PROCEDURE — 99395 PREV VISIT EST AGE 18-39: CPT | Performed by: FAMILY MEDICINE

## 2021-02-23 PROCEDURE — 81025 URINE PREGNANCY TEST: CPT | Performed by: FAMILY MEDICINE

## 2021-02-23 PROCEDURE — 86803 HEPATITIS C AB TEST: CPT | Performed by: FAMILY MEDICINE

## 2021-02-23 PROCEDURE — 84443 ASSAY THYROID STIM HORMONE: CPT | Performed by: FAMILY MEDICINE

## 2021-02-23 PROCEDURE — G0145 SCR C/V CYTO,THINLAYER,RESCR: HCPCS | Performed by: FAMILY MEDICINE

## 2021-02-23 PROCEDURE — 82306 VITAMIN D 25 HYDROXY: CPT | Performed by: FAMILY MEDICINE

## 2021-02-23 PROCEDURE — 80061 LIPID PANEL: CPT | Performed by: FAMILY MEDICINE

## 2021-02-23 PROCEDURE — 36415 COLL VENOUS BLD VENIPUNCTURE: CPT | Performed by: FAMILY MEDICINE

## 2021-02-23 RX ORDER — ESCITALOPRAM OXALATE 20 MG/1
20 TABLET ORAL DAILY
Qty: 90 TABLET | Refills: 3 | Status: SHIPPED | OUTPATIENT
Start: 2021-02-23 | End: 2022-04-20

## 2021-02-23 RX ORDER — NORGESTIMATE AND ETHINYL ESTRADIOL 0.25-0.035
1 KIT ORAL DAILY
Qty: 112 TABLET | Refills: 3 | Status: SHIPPED | OUTPATIENT
Start: 2021-02-23 | End: 2022-02-01

## 2021-02-23 ASSESSMENT — ENCOUNTER SYMPTOMS
CONSTIPATION: 0
FEVER: 0
NERVOUS/ANXIOUS: 1
COUGH: 0
BREAST MASS: 0
JOINT SWELLING: 0
ABDOMINAL PAIN: 0
MYALGIAS: 0
CHILLS: 0
HEMATURIA: 0
NAUSEA: 0
HEADACHES: 1
SHORTNESS OF BREATH: 0
DYSURIA: 0
EYE PAIN: 0
SORE THROAT: 0
DIZZINESS: 0
PARESTHESIAS: 0
ARTHRALGIAS: 1
DIARRHEA: 0
PALPITATIONS: 0
HEARTBURN: 0
FREQUENCY: 0
HEMATOCHEZIA: 0
WEAKNESS: 0

## 2021-02-23 ASSESSMENT — PATIENT HEALTH QUESTIONNAIRE - PHQ9
10. IF YOU CHECKED OFF ANY PROBLEMS, HOW DIFFICULT HAVE THESE PROBLEMS MADE IT FOR YOU TO DO YOUR WORK, TAKE CARE OF THINGS AT HOME, OR GET ALONG WITH OTHER PEOPLE: SOMEWHAT DIFFICULT
SUM OF ALL RESPONSES TO PHQ QUESTIONS 1-9: 7
SUM OF ALL RESPONSES TO PHQ QUESTIONS 1-9: 7

## 2021-02-23 ASSESSMENT — PAIN SCALES - GENERAL: PAINLEVEL: MILD PAIN (3)

## 2021-02-23 ASSESSMENT — MIFFLIN-ST. JEOR: SCORE: 1476.27

## 2021-02-23 NOTE — LETTER
My Depression Action Plan  Name: Genesis Cutler   Date of Birth 1995  Date: 2/23/2021    My doctor: Richard Ledbetter   My clinic: [facility] [place]  [address] [address], [address]  [place] [id number] [phone]          GREEN    ZONE   Good Control    What it looks like:     Things are going generally well. You have normal ups and downs. You may even feel depressed from time to time, but bad moods usually last less than a day.   What you need to do:  1. Continue to care for yourself (see self care plan)  2. Check your depression survival kit and update it as needed  3. Follow your physician s recommendations including any medication.  4. Do not stop taking medication unless you consult with your physician first.           YELLOW         ZONE Getting Worse    What it looks like:     Depression is starting to interfere with your life.     It may be hard to get out of bed; you may be starting to isolate yourself from others.    Symptoms of depression are starting to last most all day and this has happened for several days.     You may have suicidal thoughts but they are not constant.   What you need to do:     1. Call your care team. Your response to treatment will improve if you keep your care team informed of your progress. Yellow periods are signs an adjustment may need to be made.     2. Continue your self-care.  Just get dressed and ready for the day.  Don't give yourself time to talk yourself out of it.    3. Talk to someone in your support network.    4. Open up your Depression Self-Care Plan/Wellness Kit.           RED    ZONE Medical Alert - Get Help    What it looks like:     Depression is seriously interfering with your life.     You may experience these or other symptoms: You can t get out of bed most days, can t work or engage in other necessary activities, you have trouble taking care of basic hygiene, or basic responsibilities, thoughts of suicide or  death that will not go away, self-injurious behavior.     What you need to do:  1. Call your care team and request a same-day appointment. If they are not available (weekends or after hours) call your local crisis line, emergency room or 911.          Depression Self-Care Plan / Wellness Kit    Many people find that medication and therapy are helpful treatments for managing depression. In addition, making small changes to your everyday life can help to boost your mood and improve your wellbeing. Below are some tips for you to consider. Be sure to talk with your medical provider and/or behavioral health consultant if your symptoms are worsening or not improving.     Sleep   Sleep hygiene  means all of the habits that support good, restful sleep. It includes maintaining a consistent bedtime and wake time, using your bedroom only for sleeping or sex, and keeping the bedroom dark and free of distractions like a computer, smartphone, or television.     Develop a Healthy Routine  Maintain good hygiene. Get out of bed in the morning, make your bed, brush your teeth, take a shower, and get dressed. Don t spend too much time viewing media that makes you feel stressed. Find time to relax each day.    Exercise  Get some form of exercise every day. This will help reduce pain and release endorphins, the  feel good  chemicals in your brain. It can be as simple as just going for a walk or doing some gardening, anything that will get you moving.      Diet  Strive to eat healthy foods, including fruits and vegetables. Drink plenty of water. Avoid excessive sugar, caffeine, alcohol, and other mood-altering substances.     Stay Connected with Others  Stay in touch with friends and family members.    Manage Your Mood  Try deep breathing, massage therapy, biofeedback, or meditation. Take part in fun activities when you can. Try to find something to smile about each day.     Psychotherapy  Be open to working with a therapist if your  provider recommends it.     Medication  Be sure to take your medication as prescribed. Most anti-depressants need to be taken every day. It usually takes several weeks for medications to work. Not all medicines work for all people. It is important to follow-up with your provider to make sure you have a treatment plan that is working for you. Do not stop your medication abruptly without first discussing it with your provider.    Crisis Resources   These hotlines are for both adults and children. They and are open 24 hours a day, 7 days a week unless noted otherwise.      National Suicide Prevention Lifeline   1-812-564-JFGZ (8580)      Crisis Text Line    www.crisistextline.org  Text HOME to 820965 from anywhere in the [place], anytime, about any type of crisis. A live, trained crisis counselor will receive the text and respond quickly.      [employer] Lifeline for LGBTQ Youth  A national crisis intervention and suicide lifeline for LGBTQ youth under 25. Provides a safe place to talk without judgement. Call 1-277.935.5776; text START to 005584 or visit www.thetrevorproject.org to talk to a trained counselor.      For [facility] crisis numbers, visit the [facility] website at:  https://[place].gov/dhs/people-we-serve/adults/health-care/mental-health/resources/crisis-contacts.jsp

## 2021-02-24 LAB
CHOLEST SERPL-MCNC: 162 MG/DL
DEPRECATED CALCIDIOL+CALCIFEROL SERPL-MC: 37 UG/L (ref 20–75)
HCV AB SERPL QL IA: NONREACTIVE
HDLC SERPL-MCNC: 61 MG/DL
LDLC SERPL CALC-MCNC: 89 MG/DL
NONHDLC SERPL-MCNC: 101 MG/DL
TRIGL SERPL-MCNC: 59 MG/DL
TSH SERPL DL<=0.005 MIU/L-ACNC: 2.31 MU/L (ref 0.4–4)

## 2021-02-24 ASSESSMENT — PATIENT HEALTH QUESTIONNAIRE - PHQ9: SUM OF ALL RESPONSES TO PHQ QUESTIONS 1-9: 7

## 2021-03-01 LAB
COPATH REPORT: NORMAL
PAP: NORMAL

## 2021-03-06 ENCOUNTER — HEALTH MAINTENANCE LETTER (OUTPATIENT)
Age: 26
End: 2021-03-06

## 2021-03-10 ENCOUNTER — VIRTUAL VISIT (OUTPATIENT)
Dept: GASTROENTEROLOGY | Facility: CLINIC | Age: 26
End: 2021-03-10
Payer: COMMERCIAL

## 2021-03-10 VITALS — HEIGHT: 65 IN | BODY MASS INDEX: 27.16 KG/M2 | WEIGHT: 163 LBS

## 2021-03-10 DIAGNOSIS — K59.04 CHRONIC IDIOPATHIC CONSTIPATION: Primary | ICD-10-CM

## 2021-03-10 PROCEDURE — 99214 OFFICE O/P EST MOD 30 MIN: CPT | Mod: 95 | Performed by: INTERNAL MEDICINE

## 2021-03-10 ASSESSMENT — MIFFLIN-ST. JEOR: SCORE: 1476.27

## 2021-03-10 ASSESSMENT — PAIN SCALES - GENERAL: PAINLEVEL: NO PAIN (0)

## 2021-03-10 NOTE — PATIENT INSTRUCTIONS
1. Continue your Trulance daily as you are.  2. Continue your efforts to stay hydrated and continue your exercise routine.  3. Continue daily stool softeners to optimize stool consistency  4.  Agree with use of Miralax 1-2 times daily as needed for periods of worsening constipation. Can consider regular use on Sundays if routinely having decreased stool output on weekends.  5. Follow-up with Dr. Haro in 6 months.      If you have any questions, please don't hesitate to contact me through our GI RN Clinic Coordinator, Macy Farley, at [phone].

## 2021-03-10 NOTE — NURSING NOTE
"Chief Complaint   Patient presents with     RECHECK     virtual follow up       Vitals:    03/10/21 1344   Weight: 73.9 kg (163 lb)   Height: 1.645 m (5' 4.75\")       Body mass index is 27.33 kg/m .      SHABNAM DonnellyT                      "

## 2021-03-10 NOTE — LETTER
"    3/10/2021         RE: Genesis Cutler  [address]  [facility] [address]        Dear Colleague,    Thank you for referring your patient, Genesis Cutler, to the [facility] [place]. Please see a copy of my visit note below.    Genesis Cutler is a 25 year old female who is being evaluated via a billable video visit.      The patient has been notified of following:     \"This video visit will be conducted via a call between you and your physician/provider. We have found that certain health care needs can be provided without the need for an in-person physical exam.  This service lets us provide the care you need with a video conversation.  If a prescription is necessary we can send it directly to your pharmacy.  If lab work is needed we can place an order for that and you can then stop by our lab to have the test done at a later time.    If during the course of the call the physician/provider feels a video visit is not appropriate, you will not be charged for this service.\"     Patient confirmed that they are in [place] for today's visit     Video-Visit Details  Type of service:  Video Visit    Video Start Time: 2:07 a.m.  Video End Time:  2:35 a.m.    Originating Location (pt. Location): Home    Distant Location (provider location):  [facility] [place]     Platform used: Andi     ----------------------------  GI CLINIC VISIT    CC: follow-up    ASSESSMENT/PLAN:  (K59.04) Chronic idiopathic constipation  (primary encounter diagnosis)  Comment:    As previously noted - prior normal colonoscopy, Sitz marker study, and essentially normal anorectal manometry/defecography. Response to laxatives (Miralax specifically) but requiring ever-increasing doses (>4x/day). Linzess 290 mcg was effective for several months, then lost effect. Now on plecanatide with good response. Had side effect of worsening depression " symptoms while on prucalopride/Motegrity (switch off of plecanatide/Trulance necessitated by insurance coverage).  She is now back on plecanatide/Trulance with 2 stool softeners daily and moving bowels at least 5/7 days a week.     Plan:  1. Continue your Trulance daily as you are.  2. Continue your efforts to stay hydrated and continue your exercise routine.  3. Continue daily stool softeners to optimize stool consistency  4.  Agree with use of Miralax 1-2 times daily as needed for periods of worsening constipation. Can consider regular use on Sundays if routinely having decreased stool output on weekends.  5. Follow-up with Dr. Haro in 6 months.       It was a pleasure to participate in the care of this patient; please contact us with any further questions.  A total of 28 video face-to-face minutes was spent with this patient, >50% of which was counseling regarding the above delineated issues. An additional 10 minutes was spent on the date of the encounter doing chart review, documentation, care coordination and further activities as noted above.    Gayle Haro MD   of Medicine  Division of Gastroenterology, Hepatology and Nutrition  [facility]    ---------------------------------------------------------------------------------------------------  HPI:    Ms. Cutler (formerly Rob) is a previously healthy 25 year old female who was initially seen in consultation with Jordan Du and Fabien of GI in July 2016 for constipation. She was initially seen by this writer in Nov 2016. Her last GI clinic visit was in April 2020. She returns today for follow-up.       Ms. Cutler has a few updates, since her last visit.  She underwent a shoulder surgery in July which overall went well.  She continues to work exclusively from home.  She and her  have purchased an exercise bike in order to keep up their physical activity during this pandemic as their gym has closed.  She  has been doing well as of late after the changes to her constipation med regimen (see constipation below).  She also shares that she did switch antidepressants under the guidance of her primary care physician in anticipation of possible pregnancy. But with increased symptoms she has been placed back on her prior Lexapro dosing and Ms. Cutler feels she is doing better back on this med.    1. Constipation  Summarized/taken from my prior documentation:   Issues with constipation since early childhood but with no regular laxatives or daily medications as a child nor need for disimpactions, enemas, nor hospitalizations. She used OTC laxatives as needed through late childhood and teen years. She had two urgent care visits during these years for constipation-related abdominal pain.     Her constipation apparently worsened in her early twenties going up to 2 weeks without a satisfactory BM, (she may pass some small,Anasco 1 stools with straining during this time). These periods are associated with significant bloating and LLQ pain which resolve after defecation.  She previously tried prunes/prune juice, OTC Mg citrate, an unknown laxative pill prn with minimal effect. Her Avi MD put her on daily Miralax which initially was helpful, but its effect waned.  She reports a high vegetable and fruit intake, at least 70 oz of water daily, and daily physical activity which led to an intentional 80 lbs weight loss before 2016. Her prior work-up included a normal TSH, calcium, CMP, and hgb.    Since establishing in our clinic she has been [facility] 11[facility]22[facility]2016 by Dr. Kailey Acevedo. Her testing was essentially normal with no evidence of Hirschsprung's, only mild perineal descent was noted. A Sitz marker study (after one week off of laxatives) was normal (no Sitz markers were seen on AXR done 5 days later). Though notably, she was on her period at that time (expectedly moving her bowels more frequently than  "her typical baseline).  A colonoscopy was done and was normal. Though as she was off of laxatives (for Sitz study, just prior to colonoscopy) and could only tolerate 4L Golytely (due to nausea, she was intended to take additional prep) her prep was rated at \"fair\".           For treatment she was on ever-increasing doses of fiber and Miralax (up to Miralax 4 times daily with morning coffee/caffeine ingestions) and the effect was lost over time. Ultimately, she was switched to Linzess 145 mcg, then up to 290 mcg with good response for a number of months, before response waned. In Jan 2018 we switched her to plecanatide/Trulance. With this she was moving her bowels nearly every day.  She continued to drink a large amount of water daily. She is ingesting 40-50 gm of fiber in her diet on an average day (tracks with Let nelsy).      Over time to Colace stool softener pills were added to her regimen to avoid hard stools and straining.  With the Trulance and stool softeners she generally moves her bowels daily.  She uses MiraLAX as needed for periods of increased constipation such as with travel.    Unfortunately after her last GI clinic visit her insurance declined to cover plecanatide/Trulance.  This necessitated a switch to Motegrity as recommended by her insurance company.  For the first 3 days of Motegrity Ms. Cutler reported severe gas pain followed by 2 days of bowel emptying (reporting 13 bowel movements over that period of time).  Thereafter her bowel pattern evened out passing 1-2 soft formed stools a day.  She noted that on the weekends she was less likely to have a bowel movement and attributed this to change in her schedule and caffeine ingestion.  She did experience some mild bloating while on Motegrity.  After a few weeks on Motegrity she concerning the experienced increased depression symptoms.  We took her off this medication and ultimately were able to get her plecanatide/Trulance " again.  Currently on plecanatide/Trulance with 2 stool softeners each day Ms. Cutler is moving her bowels once daily.  As with the Motegrity she does feel that on the weekend she is less likely to move her bowels.  If she does not go for both Saturday and Sunday she may experience some bloating by Monday.  She has not felt it has been bad enough to add MiraLAX to her regimen.  Overall she does feel that she has less bloating with plecanatide/Trulance as compared with Motegrity.      2. Abdominal pain   Summarized/taken from my prior documentation:    She has had and handful of episodes of abdominal pain in total since 2016. During the first one she presented to the [facility] in Nov [place], work-up with US, CT, and basic labs was negative. As the CT scan showed a large stool burden she was told this was likely due to constipation and was discharged to home. A second pain episode (precisely the same in nature) occurred around Nov 2017. She did not seek care at that time and has been monitoring her symptoms closely. She has has two more pain events in winter 2012/2018. The first one started around 2:30 pm and seemed to come on suddenly. The pain was intense and focused in the epigastric regions/RUQ with radiation to the back. The pain slowly dissipated, lasting two days in total. In March 2018 she had her fourth pain episode.  She recalls eating  at [employer] and [employer], but feels it was a typical meal. She awoke in the middle of the night with the same pain. She noted it was worse when lying down and better when sitting or hunched over. The pain is severe enough that she is unable to eat, drink, drive, perform ADLs. This pain remitted the following day. She denies f/c, n/v, or any other associated symptoms.      She has not been seen to have gallstones on prior CT or US imaging. A 2018 pelvic US (done a couple weeks ago) was normal. A HIDA scan was done sometime after her [facility] visit and was seen to be normal.  "    Through GI clinic we evaluated amylase, lipase, and hepatic panel were all normal. We attempted H pylori testing, but as she's had no further pain episodes, she ultimately didn't complete the test.   She reported another pain episode in Oct 2018.  She states she had a \"large, greasy Mexican meal\" and the pain came on within 30 minutes. She doesn't really recall any other associated symptoms or further details. The pain, as in the past, dissipated on its own after some time.    Today, this was not addressed at today's visit.         PROBLEM LIST  Patient Active Problem List    Diagnosis Date Noted     Biceps tendinopathy, right 07/03/2020     Priority: Medium     Added automatically from request for surgery [id number]       Anxiety and depression 02/24/2020     Priority: Medium       PERTINENT MEDICATIONS:  Current Outpatient Medications   Medication     escitalopram (LEXAPRO) 20 MG tablet     norgestimate-ethinyl estradiol (ORTHO-CYCLEN) 0.25-35 MG-MCG tablet     plecanatide (TRULANCE) 3 MG tablet     polyethylene glycol (MIRALAX) powder     Prucalopride Succinate (MOTEGRITY) 2 MG TABS     No current facility-administered medications for this visit.          PHYSICAL EXAMINATION:  Vitals Ht 1.645 m (5' 4.75\")   Wt 73.9 kg (163 lb)   BMI 27.33 kg/m     Wt   Wt Readings from Last 2 Encounters:   03/10/21 73.9 kg (163 lb)   02/23/21 73.9 kg (163 lb)      Gen: Pt sitting up in NAD, interactive and cooperative on exam  Eyes: sclerae anicteric, no injection  ENT:  MMM  Resp: Breathing comfortably on exam, speaking in full sentences  Skin: No jaundice  Neuro: alert, oriented, answers questions appropriately      PERTINENT STUDIES:    Office Visit on 02/23/2021   Component Date Value Ref Range Status     Hepatitis C Antibody 02/23/2021 Nonreactive  NR^Nonreactive Final     PAP 02/23/2021 NIL   Final     Copath Report 02/23/2021    Final                    Value:  Patient Name: SANIYA MORE  MR#: " [id number]  Specimen #: [id number]  Collected: 2/23/2021  Received: 2/25/2021  Reported: 3/1/2021 13:50  Ordering Phy(s): GERMAIN JACKSON    For improved result formatting, select 'View Enhanced Report Format' under   Linked Documents section.    SPECIMEN/STAIN PROCESS:  Pap imaged thin layer prep screening (Surepath, FocalPoint with guided   screening)       Pap-Cyto x 1, Pap with reflex to HPV if ASCUS x 1    SOURCE: Cervical, endocervical  ----------------------------------------------------------------   Pap imaged thin layer prep screening (Surepath, FocalPoint with guided   screening)  SPECIMEN ADEQUACY:  Satisfactory for evaluation.  -Transformation zone component absent.    CYTOLOGIC INTERPRETATION:    Negative for intraepithelial lesion or malignancy    Electronically signed out by:  ZARINA Webb (ASCP)    CLINICAL HISTORY:    A previous normal pap  Date of Last Pap: 1/29/18,    Papanicolaou Test Limitations:  Cervical c                          ytology is a screening test with   limited sensitivity; regular  screening is critical for cancer prevention; Pap tests are primarily   effective for the diagnosis/prevention of  squamous cell carcinoma, not adenocarcinomas or other cancers.    COLLECTION SITE:  Client:  [facility]  Location: [facility] ()    The technical component of this testing was completed at the [facility], with the professional component performed   at the [facility], [address] [place] [phone] ([phone])         Cholesterol 02/23/2021 162  <200 mg/dL Final     Triglycerides 02/23/2021 59  <150 mg/dL Final     HDL Cholesterol 02/23/2021 61  >49 mg/dL Final     LDL Cholesterol Calculated 02/23/2021 89  <100 mg/dL Final     Non HDL Cholesterol 02/23/2021 101  <130 mg/dL Final     TSH 02/23/2021 2.31  0.40 - 4.00  mU/L Final     Vitamin D Deficiency screening 02/23/2021 37  20 - 75 ug/L Final     HCG Qual Urine 02/23/2021 Negative  NEG^Negative Final       Again, thank you for allowing me to participate in the care of your patient.      Sincerely,    Gayle Haro MD

## 2021-03-10 NOTE — PROGRESS NOTES
"Genesis Cutler is a 25 year old female who is being evaluated via a billable video visit.      The patient has been notified of following:     \"This video visit will be conducted via a call between you and your physician/provider. We have found that certain health care needs can be provided without the need for an in-person physical exam.  This service lets us provide the care you need with a video conversation.  If a prescription is necessary we can send it directly to your pharmacy.  If lab work is needed we can place an order for that and you can then stop by our lab to have the test done at a later time.    If during the course of the call the physician/provider feels a video visit is not appropriate, you will not be charged for this service.\"     Patient confirmed that they are in [place] for today's visit     Video-Visit Details  Type of service:  Video Visit    Video Start Time: 2:07 a.m.  Video End Time:  2:35 a.m.    Originating Location (pt. Location): Home    Distant Location (provider location):  [facility] [place]     Platform used: RightAnswers     ----------------------------  GI CLINIC VISIT    CC: follow-up      ASSESSMENT/PLAN:  (K59.04) Chronic idiopathic constipation  (primary encounter diagnosis)  Comment:    As previously noted - prior normal colonoscopy, Sitz marker study, and essentially normal anorectal manometry/defecography. Response to laxatives (Miralax specifically) but requiring ever-increasing doses (>4x/day). Linzess 290 mcg was effective for several months, then lost effect. Now on plecanatide with good response. Had side effect of worsening depression symptoms while on prucalopride/Motegrity (switch off of plecanatide/Trulance necessitated by insurance coverage).  She is now back on plecanatide/Trulance with 2 stool softeners daily and moving bowels at least 5/7 days a week.     Plan:  1. Continue your Trulance daily as you are.  2. " Continue your efforts to stay hydrated and continue your exercise routine.  3. Continue daily stool softeners to optimize stool consistency  4.  Agree with use of Miralax 1-2 times daily as needed for periods of worsening constipation. Can consider regular use on Sundays if routinely having decreased stool output on weekends.  5. Follow-up with Dr. Haro in 6 months.       It was a pleasure to participate in the care of this patient; please contact us with any further questions.  A total of 28 video face-to-face minutes was spent with this patient, >50% of which was counseling regarding the above delineated issues. An additional 10 minutes was spent on the date of the encounter doing chart review, documentation, care coordination and further activities as noted above.    Gayle Haro MD   of Medicine  Division of Gastroenterology, Hepatology and Nutrition  [facility]    ---------------------------------------------------------------------------------------------------  HPI:    Ms. Cutler (formerly Rob) is a previously healthy 25 year old female who was initially seen in consultation with Jordan Du and Fabien of GI in July 2016 for constipation. She was initially seen by this writer in Nov 2016. Her last GI clinic visit was in April 2020. She returns today for follow-up.       Ms. Cutler has a few updates, since her last visit.  She underwent a shoulder surgery in July which overall went well.  She continues to work exclusively from home.  She and her  have purchased an exercise bike in order to keep up their physical activity during this pandemic as their gym has closed.  She has been doing well as of late after the changes to her constipation med regimen (see constipation below).  She also shares that she did switch antidepressants under the guidance of her primary care physician in anticipation of possible pregnancy. But with increased symptoms she has  "been placed back on her prior Lexapro dosing and Ms. Cutler feels she is doing better back on this med.    1. Constipation  Summarized/taken from my prior documentation:   Issues with constipation since early childhood but with no regular laxatives or daily medications as a child nor need for disimpactions, enemas, nor hospitalizations. She used OTC laxatives as needed through late childhood and teen years. She had two urgent care visits during these years for constipation-related abdominal pain.     Her constipation apparently worsened in her early twenties going up to 2 weeks without a satisfactory BM, (she may pass some small,[place] 1 stools with straining during this time). These periods are associated with significant bloating and LLQ pain which resolve after defecation.  She previously tried prunes/prune juice, OTC Mg citrate, an unknown laxative pill prn with minimal effect. Her Avi MD put her on daily Miralax which initially was helpful, but its effect waned.  She reports a high vegetable and fruit intake, at least 70 oz of water daily, and daily physical activity which led to an intentional 80 lbs weight loss before 2016. Her prior work-up included a normal TSH, calcium, CMP, and hgb.    Since establishing in our clinic she has been seen [facility] 11[facility]2016 by Dr. Kailey Acevedo. Her testing was essentially normal with no evidence of Hirschsprung's, only mild perineal descent was noted. A Sitz marker study (after one week off of laxatives) was normal (no Sitz markers were seen on AXR done 5 days later). Though notably, she was on her period at that time (expectedly moving her bowels more frequently than her typical baseline).  A colonoscopy was done and was normal. Though as she was off of laxatives (for Sitz study, just prior to colonoscopy) and could only tolerate 4L Golytely (due to nausea, she was intended to take additional prep) her prep was rated at \"fair\".           For " treatment she was on ever-increasing doses of fiber and Miralax (up to Miralax 4 times daily with morning coffee/caffeine ingestions) and the effect was lost over time. Ultimately, she was switched to Linzess 145 mcg, then up to 290 mcg with good response for a number of months, before response waned. In Jan 2018 we switched her to plecanatide/Trulance. With this she was moving her bowels nearly every day.  She continued to drink a large amount of water daily. She is ingesting 40-50 gm of fiber in her diet on an average day (tracks with [employer] nelsy).      Over time to Colace stool softener pills were added to her regimen to avoid hard stools and straining.  With the Trulance and stool softeners she generally moves her bowels daily.  She uses MiraLAX as needed for periods of increased constipation such as with travel.    Unfortunately after her last GI clinic visit her insurance declined to cover plecanatide/Trulance.  This necessitated a switch to Motegrity as recommended by her insurance company.  For the first 3 days of Motegrity Ms. Cutler reported severe gas pain followed by 2 days of bowel emptying (reporting 13 bowel movements over that period of time).  Thereafter her bowel pattern evened out passing 1-2 soft formed stools a day.  She noted that on the weekends she was less likely to have a bowel movement and attributed this to change in her schedule and caffeine ingestion.  She did experience some mild bloating while on Motegrity.  After a few weeks on Motegrity she concerning the experienced increased depression symptoms.  We took her off this medication and ultimately were able to get her plecanatide/Trulance again.  Currently on plecanatide/Trulance with 2 stool softeners each day Ms. Cutler is moving her bowels once daily.  As with the Motegrity she does feel that on the weekend she is less likely to move her bowels.  If she does not go for both Saturday and Sunday she may experience some  "bloating by Monday.  She has not felt it has been bad enough to add MiraLAX to her regimen.  Overall she does feel that she has less bloating with plecanatide/Trulance as compared with Motegrity.      2. Abdominal pain   Summarized/taken from my prior documentation:    She has had and handful of episodes of abdominal pain in total since 2016. During the first one she presented to the [facility] ED in Nov 2016, work-up with US, CT, and basic labs was negative. As the CT scan showed a large stool burden she was told this was likely due to constipation and was discharged to home. A second pain episode (precisely the same in nature) occurred around Nov 2017. She did not seek care at that time and has been monitoring her symptoms closely. She has has two more pain events in winter 2012/2018. The first one started around 2:30 pm and seemed to come on suddenly. The pain was intense and focused in the epigastric regions/RUQ with radiation to the back. The pain slowly dissipated, lasting two days in total. In March 2018 she had her fourth pain episode.  She recalls eating  at [employer], but feels it was a typical meal. She awoke in the middle of the night with the same pain. She noted it was worse when lying down and better when sitting or hunched over. The pain is severe enough that she is unable to eat, drink, drive, perform ADLs. This pain remitted the following day. She denies f/c, n/v, or any other associated symptoms.      She has not been seen to have gallstones on prior CT or US imaging. A 2018 pelvic US (done a couple weeks ago) was normal. A HIDA scan was done sometime after her [facility] ED visit and was seen to be normal.     Through GI clinic we evaluated amylase, lipase, and hepatic panel were all normal. We attempted H pylori testing, but as she's had no further pain episodes, she ultimately didn't complete the test.   She reported another pain episode in Oct 2018.  She states she had a \"large, greasy Mexican " "meal\" and the pain came on within 30 minutes. She doesn't really recall any other associated symptoms or further details. The pain, as in the past, dissipated on its own after some time.    Today, this was not addressed at today's visit.         PROBLEM LIST  Patient Active Problem List    Diagnosis Date Noted     Biceps tendinopathy, right 07/03/2020     Priority: Medium     Added automatically from request for surgery [id number]       Anxiety and depression 02/24/2020     Priority: Medium       PERTINENT MEDICATIONS:  Current Outpatient Medications   Medication     escitalopram (LEXAPRO) 20 MG tablet     norgestimate-ethinyl estradiol (ORTHO-CYCLEN) 0.25-35 MG-MCG tablet     plecanatide (TRULANCE) 3 MG tablet     polyethylene glycol (MIRALAX) powder     Prucalopride Succinate (MOTEGRITY) 2 MG TABS     No current facility-administered medications for this visit.          PHYSICAL EXAMINATION:  Vitals Ht 1.645 m (5' 4.75\")   Wt 73.9 kg (163 lb)   BMI 27.33 kg/m     Wt   Wt Readings from Last 2 Encounters:   03/10/21 73.9 kg (163 lb)   02/23/21 73.9 kg (163 lb)      Gen: Pt sitting up in NAD, interactive and cooperative on exam  Eyes: sclerae anicteric, no injection  ENT:  MMM  Resp: Breathing comfortably on exam, speaking in full sentences  Skin: No jaundice  Neuro: alert, oriented, answers questions appropriately      PERTINENT STUDIES:    Office Visit on 02/23/2021   Component Date Value Ref Range Status     Hepatitis C Antibody 02/23/2021 Nonreactive  NR^Nonreactive Final     PAP 02/23/2021 NIL   Final     Copath Report 02/23/2021    Final                    Value:  Patient Name: SANIYA MORE  MR#: [id number]  Specimen #: [id number]  Collected: 2/23/2021  Received: 2/25/2021  Reported: 3/1/2021 13:50  Ordering Phy(s): GERMAIN JACKSON    For improved result formatting, select 'View Enhanced Report Format' under   Linked Documents section.    SPECIMEN/STAIN PROCESS:  Pap imaged thin layer prep " screening (Surepath, FocalPoint with guided   screening)       Pap-Cyto x 1, Pap with reflex to HPV if ASCUS x 1    SOURCE: Cervical, endocervical  ----------------------------------------------------------------   Pap imaged thin layer prep screening (Surepath, FocalPoint with guided   screening)  SPECIMEN ADEQUACY:  Satisfactory for evaluation.  -Transformation zone component absent.    CYTOLOGIC INTERPRETATION:    Negative for intraepithelial lesion or malignancy    Electronically signed out by:  ZARINA Webb (ASCP)    CLINICAL HISTORY:    A previous normal pap  Date of Last Pap: 1/29/18,    Papanicolaou Test Limitations:  Cervical c                          ytology is a screening test with   limited sensitivity; regular  screening is critical for cancer prevention; Pap tests are primarily   effective for the diagnosis/prevention of  squamous cell carcinoma, not adenocarcinomas or other cancers.    COLLECTION SITE:  Client:  [facility]  Location: [facility] ([facility])    The technical component of this testing was completed at the [facility], with the professional component performed   at the [facility], [address] [place] [id number] ([phone])         Cholesterol 02/23/2021 162  <200 mg/dL Final     Triglycerides 02/23/2021 59  <150 mg/dL Final     HDL Cholesterol 02/23/2021 61  >49 mg/dL Final     LDL Cholesterol Calculated 02/23/2021 89  <100 mg/dL Final     Non HDL Cholesterol 02/23/2021 101  <130 mg/dL Final     TSH 02/23/2021 2.31  0.40 - 4.00 mU/L Final     Vitamin D Deficiency screening 02/23/2021 37  20 - 75 ug/L Final     HCG Qual Urine 02/23/2021 Negative  NEG^Negative Final

## 2021-03-12 ENCOUNTER — VIRTUAL VISIT (OUTPATIENT)
Dept: PSYCHOLOGY | Facility: CLINIC | Age: 26
End: 2021-03-12
Payer: COMMERCIAL

## 2021-03-12 DIAGNOSIS — F33.1 MODERATE EPISODE OF RECURRENT MAJOR DEPRESSIVE DISORDER (H): Primary | ICD-10-CM

## 2021-03-12 DIAGNOSIS — F41.1 GAD (GENERALIZED ANXIETY DISORDER): ICD-10-CM

## 2021-03-12 PROCEDURE — 90834 PSYTX W PT 45 MINUTES: CPT | Mod: 95 | Performed by: SOCIAL WORKER

## 2021-03-12 NOTE — PROGRESS NOTES
Progress Note    Patient Name: Genesis Cutler  Date: 3/12/21         Service Type: Individual      Session Start Time: 7:00am  Session End Time: 7:52am     Session Length: 52min    Session #: 17    Attendees: Client attended alone    Mode of Communication: [email or web address] Video     Telemedicine Visit: The patient's condition can be safely assessed and treated via synchronous audio and visual telemedicine encounter.      Reason for Telemedicine Visit: Services only offered telehealth    Originating Site (Patient Location): Patient's home    Distant Site (Provider Location): Provider Remote Setting: home office    Consent:  The patient/guardian has verbally consented to: the potential risks and benefits of telemedicine (video visit) versus in person care; bill my insurance or make self-payment for services provided; and responsibility for payment of non-covered services.      Treatment Plan Last Reviewed: 3/12/21  PHQ-9 / NEGRO-7 :  3/12/21    DATA  Interactive Complexity: No  Crisis: No       Progress Since Last Session (Related to Symptoms / Goals / Homework):   Symptoms: No change stable mental health, but some emotional and relational distress    Homework: Partially completed      Episode of Care Goals: Satisfactory progress - ACTION (Actively working towards change); Intervened by reinforcing change plan / affirming steps taken     Current / Ongoing Stressors and Concerns:   Only female in male dominated field at work, body image issues, COVID distress     Treatment Objective(s) Addressed in This Session:   identify 3 strategies to more effectively address stressors  Practice perspective taking  Identify negative self-talk and behaviors: challenge core beliefs, myths, and actions  Practice healthy mental practices while eating  use positive self-affirmations daily       Intervention:   CBT: Client reviewed the past month and endorsed stability overall. However, she noted  some increased emotional distress and self-worth issues concerning body and friendships. She processed through some negative thoughts about her body following a drs appointment. She worked through this, but noted some efforts to challenge criticism and work on acceptance towards self. She also worked through distress with friendships. She processed through this and endorsed sadness and distress. Therapist listened and vaildated, and worked to explore her thuoghts of self and the impact it has on her relationships. Therapist also explored helpful expectations and boundaries within relationships. Client agreed it would be helpful to enhance self-esteem by practicing positive affirmations.        ASSESSMENT: Current Emotional / Mental Status (status of significant symptoms):   Risk status (Self / Other harm or suicidal ideation)   Patient denies current fears or concerns for personal safety.   Patient denies current or recent suicidal ideation or behaviors.   Patient denies current or recent homicidal ideation or behaviors.   Patient denies current or recent self injurious behavior or ideation.   Patient denies other safety concerns.   Patient reports there has been no change in risk factors since their last session.     Patient reports there has been no change in protective factors since their last session.     Recommended that patient call 911 or go to the local ED should there be a change in any of these risk factors.     Appearance:   Appropriate    Eye Contact:   Fair    Psychomotor Behavior: Normal    Attitude:   Cooperative    Orientation:   All   Speech    Rate / Production: Normal/ Responsive    Volume:  Normal    Mood:    Anxious  Irritable  Sad    Affect:    Appropriate    Thought Content:  Clear    Thought Form:  Coherent  Logical    Insight:    Fair      Medication Review:   No changes to current psychiatric medication(s)      Medication Compliance:   Yes     Changes in Health Issues:   None  reported     Chemical Use Review:   Substance Use: Chemical use reviewed, no active concerns identified      Tobacco Use: No current tobacco use.      Diagnosis:  1. Moderate episode of recurrent major depressive disorder (H)    2. NEGRO (generalized anxiety disorder)        Collateral Reports Completed:   Not Applicable    PLAN: (Patient Tasks / Therapist Tasks / Other)   Client will return April 2 at 7am. She will work on building up self-esteem by practicing positive affirmations to self, including regarding body and friendships.        Amee León, [facility] 3/12/2021                                                                                                              ______________________________________________________________________    Treatment Plan    Patient's Name: Genesis Cutler  YOB: 1995    Date: 3/12/2021    DSM5 Diagnoses: 296.32 (F33.1) Major Depressive Disorder, Recurrent Episode, Moderate _ or 300.02 (F41.1) Generalized Anxiety Disorder  Psychosocial / Contextual Factors: Recent transition to work, only female in male dominated field at work  WHODAS:   WHODAS 2.0 Total Score 2/7/2020   Total Score 28       Referral / Collaboration:  Referral to another professional/service is not indicated at this time..    Anticipated number of session or this episode of care: 10-14    *Client endorsed progress, but goals maintained for enhanced progress    MeasurableTreatment Goal(s) related to diagnosis / functional impairment(s)  Goal 1: Patient will learn skills to manage and reduce anxiety, as measured by NEGRO-7.    I will know I've met my goal when I can try to stop moments of intense anxiety.      Objective #A (Patient Action)    Patient will identify 3 fears / thoughts that contribute to feeling anxious.  Status: Continued - Date(s):  3/12/21    Intervention(s)  Therapist will teach emotional recognition/identification. to identify top 3 triggers for  anxiety.    Objective #B  Patient will use at least 3 coping skills for anxiety management in the next 4 weeks.  Status: Continued - Date(s): 3/12/21    Intervention(s)  Therapist will teach emotional regulation skills. 3 new calming/coping skills to manage anxiety.    Objective #C  Patient will use relaxation strategies 1 times per day to reduce the physical symptoms of anxiety.  Status: Continued - Date(s):  3/12/21    Intervention(s)  Therapist will assign homework practice relaxation and mindfulness daily.    Objective #D  Client will learn & utilize at least 1 assertive communication skills weekly to reduce emotional distress in interpersonal.  Status: New - Date: 3/12/21     Intervention(s)  Therapist will teach assertiveness skills. express and assert self to manage emotional distress in interpersonal relationships.      Goal 2: Patient will reduce depression and improve motivation, as measured by PHQ-9.     I will know I've met my goal when I ruminate less on issues/problems.      Objective #A (Patient Action)    Status: Continued - Date(s):   3/12/21    Patient will Identify negative self-talk and behaviors: challenge core beliefs, myths, and actions.    Intervention(s)  Therapist will teach emotional recognition/identification. to identify 3 thoughts/triggers for depression and shame.    Objective #B  Patient will Increase interest, engagement, and pleasure in doing things.    Status: Continued - Date(s): 3/12/21    Intervention(s)  Therapist will provide educational materials on depression  teach emotional regulation skills. 3 new coping skills to improve mood and motivation.    Objective #C  Patient will use positive self-affirmations daily.  Status: Continued - Date(s):  3/12/21    Intervention(s)  Therapist will assign homework practice using positive self-talk daily to combat critical self-talk and shame.    Goal 3: Client will improve self-esteem and reduce restrictive eating habits while reducing  distress     I will know I've met my goal when I am more comfortable with changes in my body.      Objective #A (Client Action)    Client will relieve distress about body image by stop counting calories at mealtime.  Status: Continued - Date(s): 3/12/21    Intervention(s)  Therapist will assign homework client is to stop counting calories at meals.    Objective #B  Client will identify 2 messages and impacts related to body image and challenge negative messages and replace with positive self messages.    Status: Continued - Date(s): 3/12/21    Intervention(s)  Therapist will assign homework use journaling to process impacts of eating habits and challenge negative messages from others regarding body image.      Patient has reviewed and agreed to the above plan.      Amee León, [facility] CenterSW  March 12, 2021

## 2021-03-12 NOTE — PATIENT INSTRUCTIONS
Client will return April 2 at 7am. She will work on building up self-esteem by practicing positive affirmations to self, including regarding body and friendships

## 2021-03-23 ENCOUNTER — MYC MEDICAL ADVICE (OUTPATIENT)
Dept: GASTROENTEROLOGY | Facility: CLINIC | Age: 26
End: 2021-03-23

## 2021-04-02 ENCOUNTER — VIRTUAL VISIT (OUTPATIENT)
Dept: PSYCHOLOGY | Facility: CLINIC | Age: 26
End: 2021-04-02
Payer: COMMERCIAL

## 2021-04-02 DIAGNOSIS — F41.1 GAD (GENERALIZED ANXIETY DISORDER): ICD-10-CM

## 2021-04-02 DIAGNOSIS — F33.1 MODERATE EPISODE OF RECURRENT MAJOR DEPRESSIVE DISORDER (H): Primary | ICD-10-CM

## 2021-04-02 PROCEDURE — 90834 PSYTX W PT 45 MINUTES: CPT | Mod: 95 | Performed by: SOCIAL WORKER

## 2021-04-02 NOTE — PROGRESS NOTES
Progress Note    Patient Name: Genesis Cutler  Date: 4/2/21         Service Type: Individual      Session Start Time: 7:00am  Session End Time: 7:52am     Session Length: 52min    Session #: 18    Attendees: Client attended alone    Mode of Communication: [employer] Video     Telemedicine Visit: The patient's condition can be safely assessed and treated via synchronous audio and visual telemedicine encounter.      Reason for Telemedicine Visit: Services only offered telehealth    Originating Site (Patient Location): Patient's home    Distant Site (Provider Location): Provider Remote Setting: home office    Consent:  The patient/guardian has verbally consented to: the potential risks and benefits of telemedicine (video visit) versus in person care; bill my insurance or make self-payment for services provided; and responsibility for payment of non-covered services.      Treatment Plan Last Reviewed: 3/12/21  PHQ-9 / NEGRO-7 :  4/2/21    DATA  Interactive Complexity: No  Crisis: No       Progress Since Last Session (Related to Symptoms / Goals / Homework):   Symptoms: No change stable mental health, but some emotional and relational distress    Homework: Partially completed      Episode of Care Goals: Satisfactory progress - ACTION (Actively working towards change); Intervened by reinforcing change plan / affirming steps taken     Current / Ongoing Stressors and Concerns:   Only female in male dominated field at work, body image issues, COVID distress     Treatment Objective(s) Addressed in This Session:   identify 3 strategies to more effectively address stressors  learn and demonstrate 1 assertiveness skill(s)  Practice perspective taking  Identify negative self-talk and behaviors: challenge core beliefs, myths, and actions  Practice healthy mental practices while eating  practice setting boundaries 2 times in the next 4 weeks  use positive self-affirmations  daily       Intervention:   CBT: Client reviewed the past several weeks and endorsed stability overall, but noted some distress.  She processed through emotoinal distress regarding her processing through different options for starting a family.  Therapist listened and validated and worked to reflect her values and how she may need to express that to others. Client also engaged  in processing through dynamics with friend, noting distress. Therapist worked to explore her emotions and reflected the sadness she appears to be experiencing. Client reflected on other interpersonal relationships that have caused her to feel hurt or distressed. Therapist listened and validated, and worked to explore values and beliefs that may differ.  Interpersonal Therapy: Therapist reflected on differing personalities and values that can cause conflict in interpersonal relationships. Therapist explored client's locus of control and reflected healthy ways to express self and set limits/boundaries. Client noted struggles with this at times, but agreed to try        ASSESSMENT: Current Emotional / Mental Status (status of significant symptoms):   Risk status (Self / Other harm or suicidal ideation)   Patient denies current fears or concerns for personal safety.   Patient denies current or recent suicidal ideation or behaviors.   Patient denies current or recent homicidal ideation or behaviors.   Patient denies current or recent self injurious behavior or ideation.   Patient denies other safety concerns.   Patient reports there has been no change in risk factors since their last session.     Patient reports there has been no change in protective factors since their last session.     Recommended that patient call 911 or go to the local ED should there be a change in any of these risk factors.     Appearance:   Appropriate    Eye Contact:   Fair    Psychomotor Behavior: Normal    Attitude:   Cooperative   Friendly   Orientation:   All   Speech    Rate / Production: Normal/ Responsive    Volume:  Normal    Mood:    Irritable  Normal Sad    Affect:    Appropriate    Thought Content:  Clear    Thought Form:  Coherent  Logical    Insight:    Fair      Medication Review:   No changes to current psychiatric medication(s)      Medication Compliance:   Yes     Changes in Health Issues:   None reported     Chemical Use Review:   Substance Use: Chemical use reviewed, no active concerns identified      Tobacco Use: No current tobacco use.      Diagnosis:  1. Moderate episode of recurrent major depressive disorder (H)    2. NEGRO (generalized anxiety disorder)        Collateral Reports Completed:   Not Applicable    PLAN: (Patient Tasks / Therapist Tasks / Other)  Client will return May 5 at 7am. She will work on using assertive communication and boundary setting to reduce emotional distress and resentment in relationships.           [facility] León, [facility]                                                                                                                ______________________________________________________________________    Treatment Plan    Patient's Name: Genesis Cutler  YOB: 1995    Date: 3/12/2021    DSM5 Diagnoses: 296.32 (F33.1) Major Depressive Disorder, Recurrent Episode, Moderate _ or 300.02 (F41.1) Generalized Anxiety Disorder  Psychosocial / Contextual Factors: Recent transition to work, only female in male dominated field at work  WHODAS:   WHODAS 2.0 Total Score 2/7/2020   Total Score 28       Referral / Collaboration:  Referral to another professional/service is not indicated at this time..    Anticipated number of session or this episode of care: 10-14    *Client endorsed progress, but goals maintained for enhanced progress    MeasurableTreatment Goal(s) related to diagnosis / functional impairment(s)  Goal 1: Patient will learn skills to manage and reduce anxiety,  as measured by NEGRO-7.    I will know I've met my goal when I can try to stop moments of intense anxiety.      Objective #A (Patient Action)    Patient will identify 3 fears / thoughts that contribute to feeling anxious.  Status: Continued - Date(s):  3/12/21    Intervention(s)  Therapist will teach emotional recognition/identification. to identify top 3 triggers for anxiety.    Objective #B  Patient will use at least 3 coping skills for anxiety management in the next 4 weeks.  Status: Continued - Date(s): 3/12/21    Intervention(s)  Therapist will teach emotional regulation skills. 3 new calming/coping skills to manage anxiety.    Objective #C  Patient will use relaxation strategies 1 times per day to reduce the physical symptoms of anxiety.  Status: Continued - Date(s):  3/12/21    Intervention(s)  Therapist will assign homework practice relaxation and mindfulness daily.    Objective #D  Client will learn & utilize at least 1 assertive communication skills weekly to reduce emotional distress in interpersonal.  Status: New - Date: 3/12/21     Intervention(s)  Therapist will teach assertiveness skills. express and assert self to manage emotional distress in interpersonal relationships.      Goal 2: Patient will reduce depression and improve motivation, as measured by PHQ-9.     I will know I've met my goal when I ruminate less on issues/problems.      Objective #A (Patient Action)    Status: Continued - Date(s):   3/12/21    Patient will Identify negative self-talk and behaviors: challenge core beliefs, myths, and actions.    Intervention(s)  Therapist will teach emotional recognition/identification. to identify 3 thoughts/triggers for depression and shame.    Objective #B  Patient will Increase interest, engagement, and pleasure in doing things.    Status: Continued - Date(s): 3/12/21    Intervention(s)  Therapist will provide educational materials on depression  teach emotional regulation skills. 3 new coping  skills to improve mood and motivation.    Objective #C  Patient will use positive self-affirmations daily.  Status: Continued - Date(s):  3/12/21    Intervention(s)  Therapist will assign homework practice using positive self-talk daily to combat critical self-talk and shame.    Goal 3: Client will improve self-esteem and reduce restrictive eating habits while reducing distress     I will know I've met my goal when I am more comfortable with changes in my body.      Objective #A (Client Action)    Client will relieve distress about body image by stop counting calories at mealtime.  Status: Continued - Date(s): 3/12/21    Intervention(s)  Therapist will assign homework client is to stop counting calories at meals.    Objective #B  Client will identify 2 messages and impacts related to body image and challenge negative messages and replace with positive self messages.    Status: Continued - Date(s): 3/12/21    Intervention(s)  Therapist will assign homework use journaling to process impacts of eating habits and challenge negative messages from others regarding body image.      Patient has reviewed and agreed to the above plan.      Amee León, [facility]  March 12, 2021

## 2021-05-05 ENCOUNTER — VIRTUAL VISIT (OUTPATIENT)
Dept: PSYCHOLOGY | Facility: CLINIC | Age: 26
End: 2021-05-05
Payer: COMMERCIAL

## 2021-05-05 DIAGNOSIS — F33.1 MODERATE EPISODE OF RECURRENT MAJOR DEPRESSIVE DISORDER (H): Primary | ICD-10-CM

## 2021-05-05 DIAGNOSIS — F41.1 GAD (GENERALIZED ANXIETY DISORDER): ICD-10-CM

## 2021-05-05 PROCEDURE — 90834 PSYTX W PT 45 MINUTES: CPT | Mod: 95 | Performed by: SOCIAL WORKER

## 2021-05-05 NOTE — PROGRESS NOTES
Progress Note    Patient Name: Genesis Cutler  Date: 5/5/21         Service Type: Individual      Session Start Time: 7:00am  Session End Time: 7:52am     Session Length: 52min    Session #: 19    Attendees: Client attended alone    Mode of Communication: [employer] Video     Telemedicine Visit: The patient's condition can be safely assessed and treated via synchronous audio and visual telemedicine encounter.      Reason for Telemedicine Visit: Services only offered telehealth    Originating Site (Patient Location): Patient's home    Distant Site (Provider Location): Provider Remote Setting: home office    Consent:  The patient/guardian has verbally consented to: the potential risks and benefits of telemedicine (video visit) versus in person care; bill my insurance or make self-payment for services provided; and responsibility for payment of non-covered services.      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 She processed through this and noted impacts from interpersonal relationships, work, and family relationships. She identified feeling frustrated and sad. Therapist listened as she processed through and validated and normalized her emotions, working to encourage her to validate her own emotions as well. Therapist reflected her committment to her supports, noting the triggers and impacts. Therapist encouraged her to explore ways to practice self-love and self-care to enhance her mental health.  Interpersonal Therapy: Therapist reflected client's locus of control within interpersonal relationships, working to empower her. Therapist normalized distress within differences and worked to perspective take.        ASSESSMENT: Current Emotional / Mental Status (status of significant symptoms):   Risk status (Self / Other harm or suicidal ideation)   Patient denies current fears or concerns for personal safety.   Patient denies current or recent suicidal ideation or behaviors.   Patient denies current or recent homicidal ideation or behaviors.   Patient denies current or recent self injurious behavior or ideation.   Patient denies other safety concerns.   Patient reports there has been no change in risk factors since their last session.     Patient reports there has been no change in protective factors since their last session.     Recommended that patient call 911 or go to the local ED should there be a change in any of these risk factors.     Appearance:   Appropriate    Eye Contact:   Fair    Psychomotor Behavior: Normal    Attitude:   Cooperative  Friendly   Orientation:   All   Speech    Rate / Production: Normal/ Responsive    Volume:  Normal    Mood:    Anxious  Irritable  Sad    Affect:    Appropriate    Thought Content:  Clear    Thought Form:  Coherent  Logical    Insight:    Fair      Medication Review:   No " changes to current psychiatric medication(s)      Medication Compliance:   Yes     Changes in Health Issues:   None reported     Chemical Use Review:   Substance Use: Chemical use reviewed, no active concerns identified      Tobacco Use: No current tobacco use.      Diagnosis:  1. Moderate episode of recurrent major depressive disorder (H)    2. NEGRO (generalized anxiety disorder)        Collateral Reports Completed:   Not Applicable    PLAN: (Patient Tasks / Therapist Tasks / Other)  Client will return June 4 at 7am. She will work on validating emotions for self to self-nurture. She will also explore ways to enhance self-care and self-love and engage in giving self permission to do so.           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    Anticipated number of session or this episode of care: 10-14    *Client endorsed progress, but goals maintained for enhanced progress    MeasurableTreatment Goal(s) related to diagnosis / functional impairment(s)  Goal 1: Patient will learn skills to manage and reduce anxiety, as measured by NEGRO-7.    I will know I've met my goal when I can try to stop moments of intense anxiety.      Objective #A (Patient Action)    Patient will identify 3 fears / thoughts that contribute to feeling  anxious.  Status: Continued - Date(s):  3/12/21    Intervention(s)  Therapist will teach emotional recognition/identification. to identify top 3 triggers for anxiety.    Objective #B  Patient will use at least 3 coping skills for anxiety management in the next 4 weeks.  Status: Continued - Date(s): 3/12/21    Intervention(s)  Therapist will teach emotional regulation skills. 3 new calming/coping skills to manage anxiety.    Objective #C  Patient will use relaxation strategies 1 times per day to reduce the physical symptoms of anxiety.  Status: Continued - Date(s):  3/12/21    Intervention(s)  Therapist will assign homework practice relaxation and mindfulness daily.    Objective #D  Client will learn & utilize at least 1 assertive communication skills weekly to reduce emotional distress in interpersonal.  Status: New - Date: 3/12/21     Intervention(s)  Therapist will teach assertiveness skills. express and assert self to manage emotional distress in interpersonal relationships.      Goal 2: Patient will reduce depression and improve motivation, as measured by PHQ-9.     I will know I've met my goal when I ruminate less on issues/problems.      Objective #A (Patient Action)    Status: Continued - Date(s):   3/12/21    Patient will Identify negative self-talk and behaviors: challenge core beliefs, myths, and actions.    Intervention(s)  Therapist will teach emotional recognition/identification. to identify 3 thoughts/triggers for depression and shame.    Objective #B  Patient will Increase interest, engagement, and pleasure in doing things.    Status: Continued - Date(s): 3/12/21    Intervention(s)  Therapist will provide educational materials on depression  teach emotional regulation skills. 3 new coping skills to improve mood and motivation.    Objective #C  Patient will use positive self-affirmations daily.  Status: Continued - Date(s):  3/12/21    Intervention(s)  Therapist will assign homework practice using  positive self-talk daily to combat critical self-talk and shame.    Goal 3: Client will improve self-esteem and reduce restrictive eating habits while reducing distress     I will know I've met my goal when I am more comfortable with changes in my body.      Objective #A (Client Action)    Client will relieve distress about body image by stop counting calories at mealtime.  Status: Continued - Date(s): 3/12/21    Intervention(s)  Therapist will assign homework client is to stop counting calories at meals.    Objective #B  Client will identify 2 messages and impacts related to body image and challenge negative messages and replace with positive self messages.    Status: Continued - Date(s): 3/12/21    Intervention(s)  Therapist will assign homework use journaling to process impacts of eating habits and challenge negative messages from others regarding body image.      Patient has reviewed and agreed to the above plan.      Amee León, [facility]  March 12, 2021

## 2021-05-05 NOTE — PATIENT INSTRUCTIONS
Client will return June 4 at 7am. She will work on validating emotions for self to self-nurture. She will also explore ways to enhance self-care and self-love and engage in giving self permission to do so

## 2021-06-04 ENCOUNTER — VIRTUAL VISIT (OUTPATIENT)
Dept: PSYCHOLOGY | Facility: CLINIC | Age: 26
End: 2021-06-04
Payer: COMMERCIAL

## 2021-06-04 DIAGNOSIS — F33.1 MODERATE EPISODE OF RECURRENT MAJOR DEPRESSIVE DISORDER (H): Primary | ICD-10-CM

## 2021-06-04 DIAGNOSIS — F41.1 GAD (GENERALIZED ANXIETY DISORDER): ICD-10-CM

## 2021-06-04 PROCEDURE — 90834 PSYTX W PT 45 MINUTES: CPT | Mod: 95 | Performed by: SOCIAL WORKER

## 2021-06-04 NOTE — PATIENT INSTRUCTIONS
Client will return July 30 at 7am.  She will work on perspective taking, boundary setting and recognizing locus of control to manage emotional distress in interpersonal relationships.

## 2021-06-04 NOTE — PROGRESS NOTES
Progress Note    Patient Name: Genesis Cutler  Date: 6/4/21         Service Type: Individual      Session Start Time: 7:00am  Session End Time: 7:52am     Session Length: 52min    Session #: 20    Attendees: Client attended alone    Mode of Communication: [employer] Video     Telemedicine Visit: The patient's condition can be safely assessed and treated via synchronous audio and visual telemedicine encounter.      Reason for Telemedicine Visit: Services only offered telehealth    Originating Site (Patient Location): Patient's home    Distant Site (Provider Location): Provider Remote Setting: home office    Consent:  The patient/guardian has verbally consented to: the potential risks and benefits of telemedicine (video visit) versus in person care; bill my insurance or make self-payment for services provided; and responsibility for payment of non-covered services.      Treatment Plan Last Reviewed: 6/4/21  PHQ-9 / NEGRO-7 :  6/4/21    DATA  Interactive Complexity: No  Crisis: No       Progress Since Last Session (Related to Symptoms / Goals / Homework):   Symptoms: Improving overall stable and managing emotional distressHowever, some interpersonal relational distress    Homework: Partially completed      Episode of Care Goals: Satisfactory progress - ACTION (Actively working towards change); Intervened by reinforcing change plan / affirming steps taken     Current / Ongoing Stressors and Concerns:   Only female in male dominated field at work, body image issues, COVID distress     Treatment Objective(s) Addressed in This Session:   identify 3 strategies to more effectively address stressors  learn and demonstrate 1 assertiveness skill(s)  Practice perspective taking  Identify negative self-talk and behaviors: challenge core beliefs, myths, and actions  practice setting boundaries 2 times in the next 4 weeks  use positive self-affirmations  daily       Intervention:   CBT: Client endorsed stability overall, but noted some distress regarding some interpersonal relationships as well as processing through having children. She worked through her thoughts and feelings, while therapist listened, validated and worked to perspective take and reflect.  Interpersonal Therapy: Client endorsed some distress in relatinship with a friend and processed through. Therapist worked to explore emotions, triggers, and also boundaries that may be helpful in managing distress. Client also processed through past and present family dynamics and endorsed some distress. Therapist worked to validate, while also perspective taking to help explore locus of control to manage emotoinal distress. Therapist normalized distress when values differ.        ASSESSMENT: Current Emotional / Mental Status (status of significant symptoms):   Risk status (Self / Other harm or suicidal ideation)   Patient denies current fears or concerns for personal safety.   Patient denies current or recent suicidal ideation or behaviors.   Patient denies current or recent homicidal ideation or behaviors.   Patient denies current or recent self injurious behavior or ideation.   Patient denies other safety concerns.   Patient reports there has been no change in risk factors since their last session.     Patient reports there has been no change in protective factors since their last session.     Recommended that patient call 911 or go to the local ED should there be a change in any of these risk factors.     Appearance:   Appropriate    Eye Contact:   Fair    Psychomotor Behavior: Normal    Attitude:   Cooperative  Friendly   Orientation:   All   Speech    Rate / Production: Normal/ Responsive    Volume:  Normal    Mood:    Irritable  Sad    Affect:    Appropriate    Thought Content:  Clear    Thought Form:  Coherent  Logical    Insight:    Fair      Medication Review:   No changes to current psychiatric  medication(s)      Medication Compliance:   Yes     Changes in Health Issues:   None reported     Chemical Use Review:   Substance Use: Chemical use reviewed, no active concerns identified      Tobacco Use: No current tobacco use.      Diagnosis:  1. Moderate episode of recurrent major depressive disorder (H)    2. NEGRO (generalized anxiety disorder)        Collateral Reports Completed:   Not Applicable    PLAN: (Patient Tasks / Therapist Tasks / Other)  Client will return July 30 at 7am.  She will work on perspective taking, boundary setting and recognizing locus of control to manage emotional distress in interpersonal relationships.        Amee Lóen, [facility]  6/4/2021                                                                                                        ______________________________________________________________________    Treatment Plan    Patient's Name: Genesis Cutler  YOB: 1995    Date: 6/4/21    DSM5 Diagnoses: 296.32 (F33.1) Major Depressive Disorder, Recurrent Episode, Moderate _ or 300.02 (F41.1) Generalized Anxiety Disorder  Psychosocial / Contextual Factors: Recent transition to work, only female in male dominated field at work  WHODAS:   WHODAS 2.0 Total Score 2/7/2020   Total Score 28       Referral / Collaboration:  Referral to another professional/service is not indicated at this time..    Anticipated number of session or this episode of care: 10-14    *Client endorsed progress, but goals maintained for enhanced progress    MeasurableTreatment Goal(s) related to diagnosis / functional impairment(s)  Goal 1: Patient will learn skills to manage and reduce anxiety, as measured by NEGRO-7.    I will know I've met my goal when I can try to stop moments of intense anxiety.      Objective #A (Patient Action)    Patient will identify 3 fears / thoughts that contribute to feeling anxious.  Status: Continued - Date(s):  6/4/21    Intervention(s)  Therapist will  teach emotional recognition/identification. to identify top 3 triggers for anxiety.    Objective #B  Patient will use at least 3 coping skills for anxiety management in the next 4 weeks.  Status: Continued - Date(s): 6/4/21    Intervention(s)  Therapist will teach emotional regulation skills. 3 new calming/coping skills to manage anxiety.    Objective #C  Patient will use relaxation strategies 1 times per day to reduce the physical symptoms of anxiety.  Status: Continued - Date(s):  6/4/21    Intervention(s)  Therapist will assign homework practice relaxation and mindfulness daily.    Objective #D  Client will learn & utilize at least 1 assertive communication skills weekly to set boundaries to reduce emotional distress in interpersonal relationships   Status: Continued - Date(s):6/4/21     Intervention(s)  Therapist will teach assertiveness skills. express and assert self to manage emotional distress in interpersonal relationships.      Goal 2: Patient will reduce depression and improve motivation, as measured by PHQ-9.     I will know I've met my goal when I ruminate less on issues/problems.      Objective #A (Patient Action)    Status: Continued - Date(s):  6/4/21    Patient will Identify negative self-talk and behaviors: challenge core beliefs, myths, and actions.    Intervention(s)  Therapist will teach emotional recognition/identification. to identify 3 thoughts/triggers for depression and shame.    Objective #B  Patient will Increase interest, engagement, and pleasure in doing things.    Status: Continued - Date(s):  6/4/21    Intervention(s)  Therapist will provide educational materials on depression  teach emotional regulation skills. 3 new coping skills to improve mood and motivation.    Objective #C  Patient will use positive self-affirmations daily.  Status: Continued - Date(s):  3/12/21    Intervention(s)  Therapist will assign homework practice using positive self-talk daily to combat critical  self-talk and shame.    Goal 3: Client will improve self-esteem and reduce restrictive eating habits while reducing distress     I will know I've met my goal when I am more comfortable with changes in my body.      Objective #A (Client Action)    Client will relieve distress about body image by stop counting calories at mealtime.  Status: Continued - Date(s): 6/4/21    Intervention(s)  Therapist will assign homework client is to stop counting calories at meals.    Objective #B  Client will identify 2 messages and impacts related to body image and challenge negative messages and replace with positive self messages.    Status: Continued - Date(s):  6/4/21    Intervention(s)  Therapist will assign homework use journaling to process impacts of eating habits and challenge negative messages from others regarding body image.      Patient has reviewed and agreed to the above plan.      Amee León, [facility]  June 4, 2021

## 2021-07-30 ENCOUNTER — VIRTUAL VISIT (OUTPATIENT)
Dept: PSYCHOLOGY | Facility: CLINIC | Age: 26
End: 2021-07-30
Payer: COMMERCIAL

## 2021-07-30 DIAGNOSIS — F41.1 GAD (GENERALIZED ANXIETY DISORDER): ICD-10-CM

## 2021-07-30 DIAGNOSIS — F33.1 MODERATE EPISODE OF RECURRENT MAJOR DEPRESSIVE DISORDER (H): Primary | ICD-10-CM

## 2021-07-30 PROCEDURE — 90834 PSYTX W PT 45 MINUTES: CPT | Mod: 95 | Performed by: SOCIAL WORKER

## 2021-07-30 NOTE — PROGRESS NOTES
Progress Note    Patient Name: Genesis Cutler  Date: 7/30/21         Service Type: Individual      Session Start Time: 7:01am  Session End Time: 7:53am     Session Length: 52min    Session #: 21    Attendees: Client attended alone    Mode of Communication: [employer] Video     Telemedicine Visit: The patient's condition can be safely assessed and treated via synchronous audio and visual telemedicine encounter.      Reason for Telemedicine Visit: Services only offered telehealth    Originating Site (Patient Location): Patient's home    Distant Site (Provider Location): Provider Remote Setting: home office    Consent:  The patient/guardian has verbally consented to: the potential risks and benefits of telemedicine (video visit) versus in person care; bill my insurance or make self-payment for services provided; and responsibility for payment of non-covered services.      Treatment Plan Last Reviewed: 6/4/21  PHQ-9 / NEGRO-7 :  7/30/21    DATA  Interactive Complexity: No  Crisis: No       Progress Since Last Session (Related to Symptoms / Goals / Homework):   Symptoms: No change stable but emotional distress re; pandemic, work, interpesonal relationships. self-worth struggles    Homework: Partially completed      Episode of Care Goals: Satisfactory progress - ACTION (Actively working towards change); Intervened by reinforcing change plan / affirming steps taken     Current / Ongoing Stressors and Concerns:   Only female in male dominated field at work, body image issues, COVID distress     Treatment Objective(s) Addressed in This Session:   identify 3 strategies to more effectively address stressors  learn and demonstrate 1 assertiveness skill(s)  Practice perspective taking  Identify negative self-talk and behaviors: challenge core beliefs, myths, and actions  practice setting boundaries 2 times in the next 4 weeks  use positive self-affirmations  daily       Intervention:   CBT: Client reviewed the past two months and endorsed some continued distress related to impacts from the pandemic. She worked through distress regarding re-entry at her work and distress about others' responses to COVID. She endorsed frustration as she processed and therapist listened and validated. Therapist worked to reflect her values and beliefs, while perspective taking and reviewing locus of control. Client also endorsed some continued struggles with self-talk and body image. She processed through triggers and identified some recent experiences of self criticism. Therapist worked to identify negative core beliefs and explore ways to challenge criticism to self. Client identified some positives she can focus on about self and ways to improve self-talk.        ASSESSMENT: Current Emotional / Mental Status (status of significant symptoms):   Risk status (Self / Other harm or suicidal ideation)   Patient denies current fears or concerns for personal safety.   Patient denies current or recent suicidal ideation or behaviors.   Patient denies current or recent homicidal ideation or behaviors.   Patient denies current or recent self injurious behavior or ideation.   Patient denies other safety concerns.   Patient reports there has been no change in risk factors since their last session.     Patient reports there has been no change in protective factors since their last session.     Recommended that patient call 911 or go to the local ED should there be a change in any of these risk factors.     Appearance:   Appropriate    Eye Contact:   Fair    Psychomotor Behavior: Normal    Attitude:   Cooperative  Friendly   Orientation:   All   Speech    Rate / Production: Normal/ Responsive    Volume:  Normal    Mood:    Irritable  Normal Sad    Affect:    Appropriate    Thought Content:  Clear    Thought Form:  Coherent  Logical    Insight:    Fair      Medication Review:   No changes to current  psychiatric medication(s)      Medication Compliance:   Yes     Changes in Health Issues:   None reported     Chemical Use Review:   Substance Use: Chemical use reviewed, no active concerns identified      Tobacco Use: No current tobacco use.      Diagnosis:  1. Moderate episode of recurrent major depressive disorder (H)    2. NEGRO (generalized anxiety disorder)        Collateral Reports Completed:   Not Applicable    PLAN: (Patient Tasks / Therapist Tasks / Other)  Client will return Aug 25 at 7am.  She will work on recognizing locus of control and use assertiveness and coping to manage emotional distress. She will also work on challenging negative core beliefs through positive affirmation to enhance self-worth.           Amee León, [facility]  7/30/2021                                                                                                          ______________________________________________________________________    Treatment Plan    Patient's Name: Genesis Cutler  YOB: 1995    Date: 6/4/21    DSM5 Diagnoses: 296.32 (F33.1) Major Depressive Disorder, Recurrent Episode, Moderate _ or 300.02 (F41.1) Generalized Anxiety Disorder  Psychosocial / Contextual Factors: Recent transition to work, only female in male dominated field at work  WHODAS:   WHODAS 2.0 Total Score 2/7/2020   Total Score 28       Referral / Collaboration:  Referral to another professional/service is not indicated at this time..    Anticipated number of session or this episode of care: 10-14    *Client endorsed progress, but goals maintained for enhanced progress    MeasurableTreatment Goal(s) related to diagnosis / functional impairment(s)  Goal 1: Patient will learn skills to manage and reduce anxiety, as measured by NEGRO-7.    I will know I've met my goal when I can try to stop moments of intense anxiety.      Objective #A (Patient Action)    Patient will identify 3 fears / thoughts that contribute to  feeling anxious.  Status: Continued - Date(s):  6/4/21    Intervention(s)  Therapist will teach emotional recognition/identification. to identify top 3 triggers for anxiety.    Objective #B  Patient will use at least 3 coping skills for anxiety management in the next 4 weeks.  Status: Continued - Date(s): 6/4/21    Intervention(s)  Therapist will teach emotional regulation skills. 3 new calming/coping skills to manage anxiety.    Objective #C  Patient will use relaxation strategies 1 times per day to reduce the physical symptoms of anxiety.  Status: Continued - Date(s):  6/4/21    Intervention(s)  Therapist will assign homework practice relaxation and mindfulness daily.    Objective #D  Client will learn & utilize at least 1 assertive communication skills weekly to set boundaries to reduce emotional distress in interpersonal relationships   Status: Continued - Date(s):6/4/21     Intervention(s)  Therapist will teach assertiveness skills. express and assert self to manage emotional distress in interpersonal relationships.      Goal 2: Patient will reduce depression and improve motivation, as measured by PHQ-9.     I will know I've met my goal when I ruminate less on issues/problems.      Objective #A (Patient Action)    Status: Continued - Date(s):  6/4/21    Patient will Identify negative self-talk and behaviors: challenge core beliefs, myths, and actions.    Intervention(s)  Therapist will teach emotional recognition/identification. to identify 3 thoughts/triggers for depression and shame.    Objective #B  Patient will Increase interest, engagement, and pleasure in doing things.    Status: Continued - Date(s):  6/4/21    Intervention(s)  Therapist will provide educational materials on depression  teach emotional regulation skills. 3 new coping skills to improve mood and motivation.    Objective #C  Patient will use positive self-affirmations daily.  Status: Continued - Date(s):   3/12/21    Intervention(s)  Therapist will assign homework practice using positive self-talk daily to combat critical self-talk and shame.    Goal 3: Client will improve self-esteem and reduce restrictive eating habits while reducing distress     I will know I've met my goal when I am more comfortable with changes in my body.      Objective #A (Client Action)    Client will relieve distress about body image by stop counting calories at mealtime.  Status: Continued - Date(s): 6/4/21    Intervention(s)  Therapist will assign homework client is to stop counting calories at meals.    Objective #B  Client will identify 2 messages and impacts related to body image and challenge negative messages and replace with positive self messages.    Status: Continued - Date(s):  6/4/21    Intervention(s)  Therapist will assign homework use journaling to process impacts of eating habits and challenge negative messages from others regarding body image.      Patient has reviewed and agreed to the above plan.      Amee León, [facility]  June 4, 2021

## 2021-07-30 NOTE — PATIENT INSTRUCTIONS
Client will return Aug 25 at 7am.  She will work on recognizing locus of control and use assertiveness and coping to manage emotional distress. She will also work on challenging negative core beliefs through positive affirmation to enhance self-worth.

## 2021-08-16 ENCOUNTER — MYC MEDICAL ADVICE (OUTPATIENT)
Dept: FAMILY MEDICINE | Facility: CLINIC | Age: 26
End: 2021-08-16

## 2021-08-17 NOTE — PROGRESS NOTES
"    Assessment & Plan     Fatigue, unspecified type  Diagnostic orders as below, further plans will depend on her clinical status and labs.  I also advise switching her lexapro to bedtime with plans to decrease dose if her labs are unremarkable.  I advised taking an over the counter [employer], exercise and continue on her current healthy life choices  - REVIEW OF HEALTH MAINTENANCE PROTOCOL ORDERS  - TSH with free T4 reflex  - CBC with platelets and differential  - Comprehensive metabolic panel (BMP + Alb, Alk Phos, ALT, AST, Total. Bili, TP)  - ESR: Erythrocyte sedimentation rate  - Lyme Disease Bettina with reflex to WB Serum  - HCG qualitative urine  - Mononucleosis screen      30  minutes spent on the date of the encounter doing chart review, history and exam, documentation and further activities per the note       BMI:   Estimated body mass index is 28.5 kg/m  as calculated from the following:    Height as of this encounter: 1.645 m (5' 4.76\").    Weight as of this encounter: 77.1 kg (170 lb).     Return in about 1 year (around 8/23/2022) for Routine preventive.    Richard Ledbetter MD  [facility] GLORIA Smith is a 26 year old who presents for the following health issues  accompanied by her Self:       Fatigue    Onset: Past 4 weeks    Description: How severe is the fatigue? moderate    Does the fatigue interfere with your life:Yes Details:     How much sleep are you getting? 6-8 Hours     How much sleep do you normally need to feel well? 4-5 Hours  Exercise: strenuous regular exercise program  Are there episodes of normal energy levels: yes     Accompanying Signs & Symptoms:  Falling asleep during the day: no  Snoring: no  Do you stop breathing while sleeping: no                 Night sweats: YES- chronic for yes      Fevers:no  Chest Pain: no  Pain other places in the body? no  Change in appetite: no                 Weight gain/loss: no  Dark or bloody stools: no  Heavy periods (women) " "no  Substance use:             Caffeine use:yes              Alcohol use: Less than 1 beverage / month               Illicit drug use:None    Mood  Depressed mood: no  Any new anxiety/stressors:no  Any new deaths or losses? no  PHQ-9 SCORE 5/5/2021 6/4/2021 7/30/2021   PHQ-9 Total Score Christie 8 (Mild depression) 5 (Mild depression) 7 (Mild depression)   PHQ-9 Total Score 8 5 7              History of Present Illness       She eats 4 or more servings of fruits and vegetables daily.She consumes 0 sweetened beverage(s) daily.She exercises with enough effort to increase her heart rate 30 to 60 minutes per day.  She exercises with enough effort to increase her heart rate 5 days per week.   She is taking medications regularly.       Review of Systems   Constitutional, HEENT, cardiovascular, pulmonary, GI, , musculoskeletal, neuro, skin, endocrine and psych systems are negative, except as otherwise noted.      Objective    /80   Pulse 77   Temp 97.8  F (36.6  C) (Temporal)   Resp 16   Ht 1.645 m (5' 4.76\")   Wt 77.1 kg (170 lb)   LMP 07/20/2020   SpO2 100%   BMI 28.50 kg/m    Body mass index is 28.5 kg/m .  Physical Exam   GENERAL: healthy, alert and no distress  EYES: Eyes grossly normal to inspection, PERRL and conjunctivae and sclerae normal  HENT: ear canals and TM's normal, nose and mouth without ulcers or lesions  NECK: no adenopathy, no asymmetry, masses, or scars and thyroid normal to palpation  RESP: lungs clear to auscultation - no rales, rhonchi or wheezes  CV: regular rate and rhythm, normal S1 S2, no S3 or S4, no murmur, click or rub, no peripheral edema and peripheral pulses strong  ABDOMEN: soft, nontender, no hepatosplenomegaly, no masses and bowel sounds normal  MS: no gross musculoskeletal defects noted, no edema  NEURO: Normal strength and tone, mentation intact and speech normal  PSYCH: mentation appears normal, affect normal/bright            "

## 2021-08-20 NOTE — PATIENT INSTRUCTIONS
Client will return May 5 at 7am. She will work on using assertive communication and boundary setting to reduce emotional distress and resentment in relationships.    Patient Education     4 Steps for Eating Healthier  Changing the way you eat can improve your health. It can lower your cholesterol and blood pressure, and help you stay at a healthy weight. Your diet doesn’t have to be bland and boring to be healthy. Just watch your calories and follow these steps:    Step 1. Eat fewer unhealthy fats  · Choose more fish and lean meats instead of fatty cuts of meat.  · Skip butter and lard, and use less margarine.  · Pass on foods that have palm, coconut, or hydrogenated oils.  · Eat fewer high-fat dairy foods like cheese, ice cream, and whole milk.  · Get a heart-healthy cookbook and try some low-fat recipes.  Step 2. Go light on salt  · Keep the saltshaker off the table.  · Limit high-salt ingredients, such as soy sauce, bouillon, and garlic salt.  · Instead of adding salt when cooking, season your food with herbs and flavorings. Try lemon, garlic, and onion, or salt-free herb seasonings.  · Limit convenience foods, such as boxed or canned foods and restaurant food.  · Read food labels and choose lower-sodium options.  Step 3. Limit sugar  · Pause before you add sugars to pancakes, cereal, coffee, or tea. This includes white and brown table sugar, syrup, honey, and molasses. Cut your usual amount by half.  · Use non-sugar sweeteners. Stevia, aspartame, and sucralose can satisfy a sweet tooth without adding calories.  · Swap out sugar-filled soda and other drinks. Buy sugar-free or low-calorie beverages. Remember water is always the best choice.  · Read labels and choose foods with less added sugar. Keep in mind that dairy foods and foods with fruit will have some natural sugar.  · Cut the sugar in recipes by 1/3 to 1/2. Boost the flavor with extracts like almond, vanilla, or orange. Or add spices such as cinnamon or nutmeg.  Step 4. Eat more fiber  · Eat fresh fruits and vegetables every day.  · Boost your diet with whole grains. Go for oats, whole-grain rice, and bran.  · Add  beans and lentils to your meals.  · Drink more water to match your fiber increase to help prevent constipation.  Date Last Reviewed: 6/1/2017  © 3168-7302 [employer]. [address] [place] [address], [place] [address]. All rights reserved. This information is not intended as a substitute for professional medical care. Always follow your healthcare professional's instructions.

## 2021-08-23 ENCOUNTER — OFFICE VISIT (OUTPATIENT)
Dept: FAMILY MEDICINE | Facility: CLINIC | Age: 26
End: 2021-08-23
Payer: COMMERCIAL

## 2021-08-23 VITALS
RESPIRATION RATE: 16 BRPM | HEART RATE: 77 BPM | TEMPERATURE: 97.8 F | DIASTOLIC BLOOD PRESSURE: 80 MMHG | OXYGEN SATURATION: 100 % | HEIGHT: 65 IN | SYSTOLIC BLOOD PRESSURE: 116 MMHG | WEIGHT: 170 LBS | BODY MASS INDEX: 28.32 KG/M2

## 2021-08-23 DIAGNOSIS — R53.83 FATIGUE, UNSPECIFIED TYPE: Primary | ICD-10-CM

## 2021-08-23 LAB
BASOPHILS # BLD AUTO: 0 10E3/UL (ref 0–0.2)
BASOPHILS NFR BLD AUTO: 0 %
EOSINOPHIL # BLD AUTO: 0.4 10E3/UL (ref 0–0.7)
EOSINOPHIL NFR BLD AUTO: 5 %
ERYTHROCYTE [DISTWIDTH] IN BLOOD BY AUTOMATED COUNT: 12.3 % (ref 10–15)
ERYTHROCYTE [SEDIMENTATION RATE] IN BLOOD BY WESTERGREN METHOD: 4 MM/HR (ref 0–20)
HCG UR QL: NEGATIVE
HCT VFR BLD AUTO: 41.6 % (ref 35–47)
HGB BLD-MCNC: 14.4 G/DL (ref 11.7–15.7)
LYMPHOCYTES # BLD AUTO: 2.1 10E3/UL (ref 0.8–5.3)
LYMPHOCYTES NFR BLD AUTO: 31 %
MCH RBC QN AUTO: 33.5 PG (ref 26.5–33)
MCHC RBC AUTO-ENTMCNC: 34.6 G/DL (ref 31.5–36.5)
MCV RBC AUTO: 97 FL (ref 78–100)
MONOCYTES # BLD AUTO: 0.4 10E3/UL (ref 0–1.3)
MONOCYTES NFR BLD AUTO: 6 %
MONOCYTES NFR BLD AUTO: NEGATIVE %
NEUTROPHILS # BLD AUTO: 4 10E3/UL (ref 1.6–8.3)
NEUTROPHILS NFR BLD AUTO: 57 %
PLATELET # BLD AUTO: 248 10E3/UL (ref 150–450)
RBC # BLD AUTO: 4.3 10E6/UL (ref 3.8–5.2)
WBC # BLD AUTO: 6.9 10E3/UL (ref 4–11)

## 2021-08-23 PROCEDURE — 36415 COLL VENOUS BLD VENIPUNCTURE: CPT | Performed by: FAMILY MEDICINE

## 2021-08-23 PROCEDURE — 85652 RBC SED RATE AUTOMATED: CPT | Performed by: FAMILY MEDICINE

## 2021-08-23 PROCEDURE — 99214 OFFICE O/P EST MOD 30 MIN: CPT | Performed by: FAMILY MEDICINE

## 2021-08-23 PROCEDURE — 86618 LYME DISEASE ANTIBODY: CPT | Performed by: FAMILY MEDICINE

## 2021-08-23 PROCEDURE — 86308 HETEROPHILE ANTIBODY SCREEN: CPT | Performed by: FAMILY MEDICINE

## 2021-08-23 PROCEDURE — 81025 URINE PREGNANCY TEST: CPT | Performed by: FAMILY MEDICINE

## 2021-08-23 PROCEDURE — 80050 GENERAL HEALTH PANEL: CPT | Performed by: FAMILY MEDICINE

## 2021-08-23 ASSESSMENT — MIFFLIN-ST. JEOR: SCORE: 1503.24

## 2021-08-23 NOTE — PATIENT INSTRUCTIONS
"Home Care Instructions Fatigue      Eat a sensible, well balanced diet. Avoid sweets to \"boost energy.\"    Take vitamin supplements as needed.    Maintain a regular exercise routine. Take short walks if vigorous activity is too strenuous.    Get an adequate amount of sleep. Try to establish a consistent sleeping pattern.    Increase rest, relaxation, and recreation to decrease stress.    Limit medications that contribute to fatigue, such as sleeping medications or cold and allergy medications. If prescription medication is causing fatigue, contact your PCP.      Call  Your PCP/Clinic/ED         Condition persists > 2 weeks or worsens    Fever      Seek Emergency Care Immediately If Any of the Following Occur      Chest pain or difficulty breathing    Black or bloody stools    Vomiting blood or dark coffee- grounds- like emesis    Sudden onset of one-sided weakness    New onset of irregular or slow heartbeat < 50    "

## 2021-08-24 ENCOUNTER — MYC MEDICAL ADVICE (OUTPATIENT)
Dept: FAMILY MEDICINE | Facility: CLINIC | Age: 26
End: 2021-08-24

## 2021-08-24 LAB
ALBUMIN SERPL-MCNC: 3.9 G/DL (ref 3.4–5)
ALP SERPL-CCNC: 41 U/L (ref 40–150)
ALT SERPL W P-5'-P-CCNC: 30 U/L (ref 0–50)
ANION GAP SERPL CALCULATED.3IONS-SCNC: 6 MMOL/L (ref 3–14)
AST SERPL W P-5'-P-CCNC: 25 U/L (ref 0–45)
B BURGDOR IGG+IGM SER QL: 0.02
BILIRUB SERPL-MCNC: 0.3 MG/DL (ref 0.2–1.3)
BUN SERPL-MCNC: 13 MG/DL (ref 7–30)
CALCIUM SERPL-MCNC: 8.6 MG/DL (ref 8.5–10.1)
CHLORIDE BLD-SCNC: 107 MMOL/L (ref 94–109)
CO2 SERPL-SCNC: 27 MMOL/L (ref 20–32)
CREAT SERPL-MCNC: 0.66 MG/DL (ref 0.52–1.04)
GFR SERPL CREATININE-BSD FRML MDRD: >90 ML/MIN/1.73M2
GLUCOSE BLD-MCNC: 83 MG/DL (ref 70–99)
POTASSIUM BLD-SCNC: 4.7 MMOL/L (ref 3.4–5.3)
PROT SERPL-MCNC: 7.3 G/DL (ref 6.8–8.8)
SODIUM SERPL-SCNC: 140 MMOL/L (ref 133–144)
TSH SERPL DL<=0.005 MIU/L-ACNC: 2.24 MU/L (ref 0.4–4)

## 2021-08-25 ENCOUNTER — VIRTUAL VISIT (OUTPATIENT)
Dept: PSYCHOLOGY | Facility: CLINIC | Age: 26
End: 2021-08-25
Payer: COMMERCIAL

## 2021-08-25 DIAGNOSIS — F41.1 GAD (GENERALIZED ANXIETY DISORDER): ICD-10-CM

## 2021-08-25 DIAGNOSIS — F33.1 MODERATE EPISODE OF RECURRENT MAJOR DEPRESSIVE DISORDER (H): Primary | ICD-10-CM

## 2021-08-25 PROCEDURE — 90834 PSYTX W PT 45 MINUTES: CPT | Mod: 95 | Performed by: SOCIAL WORKER

## 2021-08-25 NOTE — PATIENT INSTRUCTIONS
Client will return Sept 17 at 8am. She will work on identifying and writing down positives of body and self. She will challenge negative messages related to work ethic and put forth efforts for self-care.

## 2021-09-17 ENCOUNTER — VIRTUAL VISIT (OUTPATIENT)
Dept: PSYCHOLOGY | Facility: CLINIC | Age: 26
End: 2021-09-17
Payer: COMMERCIAL

## 2021-09-17 DIAGNOSIS — F33.1 MODERATE EPISODE OF RECURRENT MAJOR DEPRESSIVE DISORDER (H): Primary | ICD-10-CM

## 2021-09-17 DIAGNOSIS — F41.1 GAD (GENERALIZED ANXIETY DISORDER): ICD-10-CM

## 2021-09-17 PROCEDURE — 90834 PSYTX W PT 45 MINUTES: CPT | Mod: 95 | Performed by: SOCIAL WORKER

## 2021-09-17 NOTE — PATIENT INSTRUCTIONS
Client will return Oct 20 at 7am. She will work on challenging critical self-talk related to body image and changes, and practice self-love and acceptance.  She may look into buying workbook on body image.

## 2021-09-17 NOTE — PROGRESS NOTES
Progress Note    Patient Name: Genesis Cutler  Date: 9/17/21         Service Type: Individual      Session Start Time: 8:02am  Session End Time: 8:54am     Session Length: 52min    Session #: 23    Attendees: Client attended alone    Mode of Communication: [employer] Video     Telemedicine Visit: The patient's condition can be safely assessed and treated via synchronous audio and visual telemedicine encounter.      Reason for Telemedicine Visit: Services only offered telehealth    Originating Site (Patient Location): Patient's home    Distant Site (Provider Location): Provider Remote Setting: home office    Consent:  The patient/guardian has verbally consented to: the potential risks and benefits of telemedicine (video visit) versus in person care; bill my insurance or make self-payment for services provided; and responsibility for payment of non-covered services.      Treatment Plan Last Reviewed: 9/17/21  PHQ-9 / NEGRO-7 :  9/16/21    DATA  Interactive Complexity: No  Crisis: No       Progress Since Last Session (Related to Symptoms / Goals / Homework):   Symptoms: No change stable, but distress and sadness regarding body image and some self-worth struggles    Homework: Partially completed      Episode of Care Goals: Satisfactory progress - ACTION (Actively working towards change); Intervened by reinforcing change plan / affirming steps taken     Current / Ongoing Stressors and Concerns:   Only female in male dominated field at work, body image issues, COVID distress     Treatment Objective(s) Addressed in This Session:   identify 3 strategies to more effectively address stressors  learn and demonstrate 1 assertiveness skill(s)  Practice perspective taking  Identify negative self-talk and behaviors: challenge core beliefs, myths, and actions  use positive self-affirmations daily       Intervention:   CBT: Client reviewed the past few weeks and endorsed feeling good  about a promotion at work. She processed through some of her progress with managing mood concerns and therapist praised efforts. Client identified continued struggles with body image, comparing self to others, and negative self-talk. She processed through this while therapist noted her emotional distress and worked to explore impacts. Therapist worked to reflect benefits of self-compassion and positive self-talk to enhance self-love and acceptance. Client agreed to continue to practice.        ASSESSMENT: Current Emotional / Mental Status (status of significant symptoms):   Risk status (Self / Other harm or suicidal ideation)   Patient denies current fears or concerns for personal safety.   Patient denies current or recent suicidal ideation or behaviors.   Patient denies current or recent homicidal ideation or behaviors.   Patient denies current or recent self injurious behavior or ideation.   Patient denies other safety concerns.   Patient reports there has been no change in risk factors since their last session.     Patient reports there has been no change in protective factors since their last session.     Recommended that patient call 911 or go to the local ED should there be a change in any of these risk factors.     Appearance:   Appropriate    Eye Contact:   Fair    Psychomotor Behavior: Normal    Attitude:   Cooperative  Friendly   Orientation:   All   Speech    Rate / Production: Normal/ Responsive    Volume:  Normal    Mood:    Irritable  Sad    Affect:    Appropriate    Thought Content:  Clear    Thought Form:  Coherent  Logical    Insight:    Fair      Medication Review:   No changes to current psychiatric medication(s)      Medication Compliance:   Yes     Changes in Health Issues:   None reported     Chemical Use Review:   Substance Use: Chemical use reviewed, no active concerns identified      Tobacco Use: No current tobacco use.      Diagnosis:  1. Moderate episode of recurrent major depressive  disorder (H)    2. NEGRO (generalized anxiety disorder)        Collateral Reports Completed:   Not Applicable    PLAN: (Patient Tasks / Therapist Tasks / Other)  Client will return Oct 20 at 7am. She will work on challenging critical self-talk related to body image and changes, and practice self-love and acceptance.  She may look into buying workbook on body image.      Amee León, [facility]  9/17/2021                                                                                                      ______________________________________________________________________    Treatment Plan    Patient's Name: Genesis Cutler  YOB: 1995    Date: 9/17/2021      DSM5 Diagnoses: 296.32 (F33.1) Major Depressive Disorder, Recurrent Episode, Moderate _ or 300.02 (F41.1) Generalized Anxiety Disorder  Psychosocial / Contextual Factors: Recent transition to work, only female in male dominated field at work  WHODAS:   WHODAS 2.0 Total Score 2/7/2020   Total Score 28       Referral / Collaboration:  Referral to another professional/service is not indicated at this time..    Anticipated number of session or this episode of care: 10-14    *Client endorsed progress, but goals maintained for enhanced progress    MeasurableTreatment Goal(s) related to diagnosis / functional impairment(s)  Goal 1: Patient will learn skills to manage and reduce anxiety, as measured by NEGRO-7.    I will know I've met my goal when I can try to stop moments of intense anxiety.      Objective #A (Patient Action)    Patient will identify 3 fears / thoughts that contribute to feeling anxious.  Status: Completed - Date: 9/17/21      Intervention(s)  Therapist will teach emotional recognition/identification. to identify top 3 triggers for anxiety.    Objective #B  Patient will use at least 3 coping skills for anxiety management in the next 4 weeks.  Status: Continued - Date(s): 9/17/21    Intervention(s)  Therapist will teach emotional  regulation skills. 3 new calming/coping skills to manage anxiety.    Objective #C  Patient will use relaxation strategies 1 times per day to reduce the physical symptoms of anxiety.  Status: Completed - Date: 9/17/21      Intervention(s)  Therapist will assign homework practice relaxation and mindfulness daily.    Objective #D  Client will learn & utilize at least 1 assertive communication skills weekly to set boundaries to reduce emotional distress in interpersonal relationships   Status: Continued - Date(s): 9/17/21    Intervention(s)  Therapist will teach assertiveness skills. express and assert self to manage emotional distress in interpersonal relationships.      Goal 2: Patient will reduce depression and improve motivation, as measured by PHQ-9.     I will know I've met my goal when I ruminate less on issues/problems.      Objective #A (Patient Action)    Status: Continued - Date(s): 9/17/21    Patient will Identify negative self-talk and behaviors: challenge core beliefs, myths, and actions.    Intervention(s)  Therapist will teach emotional recognition/identification. to identify 3 thoughts/triggers for depression and shame.    Objective #B  Patient will Increase interest, engagement, and pleasure in doing things.    Status: Completed - Date: 9/17/21     Intervention(s)  Therapist will provide educational materials on depression  teach emotional regulation skills. 3 new coping skills to improve mood and motivation.    Objective #C  Patient will use positive self-affirmations daily.  Status: Continued - Date(s):  9/17/21    Intervention(s)  Therapist will assign homework practice using positive self-talk daily to combat critical self-talk and shame.    Goal 3: Client will improve self-esteem and body image/body changes      I will know I've met my goal when I am more comfortable with changes in my body.      Objective #A (Client Action)    Client will relieve distress about body image by stop counting  calories at mealtime.  Status: Completed - Date: 9/17/21     Intervention(s)  Therapist will assign homework client is to stop counting calories at meals.    Objective #B  Client will identify 2 messages and impacts related to body image and challenge negative messages and replace with positive self messages to improve body image   Status: Continued - Date(s): 9/17/21    Intervention(s)  Therapist will assign homework use journaling to process impacts of eating habits and challenge negative messages from others regarding body image.      Patient has reviewed and agreed to the above plan         Amee León, [facility]  September 17, 2021

## 2021-10-04 ENCOUNTER — HEALTH MAINTENANCE LETTER (OUTPATIENT)
Age: 26
End: 2021-10-04

## 2021-10-20 ENCOUNTER — VIRTUAL VISIT (OUTPATIENT)
Dept: PSYCHOLOGY | Facility: CLINIC | Age: 26
End: 2021-10-20
Payer: COMMERCIAL

## 2021-10-20 DIAGNOSIS — F41.1 GAD (GENERALIZED ANXIETY DISORDER): ICD-10-CM

## 2021-10-20 DIAGNOSIS — F33.1 MODERATE EPISODE OF RECURRENT MAJOR DEPRESSIVE DISORDER (H): Primary | ICD-10-CM

## 2021-10-20 PROCEDURE — 90834 PSYTX W PT 45 MINUTES: CPT | Mod: 95 | Performed by: SOCIAL WORKER

## 2021-10-20 NOTE — PROGRESS NOTES
Progress Note    Patient Name: Genesis Cutler  Date: 10/20/2021         Service Type: Individual      Session Start Time:    7:01am  Session End Time: 7:53am     Session Length: 52min    Session #: 24    Attendees: Client attended alone    Mode of Communication: [employer] Video     Telemedicine Visit: The patient's condition can be safely assessed and treated via synchronous audio and visual telemedicine encounter.      Reason for Telemedicine Visit: Services only offered telehealth    Originating Site (Patient Location): Patient's home    Distant Site (Provider Location): Provider Remote Setting: home office    Consent:  The patient/guardian has verbally consented to: the potential risks and benefits of telemedicine (video visit) versus in person care; bill my insurance or make self-payment for services provided; and responsibility for payment of non-covered services.      Treatment Plan Last Reviewed: 9/17/21  PHQ-9 / NEGRO-7 :  10/20/21    DATA  Interactive Complexity: No  Crisis: No       Progress Since Last Session (Related to Symptoms / Goals / Homework):   Symptoms: Improving maintaining stability. 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 She processed through dynamics with a friend and some family members. Therapist listened, validated, and worked to reflect emotions, perspectives and locus of control. Client also processed some abandonment issues. Therapist validated past and recent distress. Therapist worked to explore helpful ways to express self and manage emotional distress.        ASSESSMENT: Current Emotional / Mental Status (status of significant symptoms):   Risk status (Self / Other harm or suicidal ideation)   Patient denies current fears or concerns for personal safety.   Patient denies current or recent suicidal ideation or behaviors.   Patient denies current or recent homicidal ideation or behaviors.   Patient denies current or recent self injurious behavior or ideation.   Patient denies other safety concerns.   Patient reports there has been no change in risk factors since their last session.     Patient reports there has been no change in protective factors since their last session.     Recommended that patient call 911 or go to the local ED should there be a change in any of these risk factors.     Appearance:   Appropriate    Eye Contact:   Fair    Psychomotor Behavior: Normal    Attitude:   Cooperative  Friendly   Orientation:   All   Speech    Rate / Production: Normal/ Responsive    Volume:  Normal    Mood:    Irritable  Normal Sad    Affect:    Appropriate    Thought Content:  Clear    Thought Form:  Coherent  Logical    Insight:    Fair      Medication Review:   No changes to current psychiatric medication(s)      Medication Compliance:   Yes     Changes in Health Issues:   None reported     Chemical Use Review:   Substance Use: Chemical use reviewed, no active concerns identified      Tobacco Use: No current tobacco use.      Diagnosis:  1. Moderate episode of recurrent major depressive disorder (H)    2. NEGRO (generalized anxiety disorder)        Collateral  Reports Completed:   Not Applicable    PLAN: (Patient Tasks / Therapist Tasks / Other)  Client will return Nov 19 at 7am. She will identify locus of control and use writing to express self to others, possibly not sending the letter. She may reduce medication to assess impacts on sleep.       Amee León, [facility]  10/20/2021                                                                                                      ______________________________________________________________________    Treatment Plan    Patient's Name: Genesis Cutler  YOB: 1995    Date: 9/17/2021      DSM5 Diagnoses: 296.32 (F33.1) Major Depressive Disorder, Recurrent Episode, Moderate _ or 300.02 (F41.1) Generalized Anxiety Disorder  Psychosocial / Contextual Factors: Recent transition to work, only female in male dominated field at work  WHODAS:   WHODAS 2.0 Total Score 2/7/2020   Total Score 28       Referral / Collaboration:  Referral to another professional/service is not indicated at this time..    Anticipated number of session or this episode of care: 10-14    *Client endorsed progress, but goals maintained for enhanced progress    MeasurableTreatment Goal(s) related to diagnosis / functional impairment(s)  Goal 1: Patient will learn skills to manage and reduce anxiety, as measured by NEGRO-7.    I will know I've met my goal when I can try to stop moments of intense anxiety.      Objective #A (Patient Action)    Patient will identify 3 fears / thoughts that contribute to feeling anxious.  Status: Completed - Date: 9/17/21      Intervention(s)  Therapist will teach emotional recognition/identification. to identify top 3 triggers for anxiety.    Objective #B  Patient will use at least 3 coping skills for anxiety management in the next 4 weeks.  Status: Continued - Date(s): 9/17/21    Intervention(s)  Therapist will teach emotional regulation skills. 3 new calming/coping skills to manage  anxiety.    Objective #C  Patient will use relaxation strategies 1 times per day to reduce the physical symptoms of anxiety.  Status: Completed - Date: 9/17/21      Intervention(s)  Therapist will assign homework practice relaxation and mindfulness daily.    Objective #D  Client will learn & utilize at least 1 assertive communication skills weekly to set boundaries to reduce emotional distress in interpersonal relationships   Status: Continued - Date(s): 9/17/21    Intervention(s)  Therapist will teach assertiveness skills. express and assert self to manage emotional distress in interpersonal relationships.      Goal 2: Patient will reduce depression and improve motivation, as measured by PHQ-9.     I will know I've met my goal when I ruminate less on issues/problems.      Objective #A (Patient Action)    Status: Continued - Date(s): 9/17/21    Patient will Identify negative self-talk and behaviors: challenge core beliefs, myths, and actions.    Intervention(s)  Therapist will teach emotional recognition/identification. to identify 3 thoughts/triggers for depression and shame.    Objective #B  Patient will Increase interest, engagement, and pleasure in doing things.    Status: Completed - Date: 9/17/21     Intervention(s)  Therapist will provide educational materials on depression  teach emotional regulation skills. 3 new coping skills to improve mood and motivation.    Objective #C  Patient will use positive self-affirmations daily.  Status: Continued - Date(s):  9/17/21    Intervention(s)  Therapist will assign homework practice using positive self-talk daily to combat critical self-talk and shame.    Goal 3: Client will improve self-esteem and body image/body changes      I will know I've met my goal when I am more comfortable with changes in my body.      Objective #A (Client Action)    Client will relieve distress about body image by stop counting calories at mealtime.  Status: Completed - Date: 9/17/21      Intervention(s)  Therapist will assign homework client is to stop counting calories at meals.    Objective #B  Client will identify 2 messages and impacts related to body image and challenge negative messages and replace with positive self messages to improve body image   Status: Continued - Date(s): 9/17/21    Intervention(s)  Therapist will assign homework use journaling to process impacts of eating habits and challenge negative messages from others regarding body image.      Patient has reviewed and agreed to the above plan         Amee León, [facility]  September 17, 2021

## 2021-10-20 NOTE — PATIENT INSTRUCTIONS
Client will return Nov 19 at 7am. She will identify locus of control and use writing to express self to others, possibly not sending the letter. She may reduce medication to assess impacts on sleep.

## 2021-10-27 ENCOUNTER — DOCUMENTATION ONLY (OUTPATIENT)
Dept: PSYCHOLOGY | Facility: CLINIC | Age: 26
End: 2021-10-27
Payer: COMMERCIAL

## 2021-10-27 DIAGNOSIS — F41.1 GAD (GENERALIZED ANXIETY DISORDER): ICD-10-CM

## 2021-10-27 DIAGNOSIS — F33.1 MODERATE EPISODE OF RECURRENT MAJOR DEPRESSIVE DISORDER (H): Primary | ICD-10-CM

## 2021-10-27 NOTE — TELEPHONE ENCOUNTER
"                    Discharge Summary  Multiple Sessions    Client Name: Genesis Cutler MRN#: [id number] YOB: 1995      Intake / Discharge Date: Intake: 2/7/20; Discharge: 10/27/2021      DSM5 Diagnoses: (Sustained by DSM5 Criteria Listed Above)  Diagnoses: 296.32 (F33.1) Major Depressive Disorder, Recurrent Episode, Moderate _  300.02 (F41.1) Generalized Anxiety Disorder  Psychosocial & Contextual Factors: distress with relationship with father, distress with friendship, body image issues  WHODAS 2.0 (12 item) Score:   WHODAS 2.0 Total Score 2/7/2020   Total Score 28             Presenting Concern:  Patient reported the following reasons for seeking therapy: \"I'm not doing super great\".  She expressed concern about symptoms of depression, anxiety including ruminations and having a difficult time shifting away from intense thoughts. She stated that she also has a \"weird\" relationship with food.      Reason for Discharge:  Referred to new [facility] provider and therapist leaving agency      Disposition at Time of Last Encounter:   Comments:   Presented stable and noted continued improvement of mental health overall, despite interpersonal relationship stressors. No risk concerns noted.     Risk Management:   Client denies a history of suicidal ideation, suicide attempts, self-injurious behavior, homicidal ideation, homicidal behavior and and other safety concerns  Recommended that patient call 911 or go to the local ED should there be a change in any of these risk factors.      Referred To:  Continue therapy with another provider within [facility]: Zainab Agustin or Jolanta Cortez. Provided [facility] contact: [phone]        Amee León, [facility]   10/27/2021  "

## 2021-11-22 NOTE — PROGRESS NOTES
Assessment & Plan     Bunion, right  Rest the affected painful area as much as possible.  Apply ice for 15-20 minutes intermittently as needed and especially after any offending activity. Daily stretching. See AVS  - naproxen (NAPROSYN) 500 MG tablet; Take 1 tablet (500 mg) by mouth 2 times daily (with meals)  - Orthotics, Prosthetics and Custom Compression Order for DME - ONLY FOR DME  - Orthopedic  Referral; Future    Mood disorder (H)  Stable, continue present management    Need for prophylactic vaccination and inoculation against influenza  - INFLUENZA VACCINE IM > 6 MONTHS VALENT IIV4 (AFLURIA/FLUZONE)    High priority for 2019-nCoV vaccine  - COVID-19,PF,PFIZER (12+ Yrs PURPLE LABEL)      Return if symptoms worsen or fail to improve.    Richard Ledbetter MD  [facility] GLORIA Smith is a 26 year old who presents for the following health issues  accompanied by her Self.    History of Present Illness       She eats 4 or more servings of fruits and vegetables daily.She consumes 1 sweetened beverage(s) daily.She exercises with enough effort to increase her heart rate 30 to 60 minutes per day.  She exercises with enough effort to increase her heart rate 5 days per week.   She is taking medications regularly.    Musculoskeletal problem/pain      Duration: Pain bilateral 5th toe worse on right toe. Worse with tight fitting shoes and recent rock climbing    Intensity:  moderate    Accompanying signs and symptoms: none    History  Previous similar problem: no   Previous evaluation:  none    Precipitating or alleviating factors:  Trauma or overuse: no   Aggravating factors include: none    Therapies tried and outcome: nothing        Pain History:  When did you first notice your pain? - More than 6 weeks   Have you seen this provider for your pain in the past?   No   Where in your body do your have pain? Bilateral 5th toe worse Right  Are you seeing anyone else for your pain?  "No  What makes your pain better? Rest  What makes your pain worse? Walking, putting pressure on side of foot  How has pain affected your ability to work? Not applicable  Who lives in your household?     PHQ-9 SCORE 8/24/2021 9/16/2021 10/20/2021   PHQ-9 Total Score MyChart 9 (Mild depression) 5 (Mild depression) 9 (Mild depression)   PHQ-9 Total Score 9 5 9       NEGRO-7 SCORE 8/24/2021 9/16/2021 10/20/2021   Total Score 2 (minimal anxiety) 2 (minimal anxiety) 1 (minimal anxiety)   Total Score 2 2 1     Review of Systems   Constitutional, HEENT, cardiovascular, pulmonary, GI, , musculoskeletal, neuro, skin, endocrine and psych systems are negative, except as otherwise noted.      Objective    /62   Pulse 88   Temp 98.2  F (36.8  C) (Temporal)   Resp 18   Ht 1.645 m (5' 4.76\")   Wt 76.7 kg (169 lb 3.2 oz)   SpO2 100%   BMI 28.36 kg/m    Body mass index is 28.36 kg/m .  Physical Exam   GENERAL: healthy, alert and no distress  EYES: Eyes grossly normal to inspection, PERRL and conjunctivae and sclerae normal  HENT: ear canals and TM's normal, nose and mouth without ulcers or lesions  NECK: no adenopathy, no asymmetry, masses, or scars and thyroid normal to palpation  RESP: lungs clear to auscultation - no rales, rhonchi or wheezes  CV: regular rate and rhythm, normal S1 S2, no S3 or S4, no murmur, click or rub, no peripheral edema and peripheral pulses strong  ABDOMEN: soft, nontender, no hepatosplenomegaly, no masses and bowel sounds normal  Foot exam: normal DP and PT pulses, normal sensory exam, normal monofilament exam and bunions          "

## 2021-11-23 ENCOUNTER — OFFICE VISIT (OUTPATIENT)
Dept: FAMILY MEDICINE | Facility: CLINIC | Age: 26
End: 2021-11-23
Payer: COMMERCIAL

## 2021-11-23 VITALS
WEIGHT: 169.2 LBS | DIASTOLIC BLOOD PRESSURE: 62 MMHG | SYSTOLIC BLOOD PRESSURE: 108 MMHG | HEART RATE: 88 BPM | HEIGHT: 65 IN | RESPIRATION RATE: 18 BRPM | OXYGEN SATURATION: 100 % | TEMPERATURE: 98.2 F | BODY MASS INDEX: 28.19 KG/M2

## 2021-11-23 DIAGNOSIS — M21.611 BUNION, RIGHT: Primary | ICD-10-CM

## 2021-11-23 DIAGNOSIS — F39 MOOD DISORDER (H): ICD-10-CM

## 2021-11-23 DIAGNOSIS — Z23 HIGH PRIORITY FOR 2019-NCOV VACCINE: ICD-10-CM

## 2021-11-23 DIAGNOSIS — Z23 NEED FOR PROPHYLACTIC VACCINATION AND INOCULATION AGAINST INFLUENZA: ICD-10-CM

## 2021-11-23 PROCEDURE — 99214 OFFICE O/P EST MOD 30 MIN: CPT | Mod: 25 | Performed by: FAMILY MEDICINE

## 2021-11-23 PROCEDURE — 90686 IIV4 VACC NO PRSV 0.5 ML IM: CPT | Performed by: FAMILY MEDICINE

## 2021-11-23 PROCEDURE — 90471 IMMUNIZATION ADMIN: CPT | Performed by: FAMILY MEDICINE

## 2021-11-23 PROCEDURE — 91300 COVID-19,PF,PFIZER (12+ YRS): CPT | Performed by: FAMILY MEDICINE

## 2021-11-23 PROCEDURE — 0004A COVID-19,PF,PFIZER (12+ YRS): CPT | Performed by: FAMILY MEDICINE

## 2021-11-23 RX ORDER — NAPROXEN 500 MG/1
500 TABLET ORAL 2 TIMES DAILY WITH MEALS
Qty: 60 TABLET | Refills: 0 | Status: SHIPPED | OUTPATIENT
Start: 2021-11-23 | End: 2022-04-20

## 2021-11-23 ASSESSMENT — PAIN SCALES - GENERAL: PAINLEVEL: MILD PAIN (3)

## 2021-11-23 ASSESSMENT — MIFFLIN-ST. JEOR: SCORE: 1499.62

## 2021-11-23 NOTE — PATIENT INSTRUCTIONS
Patient Education     Bunion    You have a bunion. This is a bony bump at the base of your big toe, along the inside edge of your foot. As the bump gets bigger, it can become red, swollen, and painful with shoe wear.  Bunions may occur if you wear shoes that are too tight and pinch your toes together. High heels may make this worse. In some cases a bunion is due to poor alignment of the foot and ankle. This puts extra weight on the instep of each foot.  Once a bunion forms, it changes the way weight is spread all across your foot. This causes the bunion to get worse over time. The big toe will bend more and more toward the other toes.  A minor bunion can be treated by:    Wearing properly fitting shoes    Using bunion pads    Wearing shoe inserts, called orthotics, to better align the foot and ankle  Physical therapy with ultrasound or whirlpool baths can ease pain, redness, and swelling. Severe cases may require surgery. If you don t treat what is causing the bunion, it may get larger and more painful.  Home care    Limit high heels. These shoes force your foot forward, crowding the toes together.    Wear comfortable shoes with a wide toe area. Or have your existing shoes stretched by a shoe repair shop.    Don't wear shoes that are tight, narrow, or pointed.    If you are flat-footed, using arch supports may help prevent further deformity. The best shoe inserts are the ones custom made by an orthotic profession, an orthotist, or other healthcare provider.    Put a bunion pad over the bunion to ease pressure of your shoe against the bunion. You can buy these pads at most pharmacies without a prescription    To reduce pain and swelling, apply an ice pack over the injured area for 15 to 20 minutes. Do this every 1 to 2 hours the first day. Keep using ice 3 to 4 times a day until the pain and swelling goes away.    To make an ice pack, put ice cubes in a sealed zip-lock plastic bag. Wrap the bag in a clean, thin towel  or cloth. Never put ice or an ice pack directly on the skin.    You may use over-the-counter pain medicine to control pain, unless another medicine was prescribed. Talk with your provider before using these medicines if you have chronic liver or kidney disease, or ever had a stomach ulcer or gastrointestinal bleeding.    Follow-up care  Follow up with a healthcare provider, or as advised.  If X-rays were taken, you will be notified of any new findings that may affect your care.  When to seek medical care  Contact your healthcare provider if any of the following occur:    Increasing pain or redness around the base of the big toe    Painful ingrown toenail, with redness and swelling or pus around the nail  [employer] last reviewed this educational content on 5/1/2018 2000-2021 [employer]. All rights reserved. This information is not intended as a substitute for professional medical care. Always follow your healthcare professional's instructions.    Rest the affected painful area as much as possible.  Apply ice for 15-20 minutes intermittently as needed and especially after any offending activity. Daily stretching.

## 2021-12-01 ENCOUNTER — OFFICE VISIT (OUTPATIENT)
Dept: PODIATRY | Facility: CLINIC | Age: 26
End: 2021-12-01
Payer: COMMERCIAL

## 2021-12-01 VITALS — DIASTOLIC BLOOD PRESSURE: 84 MMHG | HEART RATE: 84 BPM | SYSTOLIC BLOOD PRESSURE: 131 MMHG

## 2021-12-01 DIAGNOSIS — M21.621 TAILOR'S BUNION OF BOTH FEET: Primary | ICD-10-CM

## 2021-12-01 DIAGNOSIS — M20.5X9 HALLUX LIMITUS, UNSPECIFIED LATERALITY: ICD-10-CM

## 2021-12-01 DIAGNOSIS — M21.622 TAILOR'S BUNION OF BOTH FEET: Primary | ICD-10-CM

## 2021-12-01 PROCEDURE — 99213 OFFICE O/P EST LOW 20 MIN: CPT | Performed by: PODIATRIST

## 2021-12-01 NOTE — PROGRESS NOTES
Subjective:    Pt is seen today in consult from Dr. Ledbetter with the c/c of painful rightfoot.  This has been symptomatic for the past  2months.   Patient points to later fifth metatarsal head.    Describes this as a burning pain.  Aggravated by activity and releaved by rest.  Tight shoes positive especially rockclimbing shoes.  Has gotten occasional pain on her left foot.  She is working out of her house.  She admits that she often sits on top of her foot in a chair.  When sitting on the floor she often crosses her legs as well.  She notices she has a wide forefoot and bone prominence medial as well.  Denies blisters numbness ecchymosis erythema or drainage.    ROS: See above         Allergies   Allergen Reactions     Penicillins Rash       Current Outpatient Medications   Medication Sig Dispense Refill     escitalopram (LEXAPRO) 20 MG tablet Take 1 tablet (20 mg) by mouth daily 90 tablet 3     naproxen (NAPROSYN) 500 MG tablet Take 1 tablet (500 mg) by mouth 2 times daily (with meals) 60 tablet 0     norgestimate-ethinyl estradiol (ORTHO-CYCLEN) 0.25-35 MG-MCG tablet Take 1 tablet by mouth daily Take 4 continuous packs, skipping the sugar pill week. 112 tablet 3     plecanatide (TRULANCE) 3 MG tablet Take 1 tablet (3 mg) by mouth daily 90 tablet 3     polyethylene glycol (MIRALAX) powder Take 1 capful by mouth 4 times daily          Patient Active Problem List   Diagnosis     Anxiety and depression     Biceps tendinopathy, right     Mood disorder (H)       Past Medical History:   Diagnosis Date     Chronic constipation        Past Surgical History:   Procedure Laterality Date     ARTHROSCOPY SHLDR ROTATOR CUFF REPAIR, SUBACROMIAL DECOMP, DIST CLAVICLE RESECTION, BICEP TENODESIS Right 7/23/2020    Procedure: Right shoulder arthroscopy, open biceps tenodesis;  Surgeon: Aline Amador MD;  Location: [facility] OR     COLONOSCOPY  03/2017       Family History   Problem Relation Age of Onset     Diabetes Mother       Diabetes Father      Diabetes Maternal Grandmother      Cerebrovascular Disease Maternal Grandmother      Coronary Artery Disease Maternal Grandfather      Cerebrovascular Disease Maternal Grandfather      Coronary Artery Disease Paternal Grandmother      Unknown/Adopted Paternal Grandfather        Social History     Tobacco Use     Smoking status: Never Smoker     Smokeless tobacco: Never Used   Substance Use Topics     Alcohol use: Yes     Alcohol/week: 0.0 standard drinks     Comment: 1-2 drinks per month         Exam:    Vitals: /84   Pulse 84   BMI: There is no height or weight on file to calculate BMI.  Height: Data Unavailable    Constitutional/ general:  Pt is in no apparent distress, appears well-nourished.  Cooperative with history and physical exam.     Psych:  The patient answered questions appropriately.  Normal affect.  Seems to have reasonable expectations, in terms of treatment.     Lungs:  Non labored breathing, non labored speech. No cough.  No audible wheezing. Even, quiet breathing.       Vascular:  positive pedal pulses bilaterally for both the DP and PT arteries.  CFT < 3 sec.  negative ankle edema.  positive pedal hair growth.    Neuro:  Alert and oriented x 3. Coordinated gait.  Light touch sensation is intact   Derm: Normal texture and turgor.  No erythema, ecchymosis, or cyanosis.      Musculoskeletal:     Patient is ambulatory without an assistive device or brace.  Slightly pronated arch with weightbearing.  Right foot has increased internal tibial torsion and left is normal.  MS 5/5 all compartments.  Normal ROM all forefoot and rearfoot joints except for both of her first MTPJ's.  The right has 50% normal dorsiflexion on the left 60%.  No equinus. Patient has a both sides Tailor's bunion with weightbearing.  Negative for lateral bursa formation.  No pain plantar or dorsal.  Right has callus plantar but no pain with palpation.      A/P bilateral bunionette with right being more  symptomatic         Bilateral stage I hallux limitus    Explained to patient she has increased internal tibial torsion on the right causing more pressure here.  Discussed how this can cause more forefoot abduction and pressure laterally.  Also discussed how sitting on one's feet can cause lateral pressure.  She will stop doing this.  I made instructions on wide shoes to offload this.  She will ice this twice a day.  She will take ibuprofen.  Also explained that she is already lost range of motion in her first MTPJ's.  Discussed how the mechanics of her foot causes.  Discussed supportive shoes and made suggestions.  She will continue to do range of motion of her first MTPJ's to keep these supple.  RETURN TO CLINIC PRN.  Thank you for allowing me participate in the care of this patient.        Marshal Pate DPM, FACFAS

## 2021-12-01 NOTE — LETTER
12/1/2021         RE: Genesis Cutler  [address]  [place] [facility] [address]        Dear Colleague,    Thank you for referring your patient, Genesis Cutler, to the [facility]. Please see a copy of my visit note below.    Subjective:    Pt is seen today in consult from Dr. Ledbetter with the c/c of painful rightfoot.  This has been symptomatic for the past  2months.   Patient points to later fifth metatarsal head.    Describes this as a burning pain.  Aggravated by activity and releaved by rest.  Tight shoes positive especially rockclimbing shoes.  Has gotten occasional pain on her left foot.  She is working out of her house.  She admits that she often sits on top of her foot in a chair.  When sitting on the floor she often crosses her legs as well.  She notices she has a wide forefoot and bone prominence medial as well.  Denies blisters numbness ecchymosis erythema or drainage.    ROS: See above         Allergies   Allergen Reactions     Penicillins Rash       Current Outpatient Medications   Medication Sig Dispense Refill     escitalopram (LEXAPRO) 20 MG tablet Take 1 tablet (20 mg) by mouth daily 90 tablet 3     naproxen (NAPROSYN) 500 MG tablet Take 1 tablet (500 mg) by mouth 2 times daily (with meals) 60 tablet 0     norgestimate-ethinyl estradiol (ORTHO-CYCLEN) 0.25-35 MG-MCG tablet Take 1 tablet by mouth daily Take 4 continuous packs, skipping the sugar pill week. 112 tablet 3     plecanatide (TRULANCE) 3 MG tablet Take 1 tablet (3 mg) by mouth daily 90 tablet 3     polyethylene glycol (MIRALAX) powder Take 1 capful by mouth 4 times daily          Patient Active Problem List   Diagnosis     Anxiety and depression     Biceps tendinopathy, right     Mood disorder (H)       Past Medical History:   Diagnosis Date     Chronic constipation        Past Surgical History:   Procedure Laterality Date     ARTHROSCOPY SHLDR ROTATOR CUFF REPAIR, SUBACROMIAL DECOMP, DIST  CLAVICLE RESECTION, BICEP TENODESIS Right 7/23/2020    Procedure: Right shoulder arthroscopy, open biceps tenodesis;  Surgeon: Aline Amador MD;  Location: [facility] OR     COLONOSCOPY  03/2017       Family History   Problem Relation Age of Onset     Diabetes Mother      Diabetes Father      Diabetes Maternal Grandmother      Cerebrovascular Disease Maternal Grandmother      Coronary Artery Disease Maternal Grandfather      Cerebrovascular Disease Maternal Grandfather      Coronary Artery Disease Paternal Grandmother      Unknown/Adopted Paternal Grandfather        Social History     Tobacco Use     Smoking status: Never Smoker     Smokeless tobacco: Never Used   Substance Use Topics     Alcohol use: Yes     Alcohol/week: 0.0 standard drinks     Comment: 1-2 drinks per month         Exam:    Vitals: /84   Pulse 84   BMI: There is no height or weight on file to calculate BMI.  Height: Data Unavailable    Constitutional/ general:  Pt is in no apparent distress, appears well-nourished.  Cooperative with history and physical exam.     Psych:  The patient answered questions appropriately.  Normal affect.  Seems to have reasonable expectations, in terms of treatment.     Lungs:  Non labored breathing, non labored speech. No cough.  No audible wheezing. Even, quiet breathing.       Vascular:  positive pedal pulses bilaterally for both the DP and PT arteries.  CFT < 3 sec.  negative ankle edema.  positive pedal hair growth.    Neuro:  Alert and oriented x 3. Coordinated gait.  Light touch sensation is intact   Derm: Normal texture and turgor.  No erythema, ecchymosis, or cyanosis.      Musculoskeletal:     Patient is ambulatory without an assistive device or brace.  Slightly pronated arch with weightbearing.  Right foot has increased internal tibial torsion and left is normal.  MS 5/5 all compartments.  Normal ROM all forefoot and rearfoot joints except for both of her first MTPJ's.  The right has 50% normal  dorsiflexion on the left 60%.  No equinus. Patient has a both sides Tailor's bunion with weightbearing.  Negative for lateral bursa formation.  No pain plantar or dorsal.  Right has callus plantar but no pain with palpation.      A/P bilateral bunionette with right being more symptomatic         Bilateral stage I hallux limitus    Explained to patient she has increased internal tibial torsion on the right causing more pressure here.  Discussed how this can cause more forefoot abduction and pressure laterally.  Also discussed how sitting on one's feet can cause lateral pressure.  She will stop doing this.  I made instructions on wide shoes to offload this.  She will ice this twice a day.  She will take ibuprofen.  Also explained that she is already lost range of motion in her first MTPJ's.  Discussed how the mechanics of her foot causes.  Discussed supportive shoes and made suggestions.  She will continue to do range of motion of her first MTPJ's to keep these supple.  RETURN TO CLINIC PRN.  Thank you for allowing me participate in the care of this patient.        Marshal Pate DPM, FACFAS          Again, thank you for allowing me to participate in the care of your patient.        Sincerely,        Marshal Pate DPM

## 2021-12-01 NOTE — PATIENT INSTRUCTIONS
We wish you continued good healing. If you have any questions or concerns, please do not hesitate to contact us at  [phone]    BringMeThatt (secure e-mail communication and access to your chart) to send a message or to make an appointment.    Please remember to call and schedule a follow up appointment if one was recommended at your earliest convenience.     PODIATRY CLINIC HOURS  TELEPHONE NUMBER    Dr. Marshal MENDOZAPINDER [facility]        Clinics:  [place] CMA   Tuesday 1PM-6PM  [place]  Wednesday 745AM-330PM  [place]  Thursday/Friday 745AM-230PM  [place] APPOINTMENTS  [phone]    [place] APPOINTMENTS  [phone]          If you need a medication refill, please contact us you may need lab work and/or a follow up visit prior to your refill (i.e. Antifungal medications).    If MRI needed please call Imaging at [phone] or [phone]    HOW DO I GET MY KNEE SCOOTER? Knee scooters can be picked up at ANY Medical Supply stores with your knee scooter Prescription.  OR    Bring your signed prescription to an [facility] showroom.

## 2022-01-16 DIAGNOSIS — K59.04 CHRONIC IDIOPATHIC CONSTIPATION: ICD-10-CM

## 2022-01-19 RX ORDER — PLECANATIDE 3 MG/1
3 TABLET ORAL DAILY
Qty: 90 TABLET | Refills: 0 | Status: SHIPPED | OUTPATIENT
Start: 2022-01-19 | End: 2022-05-18

## 2022-01-19 NOTE — TELEPHONE ENCOUNTER
plecanatide (TRULANCE) 3 MG tablet   Take 1 tablet (3 mg) by mouth daily      Last Written Prescription Date:  1/14/21  Last Fill Quantity: 90,   # refills: 3  Last Office Visit : 3/10/21  Follow-up with Dr. Haro in 6 months.  Future Office visit:  none    Per last visit 3/10/21, follow up in 6 months. 90 day to pharmacy.     Scheduling has been notified to contact the pt for appointment.

## 2022-01-28 DIAGNOSIS — Z30.41 ENCOUNTER FOR BIRTH CONTROL PILLS MAINTENANCE: ICD-10-CM

## 2022-01-31 ENCOUNTER — PATIENT OUTREACH (OUTPATIENT)
Dept: GASTROENTEROLOGY | Facility: CLINIC | Age: 27
End: 2022-01-31
Payer: COMMERCIAL

## 2022-01-31 NOTE — PROGRESS NOTES
Prior Authorization Retail Medication Request    Medication/Dose:   plecanatide (TRULANCE) 3 MG tablet 90 tablet 0 1/19/2022  No   Sig - Route: Take 1 tablet (3 mg) by mouth daily        ICD code (if different than what is on RX):    Previously Tried and Failed:    Rationale:      Insurance Name:    Insurance ID:        Pharmacy Information (if different than what is on RX)  Name:    Phone:

## 2022-02-01 RX ORDER — NORGESTIMATE AND ETHINYL ESTRADIOL 0.25-0.035
KIT ORAL
Qty: 112 TABLET | Refills: 0 | Status: SHIPPED | OUTPATIENT
Start: 2022-02-01 | End: 2022-07-03

## 2022-02-01 NOTE — TELEPHONE ENCOUNTER
Pending Prescriptions:                       Disp   Refills    CLAUDIO 0.25-35 MG-MCG tablet [Pharmacy Med *112 ta*3            Sig: TAKE 1 TABLET BY MOUTH DAILY TAKE 4 CONTINUOUS           PACKS, SKIPPING THE SUGAR PILL WEEK.    Medication is being filled for 1 time brittany refill only due to:  Patient is due for annual    Please call and help schedule.  Thank you!

## 2022-02-02 NOTE — TELEPHONE ENCOUNTER
Central Prior Authorization Team   Phone: [phone]    PA Initiation    Medication: plecanatide (TRULANCE) 3 MG tablet  Insurance Company: [employer] ([facility]) - Phone [phone] Fax [phone]  Pharmacy Filling the Rx: [facility]/PHARMACY #[id number] - [place], [place] - [address], [place] AT [facility]  Filling Pharmacy Phone: [phone]  Filling Pharmacy Fax: [phone]  Start Date: 2/2/2022

## 2022-02-05 NOTE — TELEPHONE ENCOUNTER
Prior Authorization Approval    Authorization Effective Date: 2/2/2022  Authorization Expiration Date: 2/2/2023  Medication: plecanatide (TRULANCE) 3 MG tablet-PA APPROVED   Approved Dose/Quantity:  Reference #:     Insurance Company: [employer] ([facility]) - Phone [phone] Fax [phone]  Expected CoPay:       CoPay Card Available:      Foundation Assistance Needed:    Which Pharmacy is filling the prescription (Not needed for infusion/clinic administered): [facility]ARMACY [id number] [place] [facility] [address] [facility] [place]  Pharmacy Notified: Yes- **Instructed pharmacy to notify patient when script is ready to /ship.**  Patient Notified: Yes

## 2022-02-23 ENCOUNTER — OFFICE VISIT (OUTPATIENT)
Dept: PODIATRY | Facility: CLINIC | Age: 27
End: 2022-02-23
Payer: COMMERCIAL

## 2022-02-23 ENCOUNTER — ANCILLARY PROCEDURE (OUTPATIENT)
Dept: GENERAL RADIOLOGY | Facility: CLINIC | Age: 27
End: 2022-02-23
Attending: PODIATRIST
Payer: COMMERCIAL

## 2022-02-23 VITALS
BODY MASS INDEX: 28.16 KG/M2 | HEIGHT: 65 IN | SYSTOLIC BLOOD PRESSURE: 110 MMHG | DIASTOLIC BLOOD PRESSURE: 70 MMHG | HEART RATE: 67 BPM

## 2022-02-23 DIAGNOSIS — M21.622 TAILOR'S BUNION OF BOTH FEET: Primary | ICD-10-CM

## 2022-02-23 DIAGNOSIS — M21.622 TAILOR'S BUNION OF BOTH FEET: ICD-10-CM

## 2022-02-23 DIAGNOSIS — M21.621 TAILOR'S BUNION OF BOTH FEET: ICD-10-CM

## 2022-02-23 DIAGNOSIS — M21.621 TAILOR'S BUNION OF BOTH FEET: Primary | ICD-10-CM

## 2022-02-23 PROCEDURE — 99214 OFFICE O/P EST MOD 30 MIN: CPT | Performed by: PODIATRIST

## 2022-02-23 PROCEDURE — 73630 X-RAY EXAM OF FOOT: CPT | Mod: LT | Performed by: RADIOLOGY

## 2022-02-23 PROCEDURE — 73630 X-RAY EXAM OF FOOT: CPT | Mod: RT | Performed by: RADIOLOGY

## 2022-02-23 NOTE — LETTER
2/23/2022         RE: Genesis Cutler  [address]  [facility] [address]        Dear Colleague,    Thank you for referring your patient, Genesis Cutler, to the [facility]. Please see a copy of my visit note below.    Subjective:    12/1/21   Pt is seen today in consult from Dr. Ledbetter with the c/c of painful rightfoot.  This has been symptomatic for the past  2months.   Patient points to later fifth metatarsal head.    Describes this as a burning pain.  Aggravated by activity and releaved by rest.  Tight shoes positive especially rockclimbing shoes.  Has gotten occasional pain on her left foot.  She is working out of her house.  She admits that she often sits on top of her foot in a chair.  When sitting on the floor she often crosses her legs as well.  She notices she has a wide forefoot and bone prominence medial as well.  Denies blisters numbness ecchymosis erythema or drainage.    2/23/22 patient returns for evaluation of painful right foot.  Patient has gotten stiff shoes to wear at all times.  She has been icing and stretching.  Avoiding activities that irritate this.  Patient is also gone extrawide shoes and still having pain.  Pain has not gotten any better.  Also having pain in the left foot now and points to fifth MTPJ plantar lateral.  Denies edema erythema or numbness or masses.  She is currently working out of her house.  She has never smoked.    ROS: See above         Allergies   Allergen Reactions     Penicillins Rash       Current Outpatient Medications   Medication Sig Dispense Refill     escitalopram (LEXAPRO) 20 MG tablet Take 1 tablet (20 mg) by mouth daily 90 tablet 3     CLAUDIO 0.25-35 MG-MCG tablet TAKE 1 TABLET BY MOUTH DAILY TAKE 4 CONTINUOUS PACKS, SKIPPING THE SUGAR PILL WEEK. 112 tablet 0     naproxen (NAPROSYN) 500 MG tablet Take 1 tablet (500 mg) by mouth 2 times daily (with meals) (Patient not taking: Reported on 2/23/2022) 60  "tablet 0     plecanatide (TRULANCE) 3 MG tablet Take 1 tablet (3 mg) by mouth daily Please call Gastroenterology at [phone] to schedule appointment. Thank you. 90 tablet 0     polyethylene glycol (MIRALAX) powder Take 1 capful by mouth 4 times daily          Patient Active Problem List   Diagnosis     Anxiety and depression     Biceps tendinopathy, right     Mood disorder (H)       Past Medical History:   Diagnosis Date     Chronic constipation        Past Surgical History:   Procedure Laterality Date     ARTHROSCOPY SHLDR ROTATOR CUFF REPAIR, SUBACROMIAL DECOMP, DIST CLAVICLE RESECTION, BICEP TENODESIS Right 7/23/2020    Procedure: Right shoulder arthroscopy, open biceps tenodesis;  Surgeon: Aline Amador MD;  Location: [facility]     COLONOSCOPY  03/2017       Family History   Problem Relation Age of Onset     Diabetes Mother      Diabetes Father      Diabetes Maternal Grandmother      Cerebrovascular Disease Maternal Grandmother      Coronary Artery Disease Maternal Grandfather      Cerebrovascular Disease Maternal Grandfather      Coronary Artery Disease Paternal Grandmother      Unknown/Adopted Paternal Grandfather        Social History     Tobacco Use     Smoking status: Never Smoker     Smokeless tobacco: Never Used   Substance Use Topics     Alcohol use: Yes     Alcohol/week: 0.0 standard drinks     Comment: 1-2 drinks per month         Exam:    Vitals: /70   Pulse 67   Ht 1.651 m (5' 5\")   BMI 28.16 kg/m    BMI: Body mass index is 28.16 kg/m .  Height: 5' 5\"    Constitutional/ general:  Pt is in no apparent distress, appears well-nourished.  Cooperative with history and physical exam.     Psych:  The patient answered questions appropriately.  Normal affect.  Seems to have reasonable expectations, in terms of treatment.     Lungs:  Non labored breathing, non labored speech. No cough.  No audible wheezing. Even, quiet breathing.       Vascular:  positive pedal pulses bilaterally for both " the DP and PT arteries.  CFT < 3 sec.  negative ankle edema.  positive pedal hair growth.    Neuro:  Alert and oriented x 3. Coordinated gait.  Light touch sensation is intact   Derm: Normal texture and turgor.  No erythema, ecchymosis, or cyanosis.      Musculoskeletal:     Patient is ambulatory without an assistive device or brace.  Slightly pronated arch with weightbearing.  Right foot has increased internal tibial torsion and left is normal.  MS 5/5 all compartments.  Normal ROM all forefoot and rearfoot joints except for both of her first MTPJ's.  The right has 50% normal dorsiflexion on the left 60%.  No equinus. Patient has a both sides Tailor's bunion with weightbearing.  Negative for lateral bursa formation.  No pain plantar or dorsal.  Pain with palpation plantar lateral fifth MTPJ's bilaterally.    X-rays reveal increased IM angle fourth interspace and lateral bowing fifth metatarsal head bilaterally      A/P bilateral bunionette with pain    X-rays taken today of both feet.  Explained how increased IM angle and bowing of fifth metatarsal head causing more pressure.  We gave additional pads to offload this.  She will continue icing and stretching.  Discussed steroid injection but she declines today.  She would like to discuss surgery on this.  Discussed would need fifth metatarsal head osteotomy with internal fixation.  Explained the small bone with very small screws and would have to be nonweightbearing for 6 weeks.  Discussed complications such as infection stiffness numbness and continued pain.  Patient would like to go ahead with the surgery.  We will place order in the computer to have right fifth metatarsal head osteotomy with internal fixation end of March early April.  She will need history and physical before surgery.  We gave her my card and she will call me if she has any other questions.      Marshal Pate, MELANIE, FACFAS          Again, thank you for allowing me to participate in the care of  your patient.        Sincerely,        Marshal Pate DPM

## 2022-02-23 NOTE — PATIENT INSTRUCTIONS
We wish you continued good healing. If you have any questions or concerns, please do not hesitate to contact us at  [phone]    [employer] (secure e-mail communication and access to your chart) to send a message or to make an appointment.    Please remember to call and schedule a follow up appointment if one was recommended at your earliest convenience.     PODIATRY CLINIC HOURS  TELEPHONE NUMBER    Dr. Marshal MENDOZAPINDER [facility]        Clinics:  [place] [facility] CMA   Tuesday 1PM-6PM  [place]  Wednesday 745AM-330PM  [place]  Thursday/Friday 745AM-230PM  [place] [facility] APPOINTMENTS  [phone]    [place] APPOINTMENTS  [phone]          If you need a medication refill, please contact us you may need lab work and/or a follow up visit prior to your refill (i.e. Antifungal medications).    If MRI needed please call Imaging at [phone] or [phone]    HOW DO I GET MY KNEE SCOOTER? Knee scooters can be picked up at ANY Medical Supply stores with your knee scooter Prescription.  OR    Bring your signed prescription to an [facility] showroom.

## 2022-02-24 ENCOUNTER — TELEPHONE (OUTPATIENT)
Dept: PODIATRY | Facility: CLINIC | Age: 27
End: 2022-02-24
Payer: COMMERCIAL

## 2022-02-24 PROBLEM — M21.622 TAILOR'S BUNION OF BOTH FEET: Status: ACTIVE | Noted: 2022-02-24

## 2022-02-24 PROBLEM — M21.621 TAILOR'S BUNION OF BOTH FEET: Status: ACTIVE | Noted: 2022-02-24

## 2022-02-24 NOTE — TELEPHONE ENCOUNTER
Type of surgery: right fifth metatarsal bunionette correction with bone cutting and screw fixation (right-  CPT 27890,45682    Tailor's bunion of both feet M21.621, M21.622    Location of surgery: [facility]  Date and time of surgery: 5-3-22  TBD  Surgeon: Dr Pate  Pre-Op Appt Date: 4-20-22  Post-Op Appt Date: 5-6-22   Packet sent out: Yes  Pre-cert/Authorization completed:  Submitted for prior auth for [employer] on [employer] website ([employer])  Auth approval fax received.   Auth# [id number]  Date Range 2/24/22 - 5/3/22    Date: 2-24-22    Brittney Maria  Prior Authorization Dept  [phone]

## 2022-02-24 NOTE — PROGRESS NOTES
Subjective:    12/1/21   Pt is seen today in consult from Dr. Ledbetter with the c/c of painful rightfoot.  This has been symptomatic for the past  2months.   Patient points to later fifth metatarsal head.    Describes this as a burning pain.  Aggravated by activity and releaved by rest.  Tight shoes positive especially rockclimbing shoes.  Has gotten occasional pain on her left foot.  She is working out of her house.  She admits that she often sits on top of her foot in a chair.  When sitting on the floor she often crosses her legs as well.  She notices she has a wide forefoot and bone prominence medial as well.  Denies blisters numbness ecchymosis erythema or drainage.    2/23/22 patient returns for evaluation of painful right foot.  Patient has gotten stiff shoes to wear at all times.  She has been icing and stretching.  Avoiding activities that irritate this.  Patient is also gone extrawide shoes and still having pain.  Pain has not gotten any better.  Also having pain in the left foot now and points to fifth MTPJ plantar lateral.  Denies edema erythema or numbness or masses.  She is currently working out of her house.  She has never smoked.    ROS: See above         Allergies   Allergen Reactions     Penicillins Rash       Current Outpatient Medications   Medication Sig Dispense Refill     escitalopram (LEXAPRO) 20 MG tablet Take 1 tablet (20 mg) by mouth daily 90 tablet 3     CLAUDIO 0.25-35 MG-MCG tablet TAKE 1 TABLET BY MOUTH DAILY TAKE 4 CONTINUOUS PACKS, SKIPPING THE SUGAR PILL WEEK. 112 tablet 0     naproxen (NAPROSYN) 500 MG tablet Take 1 tablet (500 mg) by mouth 2 times daily (with meals) (Patient not taking: Reported on 2/23/2022) 60 tablet 0     plecanatide (TRULANCE) 3 MG tablet Take 1 tablet (3 mg) by mouth daily Please call Gastroenterology at [phone] to schedule appointment. Thank you. 90 tablet 0     polyethylene glycol (MIRALAX) powder Take 1 capful by mouth 4 times daily          Patient  "Active Problem List   Diagnosis     Anxiety and depression     Biceps tendinopathy, right     Mood disorder (H)       Past Medical History:   Diagnosis Date     Chronic constipation        Past Surgical History:   Procedure Laterality Date     ARTHROSCOPY SHLDR ROTATOR CUFF REPAIR, SUBACROMIAL DECOMP, DIST CLAVICLE RESECTION, BICEP TENODESIS Right 7/23/2020    Procedure: Right shoulder arthroscopy, open biceps tenodesis;  Surgeon: Aline Amador MD;  Location: [facility]     COLONOSCOPY  03/2017       Family History   Problem Relation Age of Onset     Diabetes Mother      Diabetes Father      Diabetes Maternal Grandmother      Cerebrovascular Disease Maternal Grandmother      Coronary Artery Disease Maternal Grandfather      Cerebrovascular Disease Maternal Grandfather      Coronary Artery Disease Paternal Grandmother      Unknown/Adopted Paternal Grandfather        Social History     Tobacco Use     Smoking status: Never Smoker     Smokeless tobacco: Never Used   Substance Use Topics     Alcohol use: Yes     Alcohol/week: 0.0 standard drinks     Comment: 1-2 drinks per month         Exam:    Vitals: /70   Pulse 67   Ht 1.651 m (5' 5\")   BMI 28.16 kg/m    BMI: Body mass index is 28.16 kg/m .  Height: 5' 5\"    Constitutional/ general:  Pt is in no apparent distress, appears well-nourished.  Cooperative with history and physical exam.     Psych:  The patient answered questions appropriately.  Normal affect.  Seems to have reasonable expectations, in terms of treatment.     Lungs:  Non labored breathing, non labored speech. No cough.  No audible wheezing. Even, quiet breathing.       Vascular:  positive pedal pulses bilaterally for both the DP and PT arteries.  CFT < 3 sec.  negative ankle edema.  positive pedal hair growth.    Neuro:  Alert and oriented x 3. Coordinated gait.  Light touch sensation is intact   Derm: Normal texture and turgor.  No erythema, ecchymosis, or cyanosis.      Musculoskeletal:    "  Patient is ambulatory without an assistive device or brace.  Slightly pronated arch with weightbearing.  Right foot has increased internal tibial torsion and left is normal.  MS 5/5 all compartments.  Normal ROM all forefoot and rearfoot joints except for both of her first MTPJ's.  The right has 50% normal dorsiflexion on the left 60%.  No equinus. Patient has a both sides Tailor's bunion with weightbearing.  Negative for lateral bursa formation.  No pain plantar or dorsal.  Pain with palpation plantar lateral fifth MTPJ's bilaterally.    X-rays reveal increased IM angle fourth interspace and lateral bowing fifth metatarsal head bilaterally      A/P bilateral bunionette with pain    X-rays taken today of both feet.  Explained how increased IM angle and bowing of fifth metatarsal head causing more pressure.  We gave additional pads to offload this.  She will continue icing and stretching.  Discussed steroid injection but she declines today.  She would like to discuss surgery on this.  Discussed would need fifth metatarsal head osteotomy with internal fixation.  Explained the small bone with very small screws and would have to be nonweightbearing for 6 weeks.  Discussed complications such as infection stiffness numbness and continued pain.  Patient would like to go ahead with the surgery.  We will place order in the computer to have right fifth metatarsal head osteotomy with internal fixation end of March early April.  She will need history and physical before surgery.  We gave her my card and she will call me if she has any other questions.      Marshal Pate DPM, FACFAS

## 2022-03-01 ENCOUNTER — MYC MEDICAL ADVICE (OUTPATIENT)
Dept: PODIATRY | Facility: CLINIC | Age: 27
End: 2022-03-01
Payer: COMMERCIAL

## 2022-03-07 ENCOUNTER — MYC MEDICAL ADVICE (OUTPATIENT)
Dept: FAMILY MEDICINE | Facility: CLINIC | Age: 27
End: 2022-03-07
Payer: COMMERCIAL

## 2022-03-18 DIAGNOSIS — Z11.59 ENCOUNTER FOR SCREENING FOR OTHER VIRAL DISEASES: Primary | ICD-10-CM

## 2022-04-12 NOTE — H&P (VIEW-ONLY)
Answers for HPI/ROS submitted by the patient on 4/20/2022  If you checked off any problems, how difficult have these problems made it for you to do your work, take care of things at home, or get along with other people?: Somewhat difficult  PHQ9 TOTAL SCORE: 5  NEGRO 7 TOTAL SCORE: 3    [facility]  [address], [address]  [place] [address]  Phone: [phone]  Fax: [phone]  Primary Provider: Germain Ledbetter  Pre-op Performing Provider: GERMAIN LEDBETTER      PREOPERATIVE EVALUATION:  Today's date: 4/20/2022    Genesis Cutler is a 27 year old female who presents for a preoperative evaluation.    Surgical Information:  Surgery/Procedure: Left fifth metatarsal bunionette correction with bone cutting and screw fixation  Surgery Location: [facility]  Surgeon: Marshal Pate DPM  Surgery Date: 5/3/2022  Time of Surgery: 7:00am   Where patient plans to recover: At home with family  Fax number for surgical facility: Note does not need to be faxed, will be available electronically in Epic.    Type of Anesthesia Anticipated: General    Assessment & Plan     The proposed surgical procedure is considered INTERMEDIATE risk.    Preop general physical exam  - HCG qualitative urine  - Basic metabolic panel  (Ca, Cl, CO2, Creat, Gluc, K, Na, BUN)  - Hemoglobin    Bunion, right      Anxiety and depression  Stable, continue present management  - escitalopram (LEXAPRO) 20 MG tablet  Dispense: 90 tablet; Refill: 3    Absence of menstruation  - HCG qualitative urine         Medication Instructions:  Patient is to hold all scheduled medications on the day of surgery    RECOMMENDATION:  APPROVAL GIVEN to proceed with proposed procedure, without further diagnostic evaluation.        Subjective     HPI related to upcoming procedure: The patient is schedule for the above procedure due to bilateral bunionette with right being more symptomatic and  Bilateral stage I hallux limitus      Preop Questions 4/20/2022   1. Have you ever had a heart attack or stroke? No   2. Have you ever had surgery on your heart or blood vessels, such as a stent placement, a coronary artery bypass, or surgery on an artery in your head, neck, heart, or legs? No   3. Do you have chest pain with activity? No   4. Do you have a history of  heart failure? No   5. Do you currently have a cold, bronchitis or symptoms of other infection? No   6. Do you have a cough, shortness of breath, or wheezing? No   7. Do you or anyone in your family have previous history of blood clots? No   8. Do you or does anyone in your family have a serious bleeding problem such as prolonged bleeding following surgeries or cuts? No   9. Have you ever had problems with anemia or been told to take iron pills? No   10. Have you had any abnormal blood loss such as black, tarry or bloody stools, or abnormal vaginal bleeding? No   11. Have you ever had a blood transfusion? No   12. Are you willing to have a blood transfusion if it is medically needed before, during, or after your surgery? Yes   13. Have you or any of your relatives ever had problems with anesthesia? No   14. Do you have sleep apnea, excessive snoring or daytime drowsiness? UNKNOWN -    15. Do you have any artifical heart valves or other implanted medical devices like a pacemaker, defibrillator, or continuous glucose monitor? No   16. Do you have artificial joints? No   17. Are you allergic to latex? No   18. Is there any chance that you may be pregnant? No     Health Care Directive:  Patient does not have a Health Care Directive or Living Will: Discussed advance care planning with patient; information given to patient to review.    Preoperative Review of :   reviewed - no record of controlled substances prescribed.      Status of Chronic Conditions:  DEPRESSION - Patient has a long history of Depression of moderate severity requiring medication  for control with recent symptoms being stable..Current symptoms of depression include none.       Review of Systems  CONSTITUTIONAL: NEGATIVE for fever, chills, change in weight  INTEGUMENTARY/SKIN: NEGATIVE for worrisome rashes, moles or lesions  EYES: NEGATIVE for vision changes or irritation  ENT/MOUTH: NEGATIVE for ear, mouth and throat problems  RESP: NEGATIVE for significant cough or SOB  CV: NEGATIVE for chest pain, palpitations or peripheral edema  GI: NEGATIVE for nausea, abdominal pain, heartburn, or change in bowel habits  : NEGATIVE for frequency, dysuria, or hematuria  MUSCULOSKELETAL:As in HPI  NEURO: NEGATIVE for weakness, dizziness or paresthesias  ENDOCRINE: NEGATIVE for temperature intolerance, skin/hair changes  HEME: NEGATIVE for bleeding problems  PSYCHIATRIC: NEGATIVE for changes in mood or affect    Patient Active Problem List    Diagnosis Date Noted     Tailor's bunion of both feet 02/24/2022     Priority: Medium     Added automatically from request for surgery [id number]       Mood disorder (H) 11/23/2021     Priority: Medium     Biceps tendinopathy, right 07/03/2020     Priority: Medium     Added automatically from request for surgery [id number]       Anxiety and depression 02/24/2020     Priority: Medium      Past Medical History:   Diagnosis Date     Chronic constipation      Past Surgical History:   Procedure Laterality Date     ARTHROSCOPY SHLDR ROTATOR CUFF REPAIR, SUBACROMIAL DECOMP, DIST CLAVICLE RESECTION, BICEP TENODESIS Right 07/23/2020    Procedure: Right shoulder arthroscopy, open biceps tenodesis;  Surgeon: Aline Amador MD;  Location: [facility]     COLONOSCOPY  03/2017     ORTHOPEDIC SURGERY  07/2020    Biceps tenodesis, right side     Current Outpatient Medications   Medication Sig Dispense Refill     escitalopram (LEXAPRO) 20 MG tablet Take 1 tablet (20 mg) by mouth daily 90 tablet 3     CLAUDIO 0.25-35 MG-MCG tablet TAKE 1 TABLET BY MOUTH DAILY TAKE 4 CONTINUOUS PACKS,  "SKIPPING THE SUGAR PILL WEEK. 112 tablet 0     plecanatide (TRULANCE) 3 MG tablet Take 1 tablet (3 mg) by mouth daily Please call Gastroenterology at [phone] to schedule appointment. Thank you. 90 tablet 0     polyethylene glycol (MIRALAX) 17 GM/Dose powder Take 1 capful by mouth 4 times daily        naproxen (NAPROSYN) 500 MG tablet Take 1 tablet (500 mg) by mouth 2 times daily (with meals) (Patient not taking: Reported on 2/23/2022) 60 tablet 0       Allergies   Allergen Reactions     Penicillins Rash        Social History     Tobacco Use     Smoking status: Never Smoker     Smokeless tobacco: Never Used   Substance Use Topics     Alcohol use: Not Currently     Comment: 1-2 drinks per month     Family History   Problem Relation Age of Onset     Diabetes Mother      Diabetes Father      Diabetes Maternal Grandmother      Cerebrovascular Disease Maternal Grandmother      Obesity Maternal Grandmother      Coronary Artery Disease Maternal Grandfather      Cerebrovascular Disease Maternal Grandfather      Diabetes Maternal Grandfather      Coronary Artery Disease Paternal Grandmother      Unknown/Adopted Paternal Grandfather      Depression Sister      Anxiety Disorder Sister      Obesity Sister      History   Drug Use No         Objective     /72   Pulse 72   Temp 97.9  F (36.6  C) (Temporal)   Resp 20   Ht 1.651 m (5' 5\")   Wt 77.3 kg (170 lb 8 oz)   LMP 04/05/2022   SpO2 98%   Breastfeeding No   BMI 28.37 kg/m      Physical Exam    GENERAL APPEARANCE: healthy, alert and no distress     EYES: EOMI, PERRL     HENT: ear canals and TM's normal and nose and mouth without ulcers or lesions     NECK: no adenopathy, no asymmetry, masses, or scars and thyroid normal to palpation     RESP: lungs clear to auscultation - no rales, rhonchi or wheezes     CV: regular rates and rhythm, normal S1 S2, no S3 or S4 and no murmur, click or rub     ABDOMEN:  soft, nontender, no HSM or masses and bowel sounds normal   "   MS: extremities normal- no gross deformities noted, no evidence of inflammation in joints, FROM in all extremities.Patient has a both sides Tailor's bunion with weightbearing.      SKIN: no suspicious lesions or rashes     NEURO: Normal strength and tone, sensory exam grossly normal, mentation intact and speech normal     PSYCH: mentation appears normal. and affect normal/bright     LYMPHATICS: No cervical adenopathy    Recent Labs   Lab Test 08/23/21  1642 07/15/20  1535   HGB 14.4  --      --     140   POTASSIUM 4.7 4.1   CR 0.66 0.56        Diagnostics:  Labs pending at this time.  Results will be reviewed when available.       Revised Cardiac Risk Index (RCRI):  The patient has the following serious cardiovascular risks for perioperative complications:   - No serious cardiac risks = 0 points     RCRI Interpretation: 1 point: Class II (low risk - 0.9% complication rate)           Signed Electronically by: Richard Ledbetter MD  Copy of this evaluation report is provided to requesting physician.

## 2022-04-12 NOTE — PATIENT INSTRUCTIONS
Hold Naproxen 5 days to surgery  Hold all medications morning of surgery    Preparing for Your Surgery  Getting started  A nurse will call you to review your health history and instructions. They will give you an arrival time based on your scheduled surgery time. Please be ready to share:  Your doctor's clinic name and phone number  Your medical, surgical and anesthesia history  A list of allergies and sensitivities  A list of medicines, including herbal treatments and over-the-counter drugs  Whether the patient has a legal guardian (ask how to send us the papers in advance)  Please tell us if you're pregnant--or if there's any chance you might be pregnant. Some surgeries may injure a fetus (unborn baby), so they require a pregnancy test. Surgeries that are safe for a fetus don't always need a test, and you can choose whether to have one.   If you have a child who's having surgery, please ask for a copy of Preparing for Your Child's Surgery.    Preparing for surgery  Within 30 days of surgery: Have a pre-op exam (sometimes called an H&P, or History and Physical). This can be done at a clinic or pre-operative center.  If you're having a , you may not need this exam. Talk to your care team.  At your pre-op exam, talk to your care team about all medicines you take. If you need to stop any medicines before surgery, ask when to start taking them again.  We do this for your safety. Many medicines can make you bleed too much during surgery. Some change how well surgery (anesthesia) drugs work.  Call your insurance company to let them know you're having surgery. (If you don't have insurance, call [phone].)  Call your clinic if there's any change in your health. This includes signs of a cold or flu (sore throat, runny nose, cough, rash, fever). It also includes a scrape or scratch near the surgery site.  If you have questions on the day of surgery, call your hospital or surgery center.  COVID testing  You may  need to be tested for COVID-19 before having surgery. If so, your surgical team will give you instructions for scheduling this test, separate from your preoperative history and physical.  Eating and drinking guidelines  For your safety: Unless your surgeon tells you otherwise, follow the guidelines below.  Eat and drink as usual until 8 hours before surgery. After that, no food or milk.  Drink clear liquids until 2 hours before surgery. These are liquids you can see through, like water, Gatorade and Propel Water. You may also have black coffee and tea (no cream or milk).  Nothing by mouth within 2 hours of surgery. This includes gum, candy and breath mints.  If you drink alcohol: Stop drinking it the night before surgery.  If your care team tells you to take medicine on the morning of surgery, it's okay to take it with a sip of water.  Preventing infection  Shower or bathe the night before and morning of your surgery. Follow the instructions your clinic gave you. (If no instructions, use regular soap.)  Don't shave or clip hair near your surgery site. We'll remove the hair if needed.  Don't smoke or vape the morning of surgery. You may chew nicotine gum up to 2 hours before surgery. A nicotine patch is okay.  Note: Some surgeries require you to completely quit smoking and nicotine. Check with your surgeon.  Your care team will make every effort to keep you safe from infection. We will:  Clean our hands often with soap and water (or an alcohol-based hand rub).  Clean the skin at your surgery site with a special soap that kills germs.  Give you a special gown to keep you warm. (Cold raises the risk of infection.)  Wear special hair covers, masks, gowns and gloves during surgery.  Give antibiotic medicine, if prescribed. Not all surgeries need antibiotics.  What to bring on the day of surgery  Photo ID and insurance card  Copy of your health care directive, if you have one  Glasses and hearing aides (bring cases)  You  can't wear contacts during surgery  Inhaler and eye drops, if you use them (tell us about these when you arrive)  CPAP machine or breathing device, if you use them  A few personal items, if spending the night  If you have . . .  A pacemaker, ICD (cardiac defibrillator) or other implant: Bring the ID card.  An implanted stimulator: Bring the remote control.  A legal guardian: Bring a copy of the certified (court-stamped) guardianship papers.  Please remove any jewelry, including body piercings. Leave jewelry and other valuables at home.  If you're going home the day of surgery  You must have a responsible adult drive you home. They should stay with you overnight as well.  If you don't have someone to stay with you, and you aren't safe to go home alone, we may keep you overnight. Insurance often won't pay for this.  After surgery  If it's hard to control your pain or you need more pain medicine, please call your surgeon's office.  Questions?   If you have any questions for your care team, list them here: _________________________________________________________________________________________________________________________________________________________________________ ____________________________________ ____________________________________ ____________________________________  For informational purposes only. Not to replace the advice of your health care provider. Copyright   2003, 2019 [facility]. All rights reserved. Clinically reviewed by Bridget Nesbitt MD. [employer] [id number] - REV 07/21.

## 2022-04-12 NOTE — PROGRESS NOTES
Answers for HPI/ROS submitted by the patient on 4/20/2022  If you checked off any problems, how difficult have these problems made it for you to do your work, take care of things at home, or get along with other people?: Somewhat difficult  PHQ9 TOTAL SCORE: 5  NEGRO 7 TOTAL SCORE: 3    [facility]  [address], [address]  [place] [address]  Phone: [phone]  Fax: [phone]  Primary Provider: Germain Ledbetter  Pre-op Performing Provider: GERMAIN LEDBETTER      PREOPERATIVE EVALUATION:  Today's date: 4/20/2022    Genesis Cutler is a 27 year old female who presents for a preoperative evaluation.    Surgical Information:  Surgery/Procedure: Left fifth metatarsal bunionette correction with bone cutting and screw fixation  Surgery Location: [facility]  Surgeon: Marshal Pate DPM  Surgery Date: 5/3/2022  Time of Surgery: 7:00am   Where patient plans to recover: At home with family  Fax number for surgical facility: Note does not need to be faxed, will be available electronically in Epic.    Type of Anesthesia Anticipated: General    Assessment & Plan     The proposed surgical procedure is considered INTERMEDIATE risk.    Preop general physical exam  - HCG qualitative urine  - Basic metabolic panel  (Ca, Cl, CO2, Creat, Gluc, K, Na, BUN)  - Hemoglobin    Bunion, right      Anxiety and depression  Stable, continue present management  - escitalopram (LEXAPRO) 20 MG tablet  Dispense: 90 tablet; Refill: 3    Absence of menstruation  - HCG qualitative urine         Medication Instructions:  Patient is to hold all scheduled medications on the day of surgery    RECOMMENDATION:  APPROVAL GIVEN to proceed with proposed procedure, without further diagnostic evaluation.        Subjective     HPI related to upcoming procedure: The patient is schedule for the above procedure due to bilateral bunionette with right being more symptomatic and  Bilateral stage I hallux limitus      Preop Questions 4/20/2022   1. Have you ever had a heart attack or stroke? No   2. Have you ever had surgery on your heart or blood vessels, such as a stent placement, a coronary artery bypass, or surgery on an artery in your head, neck, heart, or legs? No   3. Do you have chest pain with activity? No   4. Do you have a history of  heart failure? No   5. Do you currently have a cold, bronchitis or symptoms of other infection? No   6. Do you have a cough, shortness of breath, or wheezing? No   7. Do you or anyone in your family have previous history of blood clots? No   8. Do you or does anyone in your family have a serious bleeding problem such as prolonged bleeding following surgeries or cuts? No   9. Have you ever had problems with anemia or been told to take iron pills? No   10. Have you had any abnormal blood loss such as black, tarry or bloody stools, or abnormal vaginal bleeding? No   11. Have you ever had a blood transfusion? No   12. Are you willing to have a blood transfusion if it is medically needed before, during, or after your surgery? Yes   13. Have you or any of your relatives ever had problems with anesthesia? No   14. Do you have sleep apnea, excessive snoring or daytime drowsiness? UNKNOWN -    15. Do you have any artifical heart valves or other implanted medical devices like a pacemaker, defibrillator, or continuous glucose monitor? No   16. Do you have artificial joints? No   17. Are you allergic to latex? No   18. Is there any chance that you may be pregnant? No     Health Care Directive:  Patient does not have a Health Care Directive or Living Will: Discussed advance care planning with patient; information given to patient to review.    Preoperative Review of :   reviewed - no record of controlled substances prescribed.      Status of Chronic Conditions:  DEPRESSION - Patient has a long history of Depression of moderate severity requiring medication  for control with recent symptoms being stable..Current symptoms of depression include none.       Review of Systems  CONSTITUTIONAL: NEGATIVE for fever, chills, change in weight  INTEGUMENTARY/SKIN: NEGATIVE for worrisome rashes, moles or lesions  EYES: NEGATIVE for vision changes or irritation  ENT/MOUTH: NEGATIVE for ear, mouth and throat problems  RESP: NEGATIVE for significant cough or SOB  CV: NEGATIVE for chest pain, palpitations or peripheral edema  GI: NEGATIVE for nausea, abdominal pain, heartburn, or change in bowel habits  : NEGATIVE for frequency, dysuria, or hematuria  MUSCULOSKELETAL:As in HPI  NEURO: NEGATIVE for weakness, dizziness or paresthesias  ENDOCRINE: NEGATIVE for temperature intolerance, skin/hair changes  HEME: NEGATIVE for bleeding problems  PSYCHIATRIC: NEGATIVE for changes in mood or affect    Patient Active Problem List    Diagnosis Date Noted     Tailor's bunion of both feet 02/24/2022     Priority: Medium     Added automatically from request for surgery [id number]       Mood disorder (H) 11/23/2021     Priority: Medium     Biceps tendinopathy, right 07/03/2020     Priority: Medium     Added automatically from request for surgery [id number]       Anxiety and depression 02/24/2020     Priority: Medium      Past Medical History:   Diagnosis Date     Chronic constipation      Past Surgical History:   Procedure Laterality Date     ARTHROSCOPY SHLDR ROTATOR CUFF REPAIR, SUBACROMIAL DECOMP, DIST CLAVICLE RESECTION, BICEP TENODESIS Right 07/23/2020    Procedure: Right shoulder arthroscopy, open biceps tenodesis;  Surgeon: Aline Amador MD;  Location: [facility] OR     COLONOSCOPY  03/2017     ORTHOPEDIC SURGERY  07/2020    Biceps tenodesis, right side     Current Outpatient Medications   Medication Sig Dispense Refill     escitalopram (LEXAPRO) 20 MG tablet Take 1 tablet (20 mg) by mouth daily 90 tablet 3     CLAUDIO 0.25-35 MG-MCG tablet TAKE 1 TABLET BY MOUTH DAILY TAKE 4 CONTINUOUS PACKS,  "SKIPPING THE SUGAR PILL WEEK. 112 tablet 0     plecanatide (TRULANCE) 3 MG tablet Take 1 tablet (3 mg) by mouth daily Please call Gastroenterology at [phone] to schedule appointment. Thank you. 90 tablet 0     polyethylene glycol (MIRALAX) 17 GM/Dose powder Take 1 capful by mouth 4 times daily        naproxen (NAPROSYN) 500 MG tablet Take 1 tablet (500 mg) by mouth 2 times daily (with meals) (Patient not taking: Reported on 2/23/2022) 60 tablet 0       Allergies   Allergen Reactions     Penicillins Rash        Social History     Tobacco Use     Smoking status: Never Smoker     Smokeless tobacco: Never Used   Substance Use Topics     Alcohol use: Not Currently     Comment: 1-2 drinks per month     Family History   Problem Relation Age of Onset     Diabetes Mother      Diabetes Father      Diabetes Maternal Grandmother      Cerebrovascular Disease Maternal Grandmother      Obesity Maternal Grandmother      Coronary Artery Disease Maternal Grandfather      Cerebrovascular Disease Maternal Grandfather      Diabetes Maternal Grandfather      Coronary Artery Disease Paternal Grandmother      Unknown/Adopted Paternal Grandfather      Depression Sister      Anxiety Disorder Sister      Obesity Sister      History   Drug Use No         Objective     /72   Pulse 72   Temp 97.9  F (36.6  C) (Temporal)   Resp 20   Ht 1.651 m (5' 5\")   Wt 77.3 kg (170 lb 8 oz)   LMP 04/05/2022   SpO2 98%   Breastfeeding No   BMI 28.37 kg/m      Physical Exam    GENERAL APPEARANCE: healthy, alert and no distress     EYES: EOMI, PERRL     HENT: ear canals and TM's normal and nose and mouth without ulcers or lesions     NECK: no adenopathy, no asymmetry, masses, or scars and thyroid normal to palpation     RESP: lungs clear to auscultation - no rales, rhonchi or wheezes     CV: regular rates and rhythm, normal S1 S2, no S3 or S4 and no murmur, click or rub     ABDOMEN:  soft, nontender, no HSM or masses and bowel sounds normal   "   MS: extremities normal- no gross deformities noted, no evidence of inflammation in joints, FROM in all extremities.Patient has a both sides Tailor's bunion with weightbearing.      SKIN: no suspicious lesions or rashes     NEURO: Normal strength and tone, sensory exam grossly normal, mentation intact and speech normal     PSYCH: mentation appears normal. and affect normal/bright     LYMPHATICS: No cervical adenopathy    Recent Labs   Lab Test 08/23/21  1642 07/15/20  1535   HGB 14.4  --      --     140   POTASSIUM 4.7 4.1   CR 0.66 0.56        Diagnostics:  Labs pending at this time.  Results will be reviewed when available.       Revised Cardiac Risk Index (RCRI):  The patient has the following serious cardiovascular risks for perioperative complications:   - No serious cardiac risks = 0 points     RCRI Interpretation: 1 point: Class II (low risk - 0.9% complication rate)           Signed Electronically by: Richard Ledbetter MD  Copy of this evaluation report is provided to requesting physician.

## 2022-04-20 ENCOUNTER — OFFICE VISIT (OUTPATIENT)
Dept: FAMILY MEDICINE | Facility: CLINIC | Age: 27
End: 2022-04-20
Payer: COMMERCIAL

## 2022-04-20 VITALS
HEIGHT: 65 IN | WEIGHT: 170.5 LBS | OXYGEN SATURATION: 98 % | HEART RATE: 72 BPM | BODY MASS INDEX: 28.41 KG/M2 | DIASTOLIC BLOOD PRESSURE: 72 MMHG | RESPIRATION RATE: 20 BRPM | TEMPERATURE: 97.9 F | SYSTOLIC BLOOD PRESSURE: 118 MMHG

## 2022-04-20 DIAGNOSIS — M21.611 BUNION, RIGHT: ICD-10-CM

## 2022-04-20 DIAGNOSIS — F41.9 ANXIETY AND DEPRESSION: ICD-10-CM

## 2022-04-20 DIAGNOSIS — Z01.818 PREOP GENERAL PHYSICAL EXAM: Primary | ICD-10-CM

## 2022-04-20 DIAGNOSIS — F32.A ANXIETY AND DEPRESSION: ICD-10-CM

## 2022-04-20 DIAGNOSIS — N91.2 ABSENCE OF MENSTRUATION: ICD-10-CM

## 2022-04-20 PROCEDURE — 99214 OFFICE O/P EST MOD 30 MIN: CPT | Performed by: FAMILY MEDICINE

## 2022-04-20 RX ORDER — ESCITALOPRAM OXALATE 20 MG/1
20 TABLET ORAL DAILY
Qty: 90 TABLET | Refills: 3 | Status: SHIPPED | OUTPATIENT
Start: 2022-04-20 | End: 2022-08-03

## 2022-04-20 ASSESSMENT — ANXIETY QUESTIONNAIRES
GAD7 TOTAL SCORE: 3
2. NOT BEING ABLE TO STOP OR CONTROL WORRYING: NOT AT ALL
GAD7 TOTAL SCORE: 3
7. FEELING AFRAID AS IF SOMETHING AWFUL MIGHT HAPPEN: NOT AT ALL
3. WORRYING TOO MUCH ABOUT DIFFERENT THINGS: NOT AT ALL
1. FEELING NERVOUS, ANXIOUS, OR ON EDGE: NOT AT ALL
4. TROUBLE RELAXING: SEVERAL DAYS
6. BECOMING EASILY ANNOYED OR IRRITABLE: SEVERAL DAYS
GAD7 TOTAL SCORE: 3
5. BEING SO RESTLESS THAT IT IS HARD TO SIT STILL: SEVERAL DAYS
7. FEELING AFRAID AS IF SOMETHING AWFUL MIGHT HAPPEN: NOT AT ALL

## 2022-04-20 ASSESSMENT — PATIENT HEALTH QUESTIONNAIRE - PHQ9
10. IF YOU CHECKED OFF ANY PROBLEMS, HOW DIFFICULT HAVE THESE PROBLEMS MADE IT FOR YOU TO DO YOUR WORK, TAKE CARE OF THINGS AT HOME, OR GET ALONG WITH OTHER PEOPLE: SOMEWHAT DIFFICULT
SUM OF ALL RESPONSES TO PHQ QUESTIONS 1-9: 5
SUM OF ALL RESPONSES TO PHQ QUESTIONS 1-9: 5

## 2022-04-20 ASSESSMENT — PAIN SCALES - GENERAL: PAINLEVEL: MILD PAIN (2)

## 2022-04-21 ASSESSMENT — ANXIETY QUESTIONNAIRES: GAD7 TOTAL SCORE: 3

## 2022-04-21 ASSESSMENT — PATIENT HEALTH QUESTIONNAIRE - PHQ9: SUM OF ALL RESPONSES TO PHQ QUESTIONS 1-9: 5

## 2022-04-29 ENCOUNTER — LAB (OUTPATIENT)
Dept: LAB | Facility: CLINIC | Age: 27
End: 2022-04-29
Payer: COMMERCIAL

## 2022-04-29 ENCOUNTER — MYC MEDICAL ADVICE (OUTPATIENT)
Dept: PODIATRY | Facility: CLINIC | Age: 27
End: 2022-04-29

## 2022-04-29 DIAGNOSIS — Z98.890 S/P BUNIONECTOMY: ICD-10-CM

## 2022-04-29 DIAGNOSIS — Z11.59 ENCOUNTER FOR SCREENING FOR OTHER VIRAL DISEASES: ICD-10-CM

## 2022-04-29 DIAGNOSIS — Z01.818 PREOP GENERAL PHYSICAL EXAM: ICD-10-CM

## 2022-04-29 DIAGNOSIS — M21.622 TAILOR'S BUNION OF BOTH FEET: Primary | ICD-10-CM

## 2022-04-29 DIAGNOSIS — M21.621 TAILOR'S BUNION OF BOTH FEET: Primary | ICD-10-CM

## 2022-04-29 DIAGNOSIS — N91.2 ABSENCE OF MENSTRUATION: ICD-10-CM

## 2022-04-29 LAB
ANION GAP SERPL CALCULATED.3IONS-SCNC: 7 MMOL/L (ref 3–14)
BUN SERPL-MCNC: 9 MG/DL (ref 7–30)
CALCIUM SERPL-MCNC: 9 MG/DL (ref 8.5–10.1)
CHLORIDE BLD-SCNC: 107 MMOL/L (ref 94–109)
CO2 SERPL-SCNC: 25 MMOL/L (ref 20–32)
CREAT SERPL-MCNC: 0.57 MG/DL (ref 0.52–1.04)
GFR SERPL CREATININE-BSD FRML MDRD: >90 ML/MIN/1.73M2
GLUCOSE BLD-MCNC: 85 MG/DL (ref 70–99)
HCG UR QL: NEGATIVE
HGB BLD-MCNC: 13.6 G/DL (ref 11.7–15.7)
POTASSIUM BLD-SCNC: 3.8 MMOL/L (ref 3.4–5.3)
SODIUM SERPL-SCNC: 139 MMOL/L (ref 133–144)

## 2022-04-29 PROCEDURE — 80048 BASIC METABOLIC PNL TOTAL CA: CPT

## 2022-04-29 PROCEDURE — U0005 INFEC AGEN DETEC AMPLI PROBE: HCPCS

## 2022-04-29 PROCEDURE — 81025 URINE PREGNANCY TEST: CPT

## 2022-04-29 PROCEDURE — U0003 INFECTIOUS AGENT DETECTION BY NUCLEIC ACID (DNA OR RNA); SEVERE ACUTE RESPIRATORY SYNDROME CORONAVIRUS 2 (SARS-COV-2) (CORONAVIRUS DISEASE [COVID-19]), AMPLIFIED PROBE TECHNIQUE, MAKING USE OF HIGH THROUGHPUT TECHNOLOGIES AS DESCRIBED BY CMS-2020-01-R: HCPCS

## 2022-04-29 PROCEDURE — 85018 HEMOGLOBIN: CPT

## 2022-04-29 PROCEDURE — 36415 COLL VENOUS BLD VENIPUNCTURE: CPT

## 2022-04-30 LAB — SARS-COV-2 RNA RESP QL NAA+PROBE: NEGATIVE

## 2022-05-02 ENCOUNTER — ANESTHESIA EVENT (OUTPATIENT)
Dept: SURGERY | Facility: AMBULATORY SURGERY CENTER | Age: 27
End: 2022-05-02
Payer: COMMERCIAL

## 2022-05-03 ENCOUNTER — ANESTHESIA (OUTPATIENT)
Dept: SURGERY | Facility: AMBULATORY SURGERY CENTER | Age: 27
End: 2022-05-03
Payer: COMMERCIAL

## 2022-05-03 ENCOUNTER — HOSPITAL ENCOUNTER (OUTPATIENT)
Facility: AMBULATORY SURGERY CENTER | Age: 27
Discharge: HOME | End: 2022-05-03
Attending: PODIATRIST | Admitting: PODIATRIST
Payer: COMMERCIAL

## 2022-05-03 VITALS
OXYGEN SATURATION: 99 % | HEART RATE: 62 BPM | RESPIRATION RATE: 18 BRPM | WEIGHT: 170.5 LBS | BODY MASS INDEX: 28.37 KG/M2 | TEMPERATURE: 97.2 F | SYSTOLIC BLOOD PRESSURE: 105 MMHG | DIASTOLIC BLOOD PRESSURE: 71 MMHG

## 2022-05-03 DIAGNOSIS — M21.621 TAILOR'S BUNION OF BOTH FEET: ICD-10-CM

## 2022-05-03 DIAGNOSIS — M21.622 TAILOR'S BUNION OF BOTH FEET: ICD-10-CM

## 2022-05-03 LAB — HCG UR QL: NEGATIVE

## 2022-05-03 PROCEDURE — 28308 INCISION OF METATARSAL: CPT | Mod: LT | Performed by: PODIATRIST

## 2022-05-03 PROCEDURE — 81025 URINE PREGNANCY TEST: CPT | Performed by: ANESTHESIOLOGY

## 2022-05-03 PROCEDURE — 28308 INCISION OF METATARSAL: CPT | Mod: LT

## 2022-05-03 PROCEDURE — G8907 PT DOC NO EVENTS ON DISCHARG: HCPCS

## 2022-05-03 PROCEDURE — G8916 PT W IV AB GIVEN ON TIME: HCPCS

## 2022-05-03 DEVICE — IMPLANTABLE DEVICE: Type: IMPLANTABLE DEVICE | Site: FOOT | Status: FUNCTIONAL

## 2022-05-03 DEVICE — IMP SCR SYN CORTEX 2.0X14MM SELF TAP SS 201.814: Type: IMPLANTABLE DEVICE | Site: FOOT | Status: FUNCTIONAL

## 2022-05-03 RX ORDER — ACETAMINOPHEN 325 MG/1
975 TABLET ORAL ONCE
Status: COMPLETED | OUTPATIENT
Start: 2022-05-03 | End: 2022-05-03

## 2022-05-03 RX ORDER — HYDROCODONE BITARTRATE AND ACETAMINOPHEN 5; 325 MG/1; MG/1
1-2 TABLET ORAL EVERY 4 HOURS PRN
Qty: 25 TABLET | Refills: 0 | Status: SHIPPED | OUTPATIENT
Start: 2022-05-03 | End: 2022-05-18

## 2022-05-03 RX ORDER — PROPOFOL 10 MG/ML
INJECTION, EMULSION INTRAVENOUS PRN
Status: DISCONTINUED | OUTPATIENT
Start: 2022-05-03 | End: 2022-05-03

## 2022-05-03 RX ORDER — ONDANSETRON 4 MG/1
4 TABLET, ORALLY DISINTEGRATING ORAL EVERY 30 MIN PRN
Status: DISCONTINUED | OUTPATIENT
Start: 2022-05-03 | End: 2022-05-04 | Stop reason: HOSPADM

## 2022-05-03 RX ORDER — DEXAMETHASONE SODIUM PHOSPHATE 4 MG/ML
INJECTION, SOLUTION INTRA-ARTICULAR; INTRALESIONAL; INTRAMUSCULAR; INTRAVENOUS; SOFT TISSUE PRN
Status: DISCONTINUED | OUTPATIENT
Start: 2022-05-03 | End: 2022-05-03

## 2022-05-03 RX ORDER — ONDANSETRON 2 MG/ML
INJECTION INTRAMUSCULAR; INTRAVENOUS PRN
Status: DISCONTINUED | OUTPATIENT
Start: 2022-05-03 | End: 2022-05-03

## 2022-05-03 RX ORDER — SODIUM CHLORIDE, SODIUM LACTATE, POTASSIUM CHLORIDE, CALCIUM CHLORIDE 600; 310; 30; 20 MG/100ML; MG/100ML; MG/100ML; MG/100ML
INJECTION, SOLUTION INTRAVENOUS CONTINUOUS
Status: DISCONTINUED | OUTPATIENT
Start: 2022-05-03 | End: 2022-05-04 | Stop reason: HOSPADM

## 2022-05-03 RX ORDER — CLINDAMYCIN PHOSPHATE 900 MG/50ML
900 INJECTION, SOLUTION INTRAVENOUS SEE ADMIN INSTRUCTIONS
Status: DISCONTINUED | OUTPATIENT
Start: 2022-05-03 | End: 2022-05-04 | Stop reason: HOSPADM

## 2022-05-03 RX ORDER — FENTANYL CITRATE 50 UG/ML
INJECTION, SOLUTION INTRAMUSCULAR; INTRAVENOUS PRN
Status: DISCONTINUED | OUTPATIENT
Start: 2022-05-03 | End: 2022-05-03

## 2022-05-03 RX ORDER — METOPROLOL TARTRATE 1 MG/ML
1-2 INJECTION, SOLUTION INTRAVENOUS EVERY 5 MIN PRN
Status: DISCONTINUED | OUTPATIENT
Start: 2022-05-03 | End: 2022-05-04 | Stop reason: HOSPADM

## 2022-05-03 RX ORDER — FENTANYL CITRATE 50 UG/ML
25 INJECTION, SOLUTION INTRAMUSCULAR; INTRAVENOUS
Status: DISCONTINUED | OUTPATIENT
Start: 2022-05-03 | End: 2022-05-04 | Stop reason: HOSPADM

## 2022-05-03 RX ORDER — MEPERIDINE HYDROCHLORIDE 25 MG/ML
12.5 INJECTION INTRAMUSCULAR; INTRAVENOUS; SUBCUTANEOUS
Status: DISCONTINUED | OUTPATIENT
Start: 2022-05-03 | End: 2022-05-04 | Stop reason: HOSPADM

## 2022-05-03 RX ORDER — BUPIVACAINE HYDROCHLORIDE 5 MG/ML
INJECTION, SOLUTION PERINEURAL PRN
Status: DISCONTINUED | OUTPATIENT
Start: 2022-05-03 | End: 2022-05-03 | Stop reason: HOSPADM

## 2022-05-03 RX ORDER — LIDOCAINE HYDROCHLORIDE 20 MG/ML
INJECTION, SOLUTION INFILTRATION; PERINEURAL PRN
Status: DISCONTINUED | OUTPATIENT
Start: 2022-05-03 | End: 2022-05-03

## 2022-05-03 RX ORDER — OXYCODONE HYDROCHLORIDE 5 MG/1
5 TABLET ORAL EVERY 4 HOURS PRN
Status: DISCONTINUED | OUTPATIENT
Start: 2022-05-03 | End: 2022-05-04 | Stop reason: HOSPADM

## 2022-05-03 RX ORDER — KETOROLAC TROMETHAMINE 30 MG/ML
INJECTION, SOLUTION INTRAMUSCULAR; INTRAVENOUS PRN
Status: DISCONTINUED | OUTPATIENT
Start: 2022-05-03 | End: 2022-05-03

## 2022-05-03 RX ORDER — LIDOCAINE 40 MG/G
CREAM TOPICAL
Status: DISCONTINUED | OUTPATIENT
Start: 2022-05-03 | End: 2022-05-04 | Stop reason: HOSPADM

## 2022-05-03 RX ORDER — HYDROXYZINE HYDROCHLORIDE 25 MG/1
25-50 TABLET, FILM COATED ORAL EVERY 4 HOURS PRN
Qty: 25 TABLET | Refills: 0 | Status: SHIPPED | OUTPATIENT
Start: 2022-05-03 | End: 2022-08-03

## 2022-05-03 RX ORDER — PROPOFOL 10 MG/ML
INJECTION, EMULSION INTRAVENOUS CONTINUOUS PRN
Status: DISCONTINUED | OUTPATIENT
Start: 2022-05-03 | End: 2022-05-03

## 2022-05-03 RX ORDER — CLINDAMYCIN PHOSPHATE 900 MG/50ML
900 INJECTION, SOLUTION INTRAVENOUS
Status: COMPLETED | OUTPATIENT
Start: 2022-05-03 | End: 2022-05-03

## 2022-05-03 RX ORDER — FENTANYL CITRATE 50 UG/ML
25 INJECTION, SOLUTION INTRAMUSCULAR; INTRAVENOUS EVERY 5 MIN PRN
Status: DISCONTINUED | OUTPATIENT
Start: 2022-05-03 | End: 2022-05-04 | Stop reason: HOSPADM

## 2022-05-03 RX ORDER — ONDANSETRON 2 MG/ML
4 INJECTION INTRAMUSCULAR; INTRAVENOUS EVERY 30 MIN PRN
Status: DISCONTINUED | OUTPATIENT
Start: 2022-05-03 | End: 2022-05-04 | Stop reason: HOSPADM

## 2022-05-03 RX ADMIN — PROPOFOL 200 MCG/KG/MIN: 10 INJECTION, EMULSION INTRAVENOUS at 07:01

## 2022-05-03 RX ADMIN — PROPOFOL 20 MG: 10 INJECTION, EMULSION INTRAVENOUS at 07:04

## 2022-05-03 RX ADMIN — ACETAMINOPHEN 975 MG: 325 TABLET ORAL at 06:25

## 2022-05-03 RX ADMIN — DEXAMETHASONE SODIUM PHOSPHATE 4 MG: 4 INJECTION, SOLUTION INTRA-ARTICULAR; INTRALESIONAL; INTRAMUSCULAR; INTRAVENOUS; SOFT TISSUE at 07:15

## 2022-05-03 RX ADMIN — ONDANSETRON 4 MG: 2 INJECTION INTRAMUSCULAR; INTRAVENOUS at 07:45

## 2022-05-03 RX ADMIN — PROPOFOL 50 MG: 10 INJECTION, EMULSION INTRAVENOUS at 07:01

## 2022-05-03 RX ADMIN — SODIUM CHLORIDE, SODIUM LACTATE, POTASSIUM CHLORIDE, CALCIUM CHLORIDE: 600; 310; 30; 20 INJECTION, SOLUTION INTRAVENOUS at 06:48

## 2022-05-03 RX ADMIN — FENTANYL CITRATE 50 MCG: 50 INJECTION, SOLUTION INTRAMUSCULAR; INTRAVENOUS at 06:58

## 2022-05-03 RX ADMIN — LIDOCAINE HYDROCHLORIDE 60 MG: 20 INJECTION, SOLUTION INFILTRATION; PERINEURAL at 07:01

## 2022-05-03 RX ADMIN — CLINDAMYCIN PHOSPHATE 900 MG: 900 INJECTION, SOLUTION INTRAVENOUS at 06:53

## 2022-05-03 RX ADMIN — KETOROLAC TROMETHAMINE 30 MG: 30 INJECTION, SOLUTION INTRAMUSCULAR; INTRAVENOUS at 07:39

## 2022-05-03 NOTE — ANESTHESIA PREPROCEDURE EVALUATION
Anesthesia Pre-Procedure Evaluation    Patient: Genesis Cutler   MRN: [id number] : 1995        Procedure : Procedure(s):  Left fifth metatarsal bunionette correction with bone cutting and screw fixation          Past Medical History:   Diagnosis Date     Chronic constipation       Past Surgical History:   Procedure Laterality Date     ARTHROSCOPY SHLDR ROTATOR CUFF REPAIR, SUBACROMIAL DECOMP, DIST CLAVICLE RESECTION, BICEP TENODESIS Right 2020    Procedure: Right shoulder arthroscopy, open biceps tenodesis;  Surgeon: Aline Amador MD;  Location: [facility] OR     COLONOSCOPY  2017     ORTHOPEDIC SURGERY  2020    Biceps tenodesis, right side      Allergies   Allergen Reactions     Penicillins Rash      Social History     Tobacco Use     Smoking status: Never Smoker     Smokeless tobacco: Never Used   Substance Use Topics     Alcohol use: Not Currently     Comment: 1-2 drinks per month      Wt Readings from Last 1 Encounters:   22 77.3 kg (170 lb 8 oz)        Anesthesia Evaluation   Pt has had prior anesthetic. Type: General and Regional.    No history of anesthetic complications       ROS/MED HX  ENT/Pulmonary:  - neg pulmonary ROS     Neurologic:  - neg neurologic ROS     Cardiovascular:  - neg cardiovascular ROS     METS/Exercise Tolerance:     Hematologic:  - neg hematologic  ROS     Musculoskeletal:  - neg musculoskeletal ROS     GI/Hepatic:  - neg GI/hepatic ROS     Renal/Genitourinary:  - neg Renal ROS     Endo:  - neg endo ROS     Psychiatric/Substance Use:     (+) psychiatric history anxiety and depression     Infectious Disease:  - neg infectious disease ROS     Malignancy:  - neg malignancy ROS     Other:  - neg other ROS          Physical Exam    Airway  airway exam normal           Respiratory Devices and Support         Dental  no notable dental history         Cardiovascular   cardiovascular exam normal          Pulmonary   pulmonary exam normal                 OUTSIDE LABS:  CBC:   Lab Results   Component Value Date    WBC 6.9 08/23/2021    WBC 4.6 11/13/2018    HGB 13.6 04/29/2022    HGB 14.4 08/23/2021    HCT 41.6 08/23/2021    HCT 44.4 11/13/2018     08/23/2021     11/13/2018     BMP:   Lab Results   Component Value Date     04/29/2022     08/23/2021    POTASSIUM 3.8 04/29/2022    POTASSIUM 4.7 08/23/2021    CHLORIDE 107 04/29/2022    CHLORIDE 107 08/23/2021    CO2 25 04/29/2022    CO2 27 08/23/2021    BUN 9 04/29/2022    BUN 13 08/23/2021    CR 0.57 04/29/2022    CR 0.66 08/23/2021    GLC 85 04/29/2022    GLC 83 08/23/2021     COAGS: No results found for: PTT, INR, FIBR  POC:   Lab Results   Component Value Date    HCG Negative 05/03/2022    HCGS Negative 07/15/2020     HEPATIC:   Lab Results   Component Value Date    ALBUMIN 3.9 08/23/2021    PROTTOTAL 7.3 08/23/2021    ALT 30 08/23/2021    AST 25 08/23/2021    ALKPHOS 41 08/23/2021    BILITOTAL 0.3 08/23/2021     OTHER:   Lab Results   Component Value Date    GLYNN 9.0 04/29/2022    LIPASE 159 03/14/2018    AMYLASE 70 03/14/2018    TSH 2.24 08/23/2021    SED 4 08/23/2021       Anesthesia Plan    ASA Status:  2   NPO Status:  NPO Appropriate    Anesthesia Type: MAC.     - Reason for MAC: straight local not clinically adequate   Induction: Intravenous, Propofol.   Maintenance: TIVA.        Consents    Anesthesia Plan(s) and associated risks, benefits, and realistic alternatives discussed. Questions answered and patient/representative(s) expressed understanding.    - Discussed:     - Discussed with:  Patient      - Extended Intubation/Ventilatory Support Discussed: No.      - Patient is DNR/DNI Status: No    Use of blood products discussed: No .     Postoperative Care    Pain management: IV analgesics, Oral pain medications.   PONV prophylaxis: Ondansetron (or other 5HT-3), Background Propofol Infusion     Comments:                Fabrizio Buckner MD

## 2022-05-03 NOTE — INTERVAL H&P NOTE
"I have reviewed the surgical (or preoperative) H&P that is linked to this encounter, and examined the patient. There are no significant changes    Clinical Conditions Present on Arrival:  Clinically Significant Risk Factors Present on Admission                   # Overweight: Estimated body mass index is 28.37 kg/m  as calculated from the following:    Height as of 4/20/22: 1.651 m (5' 5\").    Weight as of 4/20/22: 77.3 kg (170 lb 8 oz).       "

## 2022-05-03 NOTE — BRIEF OP NOTE
[facility] Brief Operative Note    Pre-operative diagnosis: Left bunionette   Post-operative diagnosis same   Procedure: Procedure(s):  Left fifth metatarsal bunionette correction with bone cutting and screw fixation   Surgeon(s): Surgeon(s) and Role:     * Marshal Pate DPM - Primary   Estimated blood loss: * No values recorded between 5/3/2022  7:17 AM and 5/3/2022  8:31 AM *    Specimens: * No specimens in log *   Findings: bunionette

## 2022-05-03 NOTE — ANESTHESIA CARE TRANSFER NOTE
Patient: Genesis Cutler    Procedure: Procedure(s):  Left fifth metatarsal bunionette correction with bone cutting and screw fixation       Diagnosis: Tailor's bunion of both feet [M21.621, M21.622]  Diagnosis Additional Information: No value filed.    Anesthesia Type:   MAC     Note:    Oropharynx: oropharynx clear of all foreign objects and spontaneously breathing  Level of Consciousness: drowsy  Oxygen Supplementation: face mask    Independent Airway: airway patency satisfactory and stable  Dentition: dentition unchanged  Vital Signs Stable: post-procedure vital signs reviewed and stable  Report to RN Given: handoff report given  Patient transferred to: Phase II    Handoff Report: Identifed the Patient, Identified the Reponsible Provider, Reviewed the pertinent medical history, Discussed the surgical course, Reviewed Intra-OP anesthesia mangement and issues during anesthesia, Set expectations for post-procedure period and Allowed opportunity for questions and acknowledgement of understanding      Vitals:  Vitals Value Taken Time   BP 95/53 05/03/22 0834   Temp 97.2  F (36.2  C) 05/03/22 0834   Pulse 60 05/03/22 0834   Resp 14 05/03/22 0834   SpO2 98 % 05/03/22 0834       Electronically Signed By: WILMA Britt CRNA  May 3, 2022  8:40 AM

## 2022-05-03 NOTE — ANESTHESIA POSTPROCEDURE EVALUATION
Patient: Genesis Cutler    Procedure: Procedure(s):  Left fifth metatarsal bunionette correction with bone cutting and screw fixation       Anesthesia Type:  MAC    Note:  Disposition: Outpatient   Postop Pain Control: Uneventful            Sign Out: Well controlled pain   PONV: No   Neuro/Psych: Uneventful            Sign Out: Acceptable/Baseline neuro status   Airway/Respiratory: Uneventful            Sign Out: Acceptable/Baseline resp. status   CV/Hemodynamics: Uneventful            Sign Out: Acceptable CV status; No obvious hypovolemia; No obvious fluid overload   Other NRE: NONE   DID A NON-ROUTINE EVENT OCCUR? No           Last vitals:  Vitals Value Taken Time   /71 05/03/22 0918   Temp 97.2  F (36.2  C) 05/03/22 0834   Pulse 62 05/03/22 0918   Resp 18 05/03/22 0918   SpO2 99 % 05/03/22 0918       Electronically Signed By: Fabrizio Buckner MD  May 3, 2022  9:49 AM

## 2022-05-03 NOTE — DISCHARGE INSTRUCTIONS
[facility]   Adult Discharge Orders & Instructions     For 24 hours after surgery    Get plenty of rest.  A responsible adult must stay with you for at least 24 hours after you leave the hospital.   Do not drive or use heavy equipment.  If you have weakness or tingling, don't drive or use heavy equipment until this feeling goes away.  Do not drink alcohol.  Avoid strenuous or risky activities.  Ask for help when climbing stairs.   You may feel lightheaded.  IF so, sit for a few minutes before standing.  Have someone help you get up.   If you have nausea (feel sick to your stomach): Drink only clear liquids such as apple juice, ginger ale, broth or 7-Up.  Rest may also help.  Be sure to drink enough fluids.  Move to a regular diet as you feel able.  You may have a slight fever. Call the doctor if your fever is over 100 F (37.7 C) (taken under the tongue) or lasts longer than 24 hours.  You may have a dry mouth, a sore throat, muscle aches or trouble sleeping.  These should go away after 24 hours.  Do not make important or legal decisions.     Call your doctor for any of the followin.  Signs of infection (fever, growing tenderness at the surgery site, a large amount of drainage or bleeding, severe pain, foul-smelling drainage, redness, swelling).    2. It has been over 8 to 10 hours since surgery and you are still not able to urinate (pass water).    3.  Headache for over 24 hours.          4. Signs of Covid-19 infection (temperature over 100 degrees, shortness of breath, cough, loss of taste/smell, generalized body aches, persistent headache,                  chills, sore throat, nausea/vomiting/diarrhea).    While you were at the hospital today you received 975 mg Tylenol at 6:25 am. Maximum daily dosage is 4000 mg.    Today you received Toradol, an antiinflammatory medication similar to Ibuprofen.  You should not take other antiinflammatory medication, such as Ibuprofen, Motrin, Advil, Aleve,  Naprosyn, etc until 1:40 PM.       To contact Dr Pate call:  [phone]

## 2022-05-05 NOTE — OP NOTE
Procedure Date: 05/03/2022    PREOPERATIVE DIAGNOSIS:  Left tailor's bunion.    POSTOPERATIVE DIAGNOSIS:  Left tailor's bunion.    PROCEDURE:  Left tailor's bunion repair by a Chevron osteotomy.    SURGEON:  Marshal Pate DPM    ANESTHESIA:  MAC.    HEMOSTASIS:  Pneumatic ankle tourniquet at 250 mmHg.    INDICATIONS FOR PROCEDURE:  This patient has a painful deformity and elects to have surgical correction.  She understands possible complications include continued pain, infection and numbness.    DESCRIPTION OF PROCEDURE:  The patient was brought to the operating table, laid in supine position.  She was given sedation by the anesthesiologist and a left fifth Srivastava block was done.  The foot was then prepped and draped in normal sterile manner.  Pneumatic ankle tourniquet was placed around the ankle with copious amounts of Webril padding.  Foot was then elevated for complete exsanguination.  The tourniquet was inflated and foot was brought on the operating room table where the following procedure was performed.     At this time, an incision was made just lateral to the extensor tendon over the left fifth MTPJ.  This brought down the deep fascia utilizing blunt and sharp dissection techniques.  All neurovascular structures that were encountered were either bovied, tied, or retracted to the side.  We incised the periosteum the entire length of the incision and reflected this off dorsally and laterally.  We then performed a Chevron osteotomy.  We transformed the fifth metatarsal head medial.  We temporarily fixated this.  Good reduction of the deformity was noted.  We fixated this with 2 screws from dorsal to plantar utilizing standard AO fixation techniques.  We removed the medial bump with a saw.  All sharp bony prominences were burred smooth.  We then utilized the Fluoroscan.  Good reduction of the deformity was noted.  We flushed the wound.  Standard layered closure was done at this time.  We dressed this with  Adaptic, 4 x 4's, Melvin, Kerlix and Coban.  Tourniquet was deflated.  All digits were noted to show preoperative levels of perfusion.    COMPLICATIONS:  None.    ESTIMATED BLOOD LOSS:  1 mL.    The patient tolerated the procedure and anesthesia well.  She was then wheeled from the operating room to the recovery room with vital signs stable and an intact sterile dressing.    Marshal Pate DPM        D: 2022   T: 2022   MT: ARMIN    Name:     SANIYA MORE ISHMAEL  MRN:      [id number]        Account:        [id number]   :      1995           Procedure Date: 2022     Document: [id number]

## 2022-05-06 ENCOUNTER — OFFICE VISIT (OUTPATIENT)
Dept: PODIATRY | Facility: CLINIC | Age: 27
End: 2022-05-06
Payer: COMMERCIAL

## 2022-05-06 DIAGNOSIS — M21.622 TAILOR'S BUNION OF BOTH FEET: Primary | ICD-10-CM

## 2022-05-06 DIAGNOSIS — M21.621 TAILOR'S BUNION OF BOTH FEET: Primary | ICD-10-CM

## 2022-05-06 PROCEDURE — 99024 POSTOP FOLLOW-UP VISIT: CPT | Performed by: PODIATRIST

## 2022-05-06 NOTE — PATIENT INSTRUCTIONS
We wish you continued good healing. If you have any questions or concerns, please do not hesitate to contact us at  [phone]    [employer] (secure e-mail communication and access to your chart) to send a message or to make an appointment.    Please remember to call and schedule a follow up appointment if one was recommended at your earliest convenience.     PODIATRY CLINIC HOURS  TELEPHONE NUMBER    Dr. Marshal MENDOZAPINDER [facility]        Clinics:  [place] CMA   Tuesday 1PM-6PM  [place]  Wednesday 745AM-330PM  [place]  Thursday/Friday 745AM-230PM  [place] REYES/[facility]WAN APPOINTMENTS  [phone]    [place] APPOINTMENTS  [phone]        If you need a medication refill, please contact us you may need lab work and/or a follow up visit prior to your refill (i.e. Antifungal medications).  If MRI needed please call Imaging at [phone] or [phone]  HOW DO I GET MY KNEE SCOOTER? Knee scooters can be picked up at ANY Medical Supply stores with your knee scooter Prescription.  OR  Bring your signed prescription to an [facility] showroom.

## 2022-05-06 NOTE — PROGRESS NOTES
Subjective:    Patient is 3 days postopp left fifth metatarsal Chevron osteotomy  done on 5/3/22.  Patient is doing well, admits compliance, denies fevers, chills, nausea or vomiting.  Has been NWB.  Minimal pain.      Objective:    Dressings clean, dry and intact.  Incisions are dry, well coapted with no dehiscence or drainage.  Pulses are palpable, sensation to light touch is intact.  Normal postopp edema.  No erythema or ecchymosis on the opperative site.  Bump reduced.       Assessment: Postopp day 3 left fifth ray surgery    Plan:   New dressing placed on foot.  Continue ACE bandage and elevation.  Any increase in erythema, edema, and pain patient to call me immediately.   Dispense plantarflexed cam walker today.  Discussed this is only to protect foot and she should continue nonweightbearing at all times.  She has a knee walker.  RETURN TO CLINIC in 11 days for suture removal and xray.    Marshal Pate, MELANIE, FACFAS

## 2022-05-06 NOTE — LETTER
5/6/2022         RE: Genesis Cutler  [address]  [place] [address]        Dear Colleague,    Thank you for referring your patient, Genesis Cutler, to the [facility]. Please see a copy of my visit note below.    Subjective:    Patient is 3 days postopp left fifth metatarsal Chevron osteotomy  done on 5/3/22.  Patient is doing well, admits compliance, denies fevers, chills, nausea or vomiting.  Has been NWB.  Minimal pain.      Objective:    Dressings clean, dry and intact.  Incisions are dry, well coapted with no dehiscence or drainage.  Pulses are palpable, sensation to light touch is intact.  Normal postopp edema.  No erythema or ecchymosis on the opperative site.  Bump reduced.       Assessment: Postopp day 3 left fifth ray surgery    Plan:   New dressing placed on foot.  Continue ACE bandage and elevation.  Any increase in erythema, edema, and pain patient to call me immediately.   Dispense plantarflexed cam walker today.  Discussed this is only to protect foot and she should continue nonweightbearing at all times.  She has a knee walker.  RETURN TO CLINIC in 11 days for suture removal and xray.    Marshal Pate DPM, FACFAS            Again, thank you for allowing me to participate in the care of your patient.        Sincerely,        Marshal Pate DPM

## 2022-05-11 ENCOUNTER — TELEPHONE (OUTPATIENT)
Dept: GASTROENTEROLOGY | Facility: CLINIC | Age: 27
End: 2022-05-11
Payer: COMMERCIAL

## 2022-05-11 NOTE — TELEPHONE ENCOUNTER
Called to remind patient of their upcoming appointment with our GI clinic, on 5/18/22 at 0900 with Dr. Haro. This appointment is scheduled as a video visit. You will receive a call approximately 30 minutes prior to check you in, you must be in [place] for this visit., if your appointment is virtual (video or telephone) you need to be in [place] for the visit. To reschedule or cancel patient to call [phone].    Winifred Mckeon, [facility]

## 2022-05-15 ENCOUNTER — HEALTH MAINTENANCE LETTER (OUTPATIENT)
Age: 27
End: 2022-05-15

## 2022-05-18 ENCOUNTER — VIRTUAL VISIT (OUTPATIENT)
Dept: GASTROENTEROLOGY | Facility: CLINIC | Age: 27
End: 2022-05-18
Payer: COMMERCIAL

## 2022-05-18 ENCOUNTER — OFFICE VISIT (OUTPATIENT)
Dept: PODIATRY | Facility: CLINIC | Age: 27
End: 2022-05-18
Payer: COMMERCIAL

## 2022-05-18 VITALS
BODY MASS INDEX: 28.29 KG/M2 | WEIGHT: 170 LBS | OXYGEN SATURATION: 97 % | DIASTOLIC BLOOD PRESSURE: 82 MMHG | SYSTOLIC BLOOD PRESSURE: 118 MMHG | HEART RATE: 63 BPM

## 2022-05-18 DIAGNOSIS — K59.04 CHRONIC IDIOPATHIC CONSTIPATION: ICD-10-CM

## 2022-05-18 DIAGNOSIS — M21.621 TAILOR'S BUNION OF BOTH FEET: Primary | ICD-10-CM

## 2022-05-18 DIAGNOSIS — M21.622 TAILOR'S BUNION OF BOTH FEET: Primary | ICD-10-CM

## 2022-05-18 PROCEDURE — 99207 PR NO CHARGE LOS: CPT | Performed by: PODIATRIST

## 2022-05-18 PROCEDURE — 99215 OFFICE O/P EST HI 40 MIN: CPT | Mod: GT | Performed by: INTERNAL MEDICINE

## 2022-05-18 RX ORDER — PLECANATIDE 3 MG/1
3 TABLET ORAL DAILY
Qty: 90 TABLET | Refills: 4 | Status: SHIPPED | OUTPATIENT
Start: 2022-05-18 | End: 2023-05-23

## 2022-05-18 ASSESSMENT — PAIN SCALES - GENERAL: PAINLEVEL: NO PAIN (0)

## 2022-05-18 NOTE — PATIENT INSTRUCTIONS
1. Continue your Trulance daily as you are - refilled today.  2. Ok for stool softeners as needed  3.  If any adjustments to medications (such as need for post-op pain medications). Please reach out to GI clinic, we're happy to help adjust the bowel regimen should something like pain medications be required.       RTC 1 year     If you have any questions, please don't hesitate to contact me through our GI RN Clinic Coordinator, Radha Irby, at [phone].

## 2022-05-18 NOTE — PROGRESS NOTES
Sarah is a 27 year old who is being evaluated via a billable video visit.      How would you like to obtain your AVS? MyChart  If the video visit is dropped, the invitation should be resent by: Send to e-mail at: [email or web address]  Will anyone else be joining your video visit? No      Video-Visit Details    Type of service:  Video Visit  Video Start Time: 9:06 a.m.  Video End Time:9:33 a.m.    Originating Location (pt. Location): Home    Distant Location (provider location):  [facility] [place]     Platform used for Video Visit: Adventi     ----------------------------    GI CLINIC VISIT    CC:  Follow-up      ASSESSMENT/PLAN:  K59.04) Chronic idiopathic constipation  (primary encounter diagnosis)  Comment:    Per my prior documentation - prior normal colonoscopy, Sitz marker study, and essentially normal anorectal manometry/defecography. Initial response to laxatives (Miralax specifically) but requiring ever-increasing doses (>4x/day) with loss of efficacy. Linzess 290 mcg was effective for several months, then lost effect. Now on plecanatide with good response. Had side effect of worsening depression symptoms while on prucalopride/Motegrity (switch off of plecanatide/Trulance was initially necessitated by insurance coverage).  She is now back on plecanatide/Trulance and moving bowels easily on most days.      Plan:  1. Continue your Trulance daily as you are - refilled today.  2. Ok for stool softeners as needed  3.  If any adjustments to medications (such as need for post-op pain medications). Please reach out to GI clinic, we're happy to help adjust the bowel regimen should something like pain medications be required.       RTC 1 year    It was a pleasure to participate in the care of this patient; please contact us with any further questions.  A total of 27 video face-to-face minutes was spent with this patient, >50% of which was counseling regarding the above  delineated issues. An additional 15 minutes was spent on the date of the encounter doing chart review, documentation, and further activities as noted above.    Gayle Haro MD   of Medicine  Division of Gastroenterology, Hepatology and Nutrition  [facility]    ---------------------------------------------------------------------------------------------------  HPI:    Ms. Cutler is a 27 year old female who was initially seen in consultation with Jordan Du and Fabien of GI in July 2016 for constipation. She was initially seen by this writer in Nov 2016. Her last GI clinic visit was in March 2021. She returns today for follow-up.       Recently,  Ms. Cutler reports she underwent surgery for a L bunionectomy. Overall she feels the recovery is going well and she is looking forward to stitches coming out soon. She is not yet weight-bearing and is planned for a right foot surgery later on. She reports she was given vidocin to use in the post-op period, but didn't end up really using it. She reports pain was pretty well-managed but also expressed concern about not wanting to get constipated.     She overall has been healthy. Unfortunately, her  did get COVID earlier in the year, and despite vaccination he did have some mild symptoms. She never tested positive and felt well throughout (she is also vaccinated and boosted).     She has no other health updates or medication changes.      No other medications or updates.        1. Constipation  Summarized/taken from my prior documentation:   Issues with constipation since early childhood but with no regular laxatives or daily medications as a child nor need for disimpactions, enemas, nor hospitalizations. She used OTC laxatives as needed through late childhood and teen years. She had two urgent care visits during these years for constipation-related abdominal pain.     Her constipation apparently worsened in her early twenties  "going up to 2 weeks without a satisfactory BM, (she may pass some small,Quay 1 stools with straining during this time). These periods are associated with significant bloating and LLQ pain which resolve after defecation.  She previously tried prunes/prune juice, OTC Mg citrate, an unknown laxative pill prn with minimal effect. Her Avi MD put her on daily Miralax which initially was helpful, but its effect waned.  She reports a high vegetable and fruit intake, at least 70 oz of water daily, and daily physical activity which led to an intentional 80 lbs weight loss before 2016. Her prior work-up included a normal TSH, calcium, CMP, and hgb.    Since establishing in our clinic she has [facility]2016 by Dr. Kailey Acevedo. Her testing was essentially normal with no evidence of Hirschsprung's, only mild perineal descent was noted. A Sitz marker study (after one week off of laxatives) was normal (no Sitz markers were seen on AXR done 5 days later). Though notably, she was on her period at that time (expectedly moving her bowels more frequently than her typical baseline).  A colonoscopy was done and was normal. Though as she was off of laxatives (for Sitz study, just prior to colonoscopy) and could only tolerate 4L Golytely (due to nausea, she was intended to take additional prep) her prep was rated at \"fair\".           For treatment she was on ever-increasing doses of fiber and Miralax (up to Miralax 4 times daily with morning coffee/caffeine ingestions) and the effect was lost over time. Ultimately, she was switched to Linzess 145 mcg, then up to 290 mcg with good response for a number of months, before response waned. In Jan 2018 we switched her to plecanatide/Trulance. With this she was moving her bowels nearly every day.  She continued to drink a large amount of water daily. She is ingesting 40-50 gm of fiber in her diet on an average day (tracks with [employer] nelsy).      Over " time to Colace stool softener pills were added to her regimen at times to avoid hard stools and straining.      Due to insurance she did have to transition off of Trulance and onto Motegrity.  Unfortunately she developing increased depression symptoms despite ongoing use of her mental health medications; this has been a reported effect of Motegrity. This medication was stopped and she was, ultimately able to get back on plecanatide/Trulance.    Today, Ms. Cutler continues on Trulance alone (no daily stool softeners). With this she moves bowels pretty much daily, though she notes that the week before her period to stool output is less predictable. Consistency is typically Greentop 4, occasional Greentop 6. She adds that she would rather be a little looser at times and avoid constipation. She denies any straining and is tolerating the med well.    She does resort to use of 1-2 stool softeners a day when she is out of Trulance.      2. Abdominal pain   Summarized/taken from my prior documentation:    She has had and handful of episodes of abdominal pain in total since 2016. During the first one she presented to the [facility] in Nov [place], work-up with US, CT, and basic labs was negative. As the CT scan showed a large stool burden she was told this was likely due to constipation and was discharged to home. A second pain episode (precisely the same in nature) occurred around Nov 2017. She did not seek care at that time and has been monitoring her symptoms closely. She has has two more pain events in winter 2012/2018. The first one started around 2:30 pm and seemed to come on suddenly. The pain was intense and focused in the epigastric regions/RUQ with radiation to the back. The pain slowly dissipated, lasting two days in total. In March 2018 she had her fourth pain episode.  She recalls eating  at [employer], but feels it was a typical meal. She awoke in the middle of the night with the same pain. She noted it was  "worse when lying down and better when sitting or hunched over. The pain is severe enough that she is unable to eat, drink, drive, perform ADLs. This pain remitted the following day. She denies f/c, n/v, or any other associated symptoms.      She has not been seen to have gallstones on prior CT or US imaging. A 2018 pelvic US (done a couple weeks ago) was normal. A HIDA scan was done sometime after her [facility] ED visit and was seen to be normal.     Through GI clinic we evaluated amylase, lipase, and hepatic panel were all normal. We attempted H pylori testing, but as she's had no further pain episodes, she ultimately didn't complete the test.   She reported another pain episode in Oct 2018.  She states she had a \"large, greasy Mexican meal\" and the pain came on within 30 minutes. She doesn't really recall any other associated symptoms or further details. The pain, as in the past, dissipated on its own after some time.    Today, she feels this is not an issue. She recalls an episode of abdominal pain a couple months this was similar in character to her prior pain episodes, but much less severe. She reports experiencing pain and abdominal cramping. She attributes this to quinoa and felt this was a consistent reaction. She denies any signs of allergy (hives ,resp distress) no nausea or vomiting.      PROBLEM LIST  Patient Active Problem List    Diagnosis Date Noted     Moses's bunion of both feet 02/24/2022     Priority: Medium     Added automatically from request for surgery [id number]       Mood disorder (H) 11/23/2021     Priority: Medium     Biceps tendinopathy, right 07/03/2020     Priority: Medium     Added automatically from request for surgery [id number]       Anxiety and depression 02/24/2020     Priority: Medium       PERTINENT MEDICATIONS:  Current Outpatient Medications   Medication     escitalopram (LEXAPRO) 20 MG tablet     HYDROcodone-acetaminophen (NORCO) 5-325 MG tablet     hydrOXYzine (ATARAX) 25 MG tablet "     CLAUDIO 0.25-35 MG-MCG tablet     plecanatide (TRULANCE) 3 MG tablet     polyethylene glycol (MIRALAX) 17 GM/Dose powder     No current facility-administered medications for this visit.         PHYSICAL EXAMINATION:  Vitals LMP 04/05/2022    Wt   Wt Readings from Last 2 Encounters:   05/03/22 77.3 kg (170 lb 8 oz)   04/20/22 77.3 kg (170 lb 8 oz)      Gen: Pt sitting up in NAD, interactive and cooperative on exam  Eyes: sclerae anicteric, no injection  ENT:  MMM  Resp: Breathing comfortably on exam, speaking in full sentences  Skin: No jaundice  Neuro: alert, oriented, answers questions appropriately      PERTINENT STUDIES:    Lab on 04/29/2022   Component Date Value Ref Range Status     SARS CoV2 PCR 04/29/2022 Negative  Negative Final     hCG Urine Qualitative 04/29/2022 Negative  Negative Final     Sodium 04/29/2022 139  133 - 144 mmol/L Final     Potassium 04/29/2022 3.8  3.4 - 5.3 mmol/L Final     Chloride 04/29/2022 107  94 - 109 mmol/L Final     Carbon Dioxide (CO2) 04/29/2022 25  20 - 32 mmol/L Final     Anion Gap 04/29/2022 7  3 - 14 mmol/L Final     Urea Nitrogen 04/29/2022 9  7 - 30 mg/dL Final     Creatinine 04/29/2022 0.57  0.52 - 1.04 mg/dL Final     Calcium 04/29/2022 9.0  8.5 - 10.1 mg/dL Final     Glucose 04/29/2022 85  70 - 99 mg/dL Final     GFR Estimate 04/29/2022 >90  >60 mL/min/1.73m2 Final     Hemoglobin 04/29/2022 13.6  11.7 - 15.7 g/dL Final

## 2022-05-18 NOTE — LETTER
5/18/2022         RE: Genesis Cutler  [address]  [facility] [address]        Dear Colleague,    Thank you for referring your patient, Genesis Cutler, to the [facility] [place]. Please see a copy of my visit note below.    ----------------------------    GI CLINIC VISIT    CC:  Follow-up      ASSESSMENT/PLAN:  K59.04) Chronic idiopathic constipation  (primary encounter diagnosis)  Comment:    Per my prior documentation - prior normal colonoscopy, Sitz marker study, and essentially normal anorectal manometry/defecography. Initial response to laxatives (Miralax specifically) but requiring ever-increasing doses (>4x/day) with loss of efficacy. Linzess 290 mcg was effective for several months, then lost effect. Now on plecanatide with good response. Had side effect of worsening depression symptoms while on prucalopride/Motegrity (switch off of plecanatide/Trulance was initially necessitated by insurance coverage).  She is now back on plecanatide/Trulance and moving bowels easily on most days.      Plan:  1. Continue your Trulance daily as you are - refilled today.  2. Ok for stool softeners as needed  3.  If any adjustments to medications (such as need for post-op pain medications). Please reach out to GI clinic, we're happy to help adjust the bowel regimen should something like pain medications be required.       RTC 1 year    It was a pleasure to participate in the care of this patient; please contact us with any further questions.  A total of 27 video face-to-face minutes was spent with this patient, >50% of which was counseling regarding the above delineated issues. An additional 15 minutes was spent on the date of the encounter doing chart review, documentation, and further activities as noted above.    Gayle Haro MD   of Medicine  Division of Gastroenterology, Hepatology and Nutrition  [facility]  [place]    ---------------------------------------------------------------------------------------------------  HPI:    Ms. Cutler is a 27 year old female who was initially seen in consultation with Jordan Du and Fabien of GI in July 2016 for constipation. She was initially seen by this writer in Nov 2016. Her last GI clinic visit was in March 2021. She returns today for follow-up.       Recently,  Ms. Cutler reports she underwent surgery for a L bunionectomy. Overall she feels the recovery is going well and she is looking forward to stitches coming out soon. She is not yet weight-bearing and is planned for a right foot surgery later on. She reports she was given vidocin to use in the post-op period, but didn't end up really using it. She reports pain was pretty well-managed but also expressed concern about not wanting to get constipated.     She overall has been healthy. Unfortunately, her  did get COVID earlier in the year, and despite vaccination he did have some mild symptoms. She never tested positive and felt well throughout (she is also vaccinated and boosted).     She has no other health updates or medication changes.      No other medications or updates.        1. Constipation  Summarized/taken from my prior documentation:   Issues with constipation since early childhood but with no regular laxatives or daily medications as a child nor need for disimpactions, enemas, nor hospitalizations. She used OTC laxatives as needed through late childhood and teen years. She had two urgent care visits during these years for constipation-related abdominal pain.     Her constipation apparently worsened in her early twenties going up to 2 weeks without a satisfactory BM, (she may pass some small,[place] 1 stools with straining during this time). These periods are associated with significant bloating and LLQ pain which resolve after defecation.  She previously tried prunes/prune juice, OTC Mg citrate, an  "unknown laxative pill prn with minimal effect. Her Avi MD put her on daily Miralax which initially was helpful, but its effect waned.  She reports a high vegetable and fruit intake, at least 70 oz of water daily, and daily physical activity which led to an intentional 80 lbs weight loss before 2016. Her prior work-up included a normal TSH, calcium, CMP, and hgb.    Since establishing in our clinic she has [facility]2016 by Dr. Kailey Acevedo. Her testing was essentially normal with no evidence of Hirschsprung's, only mild perineal descent was noted. A Sitz marker study (after one week off of laxatives) was normal (no Sitz markers were seen on AXR done 5 days later). Though notably, she was on her period at that time (expectedly moving her bowels more frequently than her typical baseline).  A colonoscopy was done and was normal. Though as she was off of laxatives (for Sitz study, just prior to colonoscopy) and could only tolerate 4L Golytely (due to nausea, she was intended to take additional prep) her prep was rated at \"fair\".           For treatment she was on ever-increasing doses of fiber and Miralax (up to Miralax 4 times daily with morning coffee/caffeine ingestions) and the effect was lost over time. Ultimately, she was switched to Linzess 145 mcg, then up to 290 mcg with good response for a number of months, before response waned. In Jan 2018 we switched her to plecanatide/Trulance. With this she was moving her bowels nearly every day.  She continued to drink a large amount of water daily. She is ingesting 40-50 gm of fiber in her diet on an average day (tracks with [employer] nelsy).      Over time to Colace stool softener pills were added to her regimen at times to avoid hard stools and straining.      Due to insurance she did have to transition off of Trulance and onto Motegrity.  Unfortunately she developing increased depression symptoms despite ongoing use of her mental " health medications; this has been a reported effect of Motegrity. This medication was stopped and she was, ultimately able to get back on plecanatide/Trulance.    Today, Ms. Cutler continues on Trulance alone (no daily stool softeners). With this she moves bowels pretty much daily, though she notes that the week before her period to stool output is less predictable. Consistency is typically Payne 4, occasional Payne 6. She adds that she would rather be a little looser at times and avoid constipation. She denies any straining and is tolerating the med well.    She does resort to use of 1-2 stool softeners a day when she is out of Trulance.      2. Abdominal pain   Summarized/taken from my prior documentation:    She has had and handful of episodes of abdominal pain in total since 2016. During the first one she presented to the [facility] ED in Nov 2016, work-up with US, CT, and basic labs was negative. As the CT scan showed a large stool burden she was told this was likely due to constipation and was discharged to home. A second pain episode (precisely the same in nature) occurred around Nov 2017. She did not seek care at that time and has been monitoring her symptoms closely. She has has two more pain events in winter 2012/2018. The first one started around 2:30 pm and seemed to come on suddenly. The pain was intense and focused in the epigastric regions/RUQ with radiation to the back. The pain slowly dissipated, lasting two days in total. In March 2018 she had her fourth pain episode.  She recalls eating  at [employer], but feels it was a typical meal. She awoke in the middle of the night with the same pain. She noted it was worse when lying down and better when sitting or hunched over. The pain is severe enough that she is unable to eat, drink, drive, perform ADLs. This pain remitted the following day. She denies f/c, n/v, or any other associated symptoms.      She has not been seen to have gallstones on  "prior CT or US imaging. A 2018 pelvic US (done a couple weeks ago) was normal. A HIDA scan was done sometime after her [facility] ED visit and was seen to be normal.     Through GI clinic we evaluated amylase, lipase, and hepatic panel were all normal. We attempted H pylori testing, but as she's had no further pain episodes, she ultimately didn't complete the test.   She reported another pain episode in Oct 2018.  She states she had a \"large, greasy Mexican meal\" and the pain came on within 30 minutes. She doesn't really recall any other associated symptoms or further details. The pain, as in the past, dissipated on its own after some time.    Today, she feels this is not an issue. She recalls an episode of abdominal pain a couple months this was similar in character to her prior pain episodes, but much less severe. She reports experiencing pain and abdominal cramping. She attributes this to quinoa and felt this was a consistent reaction. She denies any signs of allergy (hives ,resp distress) no nausea or vomiting.      PROBLEM LIST  Patient Active Problem List    Diagnosis Date Noted     Tailor's bunion of both feet 02/24/2022     Priority: Medium     Added automatically from request for surgery [id number]       Mood disorder (H) 11/23/2021     Priority: Medium     Biceps tendinopathy, right 07/03/2020     Priority: Medium     Added automatically from request for surgery [id number]       Anxiety and depression 02/24/2020     Priority: Medium       PERTINENT MEDICATIONS:  Current Outpatient Medications   Medication     escitalopram (LEXAPRO) 20 MG tablet     HYDROcodone-acetaminophen (NORCO) 5-325 MG tablet     hydrOXYzine (ATARAX) 25 MG tablet     CLAUDIO 0.25-35 MG-MCG tablet     plecanatide (TRULANCE) 3 MG tablet     polyethylene glycol (MIRALAX) 17 GM/Dose powder     No current facility-administered medications for this visit.         PHYSICAL EXAMINATION:  Vitals LMP 04/05/2022    Wt   Wt Readings from Last 2 Encounters: "   05/03/22 77.3 kg (170 lb 8 oz)   04/20/22 77.3 kg (170 lb 8 oz)      Gen: Pt sitting up in NAD, interactive and cooperative on exam  Eyes: sclerae anicteric, no injection  ENT:  MMM  Resp: Breathing comfortably on exam, speaking in full sentences  Skin: No jaundice  Neuro: alert, oriented, answers questions appropriately      PERTINENT STUDIES:    Lab on 04/29/2022   Component Date Value Ref Range Status     SARS CoV2 PCR 04/29/2022 Negative  Negative Final     hCG Urine Qualitative 04/29/2022 Negative  Negative Final     Sodium 04/29/2022 139  133 - 144 mmol/L Final     Potassium 04/29/2022 3.8  3.4 - 5.3 mmol/L Final     Chloride 04/29/2022 107  94 - 109 mmol/L Final     Carbon Dioxide (CO2) 04/29/2022 25  20 - 32 mmol/L Final     Anion Gap 04/29/2022 7  3 - 14 mmol/L Final     Urea Nitrogen 04/29/2022 9  7 - 30 mg/dL Final     Creatinine 04/29/2022 0.57  0.52 - 1.04 mg/dL Final     Calcium 04/29/2022 9.0  8.5 - 10.1 mg/dL Final     Glucose 04/29/2022 85  70 - 99 mg/dL Final     GFR Estimate 04/29/2022 >90  >60 mL/min/1.73m2 Final     Hemoglobin 04/29/2022 13.6  11.7 - 15.7 g/dL Final         Sincerely,    Gayle Haro MD

## 2022-05-18 NOTE — LETTER
5/18/2022         RE: Genesis Cutler  [address]  [facility] [address]        Dear Colleague,    Thank you for referring your patient, Genesis Cutler, to the [facility]. Please see a copy of my visit note below.    Subjective:    Patient is 15 days postopp left fifth metatarsal Chevron osteotomy  done on 5/3/22.  Patient is doing well, admits compliance, denies fevers, chills, nausea or vomiting.  Has been NWB.  Virtually no pain now.  Has been nonweightbearing using peg leg ambulator.    Objective:    Dressings clean, dry and intact.  Incisions are dry, well coapted with no dehiscence or drainage with suture removal.  Pulses are palpable, sensation to light touch is intact.  Normal postopp edema.  No erythema or ecchymosis on the opperative site.  Bump reduced.      X-rays reveal osteotomy tight and hardware in place    Assessment: Postopp day 15 left fifth ray surgery    Plan:   X-ray taken today of left foot.  Sutures removed.  Patient will continue nonweightbearing at all times.  We gave her the 2-week warning.  We will be careful when ambulating as not to fall.  Return to clinic in 4 weeks.    Marshal Pate DPM, FACFAS            Again, thank you for allowing me to participate in the care of your patient.        Sincerely,        Marshal Pate DPM

## 2022-05-18 NOTE — PROGRESS NOTES
Subjective:    Patient is 15 days postopp left fifth metatarsal Chevron osteotomy  done on 5/3/22.  Patient is doing well, admits compliance, denies fevers, chills, nausea or vomiting.  Has been NWB.  Virtually no pain now.  Has been nonweightbearing using peg leg ambulator.    Objective:    Dressings clean, dry and intact.  Incisions are dry, well coapted with no dehiscence or drainage with suture removal.  Pulses are palpable, sensation to light touch is intact.  Normal postopp edema.  No erythema or ecchymosis on the opperative site.  Bump reduced.      X-rays reveal osteotomy tight and hardware in place    Assessment: Postopp day 15 left fifth ray surgery    Plan:   X-ray taken today of left foot.  Sutures removed.  Patient will continue nonweightbearing at all times.  We gave her the 2-week warning.  We will be careful when ambulating as not to fall.  Return to clinic in 4 weeks.    Marshal Pate DPM, FACFAS

## 2022-05-19 ENCOUNTER — TELEPHONE (OUTPATIENT)
Dept: GASTROENTEROLOGY | Facility: CLINIC | Age: 27
End: 2022-05-19

## 2022-05-19 NOTE — PROGRESS NOTES
Blood pressure is not reasonably well control on multiple medication  Again patient may have hyperaldosteronism  I will recheck the blood test along with urinary study as part of the workup    May benefit from Aldactone at this point though will wait until we get blood repeat port back [place] [facility] Initial Evaluation  Subjective:  The history is provided by the patient. No  was used.   Patient Health History  Genesis Cutler being seen for Rehab follwoing surgery.     Problem began: 7/23/2020 (surgery).   Problem occurred: Unknown   Pain is reported as 0/10 (4/10 with movement) on pain scale.  General health as reported by patient is excellent.  Pertinent medical history includes: none.   Red flags:  None as reported by patient.  Medical allergies: other. Other medical allergies details: penicillin.   Surgeries include:  Orthopedic surgery. Other surgery history details: R shoulder arthroscopy and biceps tenodesis.    Current medications:  None.    Current occupation is .   Primary job tasks include:  Computer work.                  Therapist Generated HPI Evaluation  Problem details: Patient reports a history of R shoulder pain since highschool.  Symptoms worsened last summer.  Experienced minimal relief with PT and an injection.  S/p R shoulder arthroscopy and open biceps tenodesis 7/23/2020.  Is now out of a sling.  Reports minimal pain..         Type of problem:  Right shoulder.    This is a chronic condition.  Condition occurred with:  Unknown cause.    Patient reports pain:  Lateral and upper arm.        Associated symptoms:  Loss of motion/stiffness. Symptoms are exacerbated by lying on extremity and using arm at shoulder level  and relieved by rest.                              Objective:  Standing Alignment:      Shoulder/UE:  Normal                                       Shoulder Evaluation:  ROM:  AROM:                    Elbow Flexion:  Left:  Equal B      Elbow Extension:  Left:  Equal B           PROM:    Flexion:  Right: 150    Extension:  Left:  80    Right:  60        External Rotation:  Right:  62                                                               General     ROS    Assessment/Plan:    Patient is a 25  year old female with right side shoulder complaints.    Patient has the following significant findings with corresponding treatment plan.                Diagnosis 1:  S/p R shoulder  Pain -  manual therapy and home program  Decreased ROM/flexibility - manual therapy, therapeutic exercise and home program  Decreased strength - therapeutic exercise, therapeutic activities and home program  Decreased function - therapeutic activities and home program      Previous and current functional limitations:  (See Goal Flow Sheet for this information)    Short term and Long term goals: (See Goal Flow Sheet for this information)     Communication ability:  Patient appears to be able to clearly communicate and understand verbal and written communication and follow directions correctly.  Treatment Explanation - The following has been discussed with the patient:   RX ordered/plan of care  Anticipated outcomes  Possible risks and side effects  This patient would benefit from PT intervention to resume normal activities.   Rehab potential is good.    Frequency:  1 X week, once daily  Duration:  for 10 weeks  Discharge Plan:  Achieve all LTG.  Independent in home treatment program.  Reach maximal therapeutic benefit.    Please refer to the daily flowsheet for treatment today, total treatment time and time spent performing 1:1 timed codes.

## 2022-05-19 NOTE — TELEPHONE ENCOUNTER
Called pt to scheduled f/u GI appt, left GI scheduling number to call back.         Miko Mueller

## 2022-06-15 ENCOUNTER — OFFICE VISIT (OUTPATIENT)
Dept: PODIATRY | Facility: CLINIC | Age: 27
End: 2022-06-15
Payer: COMMERCIAL

## 2022-06-15 VITALS — DIASTOLIC BLOOD PRESSURE: 70 MMHG | HEART RATE: 68 BPM | SYSTOLIC BLOOD PRESSURE: 113 MMHG

## 2022-06-15 DIAGNOSIS — M21.621 TAILOR'S BUNION OF BOTH FEET: Primary | ICD-10-CM

## 2022-06-15 DIAGNOSIS — M21.622 TAILOR'S BUNION OF BOTH FEET: Primary | ICD-10-CM

## 2022-06-15 PROCEDURE — 99024 POSTOP FOLLOW-UP VISIT: CPT | Performed by: PODIATRIST

## 2022-06-15 NOTE — PATIENT INSTRUCTIONS
We wish you continued good healing. If you have any questions or concerns, please do not hesitate to contact us at  [phone]    [employer] (secure e-mail communication and access to your chart) to send a message or to make an appointment.    Please remember to call and schedule a follow up appointment if one was recommended at your earliest convenience.     PODIATRY CLINIC HOURS  TELEPHONE NUMBER    Dr. Marshal MENDOZAPINDER [facility]        Clinics:  Pawan Duke CMA   Tuesday 1PM-6PM  [place]  Wednesday 745AM-330PM  [place]  Thursday/Friday 745AM-230PM  [place] REYES/PAWAN APPOINTMENTS  [phone]    [place] APPOINTMENTS  [phone]        If you need a medication refill, please contact us you may need lab work and/or a follow up visit prior to your refill (i.e. Antifungal medications).  If MRI needed please call Imaging at [phone] or [phone]  HOW DO I GET MY KNEE SCOOTER? Knee scooters can be picked up at ANY Medical Supply stores with your knee scooter Prescription.  OR  Bring your signed prescription to an [facility] Medical Equipment showroom.

## 2022-06-15 NOTE — PROGRESS NOTES
Subjective:    Patient is 6 weeks 1 day postopp left fifth metatarsal Chevron osteotomy  done on 5/3/22.  Patient is doing well, admits compliance, denies fevers, chills, nausea or vomiting.  Has been NWB.  No pain now.  Has been nonweightbearing using peg leg ambulator.    Objective:    Dressings clean, dry and intact.  Incision well-healed no change in position.  Pulses are palpable, sensation to light touch is intact.  Normal postopp edema.  No erythema or ecchymosis on the opperative site.  Bump reduced.      X-rays reveal osteotomy tight and hardware in place.      Assessment: Postopp left fifth bunionette correction    Plan:   X-ray taken today of left foot.  Placed CAM Walker at 90 degrees.  We will start walking in cam walker today.  We will only walk 2 hours.  If no pain or swelling will slowly increase walking by 1 to 2 hours/day.  No high-impact activities.  Only gentle walking.  When not walking may take off CAM Walker and do range of motion.  Return to clinic in 4 weeks.    Marshal Pate DPM, FACFAS

## 2022-06-15 NOTE — LETTER
6/15/2022         RE: Genesis Cutler  [address]  [place] [address]        Dear Colleague,    Thank you for referring your patient, Genesis Cutler, to the [facility]. Please see a copy of my visit note below.    Subjective:    Patient is 6 weeks 1 day postopp left fifth metatarsal Chevron osteotomy  done on 5/3/22.  Patient is doing well, admits compliance, denies fevers, chills, nausea or vomiting.  Has been NWB.  No pain now.  Has been nonweightbearing using peg leg ambulator.    Objective:    Dressings clean, dry and intact.  Incision well-healed no change in position.  Pulses are palpable, sensation to light touch is intact.  Normal postopp edema.  No erythema or ecchymosis on the opperative site.  Bump reduced.      X-rays reveal osteotomy tight and hardware in place.      Assessment: Postopp left fifth bunionette correction    Plan:   X-ray taken today of left foot.  Placed CAM Walker at 90 degrees.  We will start walking in cam walker today.  We will only walk 2 hours.  If no pain or swelling will slowly increase walking by 1 to 2 hours/day.  No high-impact activities.  Only gentle walking.  When not walking may take off CAM Walker and do range of motion.  Return to clinic in 4 weeks.    Marshal Pate DPM, FACFAS            Again, thank you for allowing me to participate in the care of your patient.        Sincerely,        Marshal Pate DPM

## 2022-07-03 ENCOUNTER — MYC REFILL (OUTPATIENT)
Dept: FAMILY MEDICINE | Facility: CLINIC | Age: 27
End: 2022-07-03

## 2022-07-03 DIAGNOSIS — Z30.41 ENCOUNTER FOR BIRTH CONTROL PILLS MAINTENANCE: ICD-10-CM

## 2022-07-06 RX ORDER — NORGESTIMATE AND ETHINYL ESTRADIOL 0.25-0.035
KIT ORAL
Qty: 112 TABLET | Refills: 1 | Status: SHIPPED | OUTPATIENT
Start: 2022-07-06 | End: 2022-08-03

## 2022-07-13 ENCOUNTER — OFFICE VISIT (OUTPATIENT)
Dept: PODIATRY | Facility: CLINIC | Age: 27
End: 2022-07-13
Payer: COMMERCIAL

## 2022-07-13 VITALS — SYSTOLIC BLOOD PRESSURE: 115 MMHG | DIASTOLIC BLOOD PRESSURE: 78 MMHG | HEART RATE: 68 BPM

## 2022-07-13 DIAGNOSIS — M21.622 TAILOR'S BUNION OF BOTH FEET: Primary | ICD-10-CM

## 2022-07-13 DIAGNOSIS — M21.621 TAILOR'S BUNION OF BOTH FEET: Primary | ICD-10-CM

## 2022-07-13 PROCEDURE — 99214 OFFICE O/P EST MOD 30 MIN: CPT | Mod: 24 | Performed by: PODIATRIST

## 2022-07-13 NOTE — PATIENT INSTRUCTIONS
We wish you continued good healing. If you have any questions or concerns, please do not hesitate to contact us at  [phone]    [employer] (secure e-mail communication and access to your chart) to send a message or to make an appointment.    Please remember to call and schedule a follow up appointment if one was recommended at your earliest convenience.     PODIATRY CLINIC HOURS  TELEPHONE NUMBER    Dr. Marshal MENDOZAPINDER [facility]        Clinics:  Pawan Duke CMA   Tuesday 1PM-6PM  [place]  Wednesday 745AM-330PM  [place]  Thursday/Friday 745AM-230PM  [place] REYES/PAWAN APPOINTMENTS  [phone]    [place] APPOINTMENTS  [phone]        If you need a medication refill, please contact us you may need lab work and/or a follow up visit prior to your refill (i.e. Antifungal medications).  If MRI needed please call Imaging at [phone] or [phone]  HOW DO I GET MY KNEE SCOOTER? Knee scooters can be picked up at ANY Medical Supply stores with your knee scooter Prescription.  OR  Bring your signed prescription to an [employer] Medical Equipment showroom.

## 2022-07-13 NOTE — LETTER
7/13/2022         RE: Genesis Cutler  [address]  [facility] [address]        Dear Colleague,    Thank you for referring your patient, Genesis Cutler, to the [facility]. Please see a copy of my visit note below.    Subjective:    Pt is 10 weeks 1 day status post left fifth metatarsal chevron osteotomy done on 5/3/2022.  Patient has no pain.  Swelling mostly resolved.  Walking with no problems.  She is wondering about surgery on her right foot.  Patient points to lateral right fifth metatarsal head as to where pain is.    Describes this as a burning pain.  Aggravated by activity and releaved by rest.  Tight shoes bother this.   She is working out of her house.  She is very happy with outcome on her left foot and she would like to have this done on her right foot.  Denies blisters numbness ecchymosis erythema or drainage.  She has never smoked    ROS: See above         Allergies   Allergen Reactions     Penicillins Rash       Current Outpatient Medications   Medication Sig Dispense Refill     escitalopram (LEXAPRO) 20 MG tablet Take 1 tablet (20 mg) by mouth daily 90 tablet 3     hydrOXYzine (ATARAX) 25 MG tablet Take 1-2 tablets (25-50 mg) by mouth every 4 hours as needed for itching (nausea and pain) 25 tablet 0     norgestimate-ethinyl estradiol (CLAUDIO) 0.25-35 MG-MCG tablet TAKE 1 TABLET BY MOUTH DAILY TAKE 4 CONTINUOUS PACKS, SKIPPING THE SUGAR PILL WEEK. 112 tablet 1     plecanatide (TRULANCE) 3 MG tablet Take 1 tablet (3 mg) by mouth daily Please call Gastroenterology at [phone] to schedule appointment. Thank you. 90 tablet 4     polyethylene glycol (MIRALAX) 17 GM/Dose powder Take 1 capful by mouth 4 times daily          Patient Active Problem List   Diagnosis     Anxiety and depression     Biceps tendinopathy, right     Mood disorder (H)     Tailor's bunion of both feet       Past Medical History:   Diagnosis Date     Chronic constipation         Past Surgical History:   Procedure Laterality Date     ARTHROSCOPY SHLDR ROTATOR CUFF REPAIR, SUBACROMIAL DECOMP, DIST CLAVICLE RESECTION, BICEP TENODESIS Right 07/23/2020    Procedure: Right shoulder arthroscopy, open biceps tenodesis;  Surgeon: Aline Amador MD;  Location: [facility]     BUNIONECTOMY NITISH AND JOHN, COMBINED Left 5/3/2022    Procedure: Left fifth metatarsal bunionette correction with bone cutting and screw fixation;  Surgeon: Marshal Pate DPM;  Location: [facility]     COLONOSCOPY  03/2017     ORTHOPEDIC SURGERY  07/2020    Biceps tenodesis, right side       Family History   Problem Relation Age of Onset     Diabetes Mother      Diabetes Father      Diabetes Maternal Grandmother      Cerebrovascular Disease Maternal Grandmother      Obesity Maternal Grandmother      Coronary Artery Disease Maternal Grandfather      Cerebrovascular Disease Maternal Grandfather      Diabetes Maternal Grandfather      Coronary Artery Disease Paternal Grandmother      Unknown/Adopted Paternal Grandfather      Depression Sister      Anxiety Disorder Sister      Obesity Sister        Social History     Tobacco Use     Smoking status: Never Smoker     Smokeless tobacco: Never Used   Substance Use Topics     Alcohol use: Not Currently     Comment: 1-2 drinks per month         Exam:    Vitals: /78   Pulse 68   BMI: There is no height or weight on file to calculate BMI.  Height: Data Unavailable    Constitutional/ general:  Pt is in no apparent distress, appears well-nourished.  Cooperative with history and physical exam.     Psych:  The patient answered questions appropriately.  Normal affect.  Seems to have reasonable expectations, in terms of treatment.     Lungs:  Non labored breathing, non labored speech. No cough.  No audible wheezing. Even, quiet breathing.       Vascular:  positive pedal pulses bilaterally for both the DP and PT arteries.  CFT < 3 sec.  negative ankle edema.  positive pedal  hair growth.    Neuro:  Alert and oriented x 3. Coordinated gait.  Light touch sensation is intact     Derm: Normal texture and turgor.  No erythema, ecchymosis, or cyanosis.      Musculoskeletal:     Patient is ambulatory without an assistive device or brace.  Slightly pronated arch with weightbearing.  Right foot has increased internal tibial torsion and left is normal.  MS 5/5 all compartments.  Left incision well-healed.  Left tailor's bunion nicely reduced.  No pain with palpation or range of motion anywhere.  Right tailor's bunion with weightbearing.  Negative for lateral bursa formation.  No pain plantar or dorsal.      X-rays consistent with right tailor's bunion.  Left foot x-rays show osteotomy healing with increased trabeculation and no signs of movement.  Hardware stable.      A/P   Postop left fifth metatarsal chevron osteotomy  Right bunionette    Patient will graduate into good supportive shoe.  No high-impact activities until 3 months after surgery.  If any pain or swelling back to CAM Walker.  Patient would like to schedule surgery for right foot in early October.  Discussed similar risk complications efficacy and recovery.  We will place order to have surgery done early October.  Patient will call me if she has any other questions.      Marshal Pate DPM, FACFAS            Again, thank you for allowing me to participate in the care of your patient.        Sincerely,        Marshal Pate DPM

## 2022-07-13 NOTE — PROGRESS NOTES
Subjective:    Pt is 10 weeks 1 day status post left fifth metatarsal chevron osteotomy done on 5/3/2022.  Patient has no pain.  Swelling mostly resolved.  Walking with no problems.  She is wondering about surgery on her right foot.  Patient points to lateral right fifth metatarsal head as to where pain is.    Describes this as a burning pain.  Aggravated by activity and releaved by rest.  Tight shoes bother this.   She is working out of her house.  She is very happy with outcome on her left foot and she would like to have this done on her right foot.  Denies blisters numbness ecchymosis erythema or drainage.  She has never smoked    ROS: See above         Allergies   Allergen Reactions     Penicillins Rash       Current Outpatient Medications   Medication Sig Dispense Refill     escitalopram (LEXAPRO) 20 MG tablet Take 1 tablet (20 mg) by mouth daily 90 tablet 3     hydrOXYzine (ATARAX) 25 MG tablet Take 1-2 tablets (25-50 mg) by mouth every 4 hours as needed for itching (nausea and pain) 25 tablet 0     norgestimate-ethinyl estradiol (CLAUDIO) 0.25-35 MG-MCG tablet TAKE 1 TABLET BY MOUTH DAILY TAKE 4 CONTINUOUS PACKS, SKIPPING THE SUGAR PILL WEEK. 112 tablet 1     plecanatide (TRULANCE) 3 MG tablet Take 1 tablet (3 mg) by mouth daily Please call Gastroenterology at [phone] to schedule appointment. Thank you. 90 tablet 4     polyethylene glycol (MIRALAX) 17 GM/Dose powder Take 1 capful by mouth 4 times daily          Patient Active Problem List   Diagnosis     Anxiety and depression     Biceps tendinopathy, right     Mood disorder (H)     Tailor's bunion of both feet       Past Medical History:   Diagnosis Date     Chronic constipation        Past Surgical History:   Procedure Laterality Date     ARTHROSCOPY SHLDR ROTATOR CUFF REPAIR, SUBACROMIAL DECOMP, DIST CLAVICLE RESECTION, BICEP TENODESIS Right 07/23/2020    Procedure: Right shoulder arthroscopy, open biceps tenodesis;  Surgeon: Aline Amador MD;   Location: [facility]     BUNIONECTOMY NITISH AND JOHN, COMBINED Left 5/3/2022    Procedure: Left fifth metatarsal bunionette correction with bone cutting and screw fixation;  Surgeon: Marshal Pate DPM;  Location: [facility]     COLONOSCOPY  03/2017     ORTHOPEDIC SURGERY  07/2020    Biceps tenodesis, right side       Family History   Problem Relation Age of Onset     Diabetes Mother      Diabetes Father      Diabetes Maternal Grandmother      Cerebrovascular Disease Maternal Grandmother      Obesity Maternal Grandmother      Coronary Artery Disease Maternal Grandfather      Cerebrovascular Disease Maternal Grandfather      Diabetes Maternal Grandfather      Coronary Artery Disease Paternal Grandmother      Unknown/Adopted Paternal Grandfather      Depression Sister      Anxiety Disorder Sister      Obesity Sister        Social History     Tobacco Use     Smoking status: Never Smoker     Smokeless tobacco: Never Used   Substance Use Topics     Alcohol use: Not Currently     Comment: 1-2 drinks per month         Exam:    Vitals: /78   Pulse 68   BMI: There is no height or weight on file to calculate BMI.  Height: Data Unavailable    Constitutional/ general:  Pt is in no apparent distress, appears well-nourished.  Cooperative with history and physical exam.     Psych:  The patient answered questions appropriately.  Normal affect.  Seems to have reasonable expectations, in terms of treatment.     Lungs:  Non labored breathing, non labored speech. No cough.  No audible wheezing. Even, quiet breathing.       Vascular:  positive pedal pulses bilaterally for both the DP and PT arteries.  CFT < 3 sec.  negative ankle edema.  positive pedal hair growth.    Neuro:  Alert and oriented x 3. Coordinated gait.  Light touch sensation is intact     Derm: Normal texture and turgor.  No erythema, ecchymosis, or cyanosis.      Musculoskeletal:     Patient is ambulatory without an assistive device or brace.  Slightly pronated arch  with weightbearing.  Right foot has increased internal tibial torsion and left is normal.  MS 5/5 all compartments.  Left incision well-healed.  Left tailor's bunion nicely reduced.  No pain with palpation or range of motion anywhere.  Right tailor's bunion with weightbearing.  Negative for lateral bursa formation.  No pain plantar or dorsal.      X-rays consistent with right tailor's bunion.  Left foot x-rays show osteotomy healing with increased trabeculation and no signs of movement.  Hardware stable.      A/P   Postop left fifth metatarsal chevron osteotomy  Right bunionette    Patient will graduate into good supportive shoe.  No high-impact activities until 3 months after surgery.  If any pain or swelling back to CAM Walker.  Patient would like to schedule surgery for right foot in early October.  Discussed similar risk complications efficacy and recovery.  We will place order to have surgery done early October.  Patient will call me if she has any other questions.      Marshal Pate DPM, FACFAS

## 2022-07-15 ENCOUNTER — TELEPHONE (OUTPATIENT)
Dept: PODIATRY | Facility: CLINIC | Age: 27
End: 2022-07-15

## 2022-07-15 NOTE — TELEPHONE ENCOUNTER
Type of surgery: RIGHT FIFTH METATARSAL CHEVRON OSTEOTOMY RIGHT  CPT 65361, possible 55686   Tailor's bunion of both feet M21.621, M21.622    Location of surgery: [facility]  Date and time of surgery: 10/04/2022  Surgeon: STAR  Pre-Op Appt Date: 09/27/2022  Post-Op Appt Date: 10/07/2022   Packet sent out: Yes  Pre-cert/Authorization completed: Prior auth required per UMR. Submitted on [employer] website [employer]. Successful submission, will check in 3-5 business days. Submission confirmation email received.    Date: 7-15-22    Brittney Maria  Financial Securing/Prior Auth Dept  [phone]

## 2022-08-03 ENCOUNTER — OFFICE VISIT (OUTPATIENT)
Dept: FAMILY MEDICINE | Facility: CLINIC | Age: 27
End: 2022-08-03
Payer: COMMERCIAL

## 2022-08-03 VITALS
RESPIRATION RATE: 12 BRPM | DIASTOLIC BLOOD PRESSURE: 60 MMHG | HEIGHT: 64 IN | OXYGEN SATURATION: 99 % | BODY MASS INDEX: 28.51 KG/M2 | SYSTOLIC BLOOD PRESSURE: 104 MMHG | WEIGHT: 167 LBS | TEMPERATURE: 97.9 F | HEART RATE: 59 BPM

## 2022-08-03 DIAGNOSIS — K59.04 CHRONIC IDIOPATHIC CONSTIPATION: ICD-10-CM

## 2022-08-03 DIAGNOSIS — Z13.29 SCREENING FOR ENDOCRINE DISORDER: ICD-10-CM

## 2022-08-03 DIAGNOSIS — M21.622 TAILOR'S BUNION OF BOTH FEET: ICD-10-CM

## 2022-08-03 DIAGNOSIS — Z00.00 ROUTINE GENERAL MEDICAL EXAMINATION AT A HEALTH CARE FACILITY: Primary | ICD-10-CM

## 2022-08-03 DIAGNOSIS — M21.621 TAILOR'S BUNION OF BOTH FEET: ICD-10-CM

## 2022-08-03 DIAGNOSIS — Z13.220 LIPID SCREENING: ICD-10-CM

## 2022-08-03 DIAGNOSIS — Z12.83 ENCOUNTER FOR SCREENING FOR MALIGNANT NEOPLASM OF SKIN: ICD-10-CM

## 2022-08-03 DIAGNOSIS — F41.9 ANXIETY AND DEPRESSION: ICD-10-CM

## 2022-08-03 DIAGNOSIS — F32.A ANXIETY AND DEPRESSION: ICD-10-CM

## 2022-08-03 DIAGNOSIS — Z30.41 ENCOUNTER FOR BIRTH CONTROL PILLS MAINTENANCE: ICD-10-CM

## 2022-08-03 PROCEDURE — 99395 PREV VISIT EST AGE 18-39: CPT | Performed by: FAMILY MEDICINE

## 2022-08-03 PROCEDURE — 99213 OFFICE O/P EST LOW 20 MIN: CPT | Mod: 25 | Performed by: FAMILY MEDICINE

## 2022-08-03 RX ORDER — NORGESTIMATE AND ETHINYL ESTRADIOL 0.25-0.035
KIT ORAL
Qty: 112 TABLET | Refills: 1 | Status: SHIPPED | OUTPATIENT
Start: 2022-08-03 | End: 2023-03-03

## 2022-08-03 RX ORDER — HYDROXYZINE HYDROCHLORIDE 25 MG/1
25-50 TABLET, FILM COATED ORAL EVERY 4 HOURS PRN
Qty: 25 TABLET | Refills: 0 | Status: SHIPPED | OUTPATIENT
Start: 2022-08-03 | End: 2022-09-27

## 2022-08-03 RX ORDER — ESCITALOPRAM OXALATE 20 MG/1
20 TABLET ORAL DAILY
Qty: 90 TABLET | Refills: 3 | Status: SHIPPED | OUTPATIENT
Start: 2022-08-03 | End: 2022-09-27

## 2022-08-03 ASSESSMENT — ENCOUNTER SYMPTOMS
DYSURIA: 0
HEMATURIA: 0
PALPITATIONS: 0
NERVOUS/ANXIOUS: 0
HEMATOCHEZIA: 0
FEVER: 0
SHORTNESS OF BREATH: 0
CONSTIPATION: 0
NAUSEA: 0
HEADACHES: 0
HEARTBURN: 0
BREAST MASS: 0
CHILLS: 0
SORE THROAT: 0
DIARRHEA: 0
COUGH: 0
ARTHRALGIAS: 0
FREQUENCY: 0
MYALGIAS: 0
ABDOMINAL PAIN: 0
WEAKNESS: 0
JOINT SWELLING: 0
EYE PAIN: 0
PARESTHESIAS: 0
DIZZINESS: 0

## 2022-08-03 ASSESSMENT — PAIN SCALES - GENERAL: PAINLEVEL: NO PAIN (0)

## 2022-08-03 NOTE — PROGRESS NOTES
SUBJECTIVE:   CC: Genesis Cutler is an 27 year old woman who presents for preventive health visit.       Patient has been advised of split billing requirements and indicates understanding: Yes  Healthy Habits:     Getting at least 3 servings of Calcium per day:  Yes    Bi-annual eye exam:  Yes    Dental care twice a year:  Yes    Sleep apnea or symptoms of sleep apnea:  Daytime drowsiness    Diet:  Regular (no restrictions)    Frequency of exercise:  4-5 days/week    Duration of exercise:  30-45 minutes    Taking medications regularly:  Yes    Medication side effects:  None    PHQ-2 Total Score: 0    Additional concerns today:  No    Depression and Anxiety Follow-Up    How are you doing with your depression since your last visit? Improved     How are you doing with your anxiety since your last visit?  Improved     Are you having other symptoms that might be associated with depression or anxiety? No    Have you had a significant life event? No     Do you have any concerns with your use of alcohol or other drugs? No     Her therapist moved so needing another referral to [facility].    Social History     Tobacco Use     Smoking status: Never Smoker     Smokeless tobacco: Never Used   Vaping Use     Vaping Use: Never used   Substance Use Topics     Alcohol use: Yes     Comment: Occasional alcoholic drink with friends,     Drug use: No     PHQ 9/16/2021 10/20/2021 4/20/2022   PHQ-9 Total Score 5 9 5   Q9: Thoughts of better off dead/self-harm past 2 weeks Not at all Not at all Not at all   F/U: Thoughts of suicide or self-harm - - -   F/U: Safety concerns - - -     NEGRO-7 SCORE 9/16/2021 10/20/2021 4/20/2022   Total Score 2 (minimal anxiety) 1 (minimal anxiety) 3 (minimal anxiety)   Total Score 2 1 3             Today's PHQ-2 Score:   PHQ-2 ( 1999 [employer]) 8/3/2022   Q1: Little interest or pleasure in doing things 0   Q2: Feeling down, depressed or hopeless 0   PHQ-2 Score 0   PHQ-2 Total Score (12-17 Years)-  Positive if 3 or more points; Administer PHQ-A if positive -   Q1: Little interest or pleasure in doing things Not at all   Q2: Feeling down, depressed or hopeless Not at all   PHQ-2 Score 0       Abuse: Current or Past (Physical, Sexual or Emotional) - No  Do you feel safe in your environment? Yes        Social History     Tobacco Use     Smoking status: Never Smoker     Smokeless tobacco: Never Used   Substance Use Topics     Alcohol use: Yes     Comment: Occasional alcoholic drink with friends,         Alcohol Use 8/3/2022   Prescreen: >3 drinks/day or >7 drinks/week? No   Prescreen: >3 drinks/day or >7 drinks/week? -       Reviewed orders with patient.  Reviewed health maintenance and updated orders accordingly - Yes  BP Readings from Last 3 Encounters:   08/03/22 104/60   07/13/22 115/78   06/15/22 113/70    Wt Readings from Last 3 Encounters:   08/03/22 75.8 kg (167 lb)   05/18/22 77.1 kg (170 lb)   05/03/22 77.3 kg (170 lb 8 oz)                  Patient Active Problem List   Diagnosis     Anxiety and depression     Biceps tendinopathy, right     Mood disorder (H)     Tailor's bunion of both feet     Past Surgical History:   Procedure Laterality Date     ARTHROSCOPY SHLDR ROTATOR CUFF REPAIR, SUBACROMIAL DECOMP, DIST CLAVICLE RESECTION, BICEP TENODESIS Right 07/23/2020    Procedure: Right shoulder arthroscopy, open biceps tenodesis;  Surgeon: Aline Amador MD;  Location:  OR     BUNIONECTOMY NITISH AND JOHN, COMBINED Left 5/3/2022    Procedure: Left fifth metatarsal bunionette correction with bone cutting and screw fixation;  Surgeon: Marshal Pate DPM;  Location:  OR     COLONOSCOPY  03/2017     ORTHOPEDIC SURGERY  07/2020    Biceps tenodesis, right side       Social History     Tobacco Use     Smoking status: Never Smoker     Smokeless tobacco: Never Used   Substance Use Topics     Alcohol use: Yes     Comment: Occasional alcoholic drink with friends,     Family History   Problem  Relation Age of Onset     Diabetes Mother      Diabetes Father      Diabetes Maternal Grandmother      Cerebrovascular Disease Maternal Grandmother      Obesity Maternal Grandmother      Coronary Artery Disease Maternal Grandfather      Cerebrovascular Disease Maternal Grandfather      Diabetes Maternal Grandfather      Coronary Artery Disease Paternal Grandmother      Unknown/Adopted Paternal Grandfather      Depression Sister      Anxiety Disorder Sister      Obesity Sister          Current Outpatient Medications   Medication Sig Dispense Refill     escitalopram (LEXAPRO) 20 MG tablet Take 1 tablet (20 mg) by mouth daily 90 tablet 3     hydrOXYzine (ATARAX) 25 MG tablet Take 1-2 tablets (25-50 mg) by mouth every 4 hours as needed for itching (nausea and pain) 25 tablet 0     norgestimate-ethinyl estradiol (CLAUDIO) 0.25-35 MG-MCG tablet TAKE 1 TABLET BY MOUTH DAILY TAKE 4 CONTINUOUS PACKS, SKIPPING THE SUGAR PILL WEEK. 112 tablet 1     plecanatide (TRULANCE) 3 MG tablet Take 1 tablet (3 mg) by mouth daily Please call Gastroenterology at [phone] to schedule appointment. Thank you. 90 tablet 4     polyethylene glycol (MIRALAX) 17 GM/Dose powder Take 1 capful by mouth 4 times daily        Allergies   Allergen Reactions     Penicillins Rash     Recent Labs   Lab Test 04/29/22  1453 08/23/21  1642 02/23/21  1654 07/15/20  1535 03/14/18  1219 01/30/18  0756 07/19/16  0938   LDL  --   --  89  --   --   --   --    HDL  --   --  61  --   --   --   --    TRIG  --   --  59  --   --   --   --    ALT  --  30  --  26 33  --  33   CR 0.57 0.66  --  0.56  --   --  0.70   GFRESTIMATED >90 >90  --  >90  --   --  >90  Non  GFR Calc     GFRESTBLACK  --   --   --  >90  --   --  >90  African American GFR Calc     POTASSIUM 3.8 4.7  --  4.1  --   --  4.1   TSH  --  2.24 2.31  --   --    < > 1.86    < > = values in this interval not displayed.        Breast Cancer Screening:    FHS-7: No flowsheet data  "found.    Pertinent mammograms are reviewed under the imaging tab.    History of abnormal Pap smear: NO - age 30- 65 PAP every 3 years recommended  NO - age 30-65 PAP every 5 years with negative HPV co-testing recommended  PAP / HPV 2/23/2021 1/29/2018   PAP (Historical) NIL NIL     Reviewed and updated as needed this visit by clinical staff   Tobacco  Allergies  Meds   Med Hx  Surg Hx  Fam Hx  Soc Hx          Reviewed and updated as needed this visit by Provider      Review of Systems   Constitutional: Negative for chills and fever.   HENT: Negative for congestion, ear pain, hearing loss and sore throat.    Eyes: Negative for pain and visual disturbance.   Respiratory: Negative for cough and shortness of breath.    Cardiovascular: Negative for chest pain, palpitations and peripheral edema.   Gastrointestinal: Negative for abdominal pain, constipation, diarrhea, heartburn, hematochezia and nausea.   Breasts:  Negative for tenderness, breast mass and discharge.   Genitourinary: Negative for dysuria, frequency, genital sores, hematuria, pelvic pain, vaginal bleeding and vaginal discharge.   Musculoskeletal: Negative for arthralgias, joint swelling and myalgias.   Neurological: Negative for dizziness, weakness, headaches and paresthesias.   Psychiatric/Behavioral: Negative for mood changes. The patient is not nervous/anxious.           OBJECTIVE:   /60   Pulse 59   Temp 97.9  F (36.6  C) (Temporal)   Resp 12   Ht 1.627 m (5' 4.06\")   Wt 75.8 kg (167 lb)   LMP 07/26/2022 (Exact Date)   SpO2 99%   BMI 28.62 kg/m    Physical Exam  GENERAL: healthy, alert and no distress  EYES: Eyes grossly normal to inspection, PERRL and conjunctivae and sclerae normal  HENT: ear canals and TM's normal, nose and mouth without ulcers or lesions  NECK: no adenopathy, no asymmetry, masses, or scars and thyroid normal to palpation  RESP: lungs clear to auscultation - no rales, rhonchi or wheezes  BREAST: normal without " masses, tenderness or nipple discharge and no palpable axillary masses or adenopathy  CV: regular rate and rhythm, normal S1 S2, no S3 or S4, no murmur, click or rub, no peripheral edema and peripheral pulses strong  ABDOMEN: soft, nontender, no hepatosplenomegaly, no masses and bowel sounds normal  MS: no gross musculoskeletal defects noted, no edema  SKIN: no suspicious lesions or rashes, lentigo present  NEURO: Normal strength and tone, mentation intact and speech normal  PSYCH: mentation appears normal, affect normal/bright      ASSESSMENT/PLAN:   (Z00.00) Routine general medical examination at a health care facility  (primary encounter diagnosis)  Comment: See counseling    (M21.621,  M21.622) Tailor's bunion of both feet  Comment: Added automatically from request for surgery [id number]  Plan: hydrOXYzine (ATARAX) 25 MG tablet      (Z30.41) Encounter for birth control pills maintenance  Comment: See order   Plan: norgestimate-ethinyl estradiol (CLAUDIO) 0.25-35         MG-MCG tablet      (F41.9,  F32.A) Anxiety and depression  Comment: Stable, continue present management  Plan: escitalopram (LEXAPRO) 20 MG tablet, Adult         Mental Health  Referral      (Z13.220) Lipid screening  Comment: See order   Plan: Lipid panel reflex to direct LDL Fasting      (Z13.29) Screening for endocrine disorder  Comment: See order   Plan: Comprehensive metabolic panel, TSH with free T4        reflex      (Z12.83) Encounter for screening for malignant neoplasm of skin  Comment: See order   Plan: Adult Dermatology Referral      Patient has been advised of split billing requirements and indicates understanding: Yes    COUNSELING:  Reviewed preventive health counseling, as reflected in patient instructions       Regular exercise       Healthy diet/nutrition       Vision screening       Hearing screening       Osteoporosis prevention/bone health       Colorectal Cancer Screening       Consider Hep C screening for all patients  "one time for ages 18-79 years       Advance Care Planning    Estimated body mass index is 28.62 kg/m  as calculated from the following:    Height as of this encounter: 1.627 m (5' 4.06\").    Weight as of this encounter: 75.8 kg (167 lb).    Weight management plan: Discussed healthy diet and exercise guidelines    She reports that she has never smoked. She has never used smokeless tobacco.      Counseling Resources:  ATP IV Guidelines  Pooled Cohorts Equation Calculator  Breast Cancer Risk Calculator  BRCA-Related Cancer Risk Assessment: FHS-7 Tool  FRAX Risk Assessment  ICSI Preventive Guidelines  Dietary Guidelines for Americans, 2010  USDA's MyPlate  ASA Prophylaxis  Lung CA Screening    Richard Ledbetter MD  [facility]  "

## 2022-08-15 ENCOUNTER — VIRTUAL VISIT (OUTPATIENT)
Dept: PSYCHOLOGY | Facility: CLINIC | Age: 27
End: 2022-08-15
Attending: FAMILY MEDICINE
Payer: COMMERCIAL

## 2022-08-15 DIAGNOSIS — F32.A ANXIETY AND DEPRESSION: ICD-10-CM

## 2022-08-15 DIAGNOSIS — F41.9 ANXIETY AND DEPRESSION: ICD-10-CM

## 2022-08-15 PROCEDURE — 90791 PSYCH DIAGNOSTIC EVALUATION: CPT | Mod: 95 | Performed by: COUNSELOR

## 2022-08-15 ASSESSMENT — COLUMBIA-SUICIDE SEVERITY RATING SCALE - C-SSRS
2. HAVE YOU ACTUALLY HAD ANY THOUGHTS OF KILLING YOURSELF?: NO
TOTAL  NUMBER OF ABORTED OR SELF INTERRUPTED ATTEMPTS LIFETIME: NO
ATTEMPT LIFETIME: NO
TOTAL  NUMBER OF INTERRUPTED ATTEMPTS LIFETIME: NO
1. HAVE YOU WISHED YOU WERE DEAD OR WISHED YOU COULD GO TO SLEEP AND NOT WAKE UP?: NO
6. HAVE YOU EVER DONE ANYTHING, STARTED TO DO ANYTHING, OR PREPARED TO DO ANYTHING TO END YOUR LIFE?: NO

## 2022-08-15 ASSESSMENT — PATIENT HEALTH QUESTIONNAIRE - PHQ9
SUM OF ALL RESPONSES TO PHQ QUESTIONS 1-9: 10
10. IF YOU CHECKED OFF ANY PROBLEMS, HOW DIFFICULT HAVE THESE PROBLEMS MADE IT FOR YOU TO DO YOUR WORK, TAKE CARE OF THINGS AT HOME, OR GET ALONG WITH OTHER PEOPLE: SOMEWHAT DIFFICULT
SUM OF ALL RESPONSES TO PHQ QUESTIONS 1-9: 10

## 2022-08-15 NOTE — PROGRESS NOTES
"[facility]  Provider Name:  Cristela Thurman     Credentials:  YOHANNES Cramer    PATIENT'S NAME: Genesis Cutler  PREFERRED NAME: Sarah  PRONOUNS:       MRN: [id number]  : 1995  ADDRESS: [address]  [place] [address]  ACCT. NUMBER:  [id number]  DATE OF SERVICE: 8/15/22  START TIME: 11:05am  END TIME: 11:50am  PREFERRED PHONE: [phone]  May we leave a program related message: Yes  SERVICE MODALITY:  Video Visit:      Provider verified identity through the following two step process.  Patient provided:  Patient     Telemedicine Visit: The patient's condition can be safely assessed and treated via synchronous audio and visual telemedicine encounter.      Reason for Telemedicine Visit: Services only offered telehealth    Originating Site (Patient Location): Patient's home    Distant Site (Provider Location): Provider Remote Setting- Home Office    Consent:  The patient/guardian has verbally consented to: the potential risks and benefits of telemedicine (video visit) versus in person care; bill my insurance or make self-payment for services provided; and responsibility for payment of non-covered services.     Patient would like the video invitation sent by:  My Chart    Mode of Communication:  Video Conference via Amwell    As the provider I attest to compliance with applicable laws and regulations related to telemedicine.    UNIVERSAL ADULT Mental Health DIAGNOSTIC ASSESSMENT    Identifying Information:  Patient is a 27 year old,    individual.    Patient was referred for an assessment by  self.  Patient attended the session alone.    Chief Complaint:   The reason for seeking services at this time is: \"Continuing care for anxiety and depression\".  The problem(s) began 2020.    Patient has attempted to resolve these concerns in the past through therapy.    Social/Family History:  Patient reported they grew up in [place]  .  They were raised by " biological mother; biological father; stepmother  .  Parents one or both remarried.  Patient reported that their childhood was good with mother, some distance with father.  Patient described their current relationships with family of origin as better with mother and sister.     The patient describes their cultural background as .  Cultural influences and impact on patient's life structure, values, norms, and healthcare: Grew up non Taoism in very Taoism community, lost friends on occasion due to differences in belief..  Contextual influences on patient's health include: Family Factors she and father have different political beliefs and struggles.    These factors will be addressed in the Preliminary Treatment plan. Patient identified their preferred language to be English. Patient reported they does not need the assistance of an  or other support involved in therapy.     Patient reported had no significant delays in developmental tasks.   Patient's highest education level was college graduate  .  Patient identified the following learning problems: none reported.  Modifications will not be used to assist communication in therapy.  Patient reports they are  able to understand written materials.    Patient reported the following relationship history 1 marriage.  Patient's current relationship status is  for 5 years together for 10 years.   Patient identified their sexual orientation as bi-sexual.  Patient reported having   child(whitney). Patient identified spouse; co-worker as part of their support system.  Patient identified the quality of these relationships as inconsistent,  .      Patient's current living/housing situation involves staying in own home/apartment.  The immediate members of family and household include Silvestre Cutler, 29, and they report that housing is stable.    Patient is currently employed fulltime.  Patient reports their finances are obtained through  employment; spouse. Patient does not identify finances as a current stressor.      Patient reported that they have not been involved with the legal system.    Patient does not report being under probation/ parole/ jurisdiction. They are not under any current court jurisdiction. .    Patient's Strengths and Limitations:  Patient identified the following strengths or resources that will help them succeed in treatment: commitment to health and well being, friends / good social support, family support, insight, intelligence, positive work environment, strong social skills and work ethic. Things that may interfere with the patient's success in treatment include: none identified.     Assessments:  The following assessments were completed by patient for this visit:  PHQ9:   PHQ-9 SCORE 6/4/2021 7/30/2021 8/24/2021 9/16/2021 10/20/2021 4/20/2022 8/15/2022   PHQ-9 Total Score MyChart 5 (Mild depression) 7 (Mild depression) 9 (Mild depression) 5 (Mild depression) 9 (Mild depression) 5 (Mild depression) 10 (Moderate depression)   PHQ-9 Total Score 5 7 9 5 9 5 10     GAD7:   NEGRO-7 SCORE 5/5/2021 6/4/2021 7/30/2021 8/24/2021 9/16/2021 10/20/2021 4/20/2022   Total Score 8 (mild anxiety) 2 (minimal anxiety) 5 (mild anxiety) 2 (minimal anxiety) 2 (minimal anxiety) 1 (minimal anxiety) 3 (minimal anxiety)   Total Score 8 2 5 2 2 1 3     PROMIS 10-Global Health (all questions and answers displayed):   PROMIS 10 8/12/2022   In general, would you say your health is: Very good   In general, would you say your quality of life is: Very good   In general, how would you rate your physical health? Very good   In general, how would you rate your mental health, including your mood and your ability to think? Fair   In general, how would you rate your satisfaction with your social activities and relationships? Fair   In general, please rate how well you carry out your usual social activities and roles Good   To what extent are you able to carry  out your everyday physical activities such as walking, climbing stairs, carrying groceries, or moving a chair? Mostly   How often have you been bothered by emotional problems such as feeling anxious, depressed or irritable? Often   How would you rate your fatigue on average? Moderate   How would you rate your pain on average?   0 = No Pain  to  10 = Worst Imaginable Pain 3   In general, would you say your health is: 4   In general, would you say your quality of life is: 4   In general, how would you rate your physical health? 4   In general, how would you rate your mental health, including your mood and your ability to think? 2   In general, how would you rate your satisfaction with your social activities and relationships? 2   In general, please rate how well you carry out your usual social activities and roles. (This includes activities at home, at work and in your community, and responsibilities as a parent, child, spouse, employee, friend, etc.) 3   To what extent are you able to carry out your everyday physical activities such as walking, climbing stairs, carrying groceries, or moving a chair? 4   In the past 7 days, how often have you been bothered by emotional problems such as feeling anxious, depressed, or irritable? 4   In the past 7 days, how would you rate your fatigue on average? 3   In the past 7 days, how would you rate your pain on average, where 0 means no pain, and 10 means worst imaginable pain? 3   Global Mental Health Score 10   Global Physical Health Score 15   PROMIS TOTAL - SUBSCORES 25   Some recent data might be hidden     [place] Suicide Severity Rating Scale (Lifetime/Recent)  [place] Suicide Severity Rating (Lifetime/Recent) 8/15/2022   1. Wish to be Dead (Lifetime) 0   2. Non-Specific Active Suicidal Thoughts (Lifetime) 0   Actual Attempt (Lifetime) 0   Has subject engaged in non-suicidal self-injurious behavior? (Lifetime) 0   Interrupted Attempts (Lifetime) 0   Aborted or  Self-Interrupted Attempt (Lifetime) 0   Preparatory Acts or Behavior (Lifetime) 0   Calculated C-SSRS Risk Score (Lifetime/Recent) No Risk Indicated       Personal and Family Medical History:  Patient does report a family history of mental health concerns.  Patient reports family history includes Anxiety Disorder in her sister; Cerebrovascular Disease in her maternal grandfather and maternal grandmother; Coronary Artery Disease in her maternal grandfather and paternal grandmother; Depression in her sister; Diabetes in her father, maternal grandfather, maternal grandmother, and mother; Obesity in her maternal grandmother and sister; Unknown/Adopted in her paternal grandfather..     Patient does report Mental Health Diagnosis and/or Treatment.  Patient Patient reported the following previous diagnoses which include(s): an anxiety disorder; depression; otherGenerally disordered eating habits.  Patient reported symptoms began several years ago.  Patient has received mental health services in the past:  therapy  .  Psychiatric Hospitalizations: [facility] when  From February 2020 to late 2021. Patient denies a history of civil commitment.  Currently, patient none  receiving other mental health services.  These include none.         Patient has not had a physical exam to rule out medical causes for current symptoms.  Date of last physical exam was within the past year. Client was encouraged to follow up with PCP if symptoms were to develop. The patient has a [facility] Care Provider, who is named Richard Ledbetter.  Patient reports no current medical concerns.  Patient denies any issues with pain..   There are significant appetite / nutritional concerns / weight changes.   Patient does not report a history of head injury / trauma / cognitive impairment.      Patient reports current meds as:   Outpatient Medications Marked as Taking for the 8/15/22 encounter (Virtual Visit) with Cuca  Kelli MCLAUGHLIN LPC   Medication Sig     escitalopram (LEXAPRO) 20 MG tablet Take 1 tablet (20 mg) by mouth daily     hydrOXYzine (ATARAX) 25 MG tablet Take 1-2 tablets (25-50 mg) by mouth every 4 hours as needed for itching (nausea and pain)     norgestimate-ethinyl estradiol (CLAUDIO) 0.25-35 MG-MCG tablet TAKE 1 TABLET BY MOUTH DAILY TAKE 4 CONTINUOUS PACKS, SKIPPING THE SUGAR PILL WEEK.     plecanatide (TRULANCE) 3 MG tablet Take 1 tablet (3 mg) by mouth daily Please call Gastroenterology at [phone] to schedule appointment. Thank you.     polyethylene glycol (MIRALAX) 17 GM/Dose powder Take 1 capful by mouth 4 times daily        Medication Adherence:  Patient reports taking.  taking prescribed medications as prescribed.    Patient Allergies:    Allergies   Allergen Reactions     Penicillins Rash       Medical History:    Past Medical History:   Diagnosis Date     Chronic constipation          Current Mental Status Exam:   Appearance:  Appropriate    Eye Contact:  Good   Psychomotor:  Normal       Gait / station:  no problem  Attitude / Demeanor: Cooperative   Speech      Rate / Production: Normal/ Responsive      Volume:  Normal  volume      Language:  intact  Mood:   Euthymic  Affect:   Appropriate    Thought Content: Clear   Thought Process: Coherent       Associations: No loosening of associations  Insight:   Fair   Judgment:  Intact   Orientation:  All  Attention/concentration: Fair      Substance Use:  Patient did not report a family history of substance use concerns; see medical history section for details.  Patient has not received chemical dependency treatment in the past.  Patient has not ever been to detox.      Patient is not currently receiving any chemical dependency treatment.           Substance History of use Age of first use Date of last use     Pattern and duration of use (include amounts and frequency)   Alcohol currently use   12? 8/10/2022 REPORTS SUBSTANCE USE: reports using substance 2 times  per month and has 2 drinks at a time.   Patient reports heaviest use was late teens/early 20's.   Cannabis   never used     REPORTS SUBSTANCE USE: N/A     Amphetamines   never used     REPORTS SUBSTANCE USE: N/A   Cocaine/crack    never used       REPORTS SUBSTANCE USE: N/A   Hallucinogens never used         REPORTS SUBSTANCE USE: N/A   Inhalants never used         REPORTS SUBSTANCE USE: N/A   Heroin never used         REPORTS SUBSTANCE USE: N/A   Other Opiates never used     REPORTS SUBSTANCE USE: N/A   Benzodiazepine   never used     REPORTS SUBSTANCE USE: N/A   Barbiturates never used     REPORTS SUBSTANCE USE: N/A   Over the counter meds never used     REPORTS SUBSTANCE USE: N/A   Caffeine currently use 9?   REPORTS SUBSTANCE USE: reports using substance 1 times per day and has 1 drink at a time.   Patient reports heaviest use is current use.   Nicotine  never used     REPORTS SUBSTANCE USE: N/A   Other substances not listed above:  Identify:  never used     REPORTS SUBSTANCE USE: N/A     Patient reported the following problems as a result of their substance use: no problems, not applicable.    Substance Use: No symptoms    Based on the negative CAGE score and clinical interview there  are not indications of drug or alcohol abuse.      Significant Losses / Trauma / Abuse / Neglect Issues:   Patient did not  serve in the .  There are indications or report of significant loss, trauma, abuse or neglect issues related to: divorce / relational changes parents  when she was in 5th grade.  Concerns for possible neglect are not present.     Safety Assessment:   Patient denies current homicidal ideation and behaviors.  Patient denies current self-injurious ideation and behaviors.    Patient denied risk behaviors associated with substance use.  Patient denies any high risk behaviors associated with mental health symptoms.  Patient reports the following current concerns for their personal safety:  None.  Patient reports there are not firearms in the house.       There are no firearms in the home..    History of Safety Concerns:  Patient denied a history of homicidal ideation.     Patient denied a history of personal safety concerns.    Patient denied a history of assaultive behaviors.    Patient denied a history of sexual assault behaviors.     Patient denied a history of risk behaviors associated with substance use.  Patient denies any history of high risk behaviors associated with mental health symptoms.  Patient reports the following protective factors: forward or future oriented thinking; dedication to family or friends; safe and stable environment; adherence with prescribed medication; healthy fear of risky behaviors or pain; access to a variety of clinical interventions and pets    Risk Plan:  See Recommendations for Safety and Risk Management Plan    Review of Symptoms per patient report:  Depression: Change in sleep, Change in energy level, Psychomotor slowing or agitation, Feelings of helplessness, Ruminations, Irritability, Feeling sad, down, or depressed, Withdrawn and question future  [place]:  No Symptoms  Psychosis: No Symptoms  Anxiety: Excessive worry, Nervousness, Sleep disturbance, Psychomotor agitation and Ruminations  Panic:  Palpitations  Post Traumatic Stress Disorder:  No Symptoms   Eating Disorder: monitoring for eating habits  ADD / ADHD:  No symptoms  Conduct Disorder: No symptoms  Autism Spectrum Disorder: No symptoms  Obsessive Compulsive Disorder: No Symptoms    Patient reports the following compulsive behaviors and treatment history: none.      Diagnostic Criteria:   Generalized Anxiety Disorder  A. Excessive anxiety and worry about a number of events or activities (such as work or school performance).   B. The person finds it difficult to control the worry.  C. Select 3 or more symptoms (required for diagnosis). Only one item is required in children.   - Being easily fatigued.     - Difficulty concentrating or mind going blank.    - Sleep disturbance (difficulty falling or staying asleep, or restless unsatisfying sleep).   D. The focus of the anxiety and worry is not confined to features of an Axis I disorder.  E. The anxiety, worry, or physical symptoms cause clinically significant distress or impairment in social, occupational, or other important areas of functioning.   F. The disturbance is not due to the direct physiological effects of a substance (e.g., a drug of abuse, a medication) or a general medical condition (e.g., hyperthyroidism) and does not occur exclusively during a Mood Disorder, a Psychotic Disorder, or a Pervasive Developmental Disorder.    - The aformentioned symptoms began 2-3 year(s) ago and occurs 2 days per week and is experienced as mild. Persistent Depressive Disorder  A. Depressed mood for most of the day, for more days than not, as indicated either by subjective account or observation by others, for at least 2 years. Note: In children and adolescents, mood can be irritable and duration must be at least 1 year.   B. Presence, while depressed, of two (or more) of the following:        - poor appetite or overeating        - low energy or fatigue        - low self-esteem        - feelings of hopelessness   C. During the 2-year period (1 year for children or adolescents) of the disturbance, the person has never been without the symptoms in Criteria A and B for more than 2 months at a time.  D. Criteria for a major depressive disorder may be continously present for 2 years  E. There has never been a Manic Episode, a Mixed Episode, or a Hypomanic Episode, and criteria have never been met for Cyclothymic Disorder.   F. The disturbance is not better explained by a persistent schizoaffective disorder, schizophrenia, delusional disorder, or other specified or unspecified schizophrenia spectrum and other psychotic disorder  G. The symptoms are not attributable to the  physiological effects of a substance (e.g., a drug of abuse, a medication) or another medical condition (e.g., hypothyroidism).   H. The symptoms cause clinically significant distress or impairment in social, occupational, or other important areas of functioning.        - Late Onset: if onset is age 21 years or older       Functional Status:  Patient reports the following functional impairments:  relationship(s) and work / vocational responsibilities.     Nonprogrammatic care:  Patient is requesting basic services to address current mental health concerns.    Clinical Summary:  1. Reason for assessment: increasing depression  .  2. Psychosocial, Cultural and Contextual Factors: work stressors  .  3. Principal DSM5 Diagnoses  (Sustained by DSM5 Criteria Listed Above):   300.4 (F34.1) Persistent Depressive Disorder, Mild.  4. Other Diagnoses that is relevant to services:   300.02 (F41.1) Generalized Anxiety Disorder.  5. Provisional Diagnosis:  300.4 (F34.1) Persistent Depressive Disorder, Mild  300.02 (F41.1) Generalized Anxiety Disorder as evidenced by reported symptoms and history .  6. Prognosis: Expect Improvement.  7. Likely consequences of symptoms if not treated: difficulties with improvements.  8. Client strengths include:  educated, empathetic, employed, good listener, has a previous history of therapy, insightful, intelligent, support of family, friends and providers, willing to ask questions, willing to relate to others and work history .     Recommendations:     1. Plan for Safety and Risk Management:   Safety and Risk: Recommended that patient call 911 or go to the local ED should there be a change in any of these risk factors..          Report to child / adult protection services was NA.     2. Patient's identified no cultural concerns at this time.     3. Initial Treatment will focus on:    Depressed Mood - increase energy, decrease symptoms  Anxiety - increasing coping.     4. Resources/Service  Plan:    services are not indicated.   Modifications to assist communication are not indicated.   Additional disability accommodations are not indicated.      5. Collaboration:   Collaboration / coordination of treatment will be initiated with the following  support professionals: primary care physician.      6.  Referrals:   The following referral(s) will be initiated: Outpatient Mental Hema Therapy. Next Scheduled Appointment: TBD.     A Release of Information has been obtained for the following: none.     Emergency Contact  was obtained.     7. CORY:    CORY:  Discussed the general effects of drugs and alcohol on health and well-being. Provider gave patient printed information about the effects of chemical use on their health and well being. Recommendations:  none .     8. Records:   These were reviewed at time of assessment.   Information in this assessment was obtained from the medical record and  provided by patient who is a good historian.    Patient will have open access to their mental health medical record.        Provider Name/ Credentials:  Cristela Thurman PsyD, YOHANNES  August 15, 2022        Answers for HPI/ROS submitted by the patient on 8/15/2022  If you checked off any problems, how difficult have these problems made it for you to do your work, take care of things at home, or get along with other people?: Somewhat difficult  PHQ9 TOTAL SCORE: 10

## 2022-08-30 ENCOUNTER — VIRTUAL VISIT (OUTPATIENT)
Dept: PSYCHOLOGY | Facility: CLINIC | Age: 27
End: 2022-08-30
Payer: COMMERCIAL

## 2022-08-30 DIAGNOSIS — F41.9 ANXIETY AND DEPRESSION: Primary | ICD-10-CM

## 2022-08-30 DIAGNOSIS — F32.A ANXIETY AND DEPRESSION: Primary | ICD-10-CM

## 2022-08-30 DIAGNOSIS — F33.1 MODERATE EPISODE OF RECURRENT MAJOR DEPRESSIVE DISORDER (H): ICD-10-CM

## 2022-08-30 PROCEDURE — 90834 PSYTX W PT 45 MINUTES: CPT | Mod: 95 | Performed by: COUNSELOR

## 2022-08-31 NOTE — PROGRESS NOTES
[facility]                                     Progress Note    Patient Name: Genesis Cutler  Date: 8/30/2022         Service Type: Individual      Session Start Time: 10:30am  Session End Time: 11:20am     Session Length: 50 minutes    Session #: 2    Attendees: Client attended alone    Service Modality:  Video Visit:      Provider verified identity through the following two step process.  Patient provided:  Patient is known previously to provider    Telemedicine Visit: The patient's condition can be safely assessed and treated via synchronous audio and visual telemedicine encounter.      Reason for Telemedicine Visit: Patient has requested telehealth visit    Originating Site (Patient Location): Patient's home    Distant Site (Provider Location): [facility] Setting: [place]    Consent:  The patient/guardian has verbally consented to: the potential risks and benefits of telemedicine (video visit) versus in person care; bill my insurance or make self-payment for services provided; and responsibility for payment of non-covered services.     Patient would like the video invitation sent by:  My Chart    Mode of Communication:  Video Conference via Amwell    As the provider I attest to compliance with applicable laws and regulations related to telemedicine.    DATA  Interactive Complexity: No  Crisis: No        Progress Since Last Session (Related to Symptoms / Goals / Homework):   Symptoms: Improving noticing better ability to manage symptoms    Homework: Partially completed      Episode of Care Goals: Minimal progress - PREPARATION (Decided to change - considering how); Intervened by negotiating a change plan and determining options / strategies for behavior change, identifying triggers, exploring social supports, and working towards setting a date to begin behavior change     Current / Ongoing Stressors and Concerns:   Some family drama continues around politics  that play into her interactions with them. Working on managing relationship struggle with a friend and navigating peer relationships/boundaries.     Treatment Objective(s) Addressed in This Session:   compile a list of boundaries that they would like to set with others. Peers  Building rapport     Intervention:   CBT: Role-playing conversations about conflict resolution and managing emotions around friendship where she feels friendship is very one sided and wanted to talk with friend about ways to navigate it.    Assessments completed prior to visit:  The following assessments were completed by patient for this visit:  PHQ9:   PHQ-9 SCORE 6/4/2021 7/30/2021 8/24/2021 9/16/2021 10/20/2021 4/20/2022 8/15/2022   PHQ-9 Total Score MyChart 5 (Mild depression) 7 (Mild depression) 9 (Mild depression) 5 (Mild depression) 9 (Mild depression) 5 (Mild depression) 10 (Moderate depression)   PHQ-9 Total Score 5 7 9 5 9 5 10     GAD7:   NEGRO-7 SCORE 5/5/2021 6/4/2021 7/30/2021 8/24/2021 9/16/2021 10/20/2021 4/20/2022   Total Score 8 (mild anxiety) 2 (minimal anxiety) 5 (mild anxiety) 2 (minimal anxiety) 2 (minimal anxiety) 1 (minimal anxiety) 3 (minimal anxiety)   Total Score 8 2 5 2 2 1 3     PROMIS 10-Global Health (all questions and answers displayed):   PROMIS 10 8/12/2022   In general, would you say your health is: Very good   In general, would you say your quality of life is: Very good   In general, how would you rate your physical health? Very good   In general, how would you rate your mental health, including your mood and your ability to think? Fair   In general, how would you rate your satisfaction with your social activities and relationships? Fair   In general, please rate how well you carry out your usual social activities and roles Good   To what extent are you able to carry out your everyday physical activities such as walking, climbing stairs, carrying groceries, or moving a chair? Mostly   How often have you been  bothered by emotional problems such as feeling anxious, depressed or irritable? Often   How would you rate your fatigue on average? Moderate   How would you rate your pain on average?   0 = No Pain  to  10 = Worst Imaginable Pain 3   In general, would you say your health is: 4   In general, would you say your quality of life is: 4   In general, how would you rate your physical health? 4   In general, how would you rate your mental health, including your mood and your ability to think? 2   In general, how would you rate your satisfaction with your social activities and relationships? 2   In general, please rate how well you carry out your usual social activities and roles. (This includes activities at home, at work and in your community, and responsibilities as a parent, child, spouse, employee, friend, etc.) 3   To what extent are you able to carry out your everyday physical activities such as walking, climbing stairs, carrying groceries, or moving a chair? 4   In the past 7 days, how often have you been bothered by emotional problems such as feeling anxious, depressed, or irritable? 4   In the past 7 days, how would you rate your fatigue on average? 3   In the past 7 days, how would you rate your pain on average, where 0 means no pain, and 10 means worst imaginable pain? 3   Global Mental Health Score 10   Global Physical Health Score 15   PROMIS TOTAL - SUBSCORES 25   Some recent data might be hidden         ASSESSMENT: Current Emotional / Mental Status (status of significant symptoms):   Risk status (Self / Other harm or suicidal ideation)   Patient denies current fears or concerns for personal safety.   Patient denies current or recent suicidal ideation or behaviors.   Patient denies current or recent homicidal ideation or behaviors.   Patient denies current or recent self injurious behavior or ideation.   Patient denies other safety concerns.   Patient reports there has been no change in risk factors since  their last session.     Patient reports there has been no change in protective factors since their last session.     Recommended that patient call 911 or go to the local ED should there be a change in any of these risk factors.     Appearance:   Appropriate    Eye Contact:   Good    Psychomotor Behavior: Normal    Attitude:   Cooperative    Orientation:   All   Speech    Rate / Production: Normal/ Responsive    Volume:  Normal    Mood:    Euthymic   Affect:    Appropriate    Thought Content:  Clear    Thought Form:  Coherent  Logical    Insight:    Fair      Medication Review:   No changes to current psychiatric medication(s)     Medication Compliance:   Yes     Changes in Health Issues:   None reported     Chemical Use Review:   Substance Use: Chemical use reviewed, no active concerns identified      Tobacco Use: No current tobacco use.      Diagnosis:  1. Anxiety and depression    2. Moderate episode of recurrent major depressive disorder (H)        Collateral Reports Completed:   Not Applicable    PLAN: (Patient Tasks / Therapist Tasks / Other)  Continue with individual therapy. Consider treatment goals for next session.        Kelli Thurman, YOHANNES Thurman, YOHANNES  August 31, 2022

## 2022-09-11 ENCOUNTER — HEALTH MAINTENANCE LETTER (OUTPATIENT)
Age: 27
End: 2022-09-11

## 2022-09-26 ENCOUNTER — MYC MEDICAL ADVICE (OUTPATIENT)
Dept: FAMILY MEDICINE | Facility: CLINIC | Age: 27
End: 2022-09-26

## 2022-09-27 ENCOUNTER — VIRTUAL VISIT (OUTPATIENT)
Dept: PSYCHOLOGY | Facility: CLINIC | Age: 27
End: 2022-09-27
Payer: COMMERCIAL

## 2022-09-27 ENCOUNTER — OFFICE VISIT (OUTPATIENT)
Dept: FAMILY MEDICINE | Facility: CLINIC | Age: 27
End: 2022-09-27
Payer: COMMERCIAL

## 2022-09-27 VITALS
TEMPERATURE: 98.3 F | OXYGEN SATURATION: 98 % | WEIGHT: 176.8 LBS | BODY MASS INDEX: 30.3 KG/M2 | HEART RATE: 72 BPM | DIASTOLIC BLOOD PRESSURE: 68 MMHG | SYSTOLIC BLOOD PRESSURE: 112 MMHG

## 2022-09-27 DIAGNOSIS — Z01.818 PRE-OPERATIVE EXAMINATION: Primary | ICD-10-CM

## 2022-09-27 DIAGNOSIS — F39 MOOD DISORDER (H): ICD-10-CM

## 2022-09-27 DIAGNOSIS — F41.9 ANXIETY AND DEPRESSION: ICD-10-CM

## 2022-09-27 DIAGNOSIS — Z13.220 LIPID SCREENING: ICD-10-CM

## 2022-09-27 DIAGNOSIS — F41.1 GAD (GENERALIZED ANXIETY DISORDER): Primary | ICD-10-CM

## 2022-09-27 DIAGNOSIS — M21.621 TAILOR'S BUNION OF BOTH FEET: ICD-10-CM

## 2022-09-27 DIAGNOSIS — M21.622 TAILOR'S BUNION OF BOTH FEET: ICD-10-CM

## 2022-09-27 DIAGNOSIS — F32.A ANXIETY AND DEPRESSION: ICD-10-CM

## 2022-09-27 DIAGNOSIS — Z13.29 SCREENING FOR ENDOCRINE DISORDER: ICD-10-CM

## 2022-09-27 DIAGNOSIS — F33.1 MODERATE EPISODE OF RECURRENT MAJOR DEPRESSIVE DISORDER (H): ICD-10-CM

## 2022-09-27 LAB
HCG UR QL: NEGATIVE
HGB BLD-MCNC: 14.2 G/DL (ref 11.7–15.7)

## 2022-09-27 PROCEDURE — 80061 LIPID PANEL: CPT | Performed by: FAMILY MEDICINE

## 2022-09-27 PROCEDURE — 36415 COLL VENOUS BLD VENIPUNCTURE: CPT | Performed by: FAMILY MEDICINE

## 2022-09-27 PROCEDURE — 90834 PSYTX W PT 45 MINUTES: CPT | Mod: 95 | Performed by: COUNSELOR

## 2022-09-27 PROCEDURE — 99214 OFFICE O/P EST MOD 30 MIN: CPT | Performed by: FAMILY MEDICINE

## 2022-09-27 PROCEDURE — 81025 URINE PREGNANCY TEST: CPT | Performed by: FAMILY MEDICINE

## 2022-09-27 PROCEDURE — 84443 ASSAY THYROID STIM HORMONE: CPT | Performed by: FAMILY MEDICINE

## 2022-09-27 PROCEDURE — 82565 ASSAY OF CREATININE: CPT | Performed by: FAMILY MEDICINE

## 2022-09-27 PROCEDURE — 85018 HEMOGLOBIN: CPT | Performed by: FAMILY MEDICINE

## 2022-09-27 RX ORDER — ESCITALOPRAM OXALATE 20 MG/1
20 TABLET ORAL DAILY
Qty: 90 TABLET | Refills: 3 | Status: SHIPPED | OUTPATIENT
Start: 2022-09-27 | End: 2023-12-26

## 2022-09-27 ASSESSMENT — PATIENT HEALTH QUESTIONNAIRE - PHQ9
10. IF YOU CHECKED OFF ANY PROBLEMS, HOW DIFFICULT HAVE THESE PROBLEMS MADE IT FOR YOU TO DO YOUR WORK, TAKE CARE OF THINGS AT HOME, OR GET ALONG WITH OTHER PEOPLE: SOMEWHAT DIFFICULT
SUM OF ALL RESPONSES TO PHQ QUESTIONS 1-9: 6
SUM OF ALL RESPONSES TO PHQ QUESTIONS 1-9: 6

## 2022-09-27 ASSESSMENT — PAIN SCALES - GENERAL: PAINLEVEL: NO PAIN (0)

## 2022-09-27 NOTE — PROGRESS NOTES
[facility]  [address], [address]  [place] [address]  Phone: [phone]  Fax: [phone]  Primary Provider: Germain Ledbetter  Pre-op Performing Provider: GERMAIN LEDBETTER      PREOPERATIVE EVALUATION:  Today's date: 9/27/2022    Genesis Cutler is a 27 year old female who presents for a preoperative evaluation.    Surgical Information:  Surgery/Procedure: Right fifth metatarsal chevron osteotomy  Surgery Location: [facility]    Surgeon: Marshal Pate DPM  Surgery Date: 10/4/22  Time of Surgery:   Where patient plans to recover: At home with family  Fax number for surgical facility: Note does not need to be faxed, will be available electronically in Epic.    Type of Anesthesia Anticipated: Local with MAC    Assessment & Plan     The proposed surgical procedure is considered INTERMEDIATE risk.    Pre-operative examination  - Creatinine; Future  - Hemoglobin; Future  - Creatinine  - Hemoglobin    Tailor's bunion of both feet  Anxiety and depression  Stable, continue present management  - escitalopram (LEXAPRO) 20 MG tablet; Take 1 tablet (20 mg) by mouth daily    Mood disorder (H)  resolved    Lipid screening  - Lipid panel reflex to direct LDL Fasting    Screening for endocrine disorder  - TSH with free T4 reflex         Risks and Recommendations:  The patient has the following additional risks and recommendations for perioperative complications:   - No identified additional risk factors other than previously addressed    Medication Instructions:  Patient is to take all scheduled medications on the day of surgery    RECOMMENDATION:  APPROVAL GIVEN to proceed with proposed procedure, without further diagnostic evaluation.            Subjective     HPI related to upcoming procedure: The patient is scheduled for the above procedure due to Bilateral Bunionette and is s/p Left fifth Metatarsal Bunionette correction 5/3/2022 by   Herlinda.     Depression and Anxiety Follow-Up    How are you doing with your depression since your last visit? Stable    How are you doing with your anxiety since your last visit?  Stable    Are you having other symptoms that might be associated with depression or anxiety? No    Have you had a significant life event? No     Do you have any concerns with your use of alcohol or other drugs? No    Social History     Tobacco Use     Smoking status: Never Smoker     Smokeless tobacco: Never Used   Vaping Use     Vaping Use: Never used   Substance Use Topics     Alcohol use: Yes     Comment: Occasional alcoholic drink with friends,     Drug use: No     PHQ 4/20/2022 8/15/2022 9/27/2022   PHQ-9 Total Score 5 10 6   Q9: Thoughts of better off dead/self-harm past 2 weeks Not at all Not at all Not at all   F/U: Thoughts of suicide or self-harm - - -   F/U: Safety concerns - - -     NEGRO-7 SCORE 9/16/2021 10/20/2021 4/20/2022   Total Score 2 (minimal anxiety) 1 (minimal anxiety) 3 (minimal anxiety)   Total Score 2 1 3     She is on Trulance for Chronic Idiopathic  constipation.    Preop Questions 9/27/2022   1. Have you ever had a heart attack or stroke? No   2. Have you ever had surgery on your heart or blood vessels, such as a stent placement, a coronary artery bypass, or surgery on an artery in your head, neck, heart, or legs? No   3. Do you have chest pain with activity? No   4. Do you have a history of  heart failure? No   5. Do you currently have a cold, bronchitis or symptoms of other infection? YES -    6. Do you have a cough, shortness of breath, or wheezing? No   7. Do you or anyone in your family have previous history of blood clots? No   8. Do you or does anyone in your family have a serious bleeding problem such as prolonged bleeding following surgeries or cuts? No   9. Have you ever had problems with anemia or been told to take iron pills? No   10. Have you had any abnormal blood loss such as black, tarry or  bloody stools, or abnormal vaginal bleeding? No   11. Have you ever had a blood transfusion? No   12. Are you willing to have a blood transfusion if it is medically needed before, during, or after your surgery? Yes   13. Have you or any of your relatives ever had problems with anesthesia? No   14. Do you have sleep apnea, excessive snoring or daytime drowsiness? No   15. Do you have any artifical heart valves or other implanted medical devices like a pacemaker, defibrillator, or continuous glucose monitor? No   16. Do you have artificial joints? No   17. Are you allergic to latex? No   18. Is there any chance that you may be pregnant? No       Health Care Directive:  Patient does not have a Health Care Directive or Living Will: Discussed advance care planning with patient; information given to patient to review.    Preoperative Review of :   reviewed - no record of controlled substances prescribed.      Status of Chronic Conditions:  See problem list for active medical problems.  Problems all longstanding and stable, except as noted/documented.  See ROS for pertinent symptoms related to these conditions.      Review of Systems  CONSTITUTIONAL: NEGATIVE for fever, chills, change in weight  INTEGUMENTARY/SKIN: NEGATIVE for worrisome rashes, moles or lesions  EYES: NEGATIVE for vision changes or irritation  ENT/MOUTH: NEGATIVE for ear, mouth and throat problems  RESP: NEGATIVE for significant cough or SOB  CV: NEGATIVE for chest pain, palpitations or peripheral edema  GI: NEGATIVE for nausea, abdominal pain, heartburn, or change in bowel habits  : NEGATIVE for frequency, dysuria, or hematuria  MUSCULOSKELETAL:As in HPI  NEURO: NEGATIVE for weakness, dizziness or paresthesias  ENDOCRINE: NEGATIVE for temperature intolerance, skin/hair changes  HEME: NEGATIVE for bleeding problems  PSYCHIATRIC: NEGATIVE for changes in mood or affect    Patient Active Problem List    Diagnosis Date Noted     Moses's bunion of  both feet 02/24/2022     Priority: Medium     Added automatically from request for surgery [id number]       Mood disorder (H) 11/23/2021     Priority: Medium     Biceps tendinopathy, right 07/03/2020     Priority: Medium     Added automatically from request for surgery [id number]       Anxiety and depression 02/24/2020     Priority: Medium      Past Medical History:   Diagnosis Date     Chronic constipation      Past Surgical History:   Procedure Laterality Date     ARTHROSCOPY SHLDR ROTATOR CUFF REPAIR, SUBACROMIAL DECOMP, DIST CLAVICLE RESECTION, BICEP TENODESIS Right 07/23/2020    Procedure: Right shoulder arthroscopy, open biceps tenodesis;  Surgeon: Aline Amador MD;  Location: [facility] OR     BUNIONECTOMY NITISH AND JOHN, COMBINED Left 5/3/2022    Procedure: Left fifth metatarsal bunionette correction with bone cutting and screw fixation;  Surgeon: Marshal Pate DPM;  Location: [facility] OR     COLONOSCOPY  03/2017     ORTHOPEDIC SURGERY  07/2020    Biceps tenodesis, right side     Current Outpatient Medications   Medication Sig Dispense Refill     escitalopram (LEXAPRO) 20 MG tablet Take 1 tablet (20 mg) by mouth daily 90 tablet 3     hydrOXYzine (ATARAX) 25 MG tablet Take 1-2 tablets (25-50 mg) by mouth every 4 hours as needed for itching (nausea and pain) 25 tablet 0     norgestimate-ethinyl estradiol (CLAUDIO) 0.25-35 MG-MCG tablet TAKE 1 TABLET BY MOUTH DAILY TAKE 4 CONTINUOUS PACKS, SKIPPING THE SUGAR PILL WEEK. 112 tablet 1     plecanatide (TRULANCE) 3 MG tablet Take 1 tablet (3 mg) by mouth daily Please call Gastroenterology at [phone] to schedule appointment. Thank you. 90 tablet 4     polyethylene glycol (MIRALAX) 17 GM/Dose powder Take 1 capful by mouth 4 times daily          Allergies   Allergen Reactions     Penicillins Rash        Social History     Tobacco Use     Smoking status: Never Smoker     Smokeless tobacco: Never Used   Substance Use Topics     Alcohol use: Yes     Comment: Occasional  alcoholic drink with friends,     Family History   Problem Relation Age of Onset     Diabetes Mother      Diabetes Father      Diabetes Maternal Grandmother      Cerebrovascular Disease Maternal Grandmother      Obesity Maternal Grandmother      Coronary Artery Disease Maternal Grandfather      Cerebrovascular Disease Maternal Grandfather      Diabetes Maternal Grandfather      Coronary Artery Disease Paternal Grandmother      Unknown/Adopted Paternal Grandfather      Depression Sister      Anxiety Disorder Sister      Obesity Sister      History   Drug Use No         Objective     LMP 09/20/2022 (Exact Date)     Physical Exam    GENERAL APPEARANCE: healthy, alert and no distress     EYES: EOMI, PERRL     HENT: ear canals and TM's normal and nose and mouth without ulcers or lesions     NECK: no adenopathy, no asymmetry, masses, or scars and thyroid normal to palpation     RESP: lungs clear to auscultation - no rales, rhonchi or wheezes     CV: regular rates and rhythm, normal S1 S2, no S3 or S4 and no murmur, click or rub     ABDOMEN:  soft, nontender, no HSM or masses and bowel sounds normal     MS: extremities normal- no gross deformities noted, no evidence of inflammation in joints, FROM in all extremities.     SKIN: no suspicious lesions or rashes     NEURO: Normal strength and tone, sensory exam grossly normal, mentation intact and speech normal     PSYCH: mentation appears normal. and affect normal/bright     LYMPHATICS: No cervical adenopathy    Recent Labs   Lab Test 04/29/22  1453 08/23/21  1642   HGB 13.6 14.4   PLT  --  248    140   POTASSIUM 3.8 4.7   CR 0.57 0.66        Diagnostics:  Results for orders placed or performed in visit on 09/27/22   Lipid panel reflex to direct LDL Fasting     Status: None   Result Value Ref Range    Cholesterol 167 <200 mg/dL    Triglycerides 72 <150 mg/dL    Direct Measure HDL 60 >=50 mg/dL    LDL Cholesterol Calculated 93 <=100 mg/dL    Non HDL Cholesterol 107 <130  mg/dL    Patient Fasting > 8hrs? No     Narrative    Cholesterol  Desirable:  <200 mg/dL    Triglycerides  Normal:  Less than 150 mg/dL  Borderline High:  150-199 mg/dL  High:  200-499 mg/dL  Very High:  Greater than or equal to 500 mg/dL    Direct Measure HDL  Female:  Greater than or equal to 50 mg/dL   Male:  Greater than or equal to 40 mg/dL    LDL Cholesterol  Desirable:  <100mg/dL  Above Desirable:  100-129 mg/dL   Borderline High:  130-159 mg/dL   High:  160-189 mg/dL   Very High:  >= 190 mg/dL    Non HDL Cholesterol  Desirable:  130 mg/dL  Above Desirable:  130-159 mg/dL  Borderline High:  160-189 mg/dL  High:  190-219 mg/dL  Very High:  Greater than or equal to 220 mg/dL   TSH with free T4 reflex     Status: Normal   Result Value Ref Range    TSH 2.64 0.40 - 4.00 mU/L   Creatinine     Status: Normal   Result Value Ref Range    Creatinine 0.61 0.52 - 1.04 mg/dL    GFR Estimate >90 >60 mL/min/1.73m2   Hemoglobin     Status: Normal   Result Value Ref Range    Hemoglobin 14.2 11.7 - 15.7 g/dL   HCG qualitative urine     Status: Normal   Result Value Ref Range    hCG Urine Qualitative Negative Negative         Revised Cardiac Risk Index (RCRI):  The patient has the following serious cardiovascular risks for perioperative complications:   - No serious cardiac risks = 0 points     RCRI Interpretation: 1 point: Class II (low risk - 0.9% complication rate)           Signed Electronically by: Richard Ledbetter MD  Copy of this evaluation report is provided to requesting physician.

## 2022-09-28 LAB
CHOLEST SERPL-MCNC: 167 MG/DL
CREAT SERPL-MCNC: 0.61 MG/DL (ref 0.52–1.04)
FASTING STATUS PATIENT QL REPORTED: NO
GFR SERPL CREATININE-BSD FRML MDRD: >90 ML/MIN/1.73M2
HDLC SERPL-MCNC: 60 MG/DL
LDLC SERPL CALC-MCNC: 93 MG/DL
NONHDLC SERPL-MCNC: 107 MG/DL
TRIGL SERPL-MCNC: 72 MG/DL
TSH SERPL DL<=0.005 MIU/L-ACNC: 2.64 MU/L (ref 0.4–4)

## 2022-09-29 ENCOUNTER — MYC MEDICAL ADVICE (OUTPATIENT)
Dept: FAMILY MEDICINE | Facility: CLINIC | Age: 27
End: 2022-09-29

## 2022-09-29 NOTE — TELEPHONE ENCOUNTER
Good afternoon,     You try over the counter swimmer ear drops. If that does not work, then I would recommend scheduling an appointment. If you try those drops and give it 24 hours or so and if  no improvement, then I would go to urgent care over the weekend.     You could also try warm compresses.     Take Care,         GILDA MatosN, RN, PHN  [facility]  September 29, 2022

## 2022-09-29 NOTE — PROGRESS NOTES
[facility]                                     Progress Note    Patient Name: Genesis Cutler  Date: 9/27/2022         Service Type: Individual      Session Start Time: 1:30pm  Session End Time: 2:20pm     Session Length: 50 minutes    Session #: 3    Attendees: Client attended alone    Service Modality:  Video Visit:      Provider verified identity through the following two step process.  Patient provided:  Patient is known previously to provider    Telemedicine Visit: The patient's condition can be safely assessed and treated via synchronous audio and visual telemedicine encounter.      Reason for Telemedicine Visit: Patient has requested telehealth visit    Originating Site (Patient Location): Patient's home    Distant Site (Provider Location): [facility] Outpatient Setting: [place]    Consent:  The patient/guardian has verbally consented to: the potential risks and benefits of telemedicine (video visit) versus in person care; bill my insurance or make self-payment for services provided; and responsibility for payment of non-covered services.     Patient would like the video invitation sent by:  My Chart    Mode of Communication:  Video Conference via Amwell    As the provider I attest to compliance with applicable laws and regulations related to telemedicine.    DATA  Interactive Complexity: No  Crisis: No        Progress Since Last Session (Related to Symptoms / Goals / Homework):   Symptoms: Improving noticing better ability to manage symptoms    Homework: Partially completed      Episode of Care Goals: Minimal progress - PREPARATION (Decided to change - considering how); Intervened by negotiating a change plan and determining options / strategies for behavior change, identifying triggers, exploring social supports, and working towards setting a date to begin behavior change     Current / Ongoing Stressors and Concerns:   Spent a week with sister and her family in [place]. Now starting to have some \"baby fever\" but is also scared of being pregnant and having children.      Treatment Objective(s) Addressed in This Session:   learn & utilize at least 1 assertive communication skills weekly     Intervention:   CBT: Role-playing conversations about moving forward with family planning with  and ways that she can work on addressing her own anxiety symptoms and stressors. Understanding her fears around anxiety with family planning and owning those situations with her .    Assessments completed prior to visit:  The following assessments were completed by patient for this visit:  PHQ9:   PHQ-9 SCORE 7/30/2021 8/24/2021 9/16/2021 10/20/2021 4/20/2022 8/15/2022 9/27/2022   PHQ-9 Total Score MyChart 7 (Mild depression) 9 (Mild depression) 5 (Mild depression) 9 (Mild depression) 5 (Mild depression) 10 (Moderate depression) 6 (Mild depression)   PHQ-9 Total Score 7 9 5 9 5 10 6     GAD7:   NEGRO-7 SCORE 5/5/2021 6/4/2021 7/30/2021 8/24/2021 9/16/2021 10/20/2021 4/20/2022   Total Score 8 (mild anxiety) 2 (minimal anxiety) 5 (mild anxiety) 2 (minimal anxiety) 2 (minimal anxiety) 1 (minimal anxiety) 3 (minimal anxiety)   Total Score 8 2 5 2 2 1 3     PROMIS 10-Global Health (all questions and answers displayed):   PROMIS 10 8/12/2022   In general, would you say your health is: Very good   In general, would you say your quality of life is: Very good   In general, how would you rate your physical health? Very good   In general, how would you rate your mental health, including your mood and your ability to think? Fair   In general, how would you rate your satisfaction with your social activities and relationships? Fair   In general, please rate how well you carry out your usual social activities and roles Good   To what extent are you able to carry out your everyday physical activities such as walking, climbing stairs, carrying groceries, or moving a chair? Mostly   How often have you " been bothered by emotional problems such as feeling anxious, depressed or irritable? Often   How would you rate your fatigue on average? Moderate   How would you rate your pain on average?   0 = No Pain  to  10 = Worst Imaginable Pain 3   In general, would you say your health is: 4   In general, would you say your quality of life is: 4   In general, how would you rate your physical health? 4   In general, how would you rate your mental health, including your mood and your ability to think? 2   In general, how would you rate your satisfaction with your social activities and relationships? 2   In general, please rate how well you carry out your usual social activities and roles. (This includes activities at home, at work and in your community, and responsibilities as a parent, child, spouse, employee, friend, etc.) 3   To what extent are you able to carry out your everyday physical activities such as walking, climbing stairs, carrying groceries, or moving a chair? 4   In the past 7 days, how often have you been bothered by emotional problems such as feeling anxious, depressed, or irritable? 4   In the past 7 days, how would you rate your fatigue on average? 3   In the past 7 days, how would you rate your pain on average, where 0 means no pain, and 10 means worst imaginable pain? 3   Global Mental Health Score 10   Global Physical Health Score 15   PROMIS TOTAL - SUBSCORES 25   Some recent data might be hidden         ASSESSMENT: Current Emotional / Mental Status (status of significant symptoms):   Risk status (Self / Other harm or suicidal ideation)   Patient denies current fears or concerns for personal safety.   Patient denies current or recent suicidal ideation or behaviors.   Patient denies current or recent homicidal ideation or behaviors.   Patient denies current or recent self injurious behavior or ideation.   Patient denies other safety concerns.   Patient reports there has been no change in risk factors since  their last session.     Patient reports there has been no change in protective factors since their last session.     Recommended that patient call 911 or go to the local ED should there be a change in any of these risk factors.     Appearance:   Appropriate    Eye Contact:   Good    Psychomotor Behavior: Normal    Attitude:   Cooperative    Orientation:   All   Speech    Rate / Production: Normal/ Responsive    Volume:  Normal    Mood:    Anxious    Affect:    Appropriate    Thought Content:  Clear    Thought Form:  Coherent  Logical    Insight:    Fair      Medication Review:   No changes to current psychiatric medication(s)     Medication Compliance:   Yes     Changes in Health Issues:   None reported     Chemical Use Review:   Substance Use: Chemical use reviewed, no active concerns identified      Tobacco Use: No current tobacco use.      Diagnosis:  1. NEGRO (generalized anxiety disorder)    2. Moderate episode of recurrent major depressive disorder (H)        Collateral Reports Completed:   Not Applicable    PLAN: (Patient Tasks / Therapist Tasks / Other)  Continue with individual therapy. Homework to talk with  about family planning.        Kelli Thurman LPC                                              Individual Treatment Plan    Patient's Name: Genesis Cutler  YOB: 1995    Date of Creation: 9/27/2022  Date Treatment Plan Last Reviewed/Revised: 9/27/2022    DSM5 Diagnoses: 296.31 (F33.0) Major Depressive Disorder, Recurrent Episode, Mild _ and With anxious distress or 300.02 (F41.1) Generalized Anxiety Disorder  Psychosocial / Contextual Factors: family stressors (relationship with father)  PROMIS (reviewed every 90 days):     Referral / Collaboration:  Referral to another professional/service is not indicated at this time..    Anticipated number of session for this episode of care: 9-12 sessions  Anticipation frequency of session: Every other week  Anticipated Duration of  each session: 38-52 minutes  Treatment plan will be reviewed in 90 days or when goals have been changed.       MeasurableTreatment Goal(s) related to diagnosis / functional impairment(s)  Goal 1: Patient will report a decrease in anxiety symptoms.    Objective #A (Patient Action)    Patient will identify 2 fears / thoughts that contribute to feeling anxious.  Status: New - Date: 9/27/2022     Intervention(s)  Therapist will teach emotional recognition/identification.  .    Objective #B  Patient will use thought-stopping strategy daily to reduce intrusive thoughts.  Status: New - Date: 9/27/2022     Intervention(s)  Therapist will teach the client how to perform a behavioral chain analysis.  .    Goal 2: Patient will report improved communication with others.    Objective #A (Patient Action)    Status: New - Date: 9/27/2022     Patient will practice setting boundaries 2 times in the next 4 weeks.    Intervention(s)  Therapist will teach about healthy boundaries.  .    Objective #B  Patient will learn & utilize at least 2 assertive communication skills weekly.    Status: New - Date: 9/27/2022     Intervention(s)  Therapist will role-play conflict management.    Patient has reviewed and agreed to the above plan.      Kelli Thurman, YOHANNES  September 29, 2022  Answers for HPI/ROS submitted by the patient on 9/27/2022  If you checked off any problems, how difficult have these problems made it for you to do your work, take care of things at home, or get along with other people?: Somewhat difficult  PHQ9 TOTAL SCORE: 6

## 2022-09-30 PROBLEM — F39 MOOD DISORDER (H): Status: RESOLVED | Noted: 2021-11-23 | Resolved: 2022-09-30

## 2022-10-03 ENCOUNTER — ANESTHESIA EVENT (OUTPATIENT)
Dept: SURGERY | Facility: AMBULATORY SURGERY CENTER | Age: 27
End: 2022-10-03
Payer: COMMERCIAL

## 2022-10-04 ENCOUNTER — ANESTHESIA (OUTPATIENT)
Dept: SURGERY | Facility: AMBULATORY SURGERY CENTER | Age: 27
End: 2022-10-04
Payer: COMMERCIAL

## 2022-10-04 ENCOUNTER — HOSPITAL ENCOUNTER (OUTPATIENT)
Facility: AMBULATORY SURGERY CENTER | Age: 27
Discharge: HOME | End: 2022-10-04
Attending: PODIATRIST | Admitting: PODIATRIST
Payer: COMMERCIAL

## 2022-10-04 VITALS
OXYGEN SATURATION: 99 % | DIASTOLIC BLOOD PRESSURE: 66 MMHG | SYSTOLIC BLOOD PRESSURE: 104 MMHG | TEMPERATURE: 98.7 F | HEART RATE: 78 BPM | RESPIRATION RATE: 16 BRPM

## 2022-10-04 DIAGNOSIS — M21.622 TAILOR'S BUNION OF BOTH FEET: Primary | ICD-10-CM

## 2022-10-04 DIAGNOSIS — M21.621 TAILOR'S BUNION OF BOTH FEET: Primary | ICD-10-CM

## 2022-10-04 LAB — HCG UR QL: NEGATIVE

## 2022-10-04 PROCEDURE — 28308 INCISION OF METATARSAL: CPT | Performed by: PODIATRIST

## 2022-10-04 PROCEDURE — 81025 URINE PREGNANCY TEST: CPT | Performed by: PODIATRIST

## 2022-10-04 PROCEDURE — G8916 PT W IV AB GIVEN ON TIME: HCPCS

## 2022-10-04 PROCEDURE — 28308 INCISION OF METATARSAL: CPT | Mod: T9

## 2022-10-04 PROCEDURE — G8907 PT DOC NO EVENTS ON DISCHARG: HCPCS

## 2022-10-04 DEVICE — IMP SCR SYN CORTEX 2.0X12MM SELF TAP SS 201.812: Type: IMPLANTABLE DEVICE | Site: FOOT | Status: FUNCTIONAL

## 2022-10-04 RX ORDER — CLINDAMYCIN PHOSPHATE 900 MG/50ML
900 INJECTION, SOLUTION INTRAVENOUS SEE ADMIN INSTRUCTIONS
Status: DISCONTINUED | OUTPATIENT
Start: 2022-10-04 | End: 2022-10-05 | Stop reason: HOSPADM

## 2022-10-04 RX ORDER — MEPERIDINE HYDROCHLORIDE 25 MG/ML
12.5 INJECTION INTRAMUSCULAR; INTRAVENOUS; SUBCUTANEOUS
Status: DISCONTINUED | OUTPATIENT
Start: 2022-10-04 | End: 2022-10-05 | Stop reason: HOSPADM

## 2022-10-04 RX ORDER — PROPOFOL 10 MG/ML
INJECTION, EMULSION INTRAVENOUS CONTINUOUS PRN
Status: DISCONTINUED | OUTPATIENT
Start: 2022-10-04 | End: 2022-10-04

## 2022-10-04 RX ORDER — DIAZEPAM 10 MG/2ML
2.5 INJECTION, SOLUTION INTRAMUSCULAR; INTRAVENOUS
Status: DISCONTINUED | OUTPATIENT
Start: 2022-10-04 | End: 2022-10-05 | Stop reason: HOSPADM

## 2022-10-04 RX ORDER — ONDANSETRON 4 MG/1
4 TABLET, ORALLY DISINTEGRATING ORAL EVERY 30 MIN PRN
Status: DISCONTINUED | OUTPATIENT
Start: 2022-10-04 | End: 2022-10-05 | Stop reason: HOSPADM

## 2022-10-04 RX ORDER — LIDOCAINE 40 MG/G
CREAM TOPICAL
Status: DISCONTINUED | OUTPATIENT
Start: 2022-10-04 | End: 2022-10-05 | Stop reason: HOSPADM

## 2022-10-04 RX ORDER — KETOROLAC TROMETHAMINE 30 MG/ML
15 INJECTION, SOLUTION INTRAMUSCULAR; INTRAVENOUS EVERY 6 HOURS PRN
Status: DISCONTINUED | OUTPATIENT
Start: 2022-10-04 | End: 2022-10-05 | Stop reason: HOSPADM

## 2022-10-04 RX ORDER — LIDOCAINE HYDROCHLORIDE 20 MG/ML
INJECTION, SOLUTION INFILTRATION; PERINEURAL PRN
Status: DISCONTINUED | OUTPATIENT
Start: 2022-10-04 | End: 2022-10-04

## 2022-10-04 RX ORDER — FENTANYL CITRATE 50 UG/ML
INJECTION, SOLUTION INTRAMUSCULAR; INTRAVENOUS PRN
Status: DISCONTINUED | OUTPATIENT
Start: 2022-10-04 | End: 2022-10-04

## 2022-10-04 RX ORDER — HYDROCODONE BITARTRATE AND ACETAMINOPHEN 5; 325 MG/1; MG/1
1-2 TABLET ORAL EVERY 4 HOURS PRN
Qty: 10 TABLET | Refills: 0 | Status: SHIPPED | OUTPATIENT
Start: 2022-10-04 | End: 2022-10-18

## 2022-10-04 RX ORDER — SODIUM CHLORIDE, SODIUM LACTATE, POTASSIUM CHLORIDE, CALCIUM CHLORIDE 600; 310; 30; 20 MG/100ML; MG/100ML; MG/100ML; MG/100ML
INJECTION, SOLUTION INTRAVENOUS CONTINUOUS
Status: DISCONTINUED | OUTPATIENT
Start: 2022-10-04 | End: 2022-10-05 | Stop reason: HOSPADM

## 2022-10-04 RX ORDER — FENTANYL CITRATE 50 UG/ML
50 INJECTION, SOLUTION INTRAMUSCULAR; INTRAVENOUS
Status: DISCONTINUED | OUTPATIENT
Start: 2022-10-04 | End: 2022-10-05 | Stop reason: HOSPADM

## 2022-10-04 RX ORDER — BUPIVACAINE HYDROCHLORIDE 5 MG/ML
INJECTION, SOLUTION PERINEURAL PRN
Status: DISCONTINUED | OUTPATIENT
Start: 2022-10-04 | End: 2022-10-04 | Stop reason: HOSPADM

## 2022-10-04 RX ORDER — PROPOFOL 10 MG/ML
INJECTION, EMULSION INTRAVENOUS PRN
Status: DISCONTINUED | OUTPATIENT
Start: 2022-10-04 | End: 2022-10-04

## 2022-10-04 RX ORDER — ACETAMINOPHEN 325 MG/1
975 TABLET ORAL ONCE
Status: COMPLETED | OUTPATIENT
Start: 2022-10-04 | End: 2022-10-04

## 2022-10-04 RX ORDER — OXYCODONE HYDROCHLORIDE 5 MG/1
10 TABLET ORAL EVERY 4 HOURS PRN
Status: DISCONTINUED | OUTPATIENT
Start: 2022-10-04 | End: 2022-10-05 | Stop reason: HOSPADM

## 2022-10-04 RX ORDER — ALBUTEROL SULFATE 0.83 MG/ML
2.5 SOLUTION RESPIRATORY (INHALATION) EVERY 4 HOURS PRN
Status: DISCONTINUED | OUTPATIENT
Start: 2022-10-04 | End: 2022-10-05 | Stop reason: HOSPADM

## 2022-10-04 RX ORDER — ONDANSETRON 2 MG/ML
4 INJECTION INTRAMUSCULAR; INTRAVENOUS EVERY 30 MIN PRN
Status: DISCONTINUED | OUTPATIENT
Start: 2022-10-04 | End: 2022-10-05 | Stop reason: HOSPADM

## 2022-10-04 RX ORDER — FENTANYL CITRATE 50 UG/ML
50 INJECTION, SOLUTION INTRAMUSCULAR; INTRAVENOUS EVERY 5 MIN PRN
Status: DISCONTINUED | OUTPATIENT
Start: 2022-10-04 | End: 2022-10-05 | Stop reason: HOSPADM

## 2022-10-04 RX ORDER — CLINDAMYCIN PHOSPHATE 900 MG/50ML
900 INJECTION, SOLUTION INTRAVENOUS
Status: COMPLETED | OUTPATIENT
Start: 2022-10-04 | End: 2022-10-04

## 2022-10-04 RX ADMIN — FENTANYL CITRATE 50 MCG: 50 INJECTION, SOLUTION INTRAMUSCULAR; INTRAVENOUS at 07:17

## 2022-10-04 RX ADMIN — CLINDAMYCIN PHOSPHATE 900 MG: 900 INJECTION, SOLUTION INTRAVENOUS at 07:10

## 2022-10-04 RX ADMIN — KETOROLAC TROMETHAMINE 30 MG: 30 INJECTION, SOLUTION INTRAMUSCULAR; INTRAVENOUS at 08:14

## 2022-10-04 RX ADMIN — ONDANSETRON 4 MG: 2 INJECTION INTRAMUSCULAR; INTRAVENOUS at 08:14

## 2022-10-04 RX ADMIN — ACETAMINOPHEN 975 MG: 325 TABLET ORAL at 06:27

## 2022-10-04 RX ADMIN — SODIUM CHLORIDE, SODIUM LACTATE, POTASSIUM CHLORIDE, CALCIUM CHLORIDE: 600; 310; 30; 20 INJECTION, SOLUTION INTRAVENOUS at 07:15

## 2022-10-04 RX ADMIN — PROPOFOL 50 MG: 10 INJECTION, EMULSION INTRAVENOUS at 07:17

## 2022-10-04 RX ADMIN — LIDOCAINE HYDROCHLORIDE 60 MG: 20 INJECTION, SOLUTION INFILTRATION; PERINEURAL at 07:17

## 2022-10-04 RX ADMIN — PROPOFOL 100 MCG/KG/MIN: 10 INJECTION, EMULSION INTRAVENOUS at 07:17

## 2022-10-04 ASSESSMENT — ENCOUNTER SYMPTOMS
DYSRHYTHMIAS: 0
SEIZURES: 0

## 2022-10-04 NOTE — ANESTHESIA POSTPROCEDURE EVALUATION
Patient: Genesis Cutler    Procedure: Procedure(s):  Right fifth metatarsal chevron osteotomy       Anesthesia Type:  MAC    Note:  Disposition: Outpatient   Postop Pain Control: Uneventful            Sign Out: Well controlled pain   PONV: No   Neuro/Psych: Uneventful            Sign Out: Acceptable/Baseline neuro status   Airway/Respiratory: Uneventful            Sign Out: Acceptable/Baseline resp. status   CV/Hemodynamics: Uneventful            Sign Out: Acceptable CV status; No obvious hypovolemia; No obvious fluid overload   Other NRE: NONE   DID A NON-ROUTINE EVENT OCCUR? No           Last vitals:  Vitals Value Taken Time   /66 10/04/22 0855   Temp 98.7  F (37.1  C) 10/04/22 0855   Pulse 78 10/04/22 0855   Resp 16 10/04/22 0855   SpO2 100 % 10/04/22 0857   Vitals shown include unvalidated device data.    Electronically Signed By: Eran Gongora MD  October 4, 2022  1:56 PM

## 2022-10-04 NOTE — OP NOTE
Procedure Date: 10/04/2022    PREOPERATIVE DIAGNOSIS:  Right bunionette.    POSTOPERATIVE DIAGNOSIS:  Right bunionette.    PROCEDURE:  Right fifth metatarsal Chevron osteotomy.    ANESTHESIA:  MAC.    HEMOSTASIS:  Pneumatic ankle tourniquet at 250 mmHg.    INDICATIONS FOR PROCEDURE:  This patient has had successful bunionette surgery on her left foot and would like to have this on her right.  She understands possible complications include nonunion, pain, numbness and infection.    DESCRIPTION OF PROCEDURE:  The patient was brought to the operating table in a supine position.  She was given sedation by the anesthesiologist and a right fifth Srivastava block was done.  The foot was then prepped and draped in normal sterile manner.  Pneumatic ankle tourniquet was placed around the ankle with copious amounts of Webril padding.  Foot was then elevated for complete exsanguination.  The tourniquet inflated and the foot was brought on the operating table where the following procedure was performed.     At this time, an incision was made just lateral to the extensor tendon over the fifth metatarsal.  This brought down the deep fascia utilizing blunt and sharp dissection techniques.  All neurovascular structures encountered were either bovied, tied, or retracted to the side.  We removed all soft tissue off the dorsal and lateral portion of the left fifth MTPJ and metatarsal.  We removed the medial bump.  We did a standard long arm Surya bunionectomy.  We transposed this laterally approximately 50% width at the distal osteotomy site.  This temporarily fixated.  Good reduction of the deformity was noted.  We fixated this with two 2.0 mm cortical screws from dorsal to plantar, utilizing standard AO fixation techniques.  We then used the C-arm and good reduction of the deformity was noted and the joint was lined up well.  Removed the medial bump.  All sharp bony prominences were burred smooth.  We retightened the screws.  We flushed  the wound.  Standard layered closure was done at this time. Estimated blood loss was 1 mL.  The patient tolerated the procedure and anesthesia well.  She was then wheeled from the operating room to the recovery room with vital signs stable and an intact sterile dressing.    Marshal Pate DPM        D: 10/04/2022   T: 10/04/2022   MT: ARMIN    Name:     SANIYA MORE  MRN:      [id number]        Account:        [id number]   :      1995           Procedure Date: 10/04/2022     Document: [id number]

## 2022-10-04 NOTE — ANESTHESIA CARE TRANSFER NOTE
Patient: Genesis Cutler    Procedure: Procedure(s):  Right fifth metatarsal chevron osteotomy       Diagnosis: Tailor's bunion of both feet [M21.621, M21.622]  Diagnosis Additional Information: No value filed.    Anesthesia Type:   MAC     Note:    Oropharynx: oropharynx clear of all foreign objects  Level of Consciousness: awake  Oxygen Supplementation: room air    Independent Airway: airway patency satisfactory and stable  Dentition: dentition unchanged  Vital Signs Stable: post-procedure vital signs reviewed and stable  Report to RN Given: handoff report given  Patient transferred to: Phase II    Handoff Report: Identifed the Patient, Identified the Reponsible Provider, Reviewed the pertinent medical history, Discussed the surgical course, Reviewed Intra-OP anesthesia mangement and issues during anesthesia, Set expectations for post-procedure period and Allowed opportunity for questions and acknowledgement of understanding      Vitals:  Vitals Value Taken Time   BP 81/68 10/04/22 0831   Temp     Pulse 74 10/04/22 0831   Resp     SpO2 97 % 10/04/22 0833   Vitals shown include unvalidated device data.    Electronically Signed By: WILMA Kwong CRNA  October 4, 2022  8:33 AM

## 2022-10-04 NOTE — ANESTHESIA PREPROCEDURE EVALUATION
Anesthesia Pre-Procedure Evaluation    Patient: Genesis Cutler   MRN: [id number] : 1995        Procedure : Procedure(s):  Right fifth metatarsal chevron osteotomy          Past Medical History:   Diagnosis Date     Chronic constipation       Past Surgical History:   Procedure Laterality Date     ARTHROSCOPY SHLDR ROTATOR CUFF REPAIR, SUBACROMIAL DECOMP, DIST CLAVICLE RESECTION, BICEP TENODESIS Right 2020    Procedure: Right shoulder arthroscopy, open biceps tenodesis;  Surgeon: Aline Amador MD;  Location: [facility] OR     BUNIONECTOMY CHEVRON AND JOHN, COMBINED Left 5/3/2022    Procedure: Left fifth metatarsal bunionette correction with bone cutting and screw fixation;  Surgeon: Marshal Pate DPM;  Location: [facility] OR     COLONOSCOPY  2017     ORTHOPEDIC SURGERY  2020    Biceps tenodesis, right side      Allergies   Allergen Reactions     Penicillins Rash      Social History     Tobacco Use     Smoking status: Never Smoker     Smokeless tobacco: Never Used   Substance Use Topics     Alcohol use: Yes     Comment: Occasional alcoholic drink with friends,      Wt Readings from Last 1 Encounters:   22 80.2 kg (176 lb 12.8 oz)        Anesthesia Evaluation   Pt has had prior anesthetic. Type: General and MAC.    No history of anesthetic complications       ROS/MED HX  ENT/Pulmonary:     (+) recent URI, resolved, Sinus congestion started 1 week ago, now resolved. :  (-) asthma   Neurologic:    (-) no seizures and no CVA   Cardiovascular:    (-) hypertension, arrhythmias and murmur   METS/Exercise Tolerance: >4 METS    Hematologic:    (-) anemia   Musculoskeletal:       GI/Hepatic:    (-) GERD and liver disease   Renal/Genitourinary:    (-) renal disease   Endo:    (-) Type II DM   Psychiatric/Substance Use:     (+) psychiatric history anxiety and depression     Infectious Disease:       Malignancy:       Other:            Physical Exam    Airway        Mallampati: I   TM distance:  > 3 FB   Neck ROM: full   Mouth opening: > 3 cm    Respiratory Devices and Support         Dental  no notable dental history         Cardiovascular          Rhythm and rate: regular and normal (-) no murmur    Pulmonary   pulmonary exam normal        breath sounds clear to auscultation           OUTSIDE LABS:  CBC:   Lab Results   Component Value Date    WBC 6.9 08/23/2021    WBC 4.6 11/13/2018    HGB 14.2 09/27/2022    HGB 13.6 04/29/2022    HCT 41.6 08/23/2021    HCT 44.4 11/13/2018     08/23/2021     11/13/2018     BMP:   Lab Results   Component Value Date     04/29/2022     08/23/2021    POTASSIUM 3.8 04/29/2022    POTASSIUM 4.7 08/23/2021    CHLORIDE 107 04/29/2022    CHLORIDE 107 08/23/2021    CO2 25 04/29/2022    CO2 27 08/23/2021    BUN 9 04/29/2022    BUN 13 08/23/2021    CR 0.61 09/27/2022    CR 0.57 04/29/2022    GLC 85 04/29/2022    GLC 83 08/23/2021     COAGS: No results found for: PTT, INR, FIBR  POC:   Lab Results   Component Value Date    HCG Negative 09/27/2022    HCGS Negative 07/15/2020     HEPATIC:   Lab Results   Component Value Date    ALBUMIN 3.9 08/23/2021    PROTTOTAL 7.3 08/23/2021    ALT 30 08/23/2021    AST 25 08/23/2021    ALKPHOS 41 08/23/2021    BILITOTAL 0.3 08/23/2021     OTHER:   Lab Results   Component Value Date    GLYNN 9.0 04/29/2022    LIPASE 159 03/14/2018    AMYLASE 70 03/14/2018    TSH 2.64 09/27/2022    SED 4 08/23/2021       Anesthesia Plan    ASA Status:  2   NPO Status:  NPO Appropriate    Anesthesia Type: MAC.     - Reason for MAC: immobility needed   Induction: Intravenous, Propofol.   Maintenance: TIVA.        Consents    Anesthesia Plan(s) and associated risks, benefits, and realistic alternatives discussed. Questions answered and patient/representative(s) expressed understanding.    - Discussed:     - Discussed with:  Patient      - Extended Intubation/Ventilatory Support Discussed: No.      - Patient is DNR/DNI Status: No    Use of blood  products discussed: No .     Postoperative Care    Pain management: IV analgesics, Oral pain medications, Multi-modal analgesia.   PONV prophylaxis: Ondansetron (or other 5HT-3), Background Propofol Infusion     Comments:    Other Comments: Discussed plan for IV anesthetic with native airway. Discussed potential need for conversion to general anesthetic with airway management and potential for recall.  Recent sinus congestion, discussed potential for airway reactivity if intubation is needed, but since no breathing issues during illness and no intubation planned, this is low risk.        H&P reviewed: Unable to attach H&P to encounter due to EHR limitations. H&P Update: appropriate H&P reviewed, patient examined. No interval changes since H&P (within 30 days).         Eran Gongora MD

## 2022-10-04 NOTE — BRIEF OP NOTE
[facility] Brief Operative Note    Pre-operative diagnosis: Right bunionette   Post-operative diagnosis same   Procedure: Procedure(s):  Right fifth metatarsal chevron osteotomy   Surgeon(s): Surgeon(s) and Role:     * Marshal Pate DPM - Primary   Estimated blood loss: 1 mL    Specimens: none   Findings: none

## 2022-10-04 NOTE — DISCHARGE INSTRUCTIONS
[facility]   Adult Discharge Orders & Instructions     For 24 hours after surgery    Get plenty of rest.  A responsible adult must stay with you for at least 24 hours after you leave the hospital.   Do not drive or use heavy equipment.  If you have weakness or tingling, don't drive or use heavy equipment until this feeling goes away.  Do not drink alcohol.  Avoid strenuous or risky activities.  Ask for help when climbing stairs.   You may feel lightheaded.  IF so, sit for a few minutes before standing.  Have someone help you get up.   If you have nausea (feel sick to your stomach): Drink only clear liquids such as apple juice, ginger ale, broth or 7-Up.  Rest may also help.  Be sure to drink enough fluids.  Move to a regular diet as you feel able.  You may have a slight fever. Call the doctor if your fever is over 100 F (37.7 C) (taken under the tongue) or lasts longer than 24 hours.  You may have a dry mouth, a sore throat, muscle aches or trouble sleeping.  These should go away after 24 hours.  Do not make important or legal decisions.     Call your doctor for any of the followin.  Signs of infection (fever, growing tenderness at the surgery site, a large amount of drainage or bleeding, severe pain, foul-smelling drainage, redness, swelling).    2. It has been over 8 to 10 hours since surgery and you are still not able to urinate (pass water).    3.  Headache for over 24 hours.    4.  Numbness, tingling or weakness the day after surgery (if you had spinal anesthesia).                  5. Signs of Covid-19 infection (temperature over 100 degrees, shortness of breath, cough, loss of taste/smell, generalized body aches, persistent headache,                  chills, sore throat, nausea/vomiting/diarrhea).       To contact Dr Pate call:  [phone]     ______________________________________    You had 975 mg of Tylenol at 0630. You may repeat this after 1230.   Maximum amount of  Tylenol/Acetaminophen in a 24 hour period is 4,000 mg.

## 2022-10-06 ENCOUNTER — OFFICE VISIT (OUTPATIENT)
Dept: PODIATRY | Facility: CLINIC | Age: 27
End: 2022-10-06
Payer: COMMERCIAL

## 2022-10-06 VITALS — HEART RATE: 86 BPM | OXYGEN SATURATION: 99 %

## 2022-10-06 DIAGNOSIS — M21.621 TAILOR'S BUNION OF BOTH FEET: Primary | ICD-10-CM

## 2022-10-06 DIAGNOSIS — M21.622 TAILOR'S BUNION OF BOTH FEET: Primary | ICD-10-CM

## 2022-10-06 PROCEDURE — 99024 POSTOP FOLLOW-UP VISIT: CPT | Performed by: PODIATRIST

## 2022-10-06 NOTE — LETTER
10/6/2022         RE: Genesis Cutler  [address]  [place] [address]        Dear Colleague,    Thank you for referring your patient, Genesis Cutler, to the [facility]. Please see a copy of my visit note below.    Subjective:    Patient is 2 days postopp right fifth metatarsal chevron osteotomy to correct bunionette.  Patient is only been taking over-the-counter pain pills.  She has been nonweightbearing and elevating this.  States it is feeling better today.  Has no new complaints.    Objective:    Dressings clean, dry and intact.  Incision dry, well coapted with no dehiscence or drainage.  Pulses are palpable, sensation to light touch is intact.  Normal postopp edema.  No erythema or ecchymosis on the opperative site.   Toe  in good alignment.      Assessment: Postopp day 2 right fifth metatarsal chevron bunionectomy    Plan: We redressed her foot.  Continue nonweightbearing.  She brought in her cam walker today from past surgery.  We plantarflexed this.  We will use this to protect.  May do range of motion at ankle.  Discussed signs of infection.  She notices any of these will contact me right away.  I will be on vacation 2 weeks after surgery.  She will follow-up with one of my colleagues for suture removal.  I will see patient the following week to get x-rays.    Marshal Pate DPM, FACFAS          Again, thank you for allowing me to participate in the care of your patient.        Sincerely,        Marshal Pate DPM

## 2022-10-06 NOTE — PROGRESS NOTES
Subjective:    Patient is 2 days postopp right fifth metatarsal chevron osteotomy to correct bunionette.  Patient is only been taking over-the-counter pain pills.  She has been nonweightbearing and elevating this.  States it is feeling better today.  Has no new complaints.    Objective:    Dressings clean, dry and intact.  Incision dry, well coapted with no dehiscence or drainage.  Pulses are palpable, sensation to light touch is intact.  Normal postopp edema.  No erythema or ecchymosis on the opperative site.   Toe  in good alignment.      Assessment: Postopp day 2 right fifth metatarsal chevron bunionectomy    Plan: We redressed her foot.  Continue nonweightbearing.  She brought in her cam walker today from past surgery.  We plantarflexed this.  We will use this to protect.  May do range of motion at ankle.  Discussed signs of infection.  She notices any of these will contact me right away.  I will be on vacation 2 weeks after surgery.  She will follow-up with one of my colleagues for suture removal.  I will see patient the following week to get x-rays.    Marshal Pate DPM, FACFAS

## 2022-10-06 NOTE — PATIENT INSTRUCTIONS
We wish you continued good healing. If you have any questions or concerns, please do not hesitate to contact us at  [phone]    Mismit (secure e-mail communication and access to your chart) to send a message or to make an appointment.    Please remember to call and schedule a follow up appointment if one was recommended at your earliest convenience.     PODIATRY CLINIC HOURS  TELEPHONE NUMBER    Dr. Marshal MENDOZAPINDER [facility]        Clinics:  Pawan Duke CMA   Tuesday 1PM-6PM  [place]  Wednesday 745AM-330PM  [place]  Thursday/Friday 745AM-230PM  [place] REYES/PAWAN APPOINTMENTS  [phone]    [place] APPOINTMENTS  [phone]        If you need a medication refill, please contact us you may need lab work and/or a follow up visit prior to your refill (i.e. Antifungal medications).  If MRI needed please call Imaging at [phone] or [phone]  HOW DO I GET MY KNEE SCOOTER? Knee scooters can be picked up at ANY Medical Supply stores with your knee scooter Prescription.  OR  Bring your signed prescription to an [facility] Medical Equipment showroom.

## 2022-10-12 ENCOUNTER — VIRTUAL VISIT (OUTPATIENT)
Dept: PSYCHOLOGY | Facility: CLINIC | Age: 27
End: 2022-10-12
Payer: COMMERCIAL

## 2022-10-12 DIAGNOSIS — F41.1 GAD (GENERALIZED ANXIETY DISORDER): Primary | ICD-10-CM

## 2022-10-12 DIAGNOSIS — F33.1 MODERATE EPISODE OF RECURRENT MAJOR DEPRESSIVE DISORDER (H): ICD-10-CM

## 2022-10-12 PROCEDURE — 90834 PSYTX W PT 45 MINUTES: CPT | Mod: 95 | Performed by: COUNSELOR

## 2022-10-12 NOTE — PROGRESS NOTES
[facility]                                     Progress Note    Patient Name: Genesis Cutler  Date: 10/12/2022         Service Type: Individual      Session Start Time: 12:00pm  Session End Time: 12:50pm     Session Length: 50 minutes    Session #: 4    Attendees: Client attended alone    Service Modality:  Video Visit:      Provider verified identity through the following two step process.  Patient provided:  Patient is known previously to provider    Telemedicine Visit: The patient's condition can be safely assessed and treated via synchronous audio and visual telemedicine encounter.      Reason for Telemedicine Visit: Patient has requested telehealth visit    Originating Site (Patient Location): Patient's home    Distant Site (Provider Location): [facility] Outpatient Setting: [place]    Consent:  The patient/guardian has verbally consented to: the potential risks and benefits of telemedicine (video visit) versus in person care; bill my insurance or make self-payment for services provided; and responsibility for payment of non-covered services.     Patient would like the video invitation sent by:  My Chart    Mode of Communication:  Video Conference via Amwell    As the provider I attest to compliance with applicable laws and regulations related to telemedicine.    DATA  Interactive Complexity: No  Crisis: No        Progress Since Last Session (Related to Symptoms / Goals / Homework):   Symptoms: Improving noticing better ability to manage symptoms    Homework: Achieved / completed to satisfaction      Episode of Care Goals: Minimal progress - PREPARATION (Decided to change - considering how); Intervened by negotiating a change plan and determining options / strategies for behavior change, identifying triggers, exploring social supports, and working towards setting a date to begin behavior change     Current / Ongoing Stressors and Concerns:   She and  have been  talking about children and their future, what that looks like moving forward.     Treatment Objective(s) Addressed in This Session:   learn & utilize at least 1 assertive communication skills weekly     Intervention:   CBT: reviewed homework, explored what family planning looks like for them and ways to work on accepting what she and  want in the future, exploring parenting styles, and managing changes that can happen in marriage once children are added.    Assessments completed prior to visit:  The following assessments were completed by patient for this visit:  PHQ9:   PHQ-9 SCORE 7/30/2021 8/24/2021 9/16/2021 10/20/2021 4/20/2022 8/15/2022 9/27/2022   PHQ-9 Total Score MyChart 7 (Mild depression) 9 (Mild depression) 5 (Mild depression) 9 (Mild depression) 5 (Mild depression) 10 (Moderate depression) 6 (Mild depression)   PHQ-9 Total Score 7 9 5 9 5 10 6     GAD7:   NEGRO-7 SCORE 5/5/2021 6/4/2021 7/30/2021 8/24/2021 9/16/2021 10/20/2021 4/20/2022   Total Score 8 (mild anxiety) 2 (minimal anxiety) 5 (mild anxiety) 2 (minimal anxiety) 2 (minimal anxiety) 1 (minimal anxiety) 3 (minimal anxiety)   Total Score 8 2 5 2 2 1 3     PROMIS 10-Global Health (all questions and answers displayed):   PROMIS 10 8/12/2022   In general, would you say your health is: Very good   In general, would you say your quality of life is: Very good   In general, how would you rate your physical health? Very good   In general, how would you rate your mental health, including your mood and your ability to think? Fair   In general, how would you rate your satisfaction with your social activities and relationships? Fair   In general, please rate how well you carry out your usual social activities and roles Good   To what extent are you able to carry out your everyday physical activities such as walking, climbing stairs, carrying groceries, or moving a chair? Mostly   How often have you been bothered by emotional problems such as feeling  anxious, depressed or irritable? Often   How would you rate your fatigue on average? Moderate   How would you rate your pain on average?   0 = No Pain  to  10 = Worst Imaginable Pain 3   In general, would you say your health is: 4   In general, would you say your quality of life is: 4   In general, how would you rate your physical health? 4   In general, how would you rate your mental health, including your mood and your ability to think? 2   In general, how would you rate your satisfaction with your social activities and relationships? 2   In general, please rate how well you carry out your usual social activities and roles. (This includes activities at home, at work and in your community, and responsibilities as a parent, child, spouse, employee, friend, etc.) 3   To what extent are you able to carry out your everyday physical activities such as walking, climbing stairs, carrying groceries, or moving a chair? 4   In the past 7 days, how often have you been bothered by emotional problems such as feeling anxious, depressed, or irritable? 4   In the past 7 days, how would you rate your fatigue on average? 3   In the past 7 days, how would you rate your pain on average, where 0 means no pain, and 10 means worst imaginable pain? 3   Global Mental Health Score 10   Global Physical Health Score 15   PROMIS TOTAL - SUBSCORES 25   Some recent data might be hidden         ASSESSMENT: Current Emotional / Mental Status (status of significant symptoms):   Risk status (Self / Other harm or suicidal ideation)   Patient denies current fears or concerns for personal safety.   Patient denies current or recent suicidal ideation or behaviors.   Patient denies current or recent homicidal ideation or behaviors.   Patient denies current or recent self injurious behavior or ideation.   Patient denies other safety concerns.   Patient reports there has been no change in risk factors since their last session.     Patient reports there has  been no change in protective factors since their last session.     Recommended that patient call 911 or go to the local ED should there be a change in any of these risk factors.     Appearance:   Appropriate    Eye Contact:   Good    Psychomotor Behavior: Normal    Attitude:   Cooperative    Orientation:   All   Speech    Rate / Production: Normal/ Responsive    Volume:  Normal    Mood:    Anxious    Affect:    Appropriate    Thought Content:  Clear    Thought Form:  Coherent  Logical    Insight:    Fair      Medication Review:   No changes to current psychiatric medication(s)     Medication Compliance:   Yes     Changes in Health Issues:   None reported     Chemical Use Review:   Substance Use: Chemical use reviewed, no active concerns identified      Tobacco Use: No current tobacco use.      Diagnosis:  1. NEGRO (generalized anxiety disorder)    2. Moderate episode of recurrent major depressive disorder (H)        Collateral Reports Completed:   Not Applicable    PLAN: (Patient Tasks / Therapist Tasks / Other)  Continue with individual therapy. Homework to continue talk with  about family planning.        Kelli Thurman LPC                                              Individual Treatment Plan    Patient's Name: Genesis Cutler  YOB: 1995    Date of Creation: 9/27/2022  Date Treatment Plan Last Reviewed/Revised: 9/27/2022    DSM5 Diagnoses: 296.31 (F33.0) Major Depressive Disorder, Recurrent Episode, Mild _ and With anxious distress or 300.02 (F41.1) Generalized Anxiety Disorder  Psychosocial / Contextual Factors: family stressors (relationship with father)  PROMIS (reviewed every 90 days):     Referral / Collaboration:  Referral to another professional/service is not indicated at this time..    Anticipated number of session for this episode of care: 9-12 sessions  Anticipation frequency of session: Every other week  Anticipated Duration of each session: 38-52 minutes  Treatment  plan will be reviewed in 90 days or when goals have been changed.       MeasurableTreatment Goal(s) related to diagnosis / functional impairment(s)  Goal 1: Patient will report a decrease in anxiety symptoms.    Objective #A (Patient Action)    Patient will identify 2 fears / thoughts that contribute to feeling anxious.  Status: New - Date: 9/27/2022     Intervention(s)  Therapist will teach emotional recognition/identification.  .    Objective #B  Patient will use thought-stopping strategy daily to reduce intrusive thoughts.  Status: New - Date: 9/27/2022     Intervention(s)  Therapist will teach the client how to perform a behavioral chain analysis.  .    Goal 2: Patient will report improved communication with others.    Objective #A (Patient Action)    Status: New - Date: 9/27/2022     Patient will practice setting boundaries 2 times in the next 4 weeks.    Intervention(s)  Therapist will teach about healthy boundaries.  .    Objective #B  Patient will learn & utilize at least 2 assertive communication skills weekly.    Status: New - Date: 9/27/2022     Intervention(s)  Therapist will role-play conflict management.    Patient has reviewed and agreed to the above plan.      Kelli Thurman LPC  October 12, 2022  Answers for HPI/ROS submitted by the patient on 9/27/2022  If you checked off any problems, how difficult have these problems made it for you to do your work, take care of things at home, or get along with other people?: Somewhat difficult  PHQ9 TOTAL SCORE: 6

## 2022-10-18 ENCOUNTER — MYC MEDICAL ADVICE (OUTPATIENT)
Dept: FAMILY MEDICINE | Facility: CLINIC | Age: 27
End: 2022-10-18

## 2022-10-18 ENCOUNTER — TELEPHONE (OUTPATIENT)
Dept: OTOLARYNGOLOGY | Facility: CLINIC | Age: 27
End: 2022-10-18

## 2022-10-18 DIAGNOSIS — H69.93 DYSFUNCTION OF BOTH EUSTACHIAN TUBES: Primary | ICD-10-CM

## 2022-10-18 NOTE — TELEPHONE ENCOUNTER
[facility]    Phone Message    May a detailed message be left on voicemail: no     Reason for Call: Urgent referral. Patient is scheduled on 01/10 with Dr Bucio. Patient would like to be seen at [facility] location. Please review for further scheduling needs.      Was Clinic Available: no    Question regarding protocol:  Dysfunction of both eustachian tubes     Is there a referral for the requested specialist/specialty? Yes    Name of referring provider: Richard Ledbetter MD in  F[facility]ACTICE    Location of referring provider: see above      Action Taken: Message routed to:  [facility]  ent    Travel Screening: Not Applicable

## 2022-10-19 ENCOUNTER — OFFICE VISIT (OUTPATIENT)
Dept: PODIATRY | Facility: CLINIC | Age: 27
End: 2022-10-19
Payer: COMMERCIAL

## 2022-10-19 VITALS
HEIGHT: 65 IN | HEART RATE: 58 BPM | SYSTOLIC BLOOD PRESSURE: 121 MMHG | BODY MASS INDEX: 29.32 KG/M2 | DIASTOLIC BLOOD PRESSURE: 81 MMHG | WEIGHT: 176 LBS

## 2022-10-19 DIAGNOSIS — M21.621 TAILOR'S BUNION OF BOTH FEET: Primary | ICD-10-CM

## 2022-10-19 DIAGNOSIS — M21.622 TAILOR'S BUNION OF BOTH FEET: Primary | ICD-10-CM

## 2022-10-19 DIAGNOSIS — M20.5X9 HALLUX LIMITUS, UNSPECIFIED LATERALITY: ICD-10-CM

## 2022-10-19 PROCEDURE — 99024 POSTOP FOLLOW-UP VISIT: CPT | Performed by: PODIATRIST

## 2022-10-19 ASSESSMENT — PAIN SCALES - GENERAL: PAINLEVEL: NO PAIN (0)

## 2022-10-19 NOTE — PATIENT INSTRUCTIONS
PATIENT INSTRUCTIONS - Podiatry / Foot & Ankle Surgery    Suture Removal:  -Keep the incision(s) dry for 48 hours; then you may shower.    -Allow the Steri-strips to fall off by themselves over several days.  Remove any strips that remain after 1 week.  -Do not soak the incision until instructed by your surgeon.  -Keep any scabs clean, dry & intact.  Clean with alcohol or betadine. Do not use hydrogen peroxide. Let them dry out during the day.  Cover with dry band-aid if needed.  -Do not apply ointment or any other topical unless specifically instructed by your surgeon.  -Continue to use compression (ACE bandage or Tubigrip) to the operative foot/ ankle.    -Continue  WEIGHT to the operative extremity in the boot.  -You may remove the boot for ankle, toe motion and showering but no weight to the foot/ankle

## 2022-10-19 NOTE — LETTER
10/19/2022         RE: Genesis Cutler  [address]  [place] [facility] [address]        Dear Colleague,    Thank you for referring your patient, Genesis Cutler, to the [facility]. Please see a copy of my visit note below.    Assessment:      ICD-10-CM    1. Tailor's bunion of both feet  M21.621     M21.622       2. Hallux limitus, unspecified laterality  M20.5X9           Dr. Pate's post-op patient      Plan:  No orders of the defined types were placed in this encounter.      Discussed the post-operative recovery plan with the patient.    -Sutures- removed  Shower in 2 days  -WB in boot  -X-rays at follow-up      Return:  No follow-ups on file.     Rosario Flower DPM                Chief Complaint:     Patient presents with:  Right Foot - Surgical Followup     Post-op Exam    HPI:  Genesis Cutler is a 27 year old year old female who presents for post-op exam.      Past Medical & Surgical History:  Past Medical History:   Diagnosis Date     Chronic constipation       Past Surgical History:   Procedure Laterality Date     ARTHROSCOPY SHLDR ROTATOR CUFF REPAIR, SUBACROMIAL DECOMP, DIST CLAVICLE RESECTION, BICEP TENODESIS Right 07/23/2020    Procedure: Right shoulder arthroscopy, open biceps tenodesis;  Surgeon: Aline Amador MD;  Location:  OR     BUNIONECTOMY NITISH AND JOHN, COMBINED Left 5/3/2022    Procedure: Left fifth metatarsal bunionette correction with bone cutting and screw fixation;  Surgeon: Marshal Pate DPM;  Location:  OR     BUNIONECTOMY NITISH AND JOHN, COMBINED Right 10/4/2022    Procedure: Right fifth metatarsal chevron osteotomy;  Surgeon: Marshal Pate DPM;  Location: [facility] OR     COLONOSCOPY  03/2017     ORTHOPEDIC SURGERY  07/2020    Biceps tenodesis, right side      Family History   Problem Relation Age of Onset     Diabetes Mother      Diabetes Father      Diabetes Maternal Grandmother       "Cerebrovascular Disease Maternal Grandmother      Obesity Maternal Grandmother      Coronary Artery Disease Maternal Grandfather      Cerebrovascular Disease Maternal Grandfather      Diabetes Maternal Grandfather      Coronary Artery Disease Paternal Grandmother      Unknown/Adopted Paternal Grandfather      Depression Sister      Anxiety Disorder Sister      Obesity Sister         Social History:  ?  History   Smoking Status     Never   Smokeless Tobacco     Never     History   Drug Use No     Social History    Substance and Sexual Activity      Alcohol use: Yes        Comment: Occasional alcoholic drink with friends,      Allergies:  ?   Allergies   Allergen Reactions     Penicillins Rash        Medications:    Current Outpatient Medications   Medication     escitalopram (LEXAPRO) 20 MG tablet     melatonin 5 MG tablet     norgestimate-ethinyl estradiol (CLAUDIO) 0.25-35 MG-MCG tablet     plecanatide (TRULANCE) 3 MG tablet     polyethylene glycol (MIRALAX) 17 GM/Dose powder     No current facility-administered medications for this visit.       Physical Exam:  ?  Vitals:  /81   Pulse 58   Ht 1.651 m (5' 5\")   Wt 79.8 kg (176 lb)   LMP 09/20/2022 (Exact Date)   BMI 29.29 kg/m     General:  WD/WN, in NAD.  A&O x3.    Dermatologic:    Incision  is well coapted, dehiscence absent.   Gay-incisional erythema present, ecchymosis present.  Vascular:  Digital capillary refill time normal bilateral.  Skin temperature is warm  to operative site.  Focal edema- mild to operative site.     Neurologic:    Gross sensation normal to operative limb.  Gait and balance normal  to operative limb.  Musculoskeletal:  moderate pain to palpation of foot  Ankle, MPJ ROM  intact to operative limb.  Muscle strength intact to contralateral limb.    Imaging:   x-ray independently reviewed and interpreted by myself today.  Non-Weight-bearing views left foot 10/2022, reveal tailor's bunionectomy               Again, thank you for " allowing me to participate in the care of your patient.        Sincerely,        Rosario Flower DPM

## 2022-10-19 NOTE — PROGRESS NOTES
Assessment:      ICD-10-CM    1. Tailor's bunion of both feet  M21.621     M21.622       2. Hallux limitus, unspecified laterality  M20.5X9           Dr. Pate's post-op patient      Plan:  No orders of the defined types were placed in this encounter.      Discussed the post-operative recovery plan with the patient.    -Sutures- removed  Shower in 2 days  -WB in boot  -X-rays at follow-up      Return:  No follow-ups on file.     Rosario Flower DPM                Chief Complaint:     Patient presents with:  Right Foot - Surgical Followup     Post-op Exam    HPI:  Genesis Cutler is a 27 year old year old female who presents for post-op exam.      Past Medical & Surgical History:  Past Medical History:   Diagnosis Date     Chronic constipation       Past Surgical History:   Procedure Laterality Date     ARTHROSCOPY SHLDR ROTATOR CUFF REPAIR, SUBACROMIAL DECOMP, DIST CLAVICLE RESECTION, BICEP TENODESIS Right 07/23/2020    Procedure: Right shoulder arthroscopy, open biceps tenodesis;  Surgeon: Aline Amador MD;  Location: [facility] OR     BUNIONECTOMY [place]QU[place] AND JOHN, COMBINED Left 5/3/2022    Procedure: Left fifth metatarsal bunionette correction with bone cutting and screw fixation;  Surgeon: Marshal Pate DPM;  Location: [facility] OR     BUNIONECTOMY CHEELYSIA AND JOHN, COMBINED Right 10/4/2022    Procedure: Right fifth metatarsal chevron osteotomy;  Surgeon: Marshal Pate DPM;  Location: [facility] OR     COLONOSCOPY  03/2017     ORTHOPEDIC SURGERY  07/2020    Biceps tenodesis, right side      Family History   Problem Relation Age of Onset     Diabetes Mother      Diabetes Father      Diabetes Maternal Grandmother      Cerebrovascular Disease Maternal Grandmother      Obesity Maternal Grandmother      Coronary Artery Disease Maternal Grandfather      Cerebrovascular Disease Maternal Grandfather      Diabetes Maternal Grandfather      Coronary Artery Disease Paternal Grandmother      Unknown/Adopted  "Paternal Grandfather      Depression Sister      Anxiety Disorder Sister      Obesity Sister         Social History:  ?  History   Smoking Status     Never   Smokeless Tobacco     Never     History   Drug Use No     Social History    Substance and Sexual Activity      Alcohol use: Yes        Comment: Occasional alcoholic drink with friends,      Allergies:  ?   Allergies   Allergen Reactions     Penicillins Rash        Medications:    Current Outpatient Medications   Medication     escitalopram (LEXAPRO) 20 MG tablet     melatonin 5 MG tablet     norgestimate-ethinyl estradiol (CLAUDIO) 0.25-35 MG-MCG tablet     plecanatide (TRULANCE) 3 MG tablet     polyethylene glycol (MIRALAX) 17 GM/Dose powder     No current facility-administered medications for this visit.       Physical Exam:  ?  Vitals:  /81   Pulse 58   Ht 1.651 m (5' 5\")   Wt 79.8 kg (176 lb)   LMP 09/20/2022 (Exact Date)   BMI 29.29 kg/m     General:  WD/WN, in NAD.  A&O x3.    Dermatologic:    Incision  is well coapted, dehiscence absent.   Gay-incisional erythema present, ecchymosis present.  Vascular:  Digital capillary refill time normal bilateral.  Skin temperature is warm  to operative site.  Focal edema- mild to operative site.     Neurologic:    Gross sensation normal to operative limb.  Gait and balance normal  to operative limb.  Musculoskeletal:  moderate pain to palpation of foot  Ankle, MPJ ROM  intact to operative limb.  Muscle strength intact to contralateral limb.    Imaging:   x-ray independently reviewed and interpreted by myself today.  Non-Weight-bearing views left foot 10/2022, reveal tailor's bunionectomy           "

## 2022-10-26 ENCOUNTER — OFFICE VISIT (OUTPATIENT)
Dept: PODIATRY | Facility: CLINIC | Age: 27
End: 2022-10-26
Payer: COMMERCIAL

## 2022-10-26 ENCOUNTER — ANCILLARY PROCEDURE (OUTPATIENT)
Dept: GENERAL RADIOLOGY | Facility: CLINIC | Age: 27
End: 2022-10-26
Attending: PODIATRIST
Payer: COMMERCIAL

## 2022-10-26 DIAGNOSIS — M21.621 TAILOR'S BUNION OF BOTH FEET: Primary | ICD-10-CM

## 2022-10-26 DIAGNOSIS — M21.622 TAILOR'S BUNION OF BOTH FEET: ICD-10-CM

## 2022-10-26 DIAGNOSIS — M21.621 TAILOR'S BUNION OF BOTH FEET: ICD-10-CM

## 2022-10-26 DIAGNOSIS — M21.622 TAILOR'S BUNION OF BOTH FEET: Primary | ICD-10-CM

## 2022-10-26 PROCEDURE — 99024 POSTOP FOLLOW-UP VISIT: CPT | Performed by: PODIATRIST

## 2022-10-26 PROCEDURE — 73630 X-RAY EXAM OF FOOT: CPT | Mod: RT | Performed by: RADIOLOGY

## 2022-10-26 NOTE — PROGRESS NOTES
Subjective:    Patient is 3 weeks 1 day postopp right fifth metatarsal chevron osteotomy to correct bunionette.  Patient had suture out last week.  Currently it was somewhat of a struggle to get out running subcuticular suture.  Patient has been nonweightbearing.  Denies new erythema edema or drainage.  States edema is decreasing.    Objective:    Dressings clean, dry and intact.  Incision dry and healing pulses are palpable, sensation to light touch is intact.  Normal postopp edema.  No erythema or ecchymosis on the opperative site.   Toe  in good alignment.    X-rays reveal osteotomy is tight and hardware is in place.  Deformity well reduced.    Assessment: Postopp  right fifth metatarsal chevron bunionectomy    Plan: X-rays taken of right foot.  Discussed osteotomy stable.  Continue nonweightbearing.  Gave 2-week warning.  Return to clinic in 3 weeks for repeat x-ray.    Marshal Pate DPM, FACFAS

## 2022-10-26 NOTE — LETTER
10/26/2022         RE: Genesis Cutler  [address]  [place] [address]        Dear Colleague,    Thank you for referring your patient, Genesis Cutler, to the [facility]. Please see a copy of my visit note below.    Subjective:    Patient is 3 weeks 1 day postopp right fifth metatarsal chevron osteotomy to correct bunionette.  Patient had suture out last week.  Currently it was somewhat of a struggle to get out running subcuticular suture.  Patient has been nonweightbearing.  Denies new erythema edema or drainage.  States edema is decreasing.    Objective:    Dressings clean, dry and intact.  Incision dry and healing pulses are palpable, sensation to light touch is intact.  Normal postopp edema.  No erythema or ecchymosis on the opperative site.   Toe  in good alignment.    X-rays reveal osteotomy is tight and hardware is in place.  Deformity well reduced.    Assessment: Postopp  right fifth metatarsal chevron bunionectomy    Plan: X-rays taken of right foot.  Discussed osteotomy stable.  Continue nonweightbearing.  Gave 2-week warning.  Return to clinic in 3 weeks for repeat x-ray.    Marshal Pate DPM, FACFAS          Again, thank you for allowing me to participate in the care of your patient.        Sincerely,        Marshal Pate DPM

## 2022-10-26 NOTE — PATIENT INSTRUCTIONS
We wish you continued good healing. If you have any questions or concerns, please do not hesitate to contact us at  [phone]    [employer] (secure e-mail communication and access to your chart) to send a message or to make an appointment.    Please remember to call and schedule a follow up appointment if one was recommended at your earliest convenience.     PODIATRY CLINIC HOURS  TELEPHONE NUMBER    Dr. Marshal MENDOZAPINDER [facility]        Clinics:  Pawan Duke CMA   Tuesday 1PM-6PM  [place]  Wednesday 745AM-330PM  [place]  Thursday/Friday 745AM-230PM  [place] REYES/PAWAN APPOINTMENTS  [phone]    [place] APPOINTMENTS  [phone]        If you need a medication refill, please contact us you may need lab work and/or a follow up visit prior to your refill (i.e. Antifungal medications).  If MRI needed please call Imaging at [phone] or [phone]  HOW DO I GET MY KNEE SCOOTER? Knee scooters can be picked up at ANY Medical Supply stores with your knee scooter Prescription.  OR  Bring your signed prescription to an [facility] Medical Equipment showroom.

## 2022-11-08 ENCOUNTER — VIRTUAL VISIT (OUTPATIENT)
Dept: PSYCHOLOGY | Facility: CLINIC | Age: 27
End: 2022-11-08
Payer: COMMERCIAL

## 2022-11-08 DIAGNOSIS — F41.1 GAD (GENERALIZED ANXIETY DISORDER): Primary | ICD-10-CM

## 2022-11-08 PROCEDURE — 90834 PSYTX W PT 45 MINUTES: CPT | Mod: 95 | Performed by: COUNSELOR

## 2022-11-11 NOTE — PROGRESS NOTES
[facility]                                     Progress Note    Patient Name: Genesis Cutler  Date: 11/8/2022         Service Type: Individual      Session Start Time: 11:30am  Session End Time: 12:20pm     Session Length: 50 minutes    Session #: 5    Attendees: Client attended alone    Service Modality:  Video Visit:      Provider verified identity through the following two step process.  Patient provided:  Patient is known previously to provider    Telemedicine Visit: The patient's condition can be safely assessed and treated via synchronous audio and visual telemedicine encounter.      Reason for Telemedicine Visit: Patient has requested telehealth visit    Originating Site (Patient Location): Patient's home    Distant Site (Provider Location): [facility] Outpatient Setting: [place]    Consent:  The patient/guardian has verbally consented to: the potential risks and benefits of telemedicine (video visit) versus in person care; bill my insurance or make self-payment for services provided; and responsibility for payment of non-covered services.     Patient would like the video invitation sent by:  My Chart    Mode of Communication:  Video Conference via Amwell    As the provider I attest to compliance with applicable laws and regulations related to telemedicine.    DATA  Interactive Complexity: No  Crisis: No        Progress Since Last Session (Related to Symptoms / Goals / Homework):   Symptoms: Improving noticing better ability to manage symptoms    Homework: Achieved / completed to satisfaction      Episode of Care Goals: Minimal progress - PREPARATION (Decided to change - considering how); Intervened by negotiating a change plan and determining options / strategies for behavior change, identifying triggers, exploring social supports, and working towards setting a date to begin behavior change     Current / Ongoing Stressors and Concerns:   She and  have been  talking about children and their future, what that looks like moving forward. Exploring future family interactions and ways to best interact with others (family, coworkers, boss, etc) when wanting to assert self.     Treatment Objective(s) Addressed in This Session:   learn & utilize at least 1 assertive communication skills weekly     Intervention:   CBT: continuing to explore and role play different assertive communication. Exploring different upcoming scenarios and ways that she can assert her needs without causing stress for self and others..    Assessments completed prior to visit:  The following assessments were completed by patient for this visit:  PHQ9:   PHQ-9 SCORE 8/24/2021 9/16/2021 10/20/2021 4/20/2022 8/15/2022 9/27/2022 11/8/2022   PHQ-9 Total Score MyChart 9 (Mild depression) 5 (Mild depression) 9 (Mild depression) 5 (Mild depression) 10 (Moderate depression) 6 (Mild depression) 6 (Mild depression)   PHQ-9 Total Score 9 5 9 5 10 6 6     GAD7:   NEGRO-7 SCORE 5/5/2021 6/4/2021 7/30/2021 8/24/2021 9/16/2021 10/20/2021 4/20/2022   Total Score 8 (mild anxiety) 2 (minimal anxiety) 5 (mild anxiety) 2 (minimal anxiety) 2 (minimal anxiety) 1 (minimal anxiety) 3 (minimal anxiety)   Total Score 8 2 5 2 2 1 3     PROMIS 10-Global Health (all questions and answers displayed):   PROMIS 10 8/12/2022   In general, would you say your health is: Very good   In general, would you say your quality of life is: Very good   In general, how would you rate your physical health? Very good   In general, how would you rate your mental health, including your mood and your ability to think? Fair   In general, how would you rate your satisfaction with your social activities and relationships? Fair   In general, please rate how well you carry out your usual social activities and roles Good   To what extent are you able to carry out your everyday physical activities such as walking, climbing stairs, carrying groceries, or moving a  chair? Mostly   How often have you been bothered by emotional problems such as feeling anxious, depressed or irritable? Often   How would you rate your fatigue on average? Moderate   How would you rate your pain on average?   0 = No Pain  to  10 = Worst Imaginable Pain 3   In general, would you say your health is: 4   In general, would you say your quality of life is: 4   In general, how would you rate your physical health? 4   In general, how would you rate your mental health, including your mood and your ability to think? 2   In general, how would you rate your satisfaction with your social activities and relationships? 2   In general, please rate how well you carry out your usual social activities and roles. (This includes activities at home, at work and in your community, and responsibilities as a parent, child, spouse, employee, friend, etc.) 3   To what extent are you able to carry out your everyday physical activities such as walking, climbing stairs, carrying groceries, or moving a chair? 4   In the past 7 days, how often have you been bothered by emotional problems such as feeling anxious, depressed, or irritable? 4   In the past 7 days, how would you rate your fatigue on average? 3   In the past 7 days, how would you rate your pain on average, where 0 means no pain, and 10 means worst imaginable pain? 3   Global Mental Health Score 10   Global Physical Health Score 15   PROMIS TOTAL - SUBSCORES 25   Some recent data might be hidden         ASSESSMENT: Current Emotional / Mental Status (status of significant symptoms):   Risk status (Self / Other harm or suicidal ideation)   Patient denies current fears or concerns for personal safety.   Patient denies current or recent suicidal ideation or behaviors.   Patient denies current or recent homicidal ideation or behaviors.   Patient denies current or recent self injurious behavior or ideation.   Patient denies other safety concerns.   Patient reports there has  been no change in risk factors since their last session.     Patient reports there has been no change in protective factors since their last session.     Recommended that patient call 911 or go to the local ED should there be a change in any of these risk factors.     Appearance:   Appropriate    Eye Contact:   Good    Psychomotor Behavior: Normal    Attitude:   Cooperative    Orientation:   All   Speech    Rate / Production: Normal/ Responsive    Volume:  Normal    Mood:    Anxious    Affect:    Appropriate    Thought Content:  Clear    Thought Form:  Coherent  Logical    Insight:    Fair      Medication Review:   No changes to current psychiatric medication(s)     Medication Compliance:   Yes     Changes in Health Issues:   None reported     Chemical Use Review:   Substance Use: Chemical use reviewed, no active concerns identified      Tobacco Use: No current tobacco use.      Diagnosis:  1. NEGRO (generalized anxiety disorder)        Collateral Reports Completed:   Not Applicable    PLAN: (Patient Tasks / Therapist Tasks / Other)  Continue with individual therapy. Homework to continue assertive communication.        Kelli Thurman LPC                                              Individual Treatment Plan    Patient's Name: Genesis Cutler  YOB: 1995    Date of Creation: 9/27/2022  Date Treatment Plan Last Reviewed/Revised: 9/27/2022    DSM5 Diagnoses: 296.31 (F33.0) Major Depressive Disorder, Recurrent Episode, Mild _ and With anxious distress or 300.02 (F41.1) Generalized Anxiety Disorder  Psychosocial / Contextual Factors: family stressors (relationship with father)  PROMIS (reviewed every 90 days):     Referral / Collaboration:  Referral to another professional/service is not indicated at this time..    Anticipated number of session for this episode of care: 9-12 sessions  Anticipation frequency of session: Every other week  Anticipated Duration of each session: 38-52  minutes  Treatment plan will be reviewed in 90 days or when goals have been changed.       MeasurableTreatment Goal(s) related to diagnosis / functional impairment(s)  Goal 1: Patient will report a decrease in anxiety symptoms.    Objective #A (Patient Action)    Patient will identify 2 fears / thoughts that contribute to feeling anxious.  Status: New - Date: 9/27/2022     Intervention(s)  Therapist will teach emotional recognition/identification.  .    Objective #B  Patient will use thought-stopping strategy daily to reduce intrusive thoughts.  Status: New - Date: 9/27/2022     Intervention(s)  Therapist will teach the client how to perform a behavioral chain analysis.  .    Goal 2: Patient will report improved communication with others.    Objective #A (Patient Action)    Status: New - Date: 9/27/2022     Patient will practice setting boundaries 2 times in the next 4 weeks.    Intervention(s)  Therapist will teach about healthy boundaries.  .    Objective #B  Patient will learn & utilize at least 2 assertive communication skills weekly.    Status: New - Date: 9/27/2022     Intervention(s)  Therapist will role-play conflict management.    Patient has reviewed and agreed to the above plan.      Kelli Thurman, YOHANNES  November 11, 2022  Answers for HPI/ROS submitted by the patient on 9/27/2022  If you checked off any problems, how difficult have these problems made it for you to do your work, take care of things at home, or get along with other people?: Somewhat difficult  PHQ9 TOTAL SCORE: 6  Answers for HPI/ROS submitted by the patient on 11/8/2022  If you checked off any problems, how difficult have these problems made it for you to do your work, take care of things at home, or get along with other people?: Somewhat difficult  PHQ9 TOTAL SCORE: 6

## 2022-11-16 ENCOUNTER — ANCILLARY PROCEDURE (OUTPATIENT)
Dept: GENERAL RADIOLOGY | Facility: CLINIC | Age: 27
End: 2022-11-16
Attending: PODIATRIST
Payer: COMMERCIAL

## 2022-11-16 ENCOUNTER — OFFICE VISIT (OUTPATIENT)
Dept: PODIATRY | Facility: CLINIC | Age: 27
End: 2022-11-16
Payer: COMMERCIAL

## 2022-11-16 DIAGNOSIS — M21.622 TAILOR'S BUNION OF BOTH FEET: ICD-10-CM

## 2022-11-16 DIAGNOSIS — M21.621 TAILOR'S BUNION OF BOTH FEET: Primary | ICD-10-CM

## 2022-11-16 DIAGNOSIS — M21.621 TAILOR'S BUNION OF BOTH FEET: ICD-10-CM

## 2022-11-16 DIAGNOSIS — M21.622 TAILOR'S BUNION OF BOTH FEET: Primary | ICD-10-CM

## 2022-11-16 PROCEDURE — 73630 X-RAY EXAM OF FOOT: CPT | Mod: RT | Performed by: STUDENT IN AN ORGANIZED HEALTH CARE EDUCATION/TRAINING PROGRAM

## 2022-11-16 PROCEDURE — 99024 POSTOP FOLLOW-UP VISIT: CPT | Performed by: PODIATRIST

## 2022-11-16 NOTE — PROGRESS NOTES
Subjective:    Patient is 6 weeks 1 day postopp right fifth metatarsal chevron osteotomy to correct bunionette done on 10/4/22.  Patient states she has no pain now.  Swelling decreasing.  Has been nonweightbearing.  Has no other new complaints.    Objective:    Incision well-healed.  Normal postopp edema.  No erythema or ecchymosis on the opperative site.   Toe  in good alignment.  No pain with range of motion fifth MTPJ.    X-rays reveal osteotomy is tight and hardware is in place.  Deformity well reduced.    Assessment: Postopp  right fifth metatarsal chevron bunionectomy    Plan: X-rays taken of right foot.  Discussed osteotomy stable.  Will start walking in cam walker tonight for 2 hours.  We put this at 90 degrees.  If pain or swelling back to nonweightbearing.  We will add 2 hours/day.  We will only do gentle walking.  Return to clinic in 3 weeks for final check.    Marshal Pate DPM, FACFAS

## 2022-11-16 NOTE — LETTER
11/16/2022         RE: Genesis Cutler  [address]  [place] [address]        Dear Colleague,    Thank you for referring your patient, Genesis Cutler, to the [facility]. Please see a copy of my visit note below.    Subjective:    Patient is 6 weeks 1 day postopp right fifth metatarsal chevron osteotomy to correct bunionette done on 10/4/22.  Patient states she has no pain now.  Swelling decreasing.  Has been nonweightbearing.  Has no other new complaints.    Objective:    Incision well-healed.  Normal postopp edema.  No erythema or ecchymosis on the opperative site.   Toe  in good alignment.  No pain with range of motion fifth MTPJ.    X-rays reveal osteotomy is tight and hardware is in place.  Deformity well reduced.    Assessment: Postopp  right fifth metatarsal chevron bunionectomy    Plan: X-rays taken of right foot.  Discussed osteotomy stable.  Will start walking in cam walker tonight for 2 hours.  We put this at 90 degrees.  If pain or swelling back to nonweightbearing.  We will add 2 hours/day.  We will only do gentle walking.  Return to clinic in 3 weeks for final check.    Marshal Pate DPM, FACFAS          Again, thank you for allowing me to participate in the care of your patient.        Sincerely,        Marshal Pate DPM

## 2022-11-16 NOTE — PATIENT INSTRUCTIONS
We wish you continued good healing. If you have any questions or concerns, please do not hesitate to contact us at  [phone]    [facility] (secure e-mail communication and access to your chart) to send a message or to make an appointment.    Please remember to call and schedule a follow up appointment if one was recommended at your earliest convenience.     PODIATRY CLINIC HOURS  TELEPHONE NUMBER    Dr. Marshal MENDOZAPINDER [facility]        Clinics:  [facility]   [place]  Tuesday 1PM-6PM  [place]  Wednesday 745AM-330PM  [place]  Thursday/Friday 745AM-230PM       [place] APPOINTMENTS  [phone]    [place] APPOINTMENTS  [phone]        If you need a medication refill, please contact us you may need lab work and/or a follow up visit prior to your refill (i.e. Antifungal medications).  If MRI needed please call Imaging at [phone]   HOW DO I GET MY KNEE SCOOTER? Knee scooters can be picked up at ANY Medical Supply stores with your knee scooter Prescription.  OR  Bring your signed prescription to an [facility] Medical Equipment showroom.

## 2022-11-25 NOTE — ANESTHESIA CARE TRANSFER NOTE
Patient: Genesis Cutler    Procedure(s):  Right shoulder arthroscopy, open biceps tenodesis    Diagnosis: Biceps tendinopathy, right [M67.921]  Diagnosis Additional Information: No value filed.    Anesthesia Type:   General, Peripheral Nerve Block, For Post-op pain in coordination with surgeon     Note:  Airway :Nasal Cannula  Patient transferred to:PACU  Handoff Report: Identifed the Patient, Identified the Reponsible Provider, Reviewed the pertinent medical history, Discussed the surgical course, Reviewed Intra-OP anesthesia mangement and issues during anesthesia, Set expectations for post-procedure period and Allowed opportunity for questions and acknowledgement of understanding      Vitals: (Last set prior to Anesthesia Care Transfer)    CRNA VITALS  7/23/2020 1400 - 7/23/2020 1435      7/23/2020             Pulse:  84    SpO2:  99 %    Resp Rate (observed):  14    EKG:  NSR                Electronically Signed By: WILMA Amaral CRNA  July 23, 2020  2:35 PM   Vaccine status unknown

## 2022-12-07 ENCOUNTER — ANCILLARY PROCEDURE (OUTPATIENT)
Dept: GENERAL RADIOLOGY | Facility: CLINIC | Age: 27
End: 2022-12-07
Attending: PODIATRIST
Payer: COMMERCIAL

## 2022-12-07 ENCOUNTER — OFFICE VISIT (OUTPATIENT)
Dept: PODIATRY | Facility: CLINIC | Age: 27
End: 2022-12-07
Payer: COMMERCIAL

## 2022-12-07 VITALS — SYSTOLIC BLOOD PRESSURE: 112 MMHG | DIASTOLIC BLOOD PRESSURE: 70 MMHG | HEART RATE: 74 BPM

## 2022-12-07 DIAGNOSIS — M21.621 TAILOR'S BUNION OF BOTH FEET: ICD-10-CM

## 2022-12-07 DIAGNOSIS — M21.621 TAILOR'S BUNION OF BOTH FEET: Primary | ICD-10-CM

## 2022-12-07 DIAGNOSIS — M21.622 TAILOR'S BUNION OF BOTH FEET: ICD-10-CM

## 2022-12-07 DIAGNOSIS — M21.622 TAILOR'S BUNION OF BOTH FEET: Primary | ICD-10-CM

## 2022-12-07 PROCEDURE — 99024 POSTOP FOLLOW-UP VISIT: CPT | Performed by: PODIATRIST

## 2022-12-07 PROCEDURE — 73630 X-RAY EXAM OF FOOT: CPT | Mod: RT | Performed by: RADIOLOGY

## 2022-12-07 NOTE — PATIENT INSTRUCTIONS
We wish you continued good healing. If you have any questions or concerns, please do not hesitate to contact us at  [phone]    [facility] (secure e-mail communication and access to your chart) to send a message or to make an appointment.    Please remember to call and schedule a follow up appointment if one was recommended at your earliest convenience.     PODIATRY CLINIC HOURS  TELEPHONE NUMBER    Dr. Marshal MENDOZAPINDER [facility]        Clinics:  [facility]   [place]  Tuesday 1PM-6PM  [place]  Wednesday 745AM-330PM  [place]  Thursday/Friday 745AM-230PM       [place] APPOINTMENTS  [phone]    [place] APPOINTMENTS  [phone]        If you need a medication refill, please contact us you may need lab work and/or a follow up visit prior to your refill (i.e. Antifungal medications).  If MRI needed please call Imaging at [phone]   HOW DO I GET MY KNEE SCOOTER? Knee scooters can be picked up at ANY Medical Supply stores with your knee scooter Prescription.  OR  Bring your signed prescription to an [facility] Medical Equipment showroom.

## 2022-12-07 NOTE — LETTER
12/7/2022         RE: Genesis Cutler  [address]  [place] [address]        Dear Colleague,    Thank you for referring your patient, Genesis Cutler, to the [facility]. Please see a copy of my visit note below.    Subjective:    Patient is 9 weeks 1 day postopp right fifth metatarsal chevron osteotomy to correct bunionette done on 10/4/22.  Patient states she has no pain now.  Swelling decreasing.  Has been walking in cam walker with no problems..  Has no other new complaints.    Objective:    Incision well-healed.  Normal postopp edema.  No erythema or ecchymosis on the opperative site.   Toe  in good alignment.  No pain with range of motion fifth MTPJ.  No pain with palpation or vigorously loading fifth metatarsal head.    X-rays reveal osteotomy is tight and hardware is in place.  Deformity well reduced.  Becoming more difficult to see osteotomy    Assessment: Postopp  right fifth metatarsal chevron bunionectomy    Plan: X-rays taken of right foot.  Discussed osteotomy stable.  Will start walking in shoe tonight for 2 hours.  If pain or swelling back to CAM Walker.  We will add 2 hours/day of walking and shoe.  We will only do gentle walking.  At 3 months after surgery may slowly start higher impact activities.  RTC as needed.    Marshal Pate DPM, FACFAS          Again, thank you for allowing me to participate in the care of your patient.        Sincerely,        Marshal Pate DPM

## 2022-12-07 NOTE — PROGRESS NOTES
Subjective:    Patient is 9 weeks 1 day postopp right fifth metatarsal chevron osteotomy to correct bunionette done on 10/4/22.  Patient states she has no pain now.  Swelling decreasing.  Has been walking in cam walker with no problems..  Has no other new complaints.    Objective:    Incision well-healed.  Normal postopp edema.  No erythema or ecchymosis on the opperative site.   Toe  in good alignment.  No pain with range of motion fifth MTPJ.  No pain with palpation or vigorously loading fifth metatarsal head.    X-rays reveal osteotomy is tight and hardware is in place.  Deformity well reduced.  Becoming more difficult to see osteotomy    Assessment: Postopp  right fifth metatarsal chevron bunionectomy    Plan: X-rays taken of right foot.  Discussed osteotomy stable.  Will start walking in shoe tonight for 2 hours.  If pain or swelling back to CAM Walker.  We will add 2 hours/day of walking and shoe.  We will only do gentle walking.  At 3 months after surgery may slowly start higher impact activities.  RTC as needed.    Marshal Pate DPM, FACFAS

## 2023-01-10 ENCOUNTER — OFFICE VISIT (OUTPATIENT)
Dept: AUDIOLOGY | Facility: CLINIC | Age: 28
End: 2023-01-10
Attending: FAMILY MEDICINE
Payer: COMMERCIAL

## 2023-01-10 ENCOUNTER — OFFICE VISIT (OUTPATIENT)
Dept: OTOLARYNGOLOGY | Facility: CLINIC | Age: 28
End: 2023-01-10
Attending: FAMILY MEDICINE
Payer: COMMERCIAL

## 2023-01-10 VITALS — DIASTOLIC BLOOD PRESSURE: 78 MMHG | SYSTOLIC BLOOD PRESSURE: 116 MMHG | HEART RATE: 77 BPM

## 2023-01-10 DIAGNOSIS — H93.8X1 EAR FULLNESS, RIGHT: Primary | ICD-10-CM

## 2023-01-10 DIAGNOSIS — H69.93 DYSFUNCTION OF BOTH EUSTACHIAN TUBES: ICD-10-CM

## 2023-01-10 PROCEDURE — 92557 COMPREHENSIVE HEARING TEST: CPT | Performed by: AUDIOLOGIST

## 2023-01-10 PROCEDURE — 92550 TYMPANOMETRY & REFLEX THRESH: CPT | Performed by: AUDIOLOGIST

## 2023-01-10 PROCEDURE — 99203 OFFICE O/P NEW LOW 30 MIN: CPT | Performed by: OTOLARYNGOLOGY

## 2023-01-10 ASSESSMENT — ENCOUNTER SYMPTOMS
SORE THROAT: 0
STRIDOR: 0
BRUISES/BLEEDS EASILY: 0
COUGH: 0
HEARTBURN: 0
DOUBLE VISION: 0
DIZZINESS: 0
NAUSEA: 0
SINUS PAIN: 0
HEADACHES: 0
CONSTITUTIONAL NEGATIVE: 1
SPUTUM PRODUCTION: 0
HEMOPTYSIS: 0
TINGLING: 0
TREMORS: 0
VOMITING: 0
BLURRED VISION: 0
PHOTOPHOBIA: 0

## 2023-01-10 NOTE — PROGRESS NOTES
AUDIOLOGY REPORT    SUMMARY: Audiology visit completed. See audiogram for results.    RECOMMENDATIONS: Follow-up with ENT.    Parish Snow  Doctor of Audiology  [place] License # [id number]

## 2023-01-10 NOTE — LETTER
1/10/2023         RE: Genesis Cutler  [address]  [place] [address]        Dear Colleague,    Thank you for referring your patient, Genesis Cutler, to the [facility]. Please see a copy of my visit note below.    Chief Complaint   Patient presents with     Consult     ETD right ear, went to [place] in Sept and went down a waterslide, has felt water in ear, plugged feeling and crackling since then.          HPI    This pleasant patient is having plugged up sensation in her right ear since September of last year. Occurred after a waterside in [place]. She was given an ear drop with no benefit. States bilateral tinnitus but denies any otalgia, otorrhea, dizziness or vertigo. She has seasonal allergies around spring and fall.    Current Medications:  escitalopram (LEXAPRO) 20 MG tablet  melatonin 5 MG tablet  norgestimate-ethinyl estradiol (CLAUDIO) 0.25-35 MG-MCG tablet  plecanatide (TRULANCE) 3 MG tablet  polyethylene glycol (MIRALAX) 17 GM/Dose powder       Review of Systems   Constitutional: Negative.    HENT: Negative for congestion, ear discharge, ear pain, hearing loss, nosebleeds, sinus pain, sore throat and tinnitus.    Eyes: Negative for blurred vision, double vision and photophobia.   Respiratory: Negative for cough, hemoptysis, sputum production and stridor.    Gastrointestinal: Negative for heartburn, nausea and vomiting.   Skin: Negative.    Neurological: Negative for dizziness, tingling, tremors and headaches.   Endo/Heme/Allergies: Negative for environmental allergies. Does not bruise/bleed easily.         Physical Exam  Vitals reviewed.   Constitutional:       Appearance: Normal appearance.   HENT:      Head: Normocephalic and atraumatic.      Right Ear: Hearing, tympanic membrane, ear canal and external ear normal. No middle ear effusion.      Left Ear: Hearing, tympanic membrane, ear canal and external ear normal.  No middle ear effusion.       Nose: Mucosal edema and congestion present. No rhinorrhea.      Right Turbinates: Swollen.      Left Turbinates: Swollen.      Mouth/Throat:      Mouth: Mucous membranes are moist.      Pharynx: Oropharynx is clear. Uvula midline.   Eyes:      Extraocular Movements: Extraocular movements intact.      Pupils: Pupils are equal, round, and reactive to light.   Neurological:      Mental Status: She is alert.       A/P  This pleasant patient is having right ETD for months. Hearing WNL bilaterally. 100% word rec. bilaterally. Tymps WNL. Present 1 kHz ipsi/contra reflexes bilaterally. Options were discussed. I will give her a topical nasal steroid spray, once a day and f/u as scheduled.        Again, thank you for allowing me to participate in the care of your patient.        Sincerely,        Suleiman Bucio MD

## 2023-01-10 NOTE — NURSING NOTE
Genesis Cutler's chief complaint for this visit includes:  Chief Complaint   Patient presents with     Consult     ETD right ear, went to [place] in Sept and went down a waterslide, has felt water in ear, plugged feeling and crackling since then.      PCP: Richard Ledbetter    Referring Provider:  Richard Ledbetter MD  [address] [place] [address]    /78   Pulse 77   Data Unavailable        Allergies   Allergen Reactions     Penicillins Rash         Do you need any medication refills at today's visit?

## 2023-01-10 NOTE — PROGRESS NOTES
Chief Complaint   Patient presents with     Consult     ETD right ear, went to [place] in Sept and went down a waterslide, has felt water in ear, plugged feeling and crackling since then.

## 2023-01-10 NOTE — PROGRESS NOTES
HPI    This pleasant patient is having plugged up sensation in her right ear since September of last year. Occurred after a waterside in [place]. She was given an ear drop with no benefit. States bilateral tinnitus but denies any otalgia, otorrhea, dizziness or vertigo. She has seasonal allergies around spring and fall.    Current Medications:  escitalopram (LEXAPRO) 20 MG tablet  melatonin 5 MG tablet  norgestimate-ethinyl estradiol (CLAUDIO) 0.25-35 MG-MCG tablet  plecanatide (TRULANCE) 3 MG tablet  polyethylene glycol (MIRALAX) 17 GM/Dose powder       Review of Systems   Constitutional: Negative.    HENT: Negative for congestion, ear discharge, ear pain, hearing loss, nosebleeds, sinus pain, sore throat and tinnitus.    Eyes: Negative for blurred vision, double vision and photophobia.   Respiratory: Negative for cough, hemoptysis, sputum production and stridor.    Gastrointestinal: Negative for heartburn, nausea and vomiting.   Skin: Negative.    Neurological: Negative for dizziness, tingling, tremors and headaches.   Endo/Heme/Allergies: Negative for environmental allergies. Does not bruise/bleed easily.         Physical Exam  Vitals reviewed.   Constitutional:       Appearance: Normal appearance.   HENT:      Head: Normocephalic and atraumatic.      Right Ear: Hearing, tympanic membrane, ear canal and external ear normal. No middle ear effusion.      Left Ear: Hearing, tympanic membrane, ear canal and external ear normal.  No middle ear effusion.      Nose: Mucosal edema and congestion present. No rhinorrhea.      Right Turbinates: Swollen.      Left Turbinates: Swollen.      Mouth/Throat:      Mouth: Mucous membranes are moist.      Pharynx: Oropharynx is clear. Uvula midline.   Eyes:      Extraocular Movements: Extraocular movements intact.      Pupils: Pupils are equal, round, and reactive to light.   Neurological:      Mental Status: She is alert.       A/P  This pleasant patient is having right ETD for months.  Hearing WNL bilaterally. 100% word rec. bilaterally. Tymps WNL. Present 1 kHz ipsi/contra reflexes bilaterally. Options were discussed. I will give her a topical nasal steroid spray, once a day and f/u as scheduled.

## 2023-01-30 ENCOUNTER — MYC MEDICAL ADVICE (OUTPATIENT)
Dept: FAMILY MEDICINE | Facility: CLINIC | Age: 28
End: 2023-01-30
Payer: COMMERCIAL

## 2023-03-03 ENCOUNTER — OFFICE VISIT (OUTPATIENT)
Dept: FAMILY MEDICINE | Facility: CLINIC | Age: 28
End: 2023-03-03
Attending: FAMILY MEDICINE
Payer: COMMERCIAL

## 2023-03-03 VITALS
HEART RATE: 77 BPM | BODY MASS INDEX: 30.74 KG/M2 | TEMPERATURE: 97.5 F | RESPIRATION RATE: 19 BRPM | OXYGEN SATURATION: 100 % | HEIGHT: 66 IN | SYSTOLIC BLOOD PRESSURE: 114 MMHG | DIASTOLIC BLOOD PRESSURE: 77 MMHG | WEIGHT: 191.3 LBS

## 2023-03-03 DIAGNOSIS — Z23 HIGH PRIORITY FOR 2019-NCOV VACCINE: ICD-10-CM

## 2023-03-03 DIAGNOSIS — Z23 NEED FOR PROPHYLACTIC VACCINATION AND INOCULATION AGAINST INFLUENZA: ICD-10-CM

## 2023-03-03 DIAGNOSIS — Z30.017 NEXPLANON INSERTION: Primary | ICD-10-CM

## 2023-03-03 PROCEDURE — 99213 OFFICE O/P EST LOW 20 MIN: CPT | Mod: 25 | Performed by: FAMILY MEDICINE

## 2023-03-03 PROCEDURE — 11981 INSERTION DRUG DLVR IMPLANT: CPT | Performed by: FAMILY MEDICINE

## 2023-03-03 ASSESSMENT — PAIN SCALES - GENERAL: PAINLEVEL: NO PAIN (0)

## 2023-03-03 NOTE — PROGRESS NOTES
"Nexplanon Insertion:    Is a pregnancy test required: No.  Was a consent obtained?  Yes    Subjective: Genesis Cutler is a 27 year old  presents for Nexplanon.    Patient has been given the opportunity to ask questions about all forms of birth control, including all options appropriate for Genesis Cutler. Discussed that no method of birth control, except abstinence is 100% effective against pregnancy or sexually transmitted infection.     Genesis Cutler understands she may have the Nexplanon removed at any time and it should be removed by a health care provider.    The entire insertion procedure was reviewed with the patient, including care after placement.  Stop OCP 4 days after Nexplanon placement,.    Patient's last menstrual period was 2023 (approximate). Has current contraception. No allergy to betadine or shellfish. Patient declines STD screening  HCG Qual Urine   Date Value Ref Range Status   2021 Negative NEG^Negative Final     Comment:     This test is for screening purposes.  Results should be interpreted along with   the clinical picture.  Confirmation testing is available if warranted by   ordering [id number], HCG Quantitative Pregnancy.       hCG Urine Qualitative   Date Value Ref Range Status   10/04/2022 Negative Negative Final     Comment:     This test is for screening purposes.  Results should be interpreted along with the clinical picture.  Confirmation testing is available if warranted by ordering [id number], HCG Quantitative Pregnancy.         /77 (BP Location: Left arm, Patient Position: Sitting, Cuff Size: Adult Regular)   Pulse 77   Temp 97.5  F (36.4  C) (Temporal)   Resp 19   Ht 1.68 m (5' 6.14\")   Wt 86.8 kg (191 lb 4.8 oz)   LMP 2023 (Approximate)   SpO2 100%   Breastfeeding No   BMI 30.74 kg/m      PROCEDURE NOTE: -- Nexplanon Insertion    Reason for Insertion: contraception    Patient was placed supine with left arm " exposed.  Alivia was made 8-10 cm above medial epicondyle and a guiding alivia 4 cm above the first.  Arm was prepped with Betadine. Insertion point was anesthetized with 3 mL 1% lidocaine. After stretching the skin with thumb and index finger around the insertion site, skin punctured with the tip of the needle inserted at 30 degrees and then lowered to horizontal position. The needle was then advanced to its full length. Applicator was then stabilized and slider was unlocked. Slider was pulled back until it stopped and then removed.    Correct placement of the implant was confirmed by palpation in the patient's arm and visualizing the purple top of the obturator.   Bandage and pressure dressing applied to insertion site.    Lot # [id number]  Exp: 08/2026    EBL: minimal    Complications: none    ASSESSMENT:     ICD-10-CM    1. Nexplanon insertion  Z30.017 etonogestrel (NEXPLANON) subdermal implant 68 mg     lidocaine 2%-EPINEPHrine 1:100,000 injection 1 mL      2. High priority for 2019-nCoV vaccine  Z23 COVID-19,PF,PFIZER BOOSTER BIVALENT 12+Yrs      3. Need for prophylactic vaccination and inoculation against influenza  Z23            PLAN:    Given 's handouts, including when to have Nexplanon removed, list of danger s/sx, side effects and follow up recommended. Encouraged condom use for prevention of STD. Back up contraception advised for 7 days. Advised to call for any fever, for prolonged or severe pain or bleeding, abnormal vaginal dischage. She was advised to use pain medications (ibuprofen) as needed for mild to moderate pain.     Richard Ledbetter MD

## 2023-03-10 ENCOUNTER — OFFICE VISIT (OUTPATIENT)
Dept: OTOLARYNGOLOGY | Facility: CLINIC | Age: 28
End: 2023-03-10
Payer: COMMERCIAL

## 2023-03-10 VITALS — HEART RATE: 75 BPM | DIASTOLIC BLOOD PRESSURE: 80 MMHG | SYSTOLIC BLOOD PRESSURE: 117 MMHG

## 2023-03-10 DIAGNOSIS — H69.93 DYSFUNCTION OF BOTH EUSTACHIAN TUBES: Primary | ICD-10-CM

## 2023-03-10 PROCEDURE — 99214 OFFICE O/P EST MOD 30 MIN: CPT | Performed by: OTOLARYNGOLOGY

## 2023-03-10 RX ORDER — AZELASTINE 1 MG/ML
2 SPRAY, METERED NASAL 2 TIMES DAILY
Qty: 30 ML | Refills: 1 | Status: SHIPPED | OUTPATIENT
Start: 2023-03-10 | End: 2023-05-18

## 2023-03-10 NOTE — NURSING NOTE
Genesis Cutler's chief complaint for this visit includes:    Chief Complaint   Patient presents with     RECHECK     Follow up for ETD. Pt use Budesonide for 6 weeks and stated that she didn't notice any change. Pt still feels fullness and crackling in right ear.        PCP: Richard Ledbetter         Referring Provider:    No referring provider defined for this encounter.         /80 (BP Location: Right arm, Patient Position: Sitting, Cuff Size: Adult Regular)   Pulse 75   LMP 02/07/2023 (Approximate)     Data Unavailable            Allergies   Allergen Reactions     Penicillins Rash                 Do you need any medication refills at today's visit? No     Jeannine Devine LPN on 3/10/2023 at 9:26 AM

## 2023-03-10 NOTE — LETTER
3/10/2023         RE: Genesis Cutler  [address]  [facility] [address]        Dear Colleague,    Thank you for referring your patient, Genesis Cutler, to the [facility]. Please see a copy of my visit note below.    HPI    This pleasant patient is here for the f/u. She continues to have plugged up sensation and crackling sound in her right ear since September of last year. Occurred after a waterside in [place]. She was given an ear drop with no benefit. She is on topical nasal steroid spray, Budesonide once a day with no improvement. States bilateral tinnitus but denies any otalgia, otorrhea, dizziness or vertigo. She has seasonal allergies around spring and fall. She wears a mouth guard for more than a year due to her TMJ dysfunction, grinding, clenching and tension headache.    Current Medications:  escitalopram (LEXAPRO) 20 MG tablet  melatonin 5 MG tablet  norgestimate-ethinyl estradiol (CLAUDIO) 0.25-35 MG-MCG tablet  plecanatide (TRULANCE) 3 MG tablet  polyethylene glycol (MIRALAX) 17 GM/Dose powder       Review of Systems   Constitutional: Negative.    HENT: Negative for congestion, ear discharge, ear pain, hearing loss, nosebleeds, sinus pain, sore throat and tinnitus.    Eyes: Negative for blurred vision, double vision and photophobia.   Respiratory: Negative for cough, hemoptysis, sputum production and stridor.    Gastrointestinal: Negative for heartburn, nausea and vomiting.   Skin: Negative.    Neurological: Negative for dizziness, tingling, tremors and headaches.   Endo/Heme/Allergies: Negative for environmental allergies. Does not bruise/bleed easily.         Physical Exam  Vitals reviewed.   Constitutional:       Appearance: Normal appearance.   HENT:      Head: Normocephalic and atraumatic.      Right Ear: Hearing, tympanic membrane, ear canal and external ear normal. No middle ear effusion.      Left Ear: Hearing, tympanic membrane, ear canal  and external ear normal.  No middle ear effusion.      Nose: Mucosal edema and congestion present. No rhinorrhea.      Right Turbinates: Swollen.      Left Turbinates: Swollen.      Mouth/Throat:      Mouth: Mucous membranes are moist.      Pharynx: Oropharynx is clear. Uvula midline.   Eyes:      Extraocular Movements: Extraocular movements intact.      Pupils: Pupils are equal, round, and reactive to light.   Neurological:      Mental Status: She is alert.       A/P  This pleasant patient is having right ETD for months. Hearing WNL bilaterally. 100% word rec. bilaterally. Tymps WNL. Present 1 kHz ipsi/contra reflexes bilaterally. Options including warm packs to right preauricular region, topical steroid and antihistaminic sprays were discussed. I will give her a topical nasal antihistaminic and steroid sprays, once a day, r/o TMJ dysfunction and f/u as needed.        Again, thank you for allowing me to participate in the care of your patient.        Sincerely,        Suleiman Bucio MD

## 2023-03-10 NOTE — PROGRESS NOTES
HPI    This pleasant patient is here for the f/u. She continues to have plugged up sensation and crackling sound in her right ear since September of last year. Occurred after a waterside in [place]. She was given an ear drop with no benefit. She is on topical nasal steroid spray, Budesonide once a day with no improvement. States bilateral tinnitus but denies any otalgia, otorrhea, dizziness or vertigo. She has seasonal allergies around spring and fall. She wears a mouth guard for more than a year due to her TMJ dysfunction, grinding, clenching and tension headache.    Current Medications:  escitalopram (LEXAPRO) 20 MG tablet  melatonin 5 MG tablet  norgestimate-ethinyl estradiol (CLAUDIO) 0.25-35 MG-MCG tablet  plecanatide (TRULANCE) 3 MG tablet  polyethylene glycol (MIRALAX) 17 GM/Dose powder       Review of Systems   Constitutional: Negative.    HENT: Negative for congestion, ear discharge, ear pain, hearing loss, nosebleeds, sinus pain, sore throat and tinnitus.    Eyes: Negative for blurred vision, double vision and photophobia.   Respiratory: Negative for cough, hemoptysis, sputum production and stridor.    Gastrointestinal: Negative for heartburn, nausea and vomiting.   Skin: Negative.    Neurological: Negative for dizziness, tingling, tremors and headaches.   Endo/Heme/Allergies: Negative for environmental allergies. Does not bruise/bleed easily.         Physical Exam  Vitals reviewed.   Constitutional:       Appearance: Normal appearance.   HENT:      Head: Normocephalic and atraumatic.      Right Ear: Hearing, tympanic membrane, ear canal and external ear normal. No middle ear effusion.      Left Ear: Hearing, tympanic membrane, ear canal and external ear normal.  No middle ear effusion.      Nose: Mucosal edema and congestion present. No rhinorrhea.      Right Turbinates: Swollen.      Left Turbinates: Swollen.      Mouth/Throat:      Mouth: Mucous membranes are moist.      Pharynx: Oropharynx is clear. Uvula  midline.   Eyes:      Extraocular Movements: Extraocular movements intact.      Pupils: Pupils are equal, round, and reactive to light.   Neurological:      Mental Status: She is alert.       A/P  This pleasant patient is having right ETD for months. Hearing WNL bilaterally. 100% word rec. bilaterally. Tymps WNL. Present 1 kHz ipsi/contra reflexes bilaterally. Options including warm packs to right preauricular region, topical steroid and antihistaminic sprays were discussed. I will give her a topical nasal antihistaminic and steroid sprays, once a day, r/o TMJ dysfunction and f/u as needed.

## 2023-04-10 ENCOUNTER — PATIENT OUTREACH (OUTPATIENT)
Dept: GASTROENTEROLOGY | Facility: CLINIC | Age: 28
End: 2023-04-10
Payer: COMMERCIAL

## 2023-05-02 ENCOUNTER — ANCILLARY PROCEDURE (OUTPATIENT)
Dept: CT IMAGING | Facility: CLINIC | Age: 28
End: 2023-05-02
Attending: FAMILY MEDICINE
Payer: COMMERCIAL

## 2023-05-02 DIAGNOSIS — R10.84 ABDOMINAL PAIN, GENERALIZED: ICD-10-CM

## 2023-05-02 PROCEDURE — 74177 CT ABD & PELVIS W/CONTRAST: CPT | Mod: GC | Performed by: RADIOLOGY

## 2023-05-02 RX ORDER — IOPAMIDOL 755 MG/ML
106 INJECTION, SOLUTION INTRAVASCULAR ONCE
Status: COMPLETED | OUTPATIENT
Start: 2023-05-02 | End: 2023-05-02

## 2023-05-02 RX ADMIN — IOPAMIDOL 106 ML: 755 INJECTION, SOLUTION INTRAVASCULAR at 16:11

## 2023-05-15 ENCOUNTER — TELEPHONE (OUTPATIENT)
Dept: GASTROENTEROLOGY | Facility: CLINIC | Age: 28
End: 2023-05-15
Payer: COMMERCIAL

## 2023-05-15 NOTE — TELEPHONE ENCOUNTER
Prior Authorization Retail Medication Request    Medication/Dose:   plecanatide (TRULANCE) 3 MG tablet 90 tablet 4 5/18/2022  No   Sig - Route: Take 1 tablet (3 mg) by mouth daily Please call Gastroenterology at [phone] to schedule appointment. Thank you. - Oral   Sent to pharmacy as: Trulance 3 MG Oral Tablet (plecanatide)       ICD code (if different than what is on RX):   Previously Tried and Failed:   Rationale:     Insurance Name:    Insurance ID:        Pharmacy Information (if different than what is on RX)  Name:    Phone:

## 2023-05-17 ENCOUNTER — OFFICE VISIT (OUTPATIENT)
Dept: GASTROENTEROLOGY | Facility: CLINIC | Age: 28
End: 2023-05-17
Payer: COMMERCIAL

## 2023-05-17 VITALS
WEIGHT: 193 LBS | HEIGHT: 65 IN | SYSTOLIC BLOOD PRESSURE: 119 MMHG | BODY MASS INDEX: 32.15 KG/M2 | HEART RATE: 103 BPM | OXYGEN SATURATION: 100 % | DIASTOLIC BLOOD PRESSURE: 73 MMHG

## 2023-05-17 DIAGNOSIS — K59.04 CHRONIC IDIOPATHIC CONSTIPATION: ICD-10-CM

## 2023-05-17 DIAGNOSIS — R10.13 ABDOMINAL PAIN, EPIGASTRIC: Primary | ICD-10-CM

## 2023-05-17 PROCEDURE — 99215 OFFICE O/P EST HI 40 MIN: CPT | Performed by: INTERNAL MEDICINE

## 2023-05-17 RX ORDER — DICYCLOMINE HYDROCHLORIDE 10 MG/1
CAPSULE ORAL
Qty: 30 CAPSULE | Refills: 1 | Status: SHIPPED | OUTPATIENT
Start: 2023-05-17

## 2023-05-17 ASSESSMENT — PAIN SCALES - GENERAL: PAINLEVEL: NO PAIN (0)

## 2023-05-17 NOTE — PATIENT INSTRUCTIONS
- check your vitamins and supplements for magnesium which can lead to looser stools  - continue Trulance - consider skipping 2-3 days a week given loose stools at this time  - monitor stool pattern with this change  - keep us in the loop about the prior authorization about the Trulance  - continue to use Miralax if getting backed up  - schedule an upper endoscopy at your earliest convenience  - use dicyclomine if recurrence of pain episode  - follow-up in GI clinic in 3-6 months'      If you have any questions, please don't hesitate to contact me through our GI RN Clinic Coordinator, Radha Irby, at [phone].

## 2023-05-17 NOTE — PROGRESS NOTES
GI CLINIC VISIT    CC: follow-up      ASSESSMENT/PLAN:    K59.04) Chronic idiopathic constipation  (primary encounter diagnosis)  (R10.13) Abdominal pain, epigastric  (primary encounter diagnosis)      Comment:    Per my prior documentation - prior normal colonoscopy, Sitz marker study, and essentially normal anorectal manometry/defecography. Initial response to laxatives (Miralax specifically) but requiring ever-increasing doses (>4x/day) with loss of efficacy. Linzess 290 mcg was effective for several months, then lost effect. Now on plecanatide with good response. Had side effect of worsening depression symptoms while on prucalopride/Motegrity (switch off of plecanatide/Trulance was initially necessitated by insurance coverage).  She is now back on plecanatide/Trulance and moving bowels easily on most days. Stools a bit looser now - reviewed unintentional sources of magnesium (vitamin supplements and muscle mass products) which could contribute, though she may need less plecanatide at this time - had skipped a few days due to pain and had no marked constipation/change in stool pattern. Discussed trial of using it just 4-5 days a week instead of 7.     For abd pain - recent normal CT is reassuring. Clinical presentation doesn't fit in any one category at this time. Given location and improvement with Pepto- would start with EGD to assess for ulceration, inflammation, and H pylori. Would also trial antispasmodic for pain episodes. Med discussed, questions addressed.         - check your vitamins and supplements for magnesium which can lead to looser stools  - continue Trulance - consider skipping 2-3 days a week given loose stools at this time  - monitor stool pattern with this change  - keep us in the loop about the prior authorization about the Trulance  - continue to use Miralax if getting backed up  - schedule an upper endoscopy at your earliest convenience  - use dicyclomine if recurrence of pain episode  -  follow-up in GI clinic in 3-6 months'        It was a pleasure to participate in the care of this patient; please contact us with any further questions.  A total of 40 face-to-face minutes was spent with this patient, >50% of which was counseling regarding the above delineated issues. An additional 15 minutes was spent on the date of the encounter doing chart review, documentation, and further activities as noted above. Additional (unbilled) time was spent in the day prior and after reviewing records and coordinating care.     Gayle Haro MD   of Medicine  Division of Gastroenterology, Hepatology and Nutrition  [facility]    ---------------------------------------------------------------------------------------------------  HPI:    Ms. Cutler is a 28 year old female who was initially seen in consultation with Jordan Du and Fabien of GI in July 2016 for constipation. She was initially seen by this writer in Nov 2016. Her last GI clinic visit was in May 2022. She returns today for follow-up.         Since her last visit,  Ms. Cutler reports she is doing well. She had her second bunion surgery (this time on the R) and reports recovery went well. Her primary concern is episodic abdominal pain (see below), but otherwise she feels things have been stable. Her new medications include Nexplanon, implanted in March 2022.        1. Constipation  Summarized/taken from my prior documentation:   Issues with constipation since early childhood but with no regular laxatives or daily medications as a child nor need for disimpactions, enemas, nor hospitalizations. She used OTC laxatives as needed through late childhood and teen years. She had two urgent care visits during these years for constipation-related abdominal pain.     Her constipation apparently worsened in her early twenties going up to 2 weeks without a satisfactory BM, (she may pass some small,[place] 1 stools with straining  "during this time). These periods are associated with significant bloating and LLQ pain which resolve after defecation.  She previously tried prunes/prune juice, OTC Mg citrate, an unknown laxative pill prn with minimal effect. Her [facility] MD put her on daily Miralax which initially was helpful, but its effect waned.  She reports a high vegetable and fruit intake, at least 70 oz of water daily, and daily physical activity which led to an intentional 80 lbs weight loss before 2016. Her prior work-up included a normal TSH, calcium, CMP, and hgb.    Since establishing in our clinic she has been [facility]2016 by Dr. Kailey Acevedo. Her testing was essentially normal with no evidence of Hirschsprung's, only mild perineal descent was noted. A Sitz marker study (after one week off of laxatives) was normal (no Sitz markers were seen on AXR done 5 days later). Though notably, she was on her period at that time (expectedly moving her bowels more frequently than her typical baseline).  A colonoscopy was done and was normal. Though as she was off of laxatives (for Sitz study, just prior to colonoscopy) and could only tolerate 4L Golytely (due to nausea, she was intended to take additional prep) her prep was rated at \"fair\".           For treatment she was on ever-increasing doses of fiber and Miralax (up to Miralax 4 times daily with morning coffee/caffeine ingestions) and the effect was lost over time. Ultimately, she was switched to Linzess 145 mcg, then up to 290 mcg with good response for a number of months, before response waned. In Jan 2018 we switched her to plecanatide/Trulance. With this she was moving her bowels nearly every day.  Over time to Colace stool softener pills were added to her regimen at times to avoid hard stools and straining.       Due to insurance she did have to transition off of Trulance and onto Motegrity.  Unfortunately she developing increased depression symptoms despite " "ongoing use of her mental health medications; this has been a reported effect of Motegrity. This medication was stopped and she was, ultimately able to get back on plecanatide/Trulance.     Today, Ms. Cutler continues on plecanatide/Trulance once daily. She has not needed to enact an action plan for worse symptoms/not needed additional bowel medications (e.g. Miralax) for months if not much longer per her recollection. Notably a few months ago (?Jan or February-ish?) she felt her stools became a bit looser. This was before the Nexplanon implant. Instead of one formed BM in the morning, Ms. Cutler is having a cluster of looser stools (about 2-3 BMs) over a few hours in the morning. They still have some form to them and she feels \"empty\" once the cluster is complete.   Hasn't needed Miralax for some time - months to years.      She notes that she is still awaiting the most recent refill of plecanatide/Trulance. It has been sent as well as the required prior authorization, but Ms. Cutler has not yet heard from her pharmacy.       2. Episodic abdominal pain   Summarized/taken from my prior documentation:    She has had and handful of episodes of abdominal pain in total since 2016. During the first one she presented to the [facility] ED in Nov 2016, work-up with US, CT, and basic labs was negative. As the CT scan showed a large stool burden she was told this was likely due to constipation and was discharged to home. A second pain episode (precisely the same in nature) occurred around Nov 2017. She did not seek care at that time and has been monitoring her symptoms closely. She has has two more pain events in winter 2018. The first one started around 2:30 pm and seemed to come on suddenly. The pain was intense and focused in the epigastric regions/RUQ with radiation to the back. The pain slowly dissipated, lasting two days in total. In March 2018 she had her fourth pain episode.  She recalls eating  at [employer] " [employer], but feels it was a typical meal. She awoke in the middle of the night with the same pain. She noted it was worse when lying down and better when sitting or hunched over. The pain is severe enough that she is unable to eat, drink, drive, perform ADLs. This pain remitted the following day. She denies f/c, n/v, or any other associated symptoms.      She has not been seen to have gallstones on prior CT or US imaging. A 2018 pelvic US  was normal. A HIDA scan was done sometime after her [facility] ED visit and was seen to be normal.  Through GI clinic we evaluated amylase, lipase, and hepatic panel were all normal. We attempted H pylori testing, but as she had no further pain episodes for months, she ultimately didn't complete the test. She reported another pain episode in Oct 2018.  She states she had a \"large, greasy Mexican meal\" and the pain came on within 30 minutes. She doesn't really recall any other associated symptoms or further details. The pain, as in the past, dissipated on its own after some time.      At her last GI clinic visit Ms. Cutler felt it had been a couple years since any severe episodes of pain. She felt that heavier meals may have been a contributor to her prior episodes and she identified quinoa as consistently leading to more milder abdominal cramps.      Today, Ms. Cutler reports three pain episodes this year, one in Jan which was more mild, one in April and another later in April/May. They are not precisely the same as her prior episodes in terms of character, timing, and location/radiation, but she wonders if they're similar. These current episodes seem to come on immediately after completing defecation, but while still on the toilet. The pain is high in the abdomen (much different location than her typical constipation cramping). It was described as \"achy\" and remained constant for hours, at times with spikes of increased pain. The pain would dissipate on its own after a few hours " and would recur the same way the next morning for several days in a row. The longest episode was pain recurring each morning for 10 days. The spikes of intense pain did not seem to be triggered by anything in particular; she did not feel eating or movement brought them on or worsened the pain. She did take Pepto-Bismol for awhile and she found this helpful, but stopped due to concern for worsening constipation.he did skip a couple doses of Trulance to see if this helped her pain; it did not. She couldn't identify any other aggravating or alleviating factors.  She did have a neck rash which developed during the 10 day episode and resolved spontaneously, though this did not occur with other episodes and unclear if truly related. She denies any other associated symptoms. During these episodes she hadn't noted any difference with her bowel pattern.      She did have a abd/pelvis CT scan recently which was normal. Her PCP reportedly wondered if Nexplanon was playing a role (abd pain is a listed side effect). Ms. Cutler has overall felt normal for the last few weeks.       PROBLEM LIST  Patient Active Problem List    Diagnosis Date Noted     Tailor's bunion of both feet 02/24/2022     Priority: Medium     Added automatically from request for surgery [id number]       Biceps tendinopathy, right 07/03/2020     Priority: Medium     Added automatically from request for surgery [id number]       Anxiety and depression 02/24/2020     Priority: Medium       PERTINENT MEDICATIONS:  Current Outpatient Medications   Medication     escitalopram (LEXAPRO) 20 MG tablet     etonogestrel (NEXPLANON) 68 MG IMPL     melatonin 5 MG tablet     plecanatide (TRULANCE) 3 MG tablet     polyethylene glycol (MIRALAX) 17 GM/Dose powder     azelastine (ASTELIN) 0.1 % nasal spray     Current Facility-Administered Medications   Medication     lidocaine 2%-EPINEPHrine 1:100,000 injection 1 mL   Calcium pills  Creatine powder      PHYSICAL  "EXAMINATION:  Vitals /73   Pulse 103   Ht 1.651 m (5' 5\")   Wt 87.5 kg (193 lb)   SpO2 100%   BMI 32.12 kg/m     Wt   Wt Readings from Last 2 Encounters:   05/17/23 87.5 kg (193 lb)   03/03/23 86.8 kg (191 lb 4.8 oz)      Gen: Pt sitting up in NAD, interactive and cooperative on exam  Eyes: sclerae anicteric, no injection  Cardiac: RRR, nl S1, S2, no peripheral edema  Resp: Clear b/l  GI: Normoactive BS, abd soft, flat, nontender  Skin: Warm, dry, no jaundice, nails appear healthy  Neuro: alert, oriented, answers questions appropriately      PERTINENT STUDIES:    Office Visit on 09/27/2022   Component Date Value Ref Range Status     Cholesterol 09/27/2022 167  <200 mg/dL Final     Triglycerides 09/27/2022 72  <150 mg/dL Final     Direct Measure HDL 09/27/2022 60  >=50 mg/dL Final     LDL Cholesterol Calculated 09/27/2022 93  <=100 mg/dL Final     Non HDL Cholesterol 09/27/2022 107  <130 mg/dL Final     Patient Fasting > 8hrs? 09/27/2022 No   Final     TSH 09/27/2022 2.64  0.40 - 4.00 mU/L Final     Creatinine 09/27/2022 0.61  0.52 - 1.04 mg/dL Final     GFR Estimate 09/27/2022 >90  >60 mL/min/1.73m2 Final     Hemoglobin 09/27/2022 14.2  11.7 - 15.7 g/dL Final     hCG Urine Qualitative 09/27/2022 Negative  Negative Final       "

## 2023-05-17 NOTE — LETTER
5/17/2023         RE: Genesis Cutler  [address]  [facility] [address]        Dear Colleague,    Thank you for referring your patient, Genesis Cutler, to the [facility] [place]. Please see a copy of my visit note below.    GI CLINIC VISIT    CC: follow-up      ASSESSMENT/PLAN:    K59.04) Chronic idiopathic constipation  (primary encounter diagnosis)  (R10.13) Abdominal pain, epigastric  (primary encounter diagnosis)      Comment:    Per my prior documentation - prior normal colonoscopy, Sitz marker study, and essentially normal anorectal manometry/defecography. Initial response to laxatives (Miralax specifically) but requiring ever-increasing doses (>4x/day) with loss of efficacy. Linzess 290 mcg was effective for several months, then lost effect. Now on plecanatide with good response. Had side effect of worsening depression symptoms while on prucalopride/Motegrity (switch off of plecanatide/Trulance was initially necessitated by insurance coverage).  She is now back on plecanatide/Trulance and moving bowels easily on most days. Stools a bit looser now - reviewed unintentional sources of magnesium (vitamin supplements and muscle mass products) which could contribute, though she may need less plecanatide at this time - had skipped a few days due to pain and had no marked constipation/change in stool pattern. Discussed trial of using it just 4-5 days a week instead of 7.     For abd pain - recent normal CT is reassuring. Clinical presentation doesn't fit in any one category at this time. Given location and improvement with Pepto- would start with EGD to assess for ulceration, inflammation, and H pylori. Would also trial antispasmodic for pain episodes. Med discussed, questions addressed.         - check your vitamins and supplements for magnesium which can lead to looser stools  - continue Trulance - consider skipping 2-3 days a week given loose  stools at this time  - monitor stool pattern with this change  - keep us in the loop about the prior authorization about the Trulance  - continue to use Miralax if getting backed up  - schedule an upper endoscopy at your earliest convenience  - use dicyclomine if recurrence of pain episode  - follow-up in GI clinic in 3-6 months'        It was a pleasure to participate in the care of this patient; please contact us with any further questions.  A total of 40 face-to-face minutes was spent with this patient, >50% of which was counseling regarding the above delineated issues. An additional 15 minutes was spent on the date of the encounter doing chart review, documentation, and further activities as noted above. Additional (unbilled) time was spent in the day prior and after reviewing records and coordinating care.     Gayle Haro MD   of Medicine  Division of Gastroenterology, Hepatology and Nutrition  [facility]    ---------------------------------------------------------------------------------------------------  HPI:    Ms. Cutler is a 28 year old female who was initially seen in consultation with Jordan Du and Fabien of GI in July 2016 for constipation. She was initially seen by this writer in Nov 2016. Her last GI clinic visit was in May 2022. She returns today for follow-up.         Since her last visit,  Ms. Cutler reports she is doing well. She had her second bunion surgery (this time on the R) and reports recovery went well. Her primary concern is episodic abdominal pain (see below), but otherwise she feels things have been stable. Her new medications include Nexplanon, implanted in March 2022.        1. Constipation  Summarized/taken from my prior documentation:   Issues with constipation since early childhood but with no regular laxatives or daily medications as a child nor need for disimpactions, enemas, nor hospitalizations. She used OTC laxatives as needed  "through late childhood and teen years. She had two urgent care visits during these years for constipation-related abdominal pain.     Her constipation apparently worsened in her early twenties going up to 2 weeks without a satisfactory BM, (she may pass some small,Rabun 1 stools with straining during this time). These periods are associated with significant bloating and LLQ pain which resolve after defecation.  She previously tried prunes/prune juice, OTC Mg citrate, an unknown laxative pill prn with minimal effect. Her Avi MD put her on daily Miralax which initially was helpful, but its effect waned.  She reports a high vegetable and fruit intake, at least 70 oz of water daily, and daily physical activity which led to an intentional 80 lbs weight loss before 2016. Her prior work-up included a normal TSH, calcium, CMP, and hgb.    Since establishing in our clinic she has [facility]2016 by Dr. Kailey Acevedo. Her testing was essentially normal with no evidence of Hirschsprung's, only mild perineal descent was noted. A Sitz marker study (after one week off of laxatives) was normal (no Sitz markers were seen on AXR done 5 days later). Though notably, she was on her period at that time (expectedly moving her bowels more frequently than her typical baseline).  A colonoscopy was done and was normal. Though as she was off of laxatives (for Sitz study, just prior to colonoscopy) and could only tolerate 4L Golytely (due to nausea, she was intended to take additional prep) her prep was rated at \"fair\".           For treatment she was on ever-increasing doses of fiber and Miralax (up to Miralax 4 times daily with morning coffee/caffeine ingestions) and the effect was lost over time. Ultimately, she was switched to Linzess 145 mcg, then up to 290 mcg with good response for a number of months, before response waned. In Jan 2018 we switched her to plecanatide/Trulance. With this she was moving " "her bowels nearly every day.  Over time to Colace stool softener pills were added to her regimen at times to avoid hard stools and straining.       Due to insurance she did have to transition off of Trulance and onto Motegrity.  Unfortunately she developing increased depression symptoms despite ongoing use of her mental health medications; this has been a reported effect of Motegrity. This medication was stopped and she was, ultimately able to get back on plecanatide/Trulance.     Today, Ms. Cutler continues on plecanatide/Trulance once daily. She has not needed to enact an action plan for worse symptoms/not needed additional bowel medications (e.g. Miralax) for months if not much longer per her recollection. Notably a few months ago (?Jan or February-ish?) she felt her stools became a bit looser. This was before the Nexplanon implant. Instead of one formed BM in the morning, Ms. Cutler is having a cluster of looser stools (about 2-3 BMs) over a few hours in the morning. They still have some form to them and she feels \"empty\" once the cluster is complete.   Hasn't needed Miralax for some time - months to years.      She notes that she is still awaiting the most recent refill of plecanatide/Trulance. It has been sent as well as the required prior authorization, but Ms. Cutler has not yet heard from her pharmacy.       2. Episodic abdominal pain   Summarized/taken from my prior documentation:    She has had and handful of episodes of abdominal pain in total since 2016. During the first one she [place] [facility] [place], work-up with US, CT, and basic labs was negative. As the CT scan showed a large stool burden she was told this was likely due to constipation and was discharged to home. A second pain episode (precisely the same in nature) occurred around Nov 2017. She did not seek care at that time and has been monitoring her symptoms closely. She has has two more pain events in winter 2018. The first " "one started around 2:30 pm and seemed to come on suddenly. The pain was intense and focused in the epigastric regions/RUQ with radiation to the back. The pain slowly dissipated, lasting two days in total. In March 2018 she had her fourth pain episode.  She recalls eating  at "[employer] and [employer], but feels it was a typical meal. She awoke in the middle of the night with the same pain. She noted it was worse when lying down and better when sitting or hunched over. The pain is severe enough that she is unable to eat, drink, drive, perform ADLs. This pain remitted the following day. She denies f/c, n/v, or any other associated symptoms.      She has not been seen to have gallstones on prior CT or US imaging. A 2018 pelvic US  was normal. A HIDA scan was done sometime after her [facility] ED visit and was seen to be normal.  Through GI clinic we evaluated amylase, lipase, and hepatic panel were all normal. We attempted H pylori testing, but as she had no further pain episodes for months, she ultimately didn't complete the test. She reported another pain episode in Oct 2018.  She states she had a \"large, greasy Mexican meal\" and the pain came on within 30 minutes. She doesn't really recall any other associated symptoms or further details. The pain, as in the past, dissipated on its own after some time.      At her last GI clinic visit Ms. Cutler felt it had been a couple years since any severe episodes of pain. She felt that heavier meals may have been a contributor to her prior episodes and she identified quinoa as consistently leading to more milder abdominal cramps.      Today, Ms. Cutler reports three pain episodes this year, one in Jan which was more mild, one in April and another later in April/May. They are not precisely the same as her prior episodes in terms of character, timing, and location/radiation, but she wonders if they're similar. These current episodes seem to come on immediately after completing " "defecation, but while still on the toilet. The pain is high in the abdomen (much different location than her typical constipation cramping). It was described as \"achy\" and remained constant for hours, at times with spikes of increased pain. The pain would dissipate on its own after a few hours and would recur the same way the next morning for several days in a row. The longest episode was pain recurring each morning for 10 days. The spikes of intense pain did not seem to be triggered by anything in particular; she did not feel eating or movement brought them on or worsened the pain. She did take Pepto-Bismol for awhile and she found this helpful, but stopped due to concern for worsening constipation.he did skip a couple doses of Trulance to see if this helped her pain; it did not. She couldn't identify any other aggravating or alleviating factors.  She did have a neck rash which developed during the 10 day episode and resolved spontaneously, though this did not occur with other episodes and unclear if truly related. She denies any other associated symptoms. During these episodes she hadn't noted any difference with her bowel pattern.      She did have a abd/pelvis CT scan recently which was normal. Her PCP reportedly wondered if Nexplanon was playing a role (abd pain is a listed side effect). Ms. Cutler has overall felt normal for the last few weeks.       PROBLEM LIST  Patient Active Problem List    Diagnosis Date Noted    Tailor's bunion of both feet 02/24/2022     Priority: Medium     Added automatically from request for surgery [id number]      Biceps tendinopathy, right 07/03/2020     Priority: Medium     Added automatically from request for surgery [id number]      Anxiety and depression 02/24/2020     Priority: Medium       PERTINENT MEDICATIONS:  Current Outpatient Medications   Medication    escitalopram (LEXAPRO) 20 MG tablet    etonogestrel (NEXPLANON) 68 MG IMPL    melatonin 5 MG tablet    plecanatide " "(TRULANCE) 3 MG tablet    polyethylene glycol (MIRALAX) 17 GM/Dose powder    azelastine (ASTELIN) 0.1 % nasal spray     Current Facility-Administered Medications   Medication    lidocaine 2%-EPINEPHrine 1:100,000 injection 1 mL   Calcium pills  Creatine powder      PHYSICAL EXAMINATION:  Vitals /73   Pulse 103   Ht 1.651 m (5' 5\")   Wt 87.5 kg (193 lb)   SpO2 100%   BMI 32.12 kg/m     Wt   Wt Readings from Last 2 Encounters:   05/17/23 87.5 kg (193 lb)   03/03/23 86.8 kg (191 lb 4.8 oz)      Gen: Pt sitting up in NAD, interactive and cooperative on exam  Eyes: sclerae anicteric, no injection  Cardiac: RRR, nl S1, S2, no peripheral edema  Resp: Clear b/l  GI: Normoactive BS, abd soft, flat, nontender  Skin: Warm, dry, no jaundice, nails appear healthy  Neuro: alert, oriented, answers questions appropriately      PERTINENT STUDIES:    Office Visit on 09/27/2022   Component Date Value Ref Range Status    Cholesterol 09/27/2022 167  <200 mg/dL Final    Triglycerides 09/27/2022 72  <150 mg/dL Final    Direct Measure HDL 09/27/2022 60  >=50 mg/dL Final    LDL Cholesterol Calculated 09/27/2022 93  <=100 mg/dL Final    Non HDL Cholesterol 09/27/2022 107  <130 mg/dL Final    Patient Fasting > 8hrs? 09/27/2022 No   Final    TSH 09/27/2022 2.64  0.40 - 4.00 mU/L Final    Creatinine 09/27/2022 0.61  0.52 - 1.04 mg/dL Final    GFR Estimate 09/27/2022 >90  >60 mL/min/1.73m2 Final    Hemoglobin 09/27/2022 14.2  11.7 - 15.7 g/dL Final    hCG Urine Qualitative 09/27/2022 Negative  Negative Final           Again, thank you for allowing me to participate in the care of your patient.      Sincerely,    Gayle Haro MD    "

## 2023-05-17 NOTE — NURSING NOTE
"Chief Complaint   Patient presents with     Follow Up       Vitals:    05/17/23 0850   BP: 119/73   Pulse: 103   SpO2: 100%   Weight: 87.5 kg (193 lb)   Height: 1.651 m (5' 5\")       Body mass index is 32.12 kg/m .    Valerie Lovell MA    "

## 2023-05-19 DIAGNOSIS — K59.04 CHRONIC IDIOPATHIC CONSTIPATION: ICD-10-CM

## 2023-05-19 NOTE — TELEPHONE ENCOUNTER
Central Prior Authorization Team   Phone: [phone]      PA Initiation    Medication: PLECANATIDE 3 MG PO TABS  Insurance Company: [employer] ([facility]) - Phone [phone] Fax [phone]  Pharmacy Filling the Rx: [id number] - [place], [place] - [address]  Filling Pharmacy Phone: [phone]  Filling Pharmacy Fax:    Start Date: 5/19/2023

## 2023-05-22 ENCOUNTER — MYC MEDICAL ADVICE (OUTPATIENT)
Dept: GASTROENTEROLOGY | Facility: CLINIC | Age: 28
End: 2023-05-22
Payer: COMMERCIAL

## 2023-05-22 DIAGNOSIS — K59.04 CHRONIC IDIOPATHIC CONSTIPATION: ICD-10-CM

## 2023-05-23 RX ORDER — PLECANATIDE 3 MG/1
3 TABLET ORAL DAILY
Qty: 90 TABLET | Refills: 4 | Status: SHIPPED | OUTPATIENT
Start: 2023-05-23 | End: 2024-08-21

## 2023-05-23 RX ORDER — PLECANATIDE 3 MG/1
TABLET ORAL
Qty: 90 TABLET | Refills: 4 | OUTPATIENT
Start: 2023-05-23

## 2023-05-23 NOTE — TELEPHONE ENCOUNTER
Rx Refill Request:  [facility]  Last Written Prescription Date: 5/18/22  Last Fill Quantity:  90,   # refills: 4    Future Office visit: Not scheduled  Last Office Visit :  5/17/23  Recommendations last office visit per Dr. Haro- continue Trulance - consider skipping 2-3 days a week given loose stools at this time    Refills sent to [facility]

## 2023-05-25 ENCOUNTER — TELEPHONE (OUTPATIENT)
Dept: GASTROENTEROLOGY | Facility: CLINIC | Age: 28
End: 2023-05-25
Payer: COMMERCIAL

## 2023-05-25 NOTE — TELEPHONE ENCOUNTER
"LVM and sent mychart for patient to schedule:    \"RETURN GI\" in person/virtual visit with Dr. Haro for 3-6 mo follow up (between August-Nov 2023)  "

## 2023-05-31 NOTE — TELEPHONE ENCOUNTER
Prior Authorization Approval            Medication: PLECANATIDE 3 MG PO TABS  Authorization Effective Date: 5/19/2023  Authorization Expiration Date: 5/19/2024  Approved Dose/Quantity: 90 per 90 days  Reference #: [id number]   Insurance Company: [employer] ([facility]) - Phone [phone] Fax [phone]  Expected CoPay:       CoPay Card Available:      Financial Assistance Needed:   Which [facility] is filling the [id number] [place] [address]  Pharmacy Notified: Yes  Patient Notified: No

## 2023-06-13 ENCOUNTER — OFFICE VISIT (OUTPATIENT)
Dept: FAMILY MEDICINE | Facility: CLINIC | Age: 28
End: 2023-06-13
Payer: COMMERCIAL

## 2023-06-13 VITALS
OXYGEN SATURATION: 100 % | HEIGHT: 65 IN | SYSTOLIC BLOOD PRESSURE: 115 MMHG | RESPIRATION RATE: 12 BRPM | HEART RATE: 86 BPM | TEMPERATURE: 98.7 F | BODY MASS INDEX: 32.12 KG/M2 | DIASTOLIC BLOOD PRESSURE: 77 MMHG

## 2023-06-13 DIAGNOSIS — Z30.011 BCP (BIRTH CONTROL PILLS) INITIATION: ICD-10-CM

## 2023-06-13 DIAGNOSIS — Z30.46 NEXPLANON REMOVAL: Primary | ICD-10-CM

## 2023-06-13 PROCEDURE — 11982 REMOVE DRUG IMPLANT DEVICE: CPT | Performed by: FAMILY MEDICINE

## 2023-06-13 RX ORDER — NORGESTIMATE AND ETHINYL ESTRADIOL 0.25-0.035
1 KIT ORAL DAILY
Qty: 84 TABLET | Refills: 3 | Status: SHIPPED | OUTPATIENT
Start: 2023-06-13 | End: 2024-04-16

## 2023-06-13 ASSESSMENT — PATIENT HEALTH QUESTIONNAIRE - PHQ9
SUM OF ALL RESPONSES TO PHQ QUESTIONS 1-9: 3
10. IF YOU CHECKED OFF ANY PROBLEMS, HOW DIFFICULT HAVE THESE PROBLEMS MADE IT FOR YOU TO DO YOUR WORK, TAKE CARE OF THINGS AT HOME, OR GET ALONG WITH OTHER PEOPLE: NOT DIFFICULT AT ALL
SUM OF ALL RESPONSES TO PHQ QUESTIONS 1-9: 3

## 2023-06-13 ASSESSMENT — ANXIETY QUESTIONNAIRES
GAD7 TOTAL SCORE: 3
GAD7 TOTAL SCORE: 3
3. WORRYING TOO MUCH ABOUT DIFFERENT THINGS: NOT AT ALL
2. NOT BEING ABLE TO STOP OR CONTROL WORRYING: NOT AT ALL
8. IF YOU CHECKED OFF ANY PROBLEMS, HOW DIFFICULT HAVE THESE MADE IT FOR YOU TO DO YOUR WORK, TAKE CARE OF THINGS AT HOME, OR GET ALONG WITH OTHER PEOPLE?: NOT DIFFICULT AT ALL
GAD7 TOTAL SCORE: 3
7. FEELING AFRAID AS IF SOMETHING AWFUL MIGHT HAPPEN: NOT AT ALL
IF YOU CHECKED OFF ANY PROBLEMS ON THIS QUESTIONNAIRE, HOW DIFFICULT HAVE THESE PROBLEMS MADE IT FOR YOU TO DO YOUR WORK, TAKE CARE OF THINGS AT HOME, OR GET ALONG WITH OTHER PEOPLE: NOT DIFFICULT AT ALL
4. TROUBLE RELAXING: SEVERAL DAYS
7. FEELING AFRAID AS IF SOMETHING AWFUL MIGHT HAPPEN: NOT AT ALL
1. FEELING NERVOUS, ANXIOUS, OR ON EDGE: SEVERAL DAYS
6. BECOMING EASILY ANNOYED OR IRRITABLE: NOT AT ALL
5. BEING SO RESTLESS THAT IT IS HARD TO SIT STILL: SEVERAL DAYS

## 2023-06-13 ASSESSMENT — PAIN SCALES - GENERAL: PAINLEVEL: NO PAIN (0)

## 2023-06-13 NOTE — PROGRESS NOTES
Nexplanon Removal:     Is a pregnancy test required: No.  Was a consent obtained?  Yes    Genesis Cutler is here for removal of etonogestrel implant Nexplanon/Implanon    Indication: Abnormal excessive urinary bleeding, acute abdominal pain since Nexplanon placement.  Negative CT scan abdomen pelvis.      Preoperative Diagnosis: etonogestrel implant  Postoperative Diagnosis: etonogestrel implant removed    Technique: On the left arm  Skin prep Betadine  Anesthesia 1% lidocaine  Procedure: Small incision (<5mm) was made at distal end of palpable implant, curved hemostat or mosquito forceps was used to isolate the implant and bring it to the incision, the fibrous capsule containing the implant  was incised and the Implant was removed intact.    EBL: minimal  Complications:  No  Tolerance:  Pt tolerated procedure well and was in stable condition.   Dressing:    A pressure bandage was placed for the next 12-24 hours.    Contraception was discussed and patient chose the following method oral contraceptives      Follow up: Pt was instructed to call if bleeding, severe pain or foul smell.     Richard Ledbetter MD  Subjective   Sarah is a 28 year old, presenting for the following health issues:  No chief complaint on file.        6/13/2023     1:34 PM   Additional Questions   Roomed by Susan Murillo CMA   Accompanied by self         6/13/2023     1:34 PM   Patient Reported Additional Medications   Patient reports taking the following new medications none           Answers for HPI/ROS submitted by the patient on 6/13/2023  If you checked off any problems, how difficult have these problems made it for you to do your work, take care of things at home, or get along with other people?: Not difficult at all  PHQ9 TOTAL SCORE: 3  NEGRO 7 TOTAL SCORE: 3

## 2023-07-05 ENCOUNTER — PATIENT OUTREACH (OUTPATIENT)
Dept: CARE COORDINATION | Facility: CLINIC | Age: 28
End: 2023-07-05
Payer: COMMERCIAL

## 2023-07-19 ENCOUNTER — PATIENT OUTREACH (OUTPATIENT)
Dept: CARE COORDINATION | Facility: CLINIC | Age: 28
End: 2023-07-19
Payer: COMMERCIAL

## 2023-08-23 ENCOUNTER — VIRTUAL VISIT (OUTPATIENT)
Dept: GASTROENTEROLOGY | Facility: CLINIC | Age: 28
End: 2023-08-23
Attending: INTERNAL MEDICINE
Payer: COMMERCIAL

## 2023-08-23 VITALS — BODY MASS INDEX: 32.12 KG/M2 | HEIGHT: 65 IN

## 2023-08-23 DIAGNOSIS — R10.13 ABDOMINAL PAIN, EPIGASTRIC: ICD-10-CM

## 2023-08-23 DIAGNOSIS — K59.04 CHRONIC IDIOPATHIC CONSTIPATION: Primary | ICD-10-CM

## 2023-08-23 PROCEDURE — 99215 OFFICE O/P EST HI 40 MIN: CPT | Mod: VID | Performed by: INTERNAL MEDICINE

## 2023-08-23 ASSESSMENT — PAIN SCALES - GENERAL: PAINLEVEL: NO PAIN (0)

## 2023-08-23 NOTE — PROGRESS NOTES
"Virtual Visit Details    Type of service:  Video Visit   Video Start Time: 10:48 a.m.  Video End Time: 11:06 a.m.  Originating Location (pt. Location): Home  Distant Location (provider location):  On-site  Platform used for Video Visit: Andi    ----------------------------  GI CLINIC VISIT    CC: follow-up      ASSESSMENT/PLAN:    K59.04) Chronic idiopathic constipation  (primary encounter diagnosis)  (R10.13) Abdominal pain, epigastric  (primary encounter diagnosis)      Comment:    Per my prior documentation - prior normal colonoscopy, Sitz marker study, and essentially normal anorectal manometry/defecography. Initial response to laxatives (Miralax specifically) but requiring ever-increasing doses (>4x/day) with loss of efficacy. Linzess 290 mcg was effective for several months, then lost effect. Now on plecanatide with good response. Had side effect of worsening depression symptoms while on prucalopride/Motegrity (switch off of plecanatide/Trulance was initially necessitated by insurance coverage).  She continues with plecanatide/Trulance,now using every other day or so (was having too many loose stools with every day use).      For abd pain - will continue to monitor,though episodes seemed to have resolved with removal of Nexplanon (pt would have expected, timing-wise, to have had further episodes in this timeframe).       -  if abdominal pain recurs - ok to take dicyclomine 10 mg once, and then a second dose an hour later if still uncomfortable.  - ok to continue Trulance every other day to every day   - consider adding Citrucel 1-3 times daily to \"soak up\" some of the liquid in the colon from Trulance  - follow-up in GI clinic in 6 months      It was a pleasure to participate in the care of this patient; please contact us with any further questions.  A total of 18 face-to-face minutes was spent with this patient, >50% of which was counseling regarding the above delineated issues. An additional 22 minutes was " spent on the date of the encounter doing chart review, documentation, care coordination, and further activities as noted above.    Gayle Haro MD   of Medicine  Division of Gastroenterology, Hepatology and Nutrition  [facility]    ---------------------------------------------------------------------------------------------------  HPI:    Ms. Cutler is a 28 year old female who was initially seen in consultation with Jordan Du and Fabien of GI in July 2016 for constipation. She was initially seen by this writer in Nov 2016. Her last GI clinic visit was in May 2023. She returns today for follow-up.         Since her last visit,  Ms. Cutler reports she is doing well. Since her last visit she has had her Nexplanon implant taken out which she feels has helped her episodic abdominal pain (see below). Her summer is going well. She is staying active (rock climbing this weekend) and is looking forward to the state fair.         1. Constipation  Summarized/taken from my prior documentation:   Issues with constipation since early childhood but with no regular laxatives or daily medications as a child nor need for disimpactions, enemas, nor hospitalizations. She used OTC laxatives as needed through late childhood and teen years. She had two urgent care visits during these years for constipation-related abdominal pain.     Her constipation apparently worsened in her early twenties going up to 2 weeks without a satisfactory BM, (she may pass some small,Charlotte 1 stools with straining during this time). These periods are associated with significant bloating and LLQ pain which resolve after defecation.  She previously tried prunes/prune juice, OTC Mg citrate, an unknown laxative pill prn with minimal effect. Her Avi COSTA put her on daily Miralax which initially was helpful, but its effect waned.  She reports a high vegetable and fruit intake, at least 70 oz of water daily, and daily  "physical activity which led to an intentional 80 lbs weight loss before 2016. Her prior work-up included a normal TSH, calcium, CMP, and hgb.    Since establishing in our clinic she has been [facility]2016 by Dr. Kailey Acevedo. Her testing was essentially normal with no evidence of Hirschsprung's, only mild perineal descent was noted. A Sitz marker study (after one week off of laxatives) was normal (no Sitz markers were seen on AXR done 5 days later). Though notably, she was on her period at that time (expectedly moving her bowels more frequently than her typical baseline).  A colonoscopy was done and was normal. Though as she was off of laxatives (for Sitz study, just prior to colonoscopy) and could only tolerate 4L Golytely (due to nausea, she was intended to take additional prep) her prep was rated at \"fair\".           For treatment she was on ever-increasing doses of fiber and Miralax (up to Miralax 4 times daily with morning coffee/caffeine ingestions) and the effect was lost over time. Ultimately, she was switched to Linzess 145 mcg, then up to 290 mcg with good response for a number of months, before response waned. In Jan 2018 we switched her to plecanatide/Trulance. With this she was moving her bowels nearly every day.  Over time to Colace stool softener pills were added to her regimen at times to avoid hard stools and straining.       Due to insurance she did have to transition off of Trulance and onto Motegrity.  Unfortunately she developing increased depression symptoms despite ongoing use of her mental health medications; this has been a reported effect of Motegrity. This medication was stopped and she was, ultimately able to get back on plecanatide/Trulance.     At her last visit, Ms. Cutler was tolerating the plecanatide/Trulance well but had noted her stools were getting looser with it's use. We discussed skipping doses occasionally to avoid frequent diarrhea as well as " ensuring no vitamins or supplements contained any additional magnesium.    Today, she reports she cut back on plecanatide/Trulance to every other day due to loose stools. On the days she takes the medication her BMs are generally passed as a cluster of two liquid BMs in the morning with no further stools that day. Stools are satisfactory in volume and she feels empty afterward with no bloating.  On days she skips taking plecanatide/Trulance, she may or may not have a BM. If doesn't have a BM, she generally feels well, but may have some bloating at end of the day. She recalls an episode of going two days without a BM earlier in the month and was a bit more uncomfortable.  She has not needed Miralax as a rescue med is quite awhile.        2. Episodic abdominal pain   Summarized/taken from my prior documentation:    She has had and handful of episodes of abdominal pain in total since 2016. During the first one she presented to the [facility] ED in Nov 2016, work-up with US, CT, and basic labs was negative. As the CT scan showed a large stool burden she was told this was likely due to constipation and was discharged to home. A second pain episode (precisely the same in nature) occurred around Nov 2017. She did not seek care at that time and has been monitoring her symptoms closely. She has has two more pain events in winter 2018. The first one started around 2:30 pm and seemed to come on suddenly. The pain was intense and focused in the epigastric regions/RUQ with radiation to the back. The pain slowly dissipated, lasting two days in total. In March 2018 she had her fourth pain episode.  She recalls eating  at [employer], but feels it was a typical meal. She awoke in the middle of the night with the same pain. She noted it was worse when lying down and better when sitting or hunched over. The pain is severe enough that she is unable to eat, drink, drive, perform ADLs. This pain remitted the following day. She denies  "f/c, n/v, or any other associated symptoms.      She has not been seen to have gallstones on prior CT or US imaging. A 2018 pelvic US  was normal. A HIDA scan was done sometime after her [facility] [place] and was seen to be normal.  Through GI clinic we evaluated amylase, lipase, and hepatic panel were all normal. We attempted H pylori testing, but as she had no further pain episodes for months, she ultimately didn't complete the test. She reported another pain episode in Oct 2018.  She states she had a \"large, greasy Mexican meal\" and the pain came on within 30 minutes. She doesn't really recall any other associated symptoms or further details. The pain, as in the past, dissipated on its own after some time.       Over time, Ms. Cutler felt it had been a couple years since any severe episodes of pain. She felt that heavier meals may have been a contributor to her prior episodes and she identified quinoa as consistently leading to more milder abdominal cramps.       At her recent visit in May 2023, Ms. Cutler reported three pain episodes this year, one in Jan which was more mild, one in April and another later in April/May. They were somewhat different from her prior pain episodes. These current episodes seem to come on immediately after completing defecation, but while still on the toilet. The pain is high in the abdomen (much different location than her typical constipation cramping). It was described as \"achy\" and remained constant for hours, at times with spikes of increased pain. The pain would dissipate on its own after a few hours and would recur the same way the next morning for several days in a row. The longest episode was pain recurring each morning for 10 days. The spikes of intense pain did not seem to be triggered by anything in particular; she did not feel eating or movement brought them on or worsened the pain. She did take Pepto-Bismol for awhile and she found this helpful, but stopped due to " concern for worsening constipation.he did skip a couple doses of Trulance to see if this helped her pain; it did not. She couldn't identify any other aggravating or alleviating factors.  She did have a neck rash which developed during the 10 day episode and resolved spontaneously, though this did not occur with other episodes and unclear if truly related. She denies any other associated symptoms. During these episodes she hadn't noted any difference with her bowel pattern.       She did have a abd/pelvis CT scan recently which was normal. Her PCP reportedly wondered if Nexplanon was playing a role (abd pain is a listed side effect). Ms. Cutler has overall felt normal in-between.      We ordered an EGD to investigate given upper pain and perceived response to Pepto-Bismol, but there were issues getting this scheduled and it, ultimately did not happen. Ms. Cutler reports she tried dicyclomine (10 mg) with an episode of pain and wasn't sure if it worked. The pain didn't really get much better, but it ultimately stopped after a couple hours. Today, as noted above her Nexplanon was removed and she has not had further episodes of abd pain.      PROBLEM LIST  Patient Active Problem List    Diagnosis Date Noted    Tailor's bunion of both feet 02/24/2022     Priority: Medium     Added automatically from request for surgery [id number]      Biceps tendinopathy, right 07/03/2020     Priority: Medium     Added automatically from request for surgery [id number]      Anxiety and depression 02/24/2020     Priority: Medium       PERTINENT MEDICATIONS:  Current Outpatient Medications   Medication    dicyclomine (BENTYL) 10 MG capsule    escitalopram (LEXAPRO) 20 MG tablet    melatonin 5 MG tablet    norgestimate-ethinyl estradiol (ORTHO-CYCLEN) 0.25-35 MG-MCG tablet    plecanatide (TRULANCE) 3 MG tablet    polyethylene glycol (MIRALAX) 17 GM/Dose powder     No current facility-administered medications for this visit.         PHYSICAL  "EXAMINATION:  Vitals Ht 1.651 m (5' 5\")   BMI 32.12 kg/m     Wt   Wt Readings from Last 2 Encounters:   05/17/23 87.5 kg (193 lb)   03/03/23 86.8 kg (191 lb 4.8 oz)   Gen: Pt sitting up in NAD, interactive and cooperative on exam  Eyes: sclerae anicteric, no injection  ENT:  MMM  Resp: Breathing comfortably on exam, speaking in full sentences  Skin: No jaundice  Neuro: alert, oriented, answers questions appropriately      PERTINENT STUDIES:    Office Visit on 09/27/2022   Component Date Value Ref Range Status    Cholesterol 09/27/2022 167  <200 mg/dL Final    Triglycerides 09/27/2022 72  <150 mg/dL Final    Direct Measure HDL 09/27/2022 60  >=50 mg/dL Final    LDL Cholesterol Calculated 09/27/2022 93  <=100 mg/dL Final    Non HDL Cholesterol 09/27/2022 107  <130 mg/dL Final    Patient Fasting > 8hrs? 09/27/2022 No   Final    TSH 09/27/2022 2.64  0.40 - 4.00 mU/L Final    Creatinine 09/27/2022 0.61  0.52 - 1.04 mg/dL Final    GFR Estimate 09/27/2022 >90  >60 mL/min/1.73m2 Final    Hemoglobin 09/27/2022 14.2  11.7 - 15.7 g/dL Final    hCG Urine Qualitative 09/27/2022 Negative  Negative Final       "

## 2023-08-23 NOTE — LETTER
"    8/23/2023         RE: Genesis Cutler  [address]  [place] [address]        Dear Colleague,    Thank you for referring your patient, Genesis Cutler, to the [facility] [place]. Please see a copy of my visit note below.      ----------------------------  GI CLINIC VISIT    CC: follow-up      ASSESSMENT/PLAN:    K59.04) Chronic idiopathic constipation  (primary encounter diagnosis)  (R10.13) Abdominal pain, epigastric  (primary encounter diagnosis)      Comment:    Per my prior documentation - prior normal colonoscopy, Sitz marker study, and essentially normal anorectal manometry/defecography. Initial response to laxatives (Miralax specifically) but requiring ever-increasing doses (>4x/day) with loss of efficacy. Linzess 290 mcg was effective for several months, then lost effect. Now on plecanatide with good response. Had side effect of worsening depression symptoms while on prucalopride/Motegrity (switch off of plecanatide/Trulance was initially necessitated by insurance coverage).  She continues with plecanatide/Trulance,now using every other day or so (was having too many loose stools with every day use).      For abd pain - will continue to monitor,though episodes seemed to have resolved with removal of Nexplanon (pt would have expected, timing-wise, to have had further episodes in this timeframe).       -  if abdominal pain recurs - ok to take dicyclomine 10 mg once, and then a second dose an hour later if still uncomfortable.  - ok to continue Trulance every other day to every day   - consider adding Citrucel 1-3 times daily to \"soak up\" some of the liquid in the colon from Trulance  - follow-up in GI clinic in 6 months      It was a pleasure to participate in the care of this patient; please contact us with any further questions.  A total of 18 face-to-face minutes was spent with this patient, >50% of which was counseling regarding the above " delineated issues. An additional 22 minutes was spent on the date of the encounter doing chart review, documentation, care coordination, and further activities as noted above.    Gayle Haro MD   of Medicine  Division of Gastroenterology, Hepatology and Nutrition  [facility]    ---------------------------------------------------------------------------------------------------  HPI:    Ms. Cutler is a 28 year old female who was initially seen in consultation with Jordan Du and Fabien of GI in July 2016 for constipation. She was initially seen by this writer in Nov 2016. Her last GI clinic visit was in May 2023. She returns today for follow-up.         Since her last visit,  Ms. Cutler reports she is doing well. Since her last visit she has had her Nexplanon implant taken out which she feels has helped her episodic abdominal pain (see below). Her summer is going well. She is staying active (rock climbing this weekend) and is looking forward to the state fair.         1. Constipation  Summarized/taken from my prior documentation:   Issues with constipation since early childhood but with no regular laxatives or daily medications as a child nor need for disimpactions, enemas, nor hospitalizations. She used OTC laxatives as needed through late childhood and teen years. She had two urgent care visits during these years for constipation-related abdominal pain.     Her constipation apparently worsened in her early twenties going up to 2 weeks without a satisfactory BM, (she may pass some small,[place] 1 stools with straining during this time). These periods are associated with significant bloating and LLQ pain which resolve after defecation.  She previously tried prunes/prune juice, OTC Mg citrate, an unknown laxative pill prn with minimal effect. Her Avi COSTA put her on daily Miralax which initially was helpful, but its effect waned.  She reports a high vegetable and fruit  "intake, at least 70 oz of water daily, and daily physical activity which led to an intentional 80 lbs weight loss before 2016. Her prior work-up included a normal TSH, calcium, CMP, and hgb.    Since establishing in our clinic she has [facility]2016 by Dr. Kailey Acevedo. Her testing was essentially normal with no evidence of Hirschsprung's, only mild perineal descent was noted. A Sitz marker study (after one week off of laxatives) was normal (no Sitz markers were seen on AXR done 5 days later). Though notably, she was on her period at that time (expectedly moving her bowels more frequently than her typical baseline).  A colonoscopy was done and was normal. Though as she was off of laxatives (for Sitz study, just prior to colonoscopy) and could only tolerate 4L Golytely (due to nausea, she was intended to take additional prep) her prep was rated at \"fair\".           For treatment she was on ever-increasing doses of fiber and Miralax (up to Miralax 4 times daily with morning coffee/caffeine ingestions) and the effect was lost over time. Ultimately, she was switched to Linzess 145 mcg, then up to 290 mcg with good response for a number of months, before response waned. In Jan 2018 we switched her to plecanatide/Trulance. With this she was moving her bowels nearly every day.  Over time to Colace stool softener pills were added to her regimen at times to avoid hard stools and straining.       Due to insurance she did have to transition off of Trulance and onto Motegrity.  Unfortunately she developing increased depression symptoms despite ongoing use of her mental health medications; this has been a reported effect of Motegrity. This medication was stopped and she was, ultimately able to get back on plecanatide/Trulance.     At her last visit, Ms. Cutler was tolerating the plecanatide/Trulance well but had noted her stools were getting looser with it's use. We discussed skipping doses " occasionally to avoid frequent diarrhea as well as ensuring no vitamins or supplements contained any additional magnesium.    Today, she reports she cut back on plecanatide/Trulance to every other day due to loose stools. On the days she takes the medication her BMs are generally passed as a cluster of two liquid BMs in the morning with no further stools that day. Stools are satisfactory in volume and she feels empty afterward with no bloating.  On days she skips taking plecanatide/Trulance, she may or may not have a BM. If doesn't have a BM, she generally feels well, but may have some bloating at end of the day. She recalls an episode of going two days without a BM earlier in the month and was a bit more uncomfortable.  She has not needed Miralax as a rescue med is quite awhile.        2. Episodic abdominal pain   Summarized/taken from my prior documentation:    She has had and handful of episodes of abdominal pain in total since 2016. During the first one she presented to the [facility] in Nov [place], work-up with US, CT, and basic labs was negative. As the CT scan showed a large stool burden she was told this was likely due to constipation and was discharged to home. A second pain episode (precisely the same in nature) occurred around Nov 2017. She did not seek care at that time and has been monitoring her symptoms closely. She has has two more pain events in winter 2018. The first one started around 2:30 pm and seemed to come on suddenly. The pain was intense and focused in the epigastric regions/RUQ with radiation to the back. The pain slowly dissipated, lasting two days in total. In March 2018 she had her fourth pain episode.  She recalls eating  at [employer], but feels it was a typical meal. She awoke in the middle of the night with the same pain. She noted it was worse when lying down and better when sitting or hunched over. The pain is severe enough that she is unable to eat, drink, drive, perform  "ADLs. This pain remitted the following day. She denies f/c, n/v, or any other associated symptoms.      She has not been seen to have gallstones on prior CT or US imaging. A 2018 pelvic US  was normal. A HIDA scan was done sometime after her [facility] [place] and was seen to be normal.  Through GI clinic we evaluated amylase, lipase, and hepatic panel were all normal. We attempted H pylori testing, but as she had no further pain episodes for months, she ultimately didn't complete the test. She reported another pain episode in Oct 2018.  She states she had a \"large, greasy Mexican meal\" and the pain came on within 30 minutes. She doesn't really recall any other associated symptoms or further details. The pain, as in the past, dissipated on its own after some time.       Over time, Ms. Cutler felt it had been a couple years since any severe episodes of pain. She felt that heavier meals may have been a contributor to her prior episodes and she identified quinoa as consistently leading to more milder abdominal cramps.       At her recent visit in May 2023, Ms. Cutler reported three pain episodes this year, one in Jan which was more mild, one in April and another later in April/May. They were somewhat different from her prior pain episodes. These current episodes seem to come on immediately after completing defecation, but while still on the toilet. The pain is high in the abdomen (much different location than her typical constipation cramping). It was described as \"achy\" and remained constant for hours, at times with spikes of increased pain. The pain would dissipate on its own after a few hours and would recur the same way the next morning for several days in a row. The longest episode was pain recurring each morning for 10 days. The spikes of intense pain did not seem to be triggered by anything in particular; she did not feel eating or movement brought them on or worsened the pain. She did take Pepto-Bismol for " awhile and she found this helpful, but stopped due to concern for worsening constipation.he did skip a couple doses of Trulance to see if this helped her pain; it did not. She couldn't identify any other aggravating or alleviating factors.  She did have a neck rash which developed during the 10 day episode and resolved spontaneously, though this did not occur with other episodes and unclear if truly related. She denies any other associated symptoms. During these episodes she hadn't noted any difference with her bowel pattern.       She did have a abd/pelvis CT scan recently which was normal. Her PCP reportedly wondered if Nexplanon was playing a role (abd pain is a listed side effect). Ms. Cutler has overall felt normal in-between.      We ordered an EGD to investigate given upper pain and perceived response to Pepto-Bismol, but there were issues getting this scheduled and it, ultimately did not happen. Ms. Cutler reports she tried dicyclomine (10 mg) with an episode of pain and wasn't sure if it worked. The pain didn't really get much better, but it ultimately stopped after a couple hours. Today, as noted above her Nexplanon was removed and she has not had further episodes of abd pain.      PROBLEM LIST  Patient Active Problem List    Diagnosis Date Noted    Tailor's bunion of both feet 02/24/2022     Priority: Medium     Added automatically from request for surgery [id number]      Biceps tendinopathy, right 07/03/2020     Priority: Medium     Added automatically from request for surgery [id number]      Anxiety and depression 02/24/2020     Priority: Medium       PERTINENT MEDICATIONS:  Current Outpatient Medications   Medication    dicyclomine (BENTYL) 10 MG capsule    escitalopram (LEXAPRO) 20 MG tablet    melatonin 5 MG tablet    norgestimate-ethinyl estradiol (ORTHO-CYCLEN) 0.25-35 MG-MCG tablet    plecanatide (TRULANCE) 3 MG tablet    polyethylene glycol (MIRALAX) 17 GM/Dose powder     No current  "facility-administered medications for this visit.         PHYSICAL EXAMINATION:  Vitals Ht 1.651 m (5' 5\")   BMI 32.12 kg/m     Wt   Wt Readings from Last 2 Encounters:   05/17/23 87.5 kg (193 lb)   03/03/23 86.8 kg (191 lb 4.8 oz)   Gen: Pt sitting up in NAD, interactive and cooperative on exam  Eyes: sclerae anicteric, no injection  ENT:  MMM  Resp: Breathing comfortably on exam, speaking in full sentences  Skin: No jaundice  Neuro: alert, oriented, answers questions appropriately      PERTINENT STUDIES:    Office Visit on 09/27/2022   Component Date Value Ref Range Status    Cholesterol 09/27/2022 167  <200 mg/dL Final    Triglycerides 09/27/2022 72  <150 mg/dL Final    Direct Measure HDL 09/27/2022 60  >=50 mg/dL Final    LDL Cholesterol Calculated 09/27/2022 93  <=100 mg/dL Final    Non HDL Cholesterol 09/27/2022 107  <130 mg/dL Final    Patient Fasting > 8hrs? 09/27/2022 No   Final    TSH 09/27/2022 2.64  0.40 - 4.00 mU/L Final    Creatinine 09/27/2022 0.61  0.52 - 1.04 mg/dL Final    GFR Estimate 09/27/2022 >90  >60 mL/min/1.73m2 Final    Hemoglobin 09/27/2022 14.2  11.7 - 15.7 g/dL Final    hCG Urine Qualitative 09/27/2022 Negative  Negative Final         Again, thank you for allowing me to participate in the care of your patient.      Sincerely,    Gayle Haro MD  "

## 2023-08-23 NOTE — NURSING NOTE
Is the patient currently in the state of [place]? YES    Visit mode:VIDEO    If the visit is dropped, the patient can be reconnected by: VIDEO VISIT: Send to e-mail at: [email or web address]    Will anyone else be joining the visit? NO  (If patient encounters technical issues they should call [phone])    How would you like to obtain your AVS? MyChart    Are changes needed to the allergy or medication list? No    Reason for visit: ANNY WADE

## 2023-08-23 NOTE — PATIENT INSTRUCTIONS
"-  if abdominal pain recurs - ok to take dicyclomine 10 mg once, and then a second dose an hour later if still uncomfortable.  - ok to continue Trulance every other day to every day   - consider adding Citrucel 1-3 times daily to \"soak up\" some of the liquid in the colon from Trulance  - follow-up in GI clinic in 6 months     If you have any questions, please don't hesitate to contact me through our GI RN Clinic Coordinator, Radha Irby, at [phone].    "

## 2023-08-24 ENCOUNTER — TELEPHONE (OUTPATIENT)
Dept: GASTROENTEROLOGY | Facility: CLINIC | Age: 28
End: 2023-08-24
Payer: COMMERCIAL

## 2023-08-24 NOTE — TELEPHONE ENCOUNTER
SID and sent mychart for patient to schedule:    6 mo follow-up with Dr. Haro (around 2/23/24). [facility] GI, in person or virtual.

## 2023-10-07 ENCOUNTER — HEALTH MAINTENANCE LETTER (OUTPATIENT)
Age: 28
End: 2023-10-07

## 2023-12-24 DIAGNOSIS — F32.A ANXIETY AND DEPRESSION: ICD-10-CM

## 2023-12-24 DIAGNOSIS — F41.9 ANXIETY AND DEPRESSION: ICD-10-CM

## 2023-12-26 RX ORDER — ESCITALOPRAM OXALATE 20 MG/1
20 TABLET ORAL DAILY
Qty: 90 TABLET | Refills: 1 | Status: SHIPPED | OUTPATIENT
Start: 2023-12-26 | End: 2024-04-16

## 2024-02-20 ENCOUNTER — TRANSFERRED RECORDS (OUTPATIENT)
Dept: HEALTH INFORMATION MANAGEMENT | Facility: CLINIC | Age: 29
End: 2024-02-20
Payer: COMMERCIAL

## 2024-04-16 ENCOUNTER — OFFICE VISIT (OUTPATIENT)
Dept: FAMILY MEDICINE | Facility: CLINIC | Age: 29
End: 2024-04-16
Payer: COMMERCIAL

## 2024-04-16 VITALS
TEMPERATURE: 97.8 F | DIASTOLIC BLOOD PRESSURE: 72 MMHG | SYSTOLIC BLOOD PRESSURE: 102 MMHG | RESPIRATION RATE: 16 BRPM | OXYGEN SATURATION: 100 % | BODY MASS INDEX: 32.12 KG/M2 | HEIGHT: 65 IN | HEART RATE: 66 BPM

## 2024-04-16 DIAGNOSIS — Z30.011 BCP (BIRTH CONTROL PILLS) INITIATION: ICD-10-CM

## 2024-04-16 DIAGNOSIS — F32.A ANXIETY AND DEPRESSION: ICD-10-CM

## 2024-04-16 DIAGNOSIS — F41.9 ANXIETY AND DEPRESSION: ICD-10-CM

## 2024-04-16 DIAGNOSIS — Z00.00 ROUTINE GENERAL MEDICAL EXAMINATION AT A HEALTH CARE FACILITY: Primary | ICD-10-CM

## 2024-04-16 DIAGNOSIS — Z12.4 CERVICAL CANCER SCREENING: ICD-10-CM

## 2024-04-16 PROCEDURE — 99395 PREV VISIT EST AGE 18-39: CPT | Performed by: FAMILY MEDICINE

## 2024-04-16 PROCEDURE — 87624 HPV HI-RISK TYP POOLED RSLT: CPT | Performed by: FAMILY MEDICINE

## 2024-04-16 PROCEDURE — G0145 SCR C/V CYTO,THINLAYER,RESCR: HCPCS | Performed by: FAMILY MEDICINE

## 2024-04-16 RX ORDER — NORGESTIMATE AND ETHINYL ESTRADIOL 0.25-0.035
1 KIT ORAL DAILY
Qty: 84 TABLET | Refills: 3 | Status: SHIPPED | OUTPATIENT
Start: 2024-04-16

## 2024-04-16 RX ORDER — ESCITALOPRAM OXALATE 20 MG/1
20 TABLET ORAL DAILY
Qty: 90 TABLET | Refills: 1 | Status: SHIPPED | OUTPATIENT
Start: 2024-06-04

## 2024-04-16 SDOH — HEALTH STABILITY: PHYSICAL HEALTH: ON AVERAGE, HOW MANY MINUTES DO YOU ENGAGE IN EXERCISE AT THIS LEVEL?: 40 MIN

## 2024-04-16 SDOH — HEALTH STABILITY: PHYSICAL HEALTH: ON AVERAGE, HOW MANY DAYS PER WEEK DO YOU ENGAGE IN MODERATE TO STRENUOUS EXERCISE (LIKE A BRISK WALK)?: 5 DAYS

## 2024-04-16 ASSESSMENT — PATIENT HEALTH QUESTIONNAIRE - PHQ9
SUM OF ALL RESPONSES TO PHQ QUESTIONS 1-9: 5
10. IF YOU CHECKED OFF ANY PROBLEMS, HOW DIFFICULT HAVE THESE PROBLEMS MADE IT FOR YOU TO DO YOUR WORK, TAKE CARE OF THINGS AT HOME, OR GET ALONG WITH OTHER PEOPLE: SOMEWHAT DIFFICULT
SUM OF ALL RESPONSES TO PHQ QUESTIONS 1-9: 5

## 2024-04-16 ASSESSMENT — SOCIAL DETERMINANTS OF HEALTH (SDOH): HOW OFTEN DO YOU GET TOGETHER WITH FRIENDS OR RELATIVES?: PATIENT DECLINED

## 2024-04-16 ASSESSMENT — PAIN SCALES - GENERAL: PAINLEVEL: NO PAIN (0)

## 2024-04-16 NOTE — PATIENT INSTRUCTIONS
Preventive Care Advice   This is general advice given by our system to help you stay healthy. However, your care team may have specific advice just for you. Please talk to your care team about your preventive care needs.  Nutrition  Eat 5 or more servings of fruits and vegetables each day.  Try wheat bread, brown rice and whole grain pasta (instead of white bread, rice, and pasta).  Get enough calcium and vitamin D. Check the label on foods and aim for 100% of the RDA (recommended daily allowance).  Lifestyle  Exercise at least 150 minutes each week   (30 minutes a day, 5 days a week).  Do muscle strengthening activities 2 days a week. These help control your weight and prevent disease.  No smoking.  Wear sunscreen to prevent skin cancer.  Have a dental exam and cleaning every 6 months.  Yearly exams  See your health care team every year to talk about:  Any changes in your health.  Any medicines your care team has prescribed.  Preventive care, family planning, and ways to prevent chronic diseases.  Shots (vaccines)   HPV shots (up to age 26), if you've never had them before.  Hepatitis B shots (up to age 59), if you've never had them before.  COVID-19 shot: Get this shot when it's due.  Flu shot: Get a flu shot every year.  Tetanus shot: Get a tetanus shot every 10 years.  Pneumococcal, hepatitis A, and RSV shots: Ask your care team if you need these based on your risk.  Shingles shot (for age 50 and up).  General health tests  Diabetes screening:  Starting at age 35, Get screened for diabetes at least every 3 years.  If you are younger than age 35, ask your care team if you should be screened for diabetes.  Cholesterol test: At age 39, start having a cholesterol test every 5 years, or more often if advised.  Bone density scan (DEXA): At age 50, ask your care team if you should have this scan for osteoporosis (brittle bones).  Hepatitis C: Get tested at least once in your life.  STIs (sexually transmitted  infections)  Before age 24: Ask your care team if you should be screened for STIs.  After age 24: Get screened for STIs if you're at risk. You are at risk for STIs (including HIV) if:  You are sexually active with more than one person.  You don't use condoms every time.  You or a partner was diagnosed with a sexually transmitted infection.  If you are at risk for HIV, ask about PrEP medicine to prevent HIV.  Get tested for HIV at least once in your life, whether you are at risk for HIV or not.  Cancer screening tests  Cervical cancer screening: If you have a cervix, begin getting regular cervical cancer screening tests at age 21. Most people who have regular screenings with normal results can stop after age 65. Talk about this with your provider.  Breast cancer scan (mammogram): If you've ever had breasts, begin having regular mammograms starting at age 40. This is a scan to check for breast cancer.  Colon cancer screening: It is important to start screening for colon cancer at age 45.  Have a colonoscopy test every 10 years (or more often if you're at risk) Or, ask your provider about stool tests like a FIT test every year or Cologuard test every 3 years.  To learn more about your testing options, visit: [email or web address].  For help making a decision, visit: https://bit.ly/gc60394.  Prostate cancer screening test: If you have a prostate and are age 55 to 69, ask your provider if you would benefit from a yearly prostate cancer screening test.  Lung cancer screening: If you are a current or former smoker age 50 to 80, ask your care team if ongoing lung cancer screenings are right for you.  For informational purposes only. Not to replace the advice of your health care provider. Copyright   2023 [employer]. All rights reserved. Clinically reviewed by the [facility]. [employer] 237277 - REV 01/24.    Learning About Stress  What is stress?     Stress is your  body's response to a hard situation. Your body can have a physical, emotional, or mental response. Stress is a fact of life for most people, and it affects everyone differently. What causes stress for you may not be stressful for someone else.  A lot of things can cause stress. You may feel stress when you go on a job interview, take a test, or run a race. This kind of short-term stress is normal and even useful. It can help you if you need to work hard or react quickly. For example, stress can help you finish an important job on time.  Long-term stress is caused by ongoing stressful situations or events. Examples of long-term stress include long-term health problems, ongoing problems at work, or conflicts in your family. Long-term stress can harm your health.  How does stress affect your health?  When you are stressed, your body responds as though you are in danger. It makes hormones that speed up your heart, make you breathe faster, and give you a burst of energy. This is called the fight-or-flight stress response. If the stress is over quickly, your body goes back to normal and no harm is done.  But if stress happens too often or lasts too long, it can have bad effects. Long-term stress can make you more likely to get sick, and it can make symptoms of some diseases worse. If you tense up when you are stressed, you may develop neck, shoulder, or low back pain. Stress is linked to high blood pressure and heart disease.  Stress also harms your emotional health. It can make you albrecht, tense, or depressed. Your relationships may suffer, and you may not do well at work or school.  What can you do to manage stress?  You can try these things to help manage stress:   Do something active. Exercise or activity can help reduce stress. Walking is a great way to get started. Even everyday activities such as housecleaning or yard work can help.  Try yoga or rebecca chi. These techniques combine exercise and meditation. You may need  some training at first to learn them.  Do something you enjoy. For example, listen to music or go to a movie. Practice your hobby or do volunteer work.  Meditate. This can help you relax, because you are not worrying about what happened before or what may happen in the future.  Do guided imagery. Imagine yourself in any setting that helps you feel calm. You can use online videos, books, or a teacher to guide you.  Do breathing exercises. For example:  From a standing position, bend forward from the waist with your knees slightly bent. Let your arms dangle close to the floor.  Breathe in slowly and deeply as you return to a standing position. Roll up slowly and lift your head last.  Hold your breath for just a few seconds in the standing position.  Breathe out slowly and bend forward from the waist.  Let your feelings out. Talk, laugh, cry, and express anger when you need to. Talking with supportive friends or family, a counselor, or a natalya leader about your feelings is a healthy way to relieve stress. Avoid discussing your feelings with people who make you feel worse.  Write. It may help to write about things that are bothering you. This helps you find out how much stress you feel and what is causing it. When you know this, you can find better ways to cope.  What can you do to prevent stress?  You might try some of these things to help prevent stress:  Manage your time. This helps you find time to do the things you want and need to do.  Get enough sleep. Your body recovers from the stresses of the day while you are sleeping.  Get support. Your family, friends, and community can make a difference in how you experience stress.  Limit your news feed. Avoid or limit time on social media or news that may make you feel stressed.  Do something active. Exercise or activity can help reduce stress. Walking is a great way to get started.  Where can you learn more?  Go to https://www.healthwise.net/patiented  Enter N032 in the  "search box to learn more about \"Learning About Stress.\"  Current as of: October 24, 2023               Content Version: 14.0    9973-1087 [employer].   Care instructions adapted under license by your healthcare professional. If you have questions about a medical condition or this instruction, always ask your healthcare professional. [employer] disclaims any warranty or liability for your use of this information.      Learning About Depression Screening  What is depression screening?  Depression screening is a way to see if you have depression symptoms. It may be done by a doctor or counselor. It's often part of a routine checkup. That's because your mental health is just as important as your physical health.  Depression is a mental health condition that affects how you feel, think, and act. You may:  Have less energy.  Lose interest in your daily activities.  Feel sad and grouchy for a long time.  Depression is very common. It affects people of all ages.  Many things can lead to depression. Some people become depressed after they have a stroke or find out they have a major illness like cancer or heart disease. The death of a loved one or a breakup may lead to depression. It can run in families. Most experts believe that a combination of inherited genes and stressful life events can cause it.  What happens during screening?  You may be asked to fill out a form about your depression symptoms. You and the doctor will discuss your answers. The doctor may ask you more questions to learn more about how you think, act, and feel.  What happens after screening?  If you have symptoms of depression, your doctor will talk to you about your options.  Doctors usually treat depression with medicines or counseling. Often, combining the two works best. Many people don't get help because they think that they'll get over the depression on their own. But people with depression may not get better unless they " "get treatment.  The cause of depression is not well understood. There may be many factors involved. But if you have depression, it's not your fault.  A serious symptom of depression is thinking about death or suicide. If you or someone you care about talks about this or about feeling hopeless, get help right away.  It's important to know that depression can be treated. Medicine, counseling, and self-care may help.  Where can you learn more?  Go to https://www.[employer].net/patiented  Enter T185 in the search box to learn more about \"Learning About Depression Screening.\"  Current as of: June 24, 2023               Content Version: 14.0    0849-2913 [employer].   Care instructions adapted under license by your healthcare professional. If you have questions about a medical condition or this instruction, always ask your healthcare professional. [employer] disclaims any warranty or liability for your use of this information.      Substance Use Disorder: Care Instructions  Overview     You can improve your life and health by stopping your use of alcohol or drugs. When you don't drink or use drugs, you may feel and sleep better. You may get along better with your family, friends, and coworkers. There are medicines and programs that can help with substance use disorder.  How can you care for yourself at home?  Here are some ways to help you stay sober and prevent relapse.  If you have been given medicine to help keep you sober or reduce your cravings, be sure to take it exactly as prescribed.  Talk to your doctor about programs that can help you stop using drugs or drinking alcohol.  Do not keep alcohol or drugs in your home.  Plan ahead. Think about what you'll say if other people ask you to drink or use drugs. Try not to spend time with people who drink or use drugs.  Use the time and money spent on drinking or drugs to do something that's important to you.  Preventing a relapse  Have a plan " to deal with relapse. Learn to recognize changes in your thinking that lead you to drink or use drugs. Get help before you start to drink or use drugs again.  Try to stay away from situations, friends, or places that may lead you to drink or use drugs.  If you feel the need to drink alcohol or use drugs again, seek help right away. Call a trusted friend or family member. Some people get support from organizations such as Narcotics Anonymous or [employer] or from treatment facilities.  If you relapse, get help as soon as you can. Some people make a plan with another person that outlines what they want that person to do for them if they relapse. The plan usually includes how to handle the relapse and who to notify in case of relapse.  Don't give up. Remember that a relapse doesn't mean that you have failed. Use the experience to learn the triggers that lead you to drink or use drugs. Then quit again. Recovery is a lifelong process. Many people have several relapses before they are able to quit for good.  Follow-up care is a key part of your treatment and safety. Be sure to make and go to all appointments, and call your doctor if you are having problems. It's also a good idea to know your test results and keep a list of the medicines you take.  When should you call for help?   Call 911  anytime you think you may need emergency care. For example, call if you or someone else:    Has overdosed or has withdrawal signs. Be sure to tell the emergency workers that you are or someone else is using or trying to quit using drugs. Overdose or withdrawal signs may include:  Losing consciousness.  Seizure.  Seeing or hearing things that aren't there (hallucinations).     Is thinking or talking about suicide or harming others.   Where to get help 24 hours a day, 7 days a week   If you or someone you know talks about suicide, self-harm, a mental health crisis, a substance use crisis, or any other kind of emotional distress, get  "help right away. You can:    Call the Suicide and Crisis Lifeline at 988.     Call 2-300-144-LGQB (1-159.941.6228).     Text HOME to 264878 to access the Crisis Text Line.   Consider saving these numbers in your phone.  Go to [employer] for more information or to chat online.  Call your doctor now or seek immediate medical care if:    You are having withdrawal symptoms. These may include nausea or vomiting, sweating, shakiness, and anxiety.   Watch closely for changes in your health, and be sure to contact your doctor if:    You have a relapse.     You need more help or support to stop.   Where can you learn more?  Go to https://www.[employer].net/patiented  Enter H573 in the search box to learn more about \"Substance Use Disorder: Care Instructions.\"  Current as of: November 15, 2023               Content Version: 14.0    0981-9792 [employer], [employer].   Care instructions adapted under license by your healthcare professional. If you have questions about a medical condition or this instruction, always ask your healthcare professional. Healthwise, [employer] disclaims any warranty or liability for your use of this information.      "

## 2024-04-16 NOTE — PROGRESS NOTES
"Preventive Care Visit  [facility] GLORIA Ledbetter MD, Family Medicine  Apr 16, 2024      Assessment & Plan     Routine general medical examination at a health care facility  Counseling  Appropriate preventive services were discussed with this patient, including applicable screening as appropriate for fall prevention, nutrition, physical activity, weight loss and cognition.  Checklist reviewing preventive services available has been given to the patient.  The patient's PHQ-9 score is consistent with mild depression. She was provided with information regarding depression.     Cervical cancer screening  - Pap Screen with HPV - recommended age 30 - 65 years  - HPV Hold (Lab Only)  - HPV High Risk Types DNA Cervical    Anxiety and depression  Stable, continue present management  - escitalopram (LEXAPRO) 20 MG tablet; Take 1 tablet (20 mg) by mouth daily    BCP (birth control pills) initiation  - norgestimate-ethinyl estradiol (ORTHO-CYCLEN) 0.25-35 MG-MCG tablet; Take 1 tablet by mouth daily          BMI  Estimated body mass index is 32.12 kg/m  as calculated from the following:    Height as of this encounter: 1.651 m (5' 5\").    Weight as of 5/17/23: 87.5 kg (193 lb).               Yvonne Smith is a 29 year old, presenting for the following:  Physical        Health Care Directive  Patient does not have a Health Care Directive or Living Will: Discussed advance care planning with patient; information given to patient to review.    HPI        4/16/2024   General Health   How would you rate your overall physical health? Good   Feel stress (tense, anxious, or unable to sleep) Rather much   (!) STRESS CONCERN      4/16/2024   Nutrition   Three or more servings of calcium each day? Yes   Diet: Regular (no restrictions)   How many servings of fruit and vegetables per day? 4 or more   How many sweetened beverages each day? 0-1         4/16/2024   Exercise   Days per week of moderate/strenous " exercise 5 days   Average minutes spent exercising at this level 40 min         4/16/2024   Social Factors   Frequency of gathering with friends or relatives Patient declined   Worry food won't last until get money to buy more No   Food not last or not have enough money for food? No   Do you have housing?  Yes   Are you worried about losing your housing? No   Lack of transportation? No   Unable to get utilities (heat,electricity)? No         4/16/2024   Dental   Dentist two times every year? Yes          Today's PHQ-9 Score:       4/16/2024     1:39 PM   PHQ-9 SCORE   PHQ-9 Total Score MyChart 5 (Mild depression)   PHQ-9 Total Score 5         4/16/2024   Substance Use   Alcohol more than 3/day or more than 7/wk No   Do you use any other substances recreationally? (!) ALCOHOL    (!) CANNABIS PRODUCTS     Social History     Tobacco Use    Smoking status: Never    Smokeless tobacco: Never   Vaping Use    Vaping status: Never Used   Substance Use Topics    Alcohol use: Yes     Comment: Occasional alcoholic drink with friends,    Drug use: No             4/16/2024   Breast Cancer Screening   Family history of breast, colon, or ovarian cancer? No / Unknown              4/16/2024   STI Screening   New sexual partner(s) since last STI/HIV test? No           2/23/2021     4:32 PM 1/29/2018     9:40 AM 3/9/2015    12:00 AM   PAP / HPV   PAP (Historical) NIL  NIL     PAP-ABSTRACT   See Scanned Document           This result is from an external source.           4/16/2024   Contraception/Family Planning   Questions about contraception or family planning (!) YES        Reviewed and updated as needed this visit by Provider                          Review of Systems  CONSTITUTIONAL: NEGATIVE for fever, chills, change in weight  INTEGUMENTARY/SKIN: NEGATIVE for worrisome rashes, moles or lesions  EYES: NEGATIVE for vision changes or irritation  ENT/MOUTH: NEGATIVE for ear, mouth and throat problems  RESP: NEGATIVE for significant  "cough or SOB  BREAST: NEGATIVE for masses, tenderness or discharge  CV: NEGATIVE for chest pain, palpitations or peripheral edema  GI: NEGATIVE for nausea, abdominal pain, heartburn, or change in bowel habits  : NEGATIVE for frequency, dysuria, or hematuria  MUSCULOSKELETAL: NEGATIVE for significant arthralgias or myalgia  NEURO: NEGATIVE for weakness, dizziness or paresthesias  ENDOCRINE: NEGATIVE for temperature intolerance, skin/hair changes  HEME: NEGATIVE for bleeding problems  PSYCHIATRIC: NEGATIVE for changes in mood or affect     Objective    Exam  /72 (Cuff Size: Adult Regular)   Pulse 66   Temp 97.8  F (36.6  C) (Temporal)   Resp 16   Ht 1.651 m (5' 5\")   LMP 04/11/2024   SpO2 100%   BMI 32.12 kg/m     Estimated body mass index is 32.12 kg/m  as calculated from the following:    Height as of this encounter: 1.651 m (5' 5\").    Weight as of 5/17/23: 87.5 kg (193 lb).    Physical Exam  GENERAL: alert and no distress  EYES: Eyes grossly normal to inspection, PERRL and conjunctivae and sclerae normal  HENT: ear canals and TM's normal, nose and mouth without ulcers or lesions  NECK: no adenopathy, no asymmetry, masses, or scars  RESP: lungs clear to auscultation - no rales, rhonchi or wheezes  CV: regular rate and rhythm, normal S1 S2, no S3 or S4, no murmur, click or rub, no peripheral edema  ABDOMEN: soft, nontender, no hepatosplenomegaly, no masses and bowel sounds normal  MS: no gross musculoskeletal defects noted, no edema  SKIN: no suspicious lesions or rashes        Signed Electronically by: Richard Ledbetter MD    "

## 2024-04-19 LAB
BKR LAB AP GYN ADEQUACY: NORMAL
BKR LAB AP GYN INTERPRETATION: NORMAL
BKR LAB AP HPV REFLEX: NORMAL
BKR LAB AP LMP: NORMAL
BKR LAB AP PREVIOUS ABNORMAL: NORMAL
PATH REPORT.COMMENTS IMP SPEC: NORMAL
PATH REPORT.COMMENTS IMP SPEC: NORMAL
PATH REPORT.RELEVANT HX SPEC: NORMAL

## 2024-04-22 LAB
HUMAN PAPILLOMA VIRUS 16 DNA: NEGATIVE
HUMAN PAPILLOMA VIRUS 18 DNA: NEGATIVE
HUMAN PAPILLOMA VIRUS FINAL DIAGNOSIS: NORMAL
HUMAN PAPILLOMA VIRUS OTHER HR: NEGATIVE

## 2024-08-10 DIAGNOSIS — K59.04 CHRONIC IDIOPATHIC CONSTIPATION: ICD-10-CM

## 2024-08-14 NOTE — TELEPHONE ENCOUNTER
TRULANCE 3 MG TABLET       Last Written Prescription Date:  5-23-23  Last Fill Quantity: 90,   # refills: 4  Last Office Visit : 8-23-23  Future Office visit:  8-21-24    Routing refill request to provider for review/approval because:  Med not on protocol

## 2024-08-21 ENCOUNTER — VIRTUAL VISIT (OUTPATIENT)
Dept: GASTROENTEROLOGY | Facility: CLINIC | Age: 29
End: 2024-08-21
Payer: COMMERCIAL

## 2024-08-21 DIAGNOSIS — K59.04 CHRONIC IDIOPATHIC CONSTIPATION: Primary | ICD-10-CM

## 2024-08-21 PROCEDURE — 99214 OFFICE O/P EST MOD 30 MIN: CPT | Mod: 95 | Performed by: INTERNAL MEDICINE

## 2024-08-21 RX ORDER — PLECANATIDE 3 MG/1
3 TABLET ORAL DAILY
Qty: 90 TABLET | Refills: 3 | Status: SHIPPED | OUTPATIENT
Start: 2024-08-21 | End: 2024-09-16

## 2024-08-21 NOTE — LETTER
"8/21/2024      Genesis Cutler  [address]  [place] [address]      Dear Colleague,    Thank you for referring your patient, Genesis Cutler, to the [facility] [place]. Please see a copy of my visit note below.    Virtual Visit Details    Type of service:  Video Visit      Video Start Time: 10:31 a.m   Video End Time: 10:48 a.m.    Originating Location (pt. Location): Home  Distant Location (provider location):  On-site  Platform used for Video Visit: Andi    ---------------------------------      GI CLINIC VISIT    CC:  follow-up      ASSESSMENT/PLAN:  (K59.04) Chronic idiopathic constipation  (primary encounter diagnosis)   As per my prior documentation - prior normal colonoscopy, Sitz marker study, and essentially normal anorectal manometry/defecography. Initial response to laxatives (Miralax specifically) but requiring ever-increasing doses (>4x/day) with loss of efficacy. Linzess 290 mcg was effective for several months, then lost effect. Now on plecanatide with good response. Had side effect of worsening depression symptoms while on prucalopride/Motegrity (switch off of plecanatide/Trulance was initially necessitated by insurance coverage).  She continues with plecanatide/Trulance,now using every other day or so (was having too many loose stools with every day use).       For abd pain - feels like more recent episodes were associated with periods of not adequately moving her bowels and then eating a good-sized meal.        - continue plecanatide/Trulance 3mg every other day (will reorder)  - if going 2-3 days without a satisfactory BM, consider taking Trulance 2-3 days in a row or adding Miralax to your regimen for a few days  - agree with your plan of bringing Miralax or magnesium or Colace along when traveling which can be added to regular Trulance use to limit constipation while away from home  - consider smaller, lighter meals when \"backed up\" and " working on constipation to avoid increased pain  - ok for dicyclomine as needed (if it helps at all)  - follow-up in GI clinic in 6-12 months      It was a pleasure to participate in the care of this patient; please contact us with any further questions.  A total of 17 video face-to-face minutes was spent with this patient. An additional 18 minutes was spent on the date of the encounter doing chart review, documentation, and further activities as noted above.    Gayle Haro MD   of Medicine  Division of Gastroenterology, Hepatology and Nutrition  [facility]    ---------------------------------------------------------------------------------------------------  HPI:    Ms. Cutler is a 29 year old female who was initially seen in consultation with Jordan Du and Fabien of GI in July 2016 for constipation. She was initially seen by this writer in Nov 2016. Her last GI clinic visit was in August 2023. She returns today for follow-up.       Since her last visit, Ms. Cutler is overall doing well. She has entered a [place] at the state fair and is hoping to go soon. She and her  are also traveling in late September to the [place] to enjoy a vacation before he has a knee surgery later in the fall. She unfortunately developed a HA and voice changes in recent days and reports she is now COVID positive, but symptoms have not progressed.  She denies any other new medication updates, though she shares she is considering going off of birth control in the near future.      1. Constipation  Summarized/taken from my prior documentation:   Issues with constipation since early childhood but with no regular laxatives or daily medications as a child nor need for disimpactions, enemas, nor hospitalizations. She used OTC laxatives as needed through late childhood and teen years. She had two urgent care visits during these years for constipation-related abdominal pain.      "Her constipation apparently worsened in her early twenties going up to 2 weeks without a satisfactory BM, (she would pass some small,[place] 1 stools with straining during this time). These periods were associated with significant bloating and LLQ pain which resolved after defecation.  She previously tried prunes/prune juice, OTC Mg citrate, an unknown laxative pill prn with minimal effect. Her Avi MD put her on daily Miralax which initially was helpful, but its effect waned.  She reported a high vegetable and fruit intake, at least 70 oz of water daily, and daily physical activity which led to an intentional 80 lbs weight loss before 2016. Her prior work-up included a normal TSH, calcium, CMP, and hgb.    Since establishing in our clinic she has been [facility] 11[facility]2016 by Dr. Kailey Acevedo. Her testing was essentially normal with no evidence of Hirschsprung's, only mild perineal descent was noted. A Sitz marker study (after one week off of laxatives) was normal (no Sitz markers were seen on AXR done 5 days later). Though notably, she was on her period at that time (expectedly moving her bowels more frequently than her typical baseline).  A colonoscopy was done and was normal. Though as she was off of laxatives (for Sitz study, just prior to colonoscopy) and could only tolerate 4L Golytely (due to nausea, she was intended to take additional prep) her prep was rated at \"fair\".           For treatment she was on ever-increasing doses of fiber and Miralax (up to Miralax 4 times daily with morning coffee/caffeine ingestions) and the effect was lost over time. Ultimately, she was switched to Linzess 145 mcg, then up to 290 mcg with good response for a number of months, before response waned. In Jan 2018 we switched her to plecanatide/Trulance. With this she was moving her bowels nearly every day.  Over time to Colace stool softener pills were added to her regimen at times to avoid hard stools " and straining.       Due to insurance she did have to transition off of Trulance and onto Motegrity.  Unfortunately she developing increased depression symptoms despite ongoing use of her mental health medications; this has been a reported effect of Motegrity. This medication was stopped and she was, ultimately able to get back on plecanatide/Trulance.     By 2023, Ms. Cutler was tolerating the plecanatide/Trulance well but had noted her stools were getting looser with it's use. We discussed skipping doses occasionally to avoid frequent diarrhea as well as ensuring no vitamins or supplements contained any additional magnesium.     At her last visit one year ago, she was doing well on every other day plecanatide/Trulance. On the days she takes the medication her BMs are generally passed as a cluster of two liquid BMs in the morning with no further stools that day. Stools are satisfactory in volume and she feels empty afterward with no bloating.  On days she skips taking plecanatide/Trulance, she may or may not have a BM. If doesn't have a BM, she generally feels well, but may have some bloating at end of the day. She recalls an episode of going two days without a BM earlier in the month and was a bit more uncomfortable.  She has not needed Miralax as a rescue med is quite awhile.     Today, Ms. Cutler feels that overall she is doing well, but has been out of plecanatide/Trulance for a few weeks. As she was not using Trulance everyday, she did wonder if perhaps she would do ok without any. In the last few weeks she has roughly had a pattern of moving her bowels for a few days in a row, then going up to 4 days without a BM ) and with some associated discomfort. While she was on the every other day Trulance she was typically moving her bowels every day and stools was formed.         2. Episodic abdominal pain   Summarized/taken from my prior documentation:    She has had and handful of episodes of abdominal pain in  "total since 2016. During the first one she [facility] [place] [facility] [place], work-up with US, CT, and basic labs was negative. As the CT scan showed a large stool burden she was told this was likely due to constipation and was discharged to home. A second pain episode (precisely the same in nature) occurred around Nov 2017. She did not seek care at that time and has been monitoring her symptoms closely. She has has two more pain events in winter 2018. The first one started around 2:30 pm and seemed to come on suddenly. The pain was intense and focused in the epigastric regions/RUQ with radiation to the back. The pain slowly dissipated, lasting two days in total. In March 2018 she had her fourth pain episode.  She recalls eating  at [employer], but feels it was a typical meal. She awoke in the middle of the night with the same pain. She noted it was worse when lying down and better when sitting or hunched over. The pain is severe enough that she is unable to eat, drink, drive, perform ADLs. This pain remitted the following day. She denies f/c, n/v, or any other associated symptoms.      She has not been seen to have gallstones on prior CT or US imaging. A 2018 pelvic US  was normal. A HIDA scan was done sometime after her [facility] visit and was seen to be normal.  Through GI clinic we evaluated amylase, lipase, and hepatic panel were all normal. We attempted H pylori testing, but as she had no further pain episodes for months, she ultimately didn't complete the test. She reported another pain episode in Oct 2018.  She states she had a \"large, greasy Mexican meal\" and the pain came on within 30 minutes. She doesn't really recall any other associated symptoms or further details. The pain, as in the past, dissipated on its own after some time.       Over time, Ms. Cutler felt it had been a couple years since any severe episodes of pain. She felt that heavier meals may have been a contributor to her prior " "episodes and she identified quinoa as consistently leading to more milder abdominal cramps.      In 2023, Ms. Cutler reported three pain episodes - one in Jan which was more mild, one in April and another later in April/May. They were somewhat different from her prior pain episodes. These current episodes seem to come on immediately after completing defecation, but while still on the toilet. The pain is high in the abdomen (much different location than her typical constipation cramping). It was described as \"achy\" and remained constant for hours, at times with spikes of increased pain. The pain would dissipate on its own after a few hours and would recur the same way the next morning for several days in a row. The longest episode was pain recurring each morning for 10 days. The spikes of intense pain did not seem to be triggered by anything in particular; she did not feel eating or movement brought them on or worsened the pain. She did take Pepto-Bismol for awhile and she found this helpful, but stopped due to concern for worsening constipation.he did skip a couple doses of Trulance to see if this helped her pain; it did not. She couldn't identify any other aggravating or alleviating factors.  She did have a neck rash which developed during the 10 day episode and resolved spontaneously, though this did not occur with other episodes and unclear if truly related. She denies any other associated symptoms. During these episodes she hadn't noted any difference with her bowel pattern.       She did have a abd/pelvis CT scan recently which was normal. An EGD was ordered but did not end up getting done. Her PCP reportedly wondered if Nexplanon was playing a role (abd pain is a listed side effect). Ms. Cutler has overall felt normal in-between. Her nexplanon was removed and she reported months without pain.        Today, Ms. Cutler reports that she had had some post-prandial pain episodes similar to what is described " for pain episodes in 2016 - 2018. She states that with tracking she felt that she would notice the pain if she went a few days without a satisfactory BM, then she would eat a good-sized meal the pain would come on. She did feel pain resolved after a good BM. She had tried dicyclomine as well, but didn't really notice much improvement.       PROBLEM LIST  Patient Active Problem List    Diagnosis Date Noted     Tailor's bunion of both feet 02/24/2022     Priority: Medium     Added automatically from request for surgery [id number]       Biceps tendinopathy, right 07/03/2020     Priority: Medium     Added automatically from request for surgery [id number]       Anxiety and depression 02/24/2020     Priority: Medium       PERTINENT MEDICATIONS:  Current Outpatient Medications   Medication Sig Dispense Refill     dicyclomine (BENTYL) 10 MG capsule Take 10 mg (1 cap) by mouth up to 4 times daily for abdominal pain as needed. If no improvement at 10 mg dosing, ok to increase to 20 mg (2 caps) or 30 mg (3 caps) up to 4 times daily daily as needed. 30 capsule 1     escitalopram (LEXAPRO) 20 MG tablet Take 1 tablet (20 mg) by mouth daily 90 tablet 1     melatonin 5 MG tablet Take 5 mg by mouth nightly as needed for sleep       norgestimate-ethinyl estradiol (ORTHO-CYCLEN) 0.25-35 MG-MCG tablet Take 1 tablet by mouth daily 84 tablet 3     plecanatide (TRULANCE) 3 MG tablet Take 1 tablet (3 mg) by mouth daily Please call Gastroenterology at [phone] to schedule appointment. Thank you. 90 tablet 4     polyethylene glycol (MIRALAX) 17 GM/Dose powder Take 1 capful by mouth 4 times daily        No current facility-administered medications for this visit.         PHYSICAL EXAMINATION:  Vitals There were no vitals taken for this visit.   Wt   Wt Readings from Last 2 Encounters:   05/17/23 87.5 kg (193 lb)   03/03/23 86.8 kg (191 lb 4.8 oz)      Gen: Pt sitting up in NAD, interactive and cooperative on exam  Eyes: sclerae anicteric, no  injection  ENT:  MMM  Resp: Breathing comfortably on exam, speaking in full sentences  Skin: No jaundice  Neuro: alert, oriented, answers questions appropriately        PERTINENT STUDIES:    Office Visit on 04/16/2024   Component Date Value Ref Range Status     Interpretation 04/16/2024 Negative for Intraepithelial Lesion or Malignancy (NILM)    Final     Comment 04/16/2024    Final                    Value:This result contains rich text formatting which cannot be displayed here.     Specimen Adequacy 04/16/2024 Satisfactory for evaluation, endocervical/transformation zone component absent   Final     Clinical Information 04/16/2024    Final                    Value:This result contains rich text formatting which cannot be displayed here.     LMP/Menopause Date 04/16/2024    Final                    Value:This result contains rich text formatting which cannot be displayed here.     Reflex Testing 04/16/2024 Yes regardless of result   Final     Previous Abnormal? 04/16/2024    Final                    Value:This result contains rich text formatting which cannot be displayed here.     Performing Labs 04/16/2024    Final                    Value:This result contains rich text formatting which cannot be displayed here.     Other HR HPV 04/16/2024 Negative  Negative Final     HPV16 DNA 04/16/2024 Negative  Negative Final     HPV18 DNA 04/16/2024 Negative  Negative Final     FINAL DIAGNOSIS 04/16/2024    Final                    Value:This result contains rich text formatting which cannot be displayed here.       Again, thank you for allowing me to participate in the care of your patient.        Sincerely,        Gayle Haro MD

## 2024-08-21 NOTE — NURSING NOTE
Current patient location: [address]  [facility] [address]    Is the patient currently in the state of [place]? YES    Visit mode:VIDEO    If the visit is dropped, the patient can be reconnected by: VIDEO VISIT: Text to cell phone:   Telephone Information:   Mobile [phone]       Will anyone else be joining the visit? NO  (If patient encounters technical issues they should call [phone])    How would you like to obtain your AVS? MyChart    Are changes needed to the allergy or medication list? No    Are refills needed on medications prescribed by this physician? YES    Rooming Documentation:  Questionnaire(s) completed      Reason for visit: RECHECK    Silvano WADE

## 2024-08-21 NOTE — PROGRESS NOTES
"Virtual Visit Details    Type of service:  Video Visit      Video Start Time: 10:31 a.m   Video End Time: 10:48 a.m.    Originating Location (pt. Location): Home  Distant Location (provider location):  On-site  Platform used for Video Visit: Andi    ---------------------------------      GI CLINIC VISIT    CC:  follow-up      ASSESSMENT/PLAN:  (K59.04) Chronic idiopathic constipation  (primary encounter diagnosis)   As per my prior documentation - prior normal colonoscopy, Sitz marker study, and essentially normal anorectal manometry/defecography. Initial response to laxatives (Miralax specifically) but requiring ever-increasing doses (>4x/day) with loss of efficacy. Linzess 290 mcg was effective for several months, then lost effect. Now on plecanatide with good response. Had side effect of worsening depression symptoms while on prucalopride/Motegrity (switch off of plecanatide/Trulance was initially necessitated by insurance coverage).  She continues with plecanatide/Trulance,now using every other day or so (was having too many loose stools with every day use).       For abd pain - feels like more recent episodes were associated with periods of not adequately moving her bowels and then eating a good-sized meal.        - continue plecanatide/Trulance 3mg every other day (will reorder)  - if going 2-3 days without a satisfactory BM, consider taking Trulance 2-3 days in a row or adding Miralax to your regimen for a few days  - agree with your plan of bringing Miralax or magnesium or Colace along when traveling which can be added to regular Trulance use to limit constipation while away from home  - consider smaller, lighter meals when \"backed up\" and working on constipation to avoid increased pain  - ok for dicyclomine as needed (if it helps at all)  - follow-up in GI clinic in 6-12 months      It was a pleasure to participate in the care of this patient; please contact us with any further questions.  A total of 17 " video face-to-face minutes was spent with this patient. An additional 18 minutes was spent on the date of the encounter doing chart review, documentation, and further activities as noted above.    Gayle Haro MD   of Medicine  Division of Gastroenterology, Hepatology and Nutrition  [facility]    ---------------------------------------------------------------------------------------------------  HPI:    Ms. Cutler is a 29 year old female who was initially seen in consultation with Jordan Du and Fabien of GI in July 2016 for constipation. She was initially seen by this writer in Nov 2016. Her last GI clinic visit was in August 2023. She returns today for follow-up.       Since her last visit, Ms. Cutler is overall doing well. 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 She and her  are also traveling in late September to the [place] to enjoy a vacation before he has a knee surgery later in the fall. She unfortunately developed a HA and voice changes in recent days and reports she is now COVID positive, but symptoms have not progressed.  She denies any other new medication updates, though she shares she is considering going off of birth control in the near future.      1. Constipation  Summarized/taken from my prior documentation:   Issues with constipation since early childhood but with no regular laxatives or daily medications as a child nor need for disimpactions, enemas, nor hospitalizations. She used OTC laxatives as needed through late childhood and teen years. She had two urgent care visits during these years for constipation-related abdominal pain.     Her constipation apparently worsened in her early twenties going up to 2 weeks without a satisfactory BM, (she would pass some small,[place] 1 stools with straining during this time). These periods were associated with significant bloating and LLQ pain which resolved  "after defecation.  She previously tried prunes/prune juice, OTC Mg citrate, an unknown laxative pill prn with minimal effect. Her Avi MD put her on daily Miralax which initially was helpful, but its effect waned.  She reported a high vegetable and fruit intake, at least 70 oz of water daily, and daily physical activity which led to an intentional 80 lbs weight loss before 2016. Her prior work-up included a normal TSH, calcium, CMP, and hgb.    Since establishing in our clinic she has been [facility]2016 by Dr. Kailey Acevedo. Her testing was essentially normal with no evidence of Hirschsprung's, only mild perineal descent was noted. A Sitz marker study (after one week off of laxatives) was normal (no Sitz markers were seen on AXR done 5 days later). Though notably, she was on her period at that time (expectedly moving her bowels more frequently than her typical baseline).  A colonoscopy was done and was normal. Though as she was off of laxatives (for Sitz study, just prior to colonoscopy) and could only tolerate 4L Golytely (due to nausea, she was intended to take additional prep) her prep was rated at \"fair\".           For treatment she was on ever-increasing doses of fiber and Miralax (up to Miralax 4 times daily with morning coffee/caffeine ingestions) and the effect was lost over time. Ultimately, she was switched to Linzess 145 mcg, then up to 290 mcg with good response for a number of months, before response waned. In Jan 2018 we switched her to plecanatide/Trulance. With this she was moving her bowels nearly every day.  Over time to Colace stool softener pills were added to her regimen at times to avoid hard stools and straining.       Due to insurance she did have to transition off of Trulance and onto Motegrity.  Unfortunately she developing increased depression symptoms despite ongoing use of her mental health medications; this has been a reported effect of Motegrity. This " medication was stopped and she was, ultimately able to get back on plecanatide/Trulance.     By 2023, Ms. Cutler was tolerating the plecanatide/Trulance well but had noted her stools were getting looser with it's use. We discussed skipping doses occasionally to avoid frequent diarrhea as well as ensuring no vitamins or supplements contained any additional magnesium.     At her last visit one year ago, she was doing well on every other day plecanatide/Trulance. On the days she takes the medication her BMs are generally passed as a cluster of two liquid BMs in the morning with no further stools that day. Stools are satisfactory in volume and she feels empty afterward with no bloating.  On days she skips taking plecanatide/Trulance, she may or may not have a BM. If doesn't have a BM, she generally feels well, but may have some bloating at end of the day. She recalls an episode of going two days without a BM earlier in the month and was a bit more uncomfortable.  She has not needed Miralax as a rescue med is quite awhile.     Today, Ms. Cutler feels that overall she is doing well, but has been out of plecanatide/Trulance for a few weeks. As she was not using Trulance everyday, she did wonder if perhaps she would do ok without any. In the last few weeks she has roughly had a pattern of moving her bowels for a few days in a row, then going up to 4 days without a BM ) and with some associated discomfort. While she was on the every other day Trulance she was typically moving her bowels every day and stools was formed.         2. Episodic abdominal pain   Summarized/taken from my prior documentation:    She has had and handful of episodes of abdominal pain in total since 2016. During the first one [place] [facility] [place], work-up with US, CT, and basic labs was negative. As the CT scan showed a large stool burden she was told this was likely due to constipation and was discharged to home. A second pain  "episode (precisely the same in nature) occurred around Nov 2017. She did not seek care at that time and has been monitoring her symptoms closely. She has has two more pain events in winter 2018. The first one started around 2:30 pm and seemed to come on suddenly. The pain was intense and focused in the epigastric regions/RUQ with radiation to the back. The pain slowly dissipated, lasting two days in total. In March 2018 she had her fourth pain episode.  She recalls eating  at [employer], but feels it was a typical meal. She awoke in the middle of the night with the same pain. She noted it was worse when lying down and better when sitting or hunched over. The pain is severe enough that she is unable to eat, drink, drive, perform ADLs. This pain remitted the following day. She denies f/c, n/v, or any other associated symptoms.      She has not been seen to have gallstones on prior CT or US imaging. A 2018 pelvic US  was normal. A HIDA scan was done sometime after her [facility] ED visit and was seen to be normal.  Through GI clinic we evaluated amylase, lipase, and hepatic panel were all normal. We attempted H pylori testing, but as she had no further pain episodes for months, she ultimately didn't complete the test. She reported another pain episode in Oct 2018.  She states she had a \"large, greasy Mexican meal\" and the pain came on within 30 minutes. She doesn't really recall any other associated symptoms or further details. The pain, as in the past, dissipated on its own after some time.       Over time, Ms. Cutler felt it had been a couple years since any severe episodes of pain. She felt that heavier meals may have been a contributor to her prior episodes and she identified quinoa as consistently leading to more milder abdominal cramps.      In 2023, Ms. Cutler reported three pain episodes - one in Jan which was more mild, one in April and another later in April/May. They were somewhat different from her " "prior pain episodes. These current episodes seem to come on immediately after completing defecation, but while still on the toilet. The pain is high in the abdomen (much different location than her typical constipation cramping). It was described as \"achy\" and remained constant for hours, at times with spikes of increased pain. The pain would dissipate on its own after a few hours and would recur the same way the next morning for several days in a row. The longest episode was pain recurring each morning for 10 days. The spikes of intense pain did not seem to be triggered by anything in particular; she did not feel eating or movement brought them on or worsened the pain. She did take Pepto-Bismol for awhile and she found this helpful, but stopped due to concern for worsening constipation.he did skip a couple doses of Trulance to see if this helped her pain; it did not. She couldn't identify any other aggravating or alleviating factors.  She did have a neck rash which developed during the 10 day episode and resolved spontaneously, though this did not occur with other episodes and unclear if truly related. She denies any other associated symptoms. During these episodes she hadn't noted any difference with her bowel pattern.       She did have a abd/pelvis CT scan recently which was normal. An EGD was ordered but did not end up getting done. Her PCP reportedly wondered if Nexplanon was playing a role (abd pain is a listed side effect). Ms. Cutler has overall felt normal in-between. Her nexplanon was removed and she reported months without pain.        Today, Ms. Cutler reports that she had had some post-prandial pain episodes similar to what is described for pain episodes in 2016 - 2018. She states that with tracking she felt that she would notice the pain if she went a few days without a satisfactory BM, then she would eat a good-sized meal the pain would come on. She did feel pain resolved after a good BM. She had " tried dicyclomine as well, but didn't really notice much improvement.       PROBLEM LIST  Patient Active Problem List    Diagnosis Date Noted    Tailor's bunion of both feet 02/24/2022     Priority: Medium     Added automatically from request for surgery [id number]      Biceps tendinopathy, right 07/03/2020     Priority: Medium     Added automatically from request for surgery [id number]      Anxiety and depression 02/24/2020     Priority: Medium       PERTINENT MEDICATIONS:  Current Outpatient Medications   Medication Sig Dispense Refill    dicyclomine (BENTYL) 10 MG capsule Take 10 mg (1 cap) by mouth up to 4 times daily for abdominal pain as needed. If no improvement at 10 mg dosing, ok to increase to 20 mg (2 caps) or 30 mg (3 caps) up to 4 times daily daily as needed. 30 capsule 1    escitalopram (LEXAPRO) 20 MG tablet Take 1 tablet (20 mg) by mouth daily 90 tablet 1    melatonin 5 MG tablet Take 5 mg by mouth nightly as needed for sleep      norgestimate-ethinyl estradiol (ORTHO-CYCLEN) 0.25-35 MG-MCG tablet Take 1 tablet by mouth daily 84 tablet 3    plecanatide (TRULANCE) 3 MG tablet Take 1 tablet (3 mg) by mouth daily Please call Gastroenterology at [phone] to schedule appointment. Thank you. 90 tablet 4    polyethylene glycol (MIRALAX) 17 GM/Dose powder Take 1 capful by mouth 4 times daily        No current facility-administered medications for this visit.         PHYSICAL EXAMINATION:  Vitals There were no vitals taken for this visit.   Wt   Wt Readings from Last 2 Encounters:   05/17/23 87.5 kg (193 lb)   03/03/23 86.8 kg (191 lb 4.8 oz)      Gen: Pt sitting up in NAD, interactive and cooperative on exam  Eyes: sclerae anicteric, no injection  ENT:  MMM  Resp: Breathing comfortably on exam, speaking in full sentences  Skin: No jaundice  Neuro: alert, oriented, answers questions appropriately        PERTINENT STUDIES:    Office Visit on 04/16/2024   Component Date Value Ref Range Status    Interpretation  04/16/2024 Negative for Intraepithelial Lesion or Malignancy (NILM)    Final    Comment 04/16/2024    Final                    Value:This result contains rich text formatting which cannot be displayed here.    Specimen Adequacy 04/16/2024 Satisfactory for evaluation, endocervical/transformation zone component absent   Final    Clinical Information 04/16/2024    Final                    Value:This result contains rich text formatting which cannot be displayed here.    LMP/Menopause Date 04/16/2024    Final                    Value:This result contains rich text formatting which cannot be displayed here.    Reflex Testing 04/16/2024 Yes regardless of result   Final    Previous Abnormal? 04/16/2024    Final                    Value:This result contains rich text formatting which cannot be displayed here.    Performing Labs 04/16/2024    Final                    Value:This result contains rich text formatting which cannot be displayed here.    Other HR HPV 04/16/2024 Negative  Negative Final    HPV16 DNA 04/16/2024 Negative  Negative Final    HPV18 DNA 04/16/2024 Negative  Negative Final    FINAL DIAGNOSIS 04/16/2024    Final                    Value:This result contains rich text formatting which cannot be displayed here.

## 2024-08-21 NOTE — PATIENT INSTRUCTIONS
"- continue plecanatide/Trulance 3mg every other day (will reorder)  - if going 2-3 days without a satisfactory BM, consider taking Trulance 2-3 days in a row or adding Miralax to your regimen for a few days  - agree with your plan of bringing Miralax or magnesium or Colace along when traveling which can be added to regular Trulance use to limit constipation while away from home  - consider smaller, lighter meals when \"backed up\" and working on constipation to avoid increased pain  - ok for dicyclomine as needed (if it helps at all)  - follow-up in GI clinic in 6-12 months     If you have any questions, please don't hesitate to contact me through our GI RN Clinic Coordinator, Radha Irby, at [phone].    "

## 2024-08-23 ENCOUNTER — TELEPHONE (OUTPATIENT)
Dept: GASTROENTEROLOGY | Facility: CLINIC | Age: 29
End: 2024-08-23
Payer: COMMERCIAL

## 2024-08-23 NOTE — TELEPHONE ENCOUNTER
Prior Authorization Retail Medication Request    Medication/Dose:    Disp Refills Start End CHE   plecanatide (TRULANCE) 3 MG tablet 90 tablet 3 8/21/2024 -- No   Sig - Route: Take 1 tablet (3 mg) by mouth daily. Please keep 8-21-24 clinic appt for refills. - Oral   Sent to pharmacy as: Trulance 3 MG Oral Tablet (plecanatide)   Class: E-Prescribe   Order: [id number]   E-Prescribing Status: Receipt confirmed by pharmacy (8/21/2024 11:01 AM CDT)       Diagnosis and ICD code (if different than what is on RX):    New/renewal/insurance change PA/secondary ins. PA:  Previously Tried and Failed:    Rationale:      Insurance   Primary:   Insurance ID:      Secondary (if applicable):  Insurance ID:      Pharmacy Information (if different than what is on RX)  Name:    Phone:    Fax:

## 2024-08-27 NOTE — TELEPHONE ENCOUNTER
PRIOR AUTHORIZATION DENIED    Medication: TRULANCE 3 MG PO TABS  Insurance Company: [employer] ([facility]) - Phone [phone] Fax [phone]  Denial Date: 8/27/2024  Denial Reason(s):     Appeal Information:     Patient Notified: No, care team must notify

## 2024-08-27 NOTE — TELEPHONE ENCOUNTER
PA Initiation    Medication: TRULANCE 3 MG PO TABS  Insurance Company: [employer] ([facility]) - Phone [phone] Fax [phone]  Pharmacy Filling the Rx: [id number] - [place], [place] - [address]  Filling Pharmacy Phone: [phone]  Filling Pharmacy Fax: [phone]  Start Date: 8/27/2024

## 2024-08-28 ENCOUNTER — PATIENT OUTREACH (OUTPATIENT)
Dept: GASTROENTEROLOGY | Facility: CLINIC | Age: 29
End: 2024-08-28
Payer: COMMERCIAL

## 2024-08-28 NOTE — PROGRESS NOTES
Received a PA denial for Trulance.   Notified patient and updated the provider  Letter is needed.   Will submit letter once it is completed

## 2024-08-29 ENCOUNTER — TELEPHONE (OUTPATIENT)
Dept: GASTROENTEROLOGY | Facility: CLINIC | Age: 29
End: 2024-08-29
Payer: COMMERCIAL

## 2024-08-29 NOTE — TELEPHONE ENCOUNTER
Left Voicemail (1st Attempt) and Sent Mychart (1st Attempt) for the patient to call back and schedule the following:    Appointment type: Return GI  Provider: Dr. Haro  Return date: around May 2025  Specialty phone number: [phone]  Additional appointment(s) needed: NA  Additonal Notes: LVM/MyC x1

## 2024-08-31 ENCOUNTER — E-VISIT (OUTPATIENT)
Dept: URGENT CARE | Facility: CLINIC | Age: 29
End: 2024-08-31
Payer: COMMERCIAL

## 2024-08-31 DIAGNOSIS — N30.00 ACUTE CYSTITIS WITHOUT HEMATURIA: Primary | ICD-10-CM

## 2024-08-31 PROCEDURE — 99421 OL DIG E/M SVC 5-10 MIN: CPT | Performed by: NURSE PRACTITIONER

## 2024-08-31 RX ORDER — NITROFURANTOIN 25; 75 MG/1; MG/1
100 CAPSULE ORAL 2 TIMES DAILY
Qty: 10 CAPSULE | Refills: 0 | Status: SHIPPED | OUTPATIENT
Start: 2024-08-31 | End: 2024-09-05

## 2024-08-31 NOTE — PATIENT INSTRUCTIONS
Dear Genesis Cutler    After reviewing your responses, I've been able to diagnose you with a urinary tract infection, which is a common infection of the bladder with bacteria.  This is not a sexually transmitted infection, though urinating immediately after intercourse can help prevent infections.  Drinking lots of fluids is also helpful to clear your current infection and prevent the next one.      I have sent a prescription for antibiotics to your pharmacy to treat this infection.    It is important that you take all of your prescribed medication even if your symptoms are improving after a few doses.  Taking all of your medicine helps prevent the symptoms from returning.     If your symptoms worsen, you develop pain in your back or stomach, develop fevers, or are not improving in 5 days, please contact your primary care provider for an appointment or visit any of our convenient Walk-in or Urgent Care Centers to be seen, which can be found on our website here.    Thanks again for choosing us as your health care partner,    WILMA Phillips CNP  Urinary Tract Infection (UTI) in Women: Care Instructions  Overview     A urinary tract infection (UTI) is an infection caused by bacteria. It can happen anywhere in the urinary tract. A UTI can happen in the:  Kidneys.  Ureters, the tubes that connect the kidneys to the bladder.  Bladder.  Urethra, where the urine comes out.  Most UTIs are bladder infections. They often cause pain or burning when you urinate.  Most UTIs can be cured with antibiotics. If you are prescribed antibiotics, be sure to complete your treatment so that the infection does not get worse.  Follow-up care is a key part of your treatment and safety. Be sure to make and go to all appointments, and call your doctor if you are having problems. It's also a good idea to know your test results and keep a list of the medicines you take.  How can you care for yourself at home?  Take your antibiotics  "as directed. Do not stop taking them just because you feel better. You need to take the full course of antibiotics.  Drink extra water and other fluids for the next day or two. This will help make the urine less concentrated and help wash out the bacteria that are causing the infection. (If you have kidney, heart, or liver disease and have to limit fluids, talk with your doctor before you increase the amount of fluids you drink.)  Avoid drinks that are carbonated or have caffeine. They can irritate the bladder.  Urinate often. Try to empty your bladder each time.  To relieve pain, take a hot bath or lay a heating pad set on low over your lower belly or genital area. Never go to sleep with a heating pad in place.  To prevent UTIs  Drink plenty of water each day. This helps you urinate often, which clears bacteria from your system. (If you have kidney, heart, or liver disease and have to limit fluids, talk with your doctor before you increase the amount of fluids you drink.)  Urinate when you need to.  If you are sexually active, urinate right after you have sex.  Change sanitary pads often.  Avoid douches, bubble baths, feminine hygiene sprays, and other feminine hygiene products that have deodorants.  After going to the bathroom, wipe from front to back.  When should you call for help?   Call your doctor now or seek immediate medical care if:    You have new or worse fever, chills, nausea, or vomiting.     You have new pain in your back just below your rib cage. This is called flank pain.     There is new blood or pus in your urine.     You have any problems with your antibiotic medicine.   Watch closely for changes in your health, and be sure to contact your doctor if:    You are not getting better after taking an antibiotic for 2 days.     Your symptoms go away but then come back.   Where can you learn more?  Go to https://www.healthwise.net/patiented  Enter K848 in the search box to learn more about \"Urinary " "Tract Infection (UTI) in Women: Care Instructions.\"  Current as of: November 15, 2023               Content Version: 14.0    2777-7553 [employer].   Care instructions adapted under license by your healthcare professional. If you have questions about a medical condition or this instruction, always ask your healthcare professional. [employer] disclaims any warranty or liability for your use of this information.      "

## 2024-09-11 NOTE — PROGRESS NOTES
Received appeal letter from Dr. Haro   Letter forwarded to the PA team   Left a voicemail for Sarah with the update on the appeal

## 2024-09-11 NOTE — TELEPHONE ENCOUNTER
Medication Appeal Initiation    Medication: TRULANCE 3 MG PO TABS  Appeal Start Date:  9/11/2024  Insurance Company: [employer]  Insurance Phone:   Insurance Fax:   Comments:   Faxed appeal letter to insurance.

## 2024-09-12 NOTE — TELEPHONE ENCOUNTER
MEDICATION APPEAL DENIED    Medication: TRULANCE 3 MG PO TABS  Insurance Company:   Denial Date: 9/12/2024    Denial Reason(s):       Second Level Appeal Information:  Second level appeals will be managed by the clinic staff and provider. Please contact the [employer] Prior Authorization Team if additional information about the denial is needed.

## 2024-09-16 RX ORDER — LUBIPROSTONE 24 UG/1
24 CAPSULE ORAL 2 TIMES DAILY WITH MEALS
Qty: 60 CAPSULE | Refills: 3 | Status: SHIPPED | OUTPATIENT
Start: 2024-09-16

## 2024-09-23 ENCOUNTER — MYC MEDICAL ADVICE (OUTPATIENT)
Dept: GASTROENTEROLOGY | Facility: CLINIC | Age: 29
End: 2024-09-23
Payer: COMMERCIAL

## 2024-09-24 NOTE — TELEPHONE ENCOUNTER
Discussed with Dr. Haro -    Chest discomfort is a known side effect of the Amitiza.     Patient can continue the prescription if she is comfortable and continue to monitor.     Can try lower dose if preferred, however constipation may return/worsen.     Requested patient keep GI team updated throughout the next 1-2 weeks. If symptoms continue to be bothersome, can discuss appealing the Trulance denial.     Patient updated via [employer].

## 2024-10-31 ASSESSMENT — PATIENT HEALTH QUESTIONNAIRE - PHQ9: SUM OF ALL RESPONSES TO PHQ QUESTIONS 1-9: 17

## 2024-11-20 ENCOUNTER — MYC MEDICAL ADVICE (OUTPATIENT)
Dept: FAMILY MEDICINE | Facility: CLINIC | Age: 29
End: 2024-11-20
Payer: COMMERCIAL

## 2024-12-22 DIAGNOSIS — F41.9 ANXIETY AND DEPRESSION: ICD-10-CM

## 2024-12-22 DIAGNOSIS — F32.A ANXIETY AND DEPRESSION: ICD-10-CM

## 2024-12-23 RX ORDER — ESCITALOPRAM OXALATE 20 MG/1
20 TABLET ORAL DAILY
Qty: 90 TABLET | Refills: 0 | Status: SHIPPED | OUTPATIENT
Start: 2024-12-23

## 2025-01-19 DIAGNOSIS — K59.04 CHRONIC IDIOPATHIC CONSTIPATION: ICD-10-CM

## 2025-01-27 RX ORDER — LUBIPROSTONE 24 UG/1
24 CAPSULE ORAL 2 TIMES DAILY WITH MEALS
Qty: 180 CAPSULE | Refills: 1 | Status: SHIPPED | OUTPATIENT
Start: 2025-01-27

## 2025-01-27 NOTE — TELEPHONE ENCOUNTER
Last Written Prescription:   Disp Refills Start End CHE   lubiprostone (AMITIZA) 24 MCG capsule 60 capsule 3 9/16/2024 -- No   Sig - Route: Take 1 capsule (24 mcg) by mouth 2 times daily (with meals). - Oral     ----------------------  Last Visit Date: 8/21/2024  [facility]      Future Visit Date: 0  ----------------------        Refill decision: Medication unable to be refilled by RN due to:       []    Pt not seen within past 12 months, No FOV or FOV exceeds timeframe per protocol     []    Compliance - lapse in therapy/gap in refills; No Shows; Cancellations    []    Verification - order discrepancy, clarification needed, Sig modification needed    []    Controlled medication    [x]    Medication not included in refill protocol policy    []    Abnormal labs/test:    []    Overdue labs/test:      []    Medication not active on Pt's med list    []    Drug interaction Warning    []    Medication - Last script is Reported/Historical/Transitional    []    Advanced refill request    []    Review Needed: New med; Med adjusted within <= 30 days; Safety Alert; Lab monitoring required    []    Other:       Request from pharmacy:  Requested Prescriptions   Pending Prescriptions Disp Refills    lubiprostone (AMITIZA) 24 MCG capsule [Pharmacy Med Name: LUBIPROSTONE 24 MCG CAPSULE] 180 capsule 1     Sig: TAKE 1 CAPSULE (24 MCG) BY MOUTH 2 TIMES DAILY (WITH MEALS).       There is no refill protocol information for this order

## 2025-03-23 DIAGNOSIS — Z30.011 BCP (BIRTH CONTROL PILLS) INITIATION: ICD-10-CM

## 2025-03-24 RX ORDER — NORGESTIMATE AND ETHINYL ESTRADIOL 0.25-0.035
1 KIT ORAL DAILY
Qty: 84 TABLET | Refills: 0 | Status: SHIPPED | OUTPATIENT
Start: 2025-03-24

## 2025-03-27 DIAGNOSIS — F41.9 ANXIETY AND DEPRESSION: ICD-10-CM

## 2025-03-27 DIAGNOSIS — F32.A ANXIETY AND DEPRESSION: ICD-10-CM

## 2025-03-27 RX ORDER — ESCITALOPRAM OXALATE 20 MG/1
20 TABLET ORAL DAILY
Qty: 90 TABLET | Refills: 0 | Status: SHIPPED | OUTPATIENT
Start: 2025-03-27

## 2025-04-07 ENCOUNTER — E-VISIT (OUTPATIENT)
Dept: URGENT CARE | Facility: CLINIC | Age: 30
End: 2025-04-07
Payer: COMMERCIAL

## 2025-04-07 DIAGNOSIS — J01.90 ACUTE SINUSITIS WITH SYMPTOMS > 10 DAYS: Primary | ICD-10-CM

## 2025-04-07 PROCEDURE — 99207 PR NON-BILLABLE SERV PER CHARTING: CPT | Performed by: NURSE PRACTITIONER

## 2025-04-07 RX ORDER — DOXYCYCLINE HYCLATE 100 MG
100 TABLET ORAL 2 TIMES DAILY
Qty: 14 TABLET | Refills: 0 | Status: SHIPPED | OUTPATIENT
Start: 2025-04-07 | End: 2025-04-14

## 2025-04-07 NOTE — PATIENT INSTRUCTIONS
Acute Sinusitis: Care Instructions  Overview     Acute sinusitis is an inflammation of the mucous membranes inside the nose and sinuses. Sinuses are the hollow spaces in your skull around the eyes and nose. Acute sinusitis often follows a cold. Acute sinusitis causes thick, discolored mucus that drains from the nose or down the back of the throat. It also can cause pain and pressure in your head and face along with a stuffy or blocked nose.  In most cases, sinusitis gets better on its own in 1 to 2 weeks. But some mild symptoms may last for several weeks. Sometimes antibiotics are needed if there is a bacterial infection.  Follow-up care is a key part of your treatment and safety. Be sure to make and go to all appointments, and call your doctor if you are having problems. It's also a good idea to know your test results and keep a list of the medicines you take.  How can you care for yourself at home?  Use saline (saltwater) nasal washes. This can help keep your nasal passages open and wash out mucus and allergens.  You can buy saline nose washes at a grocery store or drugstore. Follow the instructions on the package.  You can make your own at home. Add 1 teaspoon of non-iodized salt and 1 teaspoon of baking soda to 2 cups of distilled or boiled and cooled water. Fill a squeeze bottle or a nasal cleansing pot (such as a neti pot) with the nasal wash. Then put the tip into your nostril, and lean over the sink. With your mouth open, gently squirt the liquid. Repeat on the other side.  Try a decongestant nasal spray like oxymetazoline (Afrin). Do not use it for more than 3 days in a row. Using it for more than 3 days can make your congestion worse.  If needed, take an over-the-counter pain medicine, such as acetaminophen (Tylenol), ibuprofen (Advil, Motrin), or naproxen (Aleve). Read and follow all instructions on the label.  If the doctor prescribed antibiotics, take them as directed. Do not stop taking them just  "because you feel better. You need to take the full course of antibiotics.  Be careful when taking over-the-counter cold or flu medicines and Tylenol at the same time. Many of these medicines have acetaminophen, which is Tylenol. Read the labels to make sure that you are not taking more than the recommended dose. Too much acetaminophen (Tylenol) can be harmful.  Try a steroid nasal spray. It may help with your symptoms.  Breathe warm, moist air. You can use a steamy shower, a hot bath, or a sink filled with hot water. Avoid cold, dry air. Using a humidifier in your home may help. Follow the directions for cleaning the machine.  When should you call for help?   Call your doctor now or seek immediate medical care if:    You have new or worse swelling, redness, or pain in your face or around one or both of your eyes.     You have double vision or a change in your vision.     You have a high fever.     You have a severe headache and a stiff neck.     You have mental changes, such as feeling confused or much less alert.   Watch closely for changes in your health, and be sure to contact your doctor if:    You are not getting better as expected.   Where can you learn more?  Go to https://www.Hotelcloud.net/patiented  Enter I933 in the search box to learn more about \"Acute Sinusitis: Care Instructions.\"  Current as of: October 27, 2024  Content Version: 14.4    [id number] [employer].   Care instructions adapted under license by your healthcare professional. If you have questions about a medical condition or this instruction, always ask your healthcare professional. [employer] disclaims any warranty or liability for your use of this information.    Dear Genesis Cutler    After reviewing your responses, I've been able to diagnose you with Acute sinusitis with symptoms > 10 days.      Based on your responses and diagnosis, I have prescribed   Orders Placed This Encounter   Medications     " doxycycline hyclate (VIBRA-TABS) 100 MG tablet     Sig: Take 1 tablet (100 mg) by mouth 2 times daily for 7 days.     Dispense:  14 tablet     Refill:  0     May substitute any formulation of 100mg doxycycline available and best covered by insurance      to treat your symptoms. I have sent this to your pharmacy.?     It is also important to stay well hydrated, get lots of rest and take over-the-counter decongestants,?tylenol?or ibuprofen if you?are able to?take those medications per your primary care provider to help relieve discomfort.?     It is important that you take?all of?your prescribed medication even if your symptoms are improving after a few doses.? Taking?all of?your medicine helps prevent the symptoms from returning.?     If your symptoms worsen, you develop severe headache, vomiting, high fever (>102), or are not improving in 7 days, please contact your primary care provider for an appointment or visit any of our convenient Walk-in Care or Urgent Care Centers to be seen which can be found on our website?here.?     Thanks again for choosing?us?as your health care partner,?   ?  DAYANNA SOSA, WILMA CNP?   Thank you for choosing us for your care. I have placed an order for a prescription so that you can start treatment:  Orders Placed This Encounter   Medications     doxycycline hyclate (VIBRA-TABS) 100 MG tablet     Sig: Take 1 tablet (100 mg) by mouth 2 times daily for 7 days.     Dispense:  14 tablet     Refill:  0     May substitute any formulation of 100mg doxycycline available and best covered by insurance          View your full visit summary for details by clicking on the link below. Your pharmacist will able to address any questions you may have about the medication.     If you're not feeling better within 5-7 days, please schedule an appointment.  You can schedule an appointment right here in [facility], or call [phone]  If the visit is for the same symptoms as your eVisit, we'll refund  the cost of your eVisit if seen within seven days.

## 2025-04-20 SDOH — HEALTH STABILITY: PHYSICAL HEALTH: ON AVERAGE, HOW MANY MINUTES DO YOU ENGAGE IN EXERCISE AT THIS LEVEL?: 40 MIN

## 2025-04-20 SDOH — HEALTH STABILITY: PHYSICAL HEALTH: ON AVERAGE, HOW MANY DAYS PER WEEK DO YOU ENGAGE IN MODERATE TO STRENUOUS EXERCISE (LIKE A BRISK WALK)?: 4 DAYS

## 2025-04-20 ASSESSMENT — SOCIAL DETERMINANTS OF HEALTH (SDOH): HOW OFTEN DO YOU GET TOGETHER WITH FRIENDS OR RELATIVES?: ONCE A WEEK

## 2025-04-25 ENCOUNTER — OFFICE VISIT (OUTPATIENT)
Dept: FAMILY MEDICINE | Facility: CLINIC | Age: 30
End: 2025-04-25
Attending: FAMILY MEDICINE
Payer: COMMERCIAL

## 2025-04-25 VITALS
HEIGHT: 65 IN | DIASTOLIC BLOOD PRESSURE: 60 MMHG | SYSTOLIC BLOOD PRESSURE: 92 MMHG | RESPIRATION RATE: 16 BRPM | OXYGEN SATURATION: 100 % | BODY MASS INDEX: 32.12 KG/M2 | TEMPERATURE: 99 F | HEART RATE: 67 BPM

## 2025-04-25 DIAGNOSIS — F41.9 ANXIETY AND DEPRESSION: ICD-10-CM

## 2025-04-25 DIAGNOSIS — F32.5 MAJOR DEPRESSIVE DISORDER WITH SINGLE EPISODE, IN FULL REMISSION: ICD-10-CM

## 2025-04-25 DIAGNOSIS — Z12.83 ENCOUNTER FOR SCREENING FOR MALIGNANT NEOPLASM OF SKIN: ICD-10-CM

## 2025-04-25 DIAGNOSIS — F32.A ANXIETY AND DEPRESSION: ICD-10-CM

## 2025-04-25 DIAGNOSIS — Z00.00 ROUTINE GENERAL MEDICAL EXAMINATION AT A HEALTH CARE FACILITY: Primary | ICD-10-CM

## 2025-04-25 DIAGNOSIS — Z30.41 ENCOUNTER FOR SURVEILLANCE OF CONTRACEPTIVE PILLS: ICD-10-CM

## 2025-04-25 PROBLEM — F33.1 MODERATE EPISODE OF RECURRENT MAJOR DEPRESSIVE DISORDER (H): Status: ACTIVE | Noted: 2025-04-25

## 2025-04-25 PROCEDURE — 3074F SYST BP LT 130 MM HG: CPT | Performed by: FAMILY MEDICINE

## 2025-04-25 PROCEDURE — 99214 OFFICE O/P EST MOD 30 MIN: CPT | Mod: 25 | Performed by: FAMILY MEDICINE

## 2025-04-25 PROCEDURE — 96127 BRIEF EMOTIONAL/BEHAV ASSMT: CPT | Performed by: FAMILY MEDICINE

## 2025-04-25 PROCEDURE — 99395 PREV VISIT EST AGE 18-39: CPT | Performed by: FAMILY MEDICINE

## 2025-04-25 PROCEDURE — 1126F AMNT PAIN NOTED NONE PRSNT: CPT | Performed by: FAMILY MEDICINE

## 2025-04-25 PROCEDURE — 3078F DIAST BP <80 MM HG: CPT | Performed by: FAMILY MEDICINE

## 2025-04-25 RX ORDER — NORGESTIMATE AND ETHINYL ESTRADIOL 0.25-0.035
1 KIT ORAL DAILY
Qty: 84 TABLET | Refills: 3 | Status: SHIPPED | OUTPATIENT
Start: 2025-04-25

## 2025-04-25 RX ORDER — ESCITALOPRAM OXALATE 20 MG/1
20 TABLET ORAL DAILY
Qty: 90 TABLET | Refills: 3 | Status: SHIPPED | OUTPATIENT
Start: 2025-04-25

## 2025-04-25 ASSESSMENT — ANXIETY QUESTIONNAIRES
GAD7 TOTAL SCORE: 4
5. BEING SO RESTLESS THAT IT IS HARD TO SIT STILL: SEVERAL DAYS
1. FEELING NERVOUS, ANXIOUS, OR ON EDGE: SEVERAL DAYS
GAD7 TOTAL SCORE: 4
8. IF YOU CHECKED OFF ANY PROBLEMS, HOW DIFFICULT HAVE THESE MADE IT FOR YOU TO DO YOUR WORK, TAKE CARE OF THINGS AT HOME, OR GET ALONG WITH OTHER PEOPLE?: NOT DIFFICULT AT ALL
3. WORRYING TOO MUCH ABOUT DIFFERENT THINGS: NOT AT ALL
6. BECOMING EASILY ANNOYED OR IRRITABLE: NOT AT ALL
7. FEELING AFRAID AS IF SOMETHING AWFUL MIGHT HAPPEN: SEVERAL DAYS
2. NOT BEING ABLE TO STOP OR CONTROL WORRYING: NOT AT ALL
IF YOU CHECKED OFF ANY PROBLEMS ON THIS QUESTIONNAIRE, HOW DIFFICULT HAVE THESE PROBLEMS MADE IT FOR YOU TO DO YOUR WORK, TAKE CARE OF THINGS AT HOME, OR GET ALONG WITH OTHER PEOPLE: NOT DIFFICULT AT ALL
GAD7 TOTAL SCORE: 4
7. FEELING AFRAID AS IF SOMETHING AWFUL MIGHT HAPPEN: SEVERAL DAYS
4. TROUBLE RELAXING: SEVERAL DAYS

## 2025-04-25 ASSESSMENT — PATIENT HEALTH QUESTIONNAIRE - PHQ9
SUM OF ALL RESPONSES TO PHQ QUESTIONS 1-9: 3
SUM OF ALL RESPONSES TO PHQ QUESTIONS 1-9: 3
10. IF YOU CHECKED OFF ANY PROBLEMS, HOW DIFFICULT HAVE THESE PROBLEMS MADE IT FOR YOU TO DO YOUR WORK, TAKE CARE OF THINGS AT HOME, OR GET ALONG WITH OTHER PEOPLE: NOT DIFFICULT AT ALL

## 2025-04-25 ASSESSMENT — PAIN SCALES - GENERAL: PAINLEVEL_OUTOF10: NO PAIN (0)

## 2025-04-25 NOTE — PATIENT INSTRUCTIONS
Patient Education   Preventive Care Advice   This is general advice given by our system to help you stay healthy. However, your care team may have specific advice just for you. Please talk to your care team about your preventive care needs.  Nutrition  Eat 5 or more servings of fruits and vegetables each day.  Try wheat bread, brown rice and whole grain pasta (instead of white bread, rice, and pasta).  Get enough calcium and vitamin D. Check the label on foods and aim for 100% of the RDA (recommended daily allowance).  Lifestyle  Exercise at least 150 minutes each week  (30 minutes a day, 5 days a week).  Do muscle strengthening activities 2 days a week. These help control your weight and prevent disease.  No smoking.  Wear sunscreen to prevent skin cancer.  Have a dental exam and cleaning every 6 months.  Yearly exams  See your health care team every year to talk about:  Any changes in your health.  Any medicines your care team has prescribed.  Preventive care, family planning, and ways to prevent chronic diseases.  Shots (vaccines)   HPV shots (up to age 26), if you've never had them before.  Hepatitis B shots (up to age 59), if you've never had them before.  COVID-19 shot: Get this shot when it's due.  Flu shot: Get a flu shot every year.  Tetanus shot: Get a tetanus shot every 10 years.  Pneumococcal, hepatitis A, and RSV shots: Ask your care team if you need these based on your risk.  Shingles shot (for age 50 and up)  General health tests  Diabetes screening:  Starting at age 35, Get screened for diabetes at least every 3 years.  If you are younger than age 35, ask your care team if you should be screened for diabetes.  Cholesterol test: At age 39, start having a cholesterol test every 5 years, or more often if advised.  Bone density scan (DEXA): At age 50, ask your care team if you should have this scan for osteoporosis (brittle bones).  Hepatitis C: Get tested at least once in your life.  STIs (sexually  transmitted infections)  Before age 24: Ask your care team if you should be screened for STIs.  After age 24: Get screened for STIs if you're at risk. You are at risk for STIs (including HIV) if:  You are sexually active with more than one person.  You don't use condoms every time.  You or a partner was diagnosed with a sexually transmitted infection.  If you are at risk for HIV, ask about PrEP medicine to prevent HIV.  Get tested for HIV at least once in your life, whether you are at risk for HIV or not.  Cancer screening tests  Cervical cancer screening: If you have a cervix, begin getting regular cervical cancer screening tests starting at age 21.  Breast cancer scan (mammogram): If you've ever had breasts, begin having regular mammograms starting at age 40. This is a scan to check for breast cancer.  Colon cancer screening: It is important to start screening for colon cancer at age 45.  Have a colonoscopy test every 10 years (or more often if you're at risk) Or, ask your provider about stool tests like a FIT test every year or Cologuard test every 3 years.  To learn more about your testing options, visit:   .  For help making a decision, visit:   https://bit.ly/rz64131.  Prostate cancer screening test: If you have a prostate, ask your care team if a prostate cancer screening test (PSA) at age 55 is right for you.  Lung cancer screening: If you are a current or former smoker ages 50 to 80, ask your care team if ongoing lung cancer screenings are right for you.  For informational purposes only. Not to replace the advice of your health care provider. Copyright   2023 [facility]. All rights reserved. Clinically reviewed by the [facility] Transitions Program. Qwite [id number] - REV 01/24.  Learning About Stress  What is stress?     Stress is your body's response to a hard situation. Your body can have a physical, emotional, or mental response. Stress is a fact of life for most people, and it  affects everyone differently. What causes stress for you may not be stressful for someone else.  A lot of things can cause stress. You may feel stress when you go on a job interview, take a test, or run a race. This kind of short-term stress is normal and even useful. It can help you if you need to work hard or react quickly. For example, stress can help you finish an important job on time.  Long-term stress is caused by ongoing stressful situations or events. Examples of long-term stress include long-term health problems, ongoing problems at work, or conflicts in your family. Long-term stress can harm your health.  How does stress affect your health?  When you are stressed, your body responds as though you are in danger. It makes hormones that speed up your heart, make you breathe faster, and give you a burst of energy. This is called the fight-or-flight stress response. If the stress is over quickly, your body goes back to normal and no harm is done.  But if stress happens too often or lasts too long, it can have bad effects. Long-term stress can make you more likely to get sick, and it can make symptoms of some diseases worse. If you tense up when you are stressed, you may develop neck, shoulder, or low back pain. Stress is linked to high blood pressure and heart disease.  Stress also harms your emotional health. It can make you albrecht, tense, or depressed. Your relationships may suffer, and you may not do well at work or school.  What can you do to manage stress?  You can try these things to help manage stress:   Do something active. Exercise or activity can help reduce stress. Walking is a great way to get started. Even everyday activities such as housecleaning or yard work can help.  Try yoga or rebecca chi. These techniques combine exercise and meditation. You may need some training at first to learn them.  Do something you enjoy. For example, listen to music or go to a movie. Practice your hobby or do volunteer  "work.  Meditate. This can help you relax, because you are not worrying about what happened before or what may happen in the future.  Do guided imagery. Imagine yourself in any setting that helps you feel calm. You can use online videos, books, or a teacher to guide you.  Do breathing exercises. For example:  From a standing position, bend forward from the waist with your knees slightly bent. Let your arms dangle close to the floor.  Breathe in slowly and deeply as you return to a standing position. Roll up slowly and lift your head last.  Hold your breath for just a few seconds in the standing position.  Breathe out slowly and bend forward from the waist.  Let your feelings out. Talk, laugh, cry, and express anger when you need to. Talking with supportive friends or family, a counselor, or a natalya leader about your feelings is a healthy way to relieve stress. Avoid discussing your feelings with people who make you feel worse.  Write. It may help to write about things that are bothering you. This helps you find out how much stress you feel and what is causing it. When you know this, you can find better ways to cope.  What can you do to prevent stress?  You might try some of these things to help prevent stress:  Manage your time. This helps you find time to do the things you want and need to do.  Get enough sleep. Your body recovers from the stresses of the day while you are sleeping.  Get support. Your family, friends, and community can make a difference in how you experience stress.  Limit your news feed. Avoid or limit time on social media or news that may make you feel stressed.  Do something active. Exercise or activity can help reduce stress. Walking is a great way to get started.  Where can you learn more?  Go to https://www.Newspepper.net/patiented  Enter N032 in the search box to learn more about \"Learning About Stress.\"  Current as of: October 24, 2024  Content Version: 14.4 2024-2025 [employer].   Care instructions adapted under license by your healthcare professional. If you have questions about a medical condition or this instruction, always ask your healthcare professional. [employer] disclaims any warranty or liability for your use of this information.    Substance Use Disorder: Care Instructions  Overview     You can improve your life and health by stopping your use of alcohol or drugs. When you don't drink or use drugs, you may feel and sleep better. You may get along better with your family, friends, and coworkers. There are medicines and programs that can help with substance use disorder.  How can you care for yourself at home?  Here are some ways to help you stay sober and prevent relapse.  If you have been given medicine to help keep you sober or reduce your cravings, be sure to take it exactly as prescribed.  Talk to your doctor about programs that can help you stop using drugs or drinking alcohol.  Do not keep alcohol or drugs in your home.  Plan ahead. Think about what you'll say if other people ask you to drink or use drugs. Try not to spend time with people who drink or use drugs.  Use the time and money spent on drinking or drugs to do something that's important to you.  Preventing a relapse  Have a plan to deal with relapse. Learn to recognize changes in your thinking that lead you to drink or use drugs. Get help before you start to drink or use drugs again.  Try to stay away from situations, friends, or places that may lead you to drink or use drugs.  If you feel the need to drink alcohol or use drugs again, seek help right away. Call a trusted friend or family member. Some people get support from organizations such as Narcotics Anonymous or [employer] or from treatment facilities.  If you relapse, get help as soon as you can. Some people make a plan with another person that outlines what they want that person to do for them if they relapse. The plan usually includes  how to handle the relapse and who to notify in case of relapse.  Don't give up. Remember that a relapse doesn't mean that you have failed. Use the experience to learn the triggers that lead you to drink or use drugs. Then quit again. Recovery is a lifelong process. Many people have several relapses before they are able to quit for good.  Follow-up care is a key part of your treatment and safety. Be sure to make and go to all appointments, and call your doctor if you are having problems. It's also a good idea to know your test results and keep a list of the medicines you take.  When should you call for help?   Call 911  anytime you think you may need emergency care. For example, call if you or someone else:    Has overdosed or has withdrawal signs. Be sure to tell the emergency workers that you are or someone else is using or trying to quit using drugs. Overdose or withdrawal signs may include:  Losing consciousness.  Seizure.  Seeing or hearing things that aren't there (hallucinations).     Is thinking or talking about suicide or harming others.   Where to get help 24 hours a day, 7 days a week   If you or someone you know talks about suicide, self-harm, a mental health crisis, a substance use crisis, or any other kind of emotional distress, get help right away. You can:    Call the Suicide and Crisis Lifeline at 624.     Call 9-250-761-TALK (1-456.419.7207).     Text HOME to 871521 to access the Crisis Text Line.   Consider saving these numbers in your phone.  Go to [employer] for more information or to chat online.  Call your doctor now or seek immediate medical care if:    You are having withdrawal symptoms. These may include nausea or vomiting, sweating, shakiness, and anxiety.   Watch closely for changes in your health, and be sure to contact your doctor if:    You have a relapse.     You need more help or support to stop.   Where can you learn more?  Go to https://www.healthwise.net/patiented  Enter H573  "in the search box to learn more about \"Substance Use Disorder: Care Instructions.\"  Current as of: August 20, 2024  Content Version: 14.4 2024-2025 [employer].   Care instructions adapted under license by your healthcare professional. If you have questions about a medical condition or this instruction, always ask your healthcare professional. [employer] disclaims any warranty or liability for your use of this information.       "

## 2025-04-25 NOTE — PROGRESS NOTES
Preventive Care Visit  [facility] GLORIA Ledbetter MD, Family Medicine  Apr 25, 2025      Assessment & Plan     Routine general medical examination at a health care facility  Counseling  Appropriate preventive services were addressed with this patient via screening, questionnaire, or discussion as appropriate for fall prevention, nutrition, physical activity,  social engagement, weight loss and cognition.  Checklist reviewing preventive services available has been given to the patient.  Reviewed patient's diet, addressing concerns and/or questions.   She is at risk for psychosocial distress and has been provided with information to reduce risk.     Anxiety and depression  Stable, continue present management  - escitalopram (LEXAPRO) 20 MG tablet; Take 1 tablet (20 mg) by mouth daily.    Encounter for surveillance of contraceptive pills  - norgestimate-ethinyl estradiol (CLAUDIO) 0.25-35 MG-MCG tablet; Take 1 tablet by mouth daily.    Major depressive disorder with single episode, in full remission  In remission, continue present management    Encounter for screening for malignant neoplasm of skin  - Adult Dermatology  Referral; Future      Subjective   Sarah is a 30 year old, presenting for the following:  Physical        4/25/2025     4:19 PM   Additional Questions   Roomed by BT   Accompanied by self         4/25/2025   Declines Weight   Did patient decline having their weight taken? Yes          HPI  Pleasant patient here for a physical.   Depression and Anxiety   How are you doing with your depression since your last visit? Improved   How are you doing with your anxiety since your last visit?  Improved   Are you having other symptoms that might be associated with depression or anxiety? No  Have you had a significant life event? No   Do you have any concerns with your use of alcohol or other drugs? No    Social History     Tobacco Use    Smoking status: Never    Smokeless tobacco:  Never   Vaping Use    Vaping status: Never Used   Substance Use Topics    Alcohol use: Yes     Comment: Occasional alcoholic drink with friends,    Drug use: Yes     Types: Marijuana     Comment: Gummies or drinks only, not smoking         6/13/2023    11:02 AM 4/16/2024     1:39 PM 4/25/2025     3:51 PM   PHQ   PHQ-9 Total Score 3 5 3    Q9: Thoughts of better off dead/self-harm past 2 weeks Not at all Not at all Not at all       Patient-reported         4/20/2022     8:43 AM 6/13/2023    11:03 AM 4/25/2025     3:52 PM   NEGRO-7 SCORE   Total Score 3 (minimal anxiety) 3 (minimal anxiety) 4 (minimal anxiety)   Total Score 3 3 4        Patient-reported         Advance Care Planning    Discussed advance care planning with patient; however, patient declined at this time.        4/20/2025   General Health   How would you rate your overall physical health? Good   Feel stress (tense, anxious, or unable to sleep) To some extent   (!) STRESS CONCERN      4/20/2025   Nutrition   Three or more servings of calcium each day? Yes   Diet: Vegetarian   How many servings of fruit and vegetables per day? 4 or more   How many sweetened beverages each day? 0-1         4/20/2025   Exercise   Days per week of moderate/strenous exercise 4 days   Average minutes spent exercising at this level 40 min         4/20/2025   Social Factors   Frequency of gathering with friends or relatives Once a week   Worry food won't last until get money to buy more No   Food not last or not have enough money for food? No   Do you have housing? (Housing is defined as stable permanent housing and does not include staying outside in a car, in a tent, in an abandoned building, in an overnight shelter, or couch-surfing.) No   Are you worried about losing your housing? No   Lack of transportation? No   Unable to get utilities (heat,electricity)? No   Want help with housing or utility concern? No   (!) HOUSING CONCERN PRESENT      4/20/2025   Dental   Dentist two  times every year? Yes       Today's PHQ-9 Score:       4/25/2025     3:51 PM   PHQ-9 SCORE   PHQ-9 Total Score MyChart 3 (Minimal depression)   PHQ-9 Total Score 3        Patient-reported         4/20/2025   Substance Use   Alcohol more than 3/day or more than 7/wk No   Do you use any other substances recreationally? (!) CANNABIS PRODUCTS     Social History     Tobacco Use    Smoking status: Never    Smokeless tobacco: Never   Vaping Use    Vaping status: Never Used   Substance Use Topics    Alcohol use: Yes     Comment: Occasional alcoholic drink with friends,    Drug use: Yes     Types: Marijuana     Comment: Gummies or drinks only, not smoking               4/20/2025   STI Screening   New sexual partner(s) since last STI/HIV test? No             Latest Ref Rng & Units 4/16/2024     4:42 PM 2/23/2021     4:32 PM 1/29/2018     9:40 AM   PAP / HPV   PAP  Negative for Intraepithelial Lesion or Malignancy (NILM)      PAP (Historical)   NIL  NIL    HPV 16 DNA Negative Negative      HPV 18 DNA Negative Negative      Other HR HPV Negative Negative              4/20/2025   Contraception/Family Planning   Questions about contraception or family planning No        Reviewed and updated as needed this visit by Provider                    Past Medical History:   Diagnosis Date    Chronic constipation      Past Surgical History:   Procedure Laterality Date    ARTHROSCOPY SHLDR ROTATOR CUFF REPAIR, SUBACROMIAL DECOMP, DIST CLAVICLE RESECTION, BICEP TENODESIS Right 07/23/2020    Procedure: Right shoulder arthroscopy, open biceps tenodesis;  Surgeon: Aline Amador MD;  Location: [facility] OR    BUNIONECTOMY NITISH AND JOHN, COMBINED Left 5/3/2022    Procedure: Left fifth metatarsal bunionette correction with bone cutting and screw fixation;  Surgeon: Marshal Pate DPM;  Location: [facility] OR    BUNIONECTOMY NITISH AND JOHN, COMBINED Right 10/4/2022    Procedure: Right fifth metatarsal chevron osteotomy;  Surgeon: Herlinda  "Marshal RODRIGEZ DPM;  Location: [facility] OR    COLONOSCOPY  03/2017    ORTHOPEDIC SURGERY  07/2020    Biceps tenodesis, right side         Review of Systems  CONSTITUTIONAL: NEGATIVE for fever, chills, change in weight  INTEGUMENTARY/SKIN: NEGATIVE for worrisome rashes, moles or lesions  EYES: NEGATIVE for vision changes or irritation  ENT/MOUTH: NEGATIVE for ear, mouth and throat problems  RESP: NEGATIVE for significant cough or SOB  BREAST: NEGATIVE for masses, tenderness or discharge  CV: NEGATIVE for chest pain, palpitations or peripheral edema  GI: NEGATIVE for nausea, abdominal pain, heartburn, or change in bowel habits  : NEGATIVE for frequency, dysuria, or hematuria  MUSCULOSKELETAL: NEGATIVE for significant arthralgias or myalgia  NEURO: NEGATIVE for weakness, dizziness or paresthesias  ENDOCRINE: NEGATIVE for temperature intolerance, skin/hair changes  HEME: NEGATIVE for bleeding problems  PSYCHIATRIC: NEGATIVE for changes in mood or affect     Objective    Exam  BP 92/60   Pulse 67   Temp 99  F (37.2  C) (Temporal)   Resp 16   Ht 1.651 m (5' 5\")   LMP 04/10/2025 (Approximate)   SpO2 100%   BMI 32.12 kg/m     Estimated body mass index is 32.12 kg/m  as calculated from the following:    Height as of this encounter: 1.651 m (5' 5\").    Weight as of 5/17/23: 87.5 kg (193 lb).    Physical Exam  GENERAL: alert and no distress  EYES: Eyes grossly normal to inspection, PERRL and conjunctivae and sclerae normal  HENT: ear canals and TM's normal, nose and mouth without ulcers or lesions  NECK: no adenopathy, no asymmetry, masses, or scars  RESP: lungs clear to auscultation - no rales, rhonchi or wheezes  CV: regular rate and rhythm, normal S1 S2, no S3 or S4, no murmur, click or rub, no peripheral edema  ABDOMEN: soft, nontender, no hepatosplenomegaly, no masses and bowel sounds normal  MS: no gross musculoskeletal defects noted, no edema  SKIN: no suspicious lesions or rashes  PSYCH: mentation appears normal, affect " normal/bright        Signed Electronically by: Richard Ledbetter MD    Answers submitted by the patient for this visit:  Patient Health Questionnaire (Submitted on 4/25/2025)  If you checked off any problems, how difficult have these problems made it for you to do your work, take care of things at home, or get along with other people?: Not difficult at all  PHQ9 TOTAL SCORE: 3  Patient Health Questionnaire (G7) (Submitted on 4/25/2025)  NEGRO 7 TOTAL SCORE: 4

## 2025-04-28 ENCOUNTER — PATIENT OUTREACH (OUTPATIENT)
Dept: CARE COORDINATION | Facility: CLINIC | Age: 30
End: 2025-04-28
Payer: COMMERCIAL

## 2025-04-30 ENCOUNTER — PATIENT OUTREACH (OUTPATIENT)
Dept: CARE COORDINATION | Facility: CLINIC | Age: 30
End: 2025-04-30
Payer: COMMERCIAL

## 2025-05-06 PROBLEM — F33.1 MODERATE EPISODE OF RECURRENT MAJOR DEPRESSIVE DISORDER (H): Status: RESOLVED | Noted: 2025-04-25 | Resolved: 2025-05-06

## 2025-06-25 ENCOUNTER — RESULTS FOLLOW-UP (OUTPATIENT)
Dept: FAMILY MEDICINE | Facility: CLINIC | Age: 30
End: 2025-06-25

## 2025-06-25 ENCOUNTER — OFFICE VISIT (OUTPATIENT)
Dept: FAMILY MEDICINE | Facility: CLINIC | Age: 30
End: 2025-06-25
Payer: COMMERCIAL

## 2025-06-25 VITALS
HEIGHT: 65 IN | DIASTOLIC BLOOD PRESSURE: 84 MMHG | HEART RATE: 73 BPM | SYSTOLIC BLOOD PRESSURE: 120 MMHG | OXYGEN SATURATION: 100 % | BODY MASS INDEX: 32.12 KG/M2 | TEMPERATURE: 98.5 F | RESPIRATION RATE: 18 BRPM

## 2025-06-25 DIAGNOSIS — Z13.0 SCREENING FOR ENDOCRINE, METABOLIC AND IMMUNITY DISORDER: ICD-10-CM

## 2025-06-25 DIAGNOSIS — L92.0 GRANULOMA ANNULARE: Primary | ICD-10-CM

## 2025-06-25 DIAGNOSIS — L81.9 PIGMENTED SKIN LESION: ICD-10-CM

## 2025-06-25 DIAGNOSIS — Z13.228 SCREENING FOR ENDOCRINE, METABOLIC AND IMMUNITY DISORDER: ICD-10-CM

## 2025-06-25 DIAGNOSIS — R21 RASH AND NONSPECIFIC SKIN ERUPTION: Primary | ICD-10-CM

## 2025-06-25 DIAGNOSIS — D22.9 SKIN MOLE: ICD-10-CM

## 2025-06-25 DIAGNOSIS — Z13.29 SCREENING FOR ENDOCRINE, METABOLIC AND IMMUNITY DISORDER: ICD-10-CM

## 2025-06-25 LAB
ALBUMIN SERPL BCG-MCNC: 4.2 G/DL (ref 3.5–5.2)
ALP SERPL-CCNC: 42 U/L (ref 40–150)
ALT SERPL W P-5'-P-CCNC: 16 U/L (ref 0–50)
ANION GAP SERPL CALCULATED.3IONS-SCNC: 10 MMOL/L (ref 7–15)
AST SERPL W P-5'-P-CCNC: 29 U/L (ref 0–45)
BASOPHILS # BLD AUTO: 0 10E3/UL (ref 0–0.2)
BASOPHILS NFR BLD AUTO: 0 %
BILIRUB SERPL-MCNC: 0.5 MG/DL
BUN SERPL-MCNC: 11.6 MG/DL (ref 6–20)
CALCIUM SERPL-MCNC: 9.2 MG/DL (ref 8.8–10.4)
CHLORIDE SERPL-SCNC: 105 MMOL/L (ref 98–107)
CHOLEST SERPL-MCNC: 170 MG/DL
CREAT SERPL-MCNC: 0.6 MG/DL (ref 0.51–0.95)
EGFRCR SERPLBLD CKD-EPI 2021: >90 ML/MIN/1.73M2
EOSINOPHIL # BLD AUTO: 0.3 10E3/UL (ref 0–0.7)
EOSINOPHIL NFR BLD AUTO: 5 %
ERYTHROCYTE [DISTWIDTH] IN BLOOD BY AUTOMATED COUNT: 12.2 % (ref 10–15)
ERYTHROCYTE [SEDIMENTATION RATE] IN BLOOD BY WESTERGREN METHOD: 5 MM/HR (ref 0–20)
FASTING STATUS PATIENT QL REPORTED: YES
FASTING STATUS PATIENT QL REPORTED: YES
GLUCOSE SERPL-MCNC: 76 MG/DL (ref 70–99)
HCO3 SERPL-SCNC: 24 MMOL/L (ref 22–29)
HCT VFR BLD AUTO: 41.6 % (ref 35–47)
HDLC SERPL-MCNC: 62 MG/DL
HGB BLD-MCNC: 14.6 G/DL (ref 11.7–15.7)
IMM GRANULOCYTES # BLD: 0 10E3/UL
IMM GRANULOCYTES NFR BLD: 0 %
LDLC SERPL CALC-MCNC: 91 MG/DL
LYMPHOCYTES # BLD AUTO: 1.8 10E3/UL (ref 0.8–5.3)
LYMPHOCYTES NFR BLD AUTO: 24 %
MCH RBC QN AUTO: 32.7 PG (ref 26.5–33)
MCHC RBC AUTO-ENTMCNC: 35.1 G/DL (ref 31.5–36.5)
MCV RBC AUTO: 93 FL (ref 78–100)
MONOCYTES # BLD AUTO: 0.5 10E3/UL (ref 0–1.3)
MONOCYTES NFR BLD AUTO: 6 %
NEUTROPHILS # BLD AUTO: 4.9 10E3/UL (ref 1.6–8.3)
NEUTROPHILS NFR BLD AUTO: 65 %
NONHDLC SERPL-MCNC: 108 MG/DL
PLATELET # BLD AUTO: 247 10E3/UL (ref 150–450)
POTASSIUM SERPL-SCNC: 4.7 MMOL/L (ref 3.4–5.3)
PROT SERPL-MCNC: 6.7 G/DL (ref 6.4–8.3)
RBC # BLD AUTO: 4.47 10E6/UL (ref 3.8–5.2)
SODIUM SERPL-SCNC: 139 MMOL/L (ref 135–145)
TRIGL SERPL-MCNC: 85 MG/DL
TSH SERPL DL<=0.005 MIU/L-ACNC: 1.99 UIU/ML (ref 0.3–4.2)
WBC # BLD AUTO: 7.6 10E3/UL (ref 4–11)

## 2025-06-25 PROCEDURE — 36415 COLL VENOUS BLD VENIPUNCTURE: CPT | Performed by: FAMILY MEDICINE

## 2025-06-25 PROCEDURE — 80061 LIPID PANEL: CPT | Performed by: FAMILY MEDICINE

## 2025-06-25 PROCEDURE — 84443 ASSAY THYROID STIM HORMONE: CPT | Performed by: FAMILY MEDICINE

## 2025-06-25 PROCEDURE — 85652 RBC SED RATE AUTOMATED: CPT | Performed by: FAMILY MEDICINE

## 2025-06-25 PROCEDURE — 80053 COMPREHEN METABOLIC PANEL: CPT | Performed by: FAMILY MEDICINE

## 2025-06-25 PROCEDURE — 85025 COMPLETE CBC W/AUTO DIFF WBC: CPT | Performed by: FAMILY MEDICINE

## 2025-06-25 ASSESSMENT — PAIN SCALES - GENERAL: PAINLEVEL_OUTOF10: NO PAIN (0)

## 2025-06-25 NOTE — PROGRESS NOTES
Sarah was seen today for follow up and rash.    Diagnoses and all orders for this visit:    Rash and nonspecific skin eruption  -     NH PUNCH BIOPSY OF SKIN, EA SEPARATE/ADDL LESION  -     Surgical Pathology Exam  Possible Pityriasis Rosea, reassurance given of self-limited viral exanthem,which usually resolves in 6-8 weeks.   Pigmented skin lesion  -     Surgical Pathology Exam    Screening for endocrine, metabolic and immunity disorder  -     Lipid panel reflex to direct LDL Fasting; Future  -     TSH with free T4 reflex; Future  -     CBC with platelets and differential; Future  -     Comprehensive metabolic panel  -     ESR: Erythrocyte sedimentation rate; Future  -     Lipid panel reflex to direct LDL Fasting  -     TSH with free T4 reflex  -     CBC with platelets and differential  -     ESR: Erythrocyte sedimentation rate    Skin mole         Subjective   Sarah is a 30 year old, presenting for the following health issues:  Follow Up and Rash        6/25/2025     9:01 AM   Additional Questions   Roomed by AD   Accompanied by Self         6/25/2025   Declines Weight   Did patient decline having their weight taken? Yes   Rash  Onset: 4-6 weeks  Description:   Location: BLE posterior and BUE, viral illness 4/2025.  Character: blotchy  Itching (Pruritis): no   Progression of Symptoms:  waxing and waning  Accompanying Signs & Symptoms:  Fever: no   Body aches or joint pain: no   Sore throat symptoms: no   Recent cold symptoms: YES  History:   Previous similar rash: no   Precipitating factors:   Exposure to similar rash: no   New exposures: None   Recent travel: no       Nodule: She has also noticed a pigmented lesion skin of right forearm, which has increased in size. This rash is 0.1cm      Punch Biopsy Procedure Note   Pre-operative Diagnosis: Suspicious lesion right arm and rash BLE  Post-operative Diagnosis: SAME    Indications: pigmented lesion, worsening rash   Anesthesia: Ethyl chloride    Procedure Details  "  History of allergy to iodine: no   Patient informed of the risks (including bleeding and infection) and benefits of the   procedure and verbal informed consent obtained.   The lesion and surrounding area was given a sterile prep using alcohol and draped in the usual sterile fashion. The skin was then stretched perpendicular to the skin tension lines and the lesion removed using the 3mm punch. The resulting ellipse was then closed. The wound was closed with direct pressure and demabond. Antibiotic ointment and a sterile dressing applied. The specimen was sent for pathologic examination. The patient tolerated the procedure well.   EBL: minimal  Findings:   Condition:   Stable   Complications:     Plan:   1. Instructed to keep the wound dry and covered for 24-48h and clean thereafter.   2. Warning signs of infection were reviewed.   3. Recommended that the patient use OTC acetaminophen, OTC ibuprofen as needed for pain.   4. Return for suture removal in N/A        Review of Systems  Constitutional, neuro, ENT, endocrine, pulmonary, cardiac, gastrointestinal, genitourinary, musculoskeletal, integument and psychiatric systems are negative, except as otherwise noted.      Objective    /84   Pulse 73   Temp 98.5  F (36.9  C) (Temporal)   Resp 18   Ht 1.651 m (5' 5\")   LMP 06/10/2025 (Within Days)   SpO2 100%   Breastfeeding No   BMI 32.12 kg/m    Body mass index is 32.12 kg/m .  Physical Exam   GENERAL: alert and no distress  MS: no gross musculoskeletal defects noted, no edema  Rash with circular patch which are erythematous and non blanching Skin of posterior BLE and BUE          Signed Electronically by: Richard Ledbetter MD    Answers submitted by the patient for this visit:  General Questionnaire (Submitted on 6/20/2025)  Chief Complaint: Chronic problems general questions HPI Form  How many days per week do you miss taking your medication?: 0  General Concern (Submitted on 6/20/2025)  Chief " Complaint: Chronic problems general questions HPI Form  What is the reason for your visit today?: Spots on skin - petechiae  When did your symptoms begin?: More than a month  What are your symptoms?: Red spots. Most are on legs, some on arms, and a few scattered on torso.  How would you describe these symptoms?: Mild  Are your symptoms:: Staying the same  Have you had these symptoms before?: No  Questionnaire about: Chronic problems general questions HPI Form (Submitted on 6/20/2025)  Chief Complaint: Chronic problems general questions HPI Form

## 2025-06-30 LAB
PATH REPORT.COMMENTS IMP SPEC: NORMAL
PATH REPORT.FINAL DX SPEC: NORMAL
PATH REPORT.FINAL DX SPEC: NORMAL
PATH REPORT.GROSS SPEC: NORMAL
PATH REPORT.GROSS SPEC: NORMAL
PATH REPORT.MICROSCOPIC SPEC OTHER STN: NORMAL
PATH REPORT.MICROSCOPIC SPEC OTHER STN: NORMAL
PATH REPORT.RELEVANT HX SPEC: NORMAL
PATH REPORT.RELEVANT HX SPEC: NORMAL
PHOTO IMAGE: NORMAL
PHOTO IMAGE: NORMAL

## 2025-07-07 ENCOUNTER — MYC MEDICAL ADVICE (OUTPATIENT)
Dept: FAMILY MEDICINE | Facility: CLINIC | Age: 30
End: 2025-07-07
Payer: COMMERCIAL

## 2025-08-03 ENCOUNTER — E-VISIT (OUTPATIENT)
Dept: URGENT CARE | Facility: CLINIC | Age: 30
End: 2025-08-03
Payer: COMMERCIAL

## 2025-08-03 DIAGNOSIS — N94.9 VAGINAL DISCOMFORT: Primary | ICD-10-CM

## 2025-08-03 RX ORDER — FLUCONAZOLE 150 MG/1
150 TABLET ORAL ONCE
Qty: 1 TABLET | Refills: 0 | Status: SHIPPED | OUTPATIENT
Start: 2025-08-03 | End: 2025-08-03

## 2025-08-11 DIAGNOSIS — L92.0 GRANULOMA ANNULARE: Primary | ICD-10-CM

## 2025-08-22 DIAGNOSIS — K59.04 CHRONIC IDIOPATHIC CONSTIPATION: ICD-10-CM

## 2025-08-25 RX ORDER — LUBIPROSTONE 0.02 MG/1
CAPSULE ORAL
Qty: 180 CAPSULE | Refills: 1 | Status: SHIPPED | OUTPATIENT
Start: 2025-08-25

## (undated) DEVICE — Device

## (undated) DEVICE — SU MONOCRYL 2-0 SH 27" UND Y417H

## (undated) DEVICE — GLOVE PROTEXIS W/NEU-THERA 8.5  2D73TE85

## (undated) DEVICE — PIN GUARD 10-1-001 101001PBX

## (undated) DEVICE — GOWN IMPERVIOUS BREATHABLE 2XL/XLONG

## (undated) DEVICE — IMM KIT SHOULDER TMAX MASK FACE 7210559

## (undated) DEVICE — DRAPE C-ARM MINI 5423

## (undated) DEVICE — SOL NACL 0.9% IRRIG 3000ML BAG 07972-08

## (undated) DEVICE — NDL 25GA 1.5" 305127

## (undated) DEVICE — DRSG KERLIX 4 1/2"X4YDS ROLL 6715

## (undated) DEVICE — PACK EXTREMITY SOP15EXFSD

## (undated) DEVICE — NDL 19GA 1.5"

## (undated) DEVICE — GLOVE PROTEXIS POWDER FREE 7.5 ORTHOPEDIC 2D73ET75

## (undated) DEVICE — DRSG STERI STRIP 1/2X4" R1547

## (undated) DEVICE — DRSG STERI STRIP 1/4X3" R1541

## (undated) DEVICE — DRSG GAUZE 4X4" TRAY 6939

## (undated) DEVICE — DRAPE MAYO STAND 23X54 8337

## (undated) DEVICE — ARTHROSCOPIC CANNULA TWIST-IN PURPLE 7MMX7CM AR-6570

## (undated) DEVICE — DRSG TEGADERM 4X4 3/4" 1626W

## (undated) DEVICE — STRAP STIRRUP W/SLIP 30187-030

## (undated) DEVICE — SU VICRYL 3-0 RB-1 27" UND J215H

## (undated) DEVICE — DRAPE STERI TOWEL SM 1000

## (undated) DEVICE — SYR 10ML LL W/O NDL

## (undated) DEVICE — DRILL BIT MINI 110MM QC 2.0MM  310.19

## (undated) DEVICE — DRAPE U-POUCH 34X29" 1067

## (undated) DEVICE — BLADE SAW OSCILLATING STRYK MED 9.0X25X0.38MM 2296-003-111

## (undated) DEVICE — IMP WIRE KIRSCHNER 1.25X150MM 292.12
Type: IMPLANTABLE DEVICE | Site: FOOT | Status: NON-FUNCTIONAL
Removed: 2022-10-04

## (undated) DEVICE — SU MONOCRYL 3-0 SH 27" UND Y416H

## (undated) DEVICE — SU VICRYL 4-0 PS-2 18" UND J496H

## (undated) DEVICE — PREP CHLORAPREP 26ML TINTED ORANGE  260815

## (undated) DEVICE — ESU GROUND PAD ADULT W/CORD E7507

## (undated) DEVICE — BNDG KLING 3" 2232

## (undated) DEVICE — PACK SHOULDER ARTHROSCOPY SOP15SAFSH

## (undated) DEVICE — GLOVE PROTEXIS W/NEU-THERA 7.5  2D73TE75

## (undated) DEVICE — DRAPE STERI U 1015

## (undated) DEVICE — IMM KIT SHOULDER STABILIZATION 7210573

## (undated) DEVICE — TUBING SET ARTHREX DUALWAVE OUTFLOW AR-6430

## (undated) DEVICE — SOL WATER IRRIG 1000ML BOTTLE 07139-09

## (undated) DEVICE — ESU PENCIL SMOKE EVAC W/ROCKER SWITCH 0703-047-000

## (undated) DEVICE — BUR ARTHREX COOLCUT EXCALIBUR 4.0MMX13CM AR-8400EX

## (undated) DEVICE — SU ETHILON 4-0 FS-2 18" 662H

## (undated) DEVICE — DRAPE IOBAN INCISE 23X17" 6650EZ

## (undated) RX ORDER — FENTANYL CITRATE 50 UG/ML
INJECTION, SOLUTION INTRAMUSCULAR; INTRAVENOUS
Status: DISPENSED
Start: 2020-07-23

## (undated) RX ORDER — LIDOCAINE HYDROCHLORIDE 20 MG/ML
INJECTION, SOLUTION INFILTRATION; PERINEURAL
Status: DISPENSED
Start: 2022-05-03

## (undated) RX ORDER — DEXAMETHASONE SODIUM PHOSPHATE 4 MG/ML
INJECTION, SOLUTION INTRA-ARTICULAR; INTRALESIONAL; INTRAMUSCULAR; INTRAVENOUS; SOFT TISSUE
Status: DISPENSED
Start: 2022-05-03

## (undated) RX ORDER — LIDOCAINE HYDROCHLORIDE 10 MG/ML
INJECTION, SOLUTION EPIDURAL; INFILTRATION; INTRACAUDAL; PERINEURAL
Status: DISPENSED
Start: 2020-01-03

## (undated) RX ORDER — KETOROLAC TROMETHAMINE 30 MG/ML
INJECTION, SOLUTION INTRAMUSCULAR; INTRAVENOUS
Status: DISPENSED
Start: 2022-10-04

## (undated) RX ORDER — DEXAMETHASONE SODIUM PHOSPHATE 4 MG/ML
INJECTION, SOLUTION INTRA-ARTICULAR; INTRALESIONAL; INTRAMUSCULAR; INTRAVENOUS; SOFT TISSUE
Status: DISPENSED
Start: 2020-07-23

## (undated) RX ORDER — KETOROLAC TROMETHAMINE 30 MG/ML
INJECTION, SOLUTION INTRAMUSCULAR; INTRAVENOUS
Status: DISPENSED
Start: 2020-07-23

## (undated) RX ORDER — ONDANSETRON 2 MG/ML
INJECTION INTRAMUSCULAR; INTRAVENOUS
Status: DISPENSED
Start: 2022-05-03

## (undated) RX ORDER — CLINDAMYCIN PHOSPHATE 900 MG/50ML
INJECTION, SOLUTION INTRAVENOUS
Status: DISPENSED
Start: 2022-10-04

## (undated) RX ORDER — LIDOCAINE HYDROCHLORIDE 10 MG/ML
INJECTION, SOLUTION EPIDURAL; INFILTRATION; INTRACAUDAL; PERINEURAL
Status: DISPENSED
Start: 2020-07-23

## (undated) RX ORDER — EPHEDRINE SULFATE 50 MG/ML
INJECTION, SOLUTION INTRAMUSCULAR; INTRAVENOUS; SUBCUTANEOUS
Status: DISPENSED
Start: 2020-07-23

## (undated) RX ORDER — TRIAMCINOLONE ACETONIDE 40 MG/ML
INJECTION, SUSPENSION INTRA-ARTICULAR; INTRAMUSCULAR
Status: DISPENSED
Start: 2019-09-21

## (undated) RX ORDER — FENTANYL CITRATE-0.9 % NACL/PF 10 MCG/ML
PLASTIC BAG, INJECTION (ML) INTRAVENOUS
Status: DISPENSED
Start: 2020-07-23

## (undated) RX ORDER — KETOROLAC TROMETHAMINE 30 MG/ML
INJECTION, SOLUTION INTRAMUSCULAR; INTRAVENOUS
Status: DISPENSED
Start: 2022-05-03

## (undated) RX ORDER — PROPOFOL 10 MG/ML
INJECTION, EMULSION INTRAVENOUS
Status: DISPENSED
Start: 2022-05-03

## (undated) RX ORDER — ACETAMINOPHEN 325 MG/1
TABLET ORAL
Status: DISPENSED
Start: 2022-10-04

## (undated) RX ORDER — FENTANYL CITRATE 50 UG/ML
INJECTION, SOLUTION INTRAMUSCULAR; INTRAVENOUS
Status: DISPENSED
Start: 2022-10-04

## (undated) RX ORDER — BUPIVACAINE HYDROCHLORIDE 7.5 MG/ML
INJECTION, SOLUTION EPIDURAL; RETROBULBAR
Status: DISPENSED
Start: 2020-07-23

## (undated) RX ORDER — TRIAMCINOLONE ACETONIDE 40 MG/ML
INJECTION, SUSPENSION INTRA-ARTICULAR; INTRAMUSCULAR
Status: DISPENSED
Start: 2020-01-03

## (undated) RX ORDER — PROPOFOL 10 MG/ML
INJECTION, EMULSION INTRAVENOUS
Status: DISPENSED
Start: 2020-07-23

## (undated) RX ORDER — PROPOFOL 10 MG/ML
INJECTION, EMULSION INTRAVENOUS
Status: DISPENSED
Start: 2022-10-04

## (undated) RX ORDER — LIDOCAINE HYDROCHLORIDE 10 MG/ML
INJECTION, SOLUTION EPIDURAL; INFILTRATION; INTRACAUDAL; PERINEURAL
Status: DISPENSED
Start: 2019-09-21

## (undated) RX ORDER — ONDANSETRON 2 MG/ML
INJECTION INTRAMUSCULAR; INTRAVENOUS
Status: DISPENSED
Start: 2022-10-04

## (undated) RX ORDER — BUPIVACAINE HYDROCHLORIDE 2.5 MG/ML
INJECTION, SOLUTION EPIDURAL; INFILTRATION; INTRACAUDAL
Status: DISPENSED
Start: 2020-07-23

## (undated) RX ORDER — CLINDAMYCIN PHOSPHATE 900 MG/50ML
INJECTION, SOLUTION INTRAVENOUS
Status: DISPENSED
Start: 2022-05-03

## (undated) RX ORDER — BUPIVACAINE HYDROCHLORIDE 5 MG/ML
INJECTION, SOLUTION PERINEURAL
Status: DISPENSED
Start: 2022-05-03

## (undated) RX ORDER — FENTANYL CITRATE-0.9 % NACL/PF 10 MCG/ML
PLASTIC BAG, INJECTION (ML) INTRAVENOUS
Status: DISPENSED
Start: 2022-10-04

## (undated) RX ORDER — ONDANSETRON 2 MG/ML
INJECTION INTRAMUSCULAR; INTRAVENOUS
Status: DISPENSED
Start: 2020-07-23

## (undated) RX ORDER — EPINEPHRINE NASAL SOLUTION 1 MG/ML
SOLUTION NASAL
Status: DISPENSED
Start: 2020-07-23

## (undated) RX ORDER — GLYCOPYRROLATE 0.2 MG/ML
INJECTION INTRAMUSCULAR; INTRAVENOUS
Status: DISPENSED
Start: 2020-07-23

## (undated) RX ORDER — LIDOCAINE HYDROCHLORIDE 20 MG/ML
INJECTION, SOLUTION EPIDURAL; INFILTRATION; INTRACAUDAL; PERINEURAL
Status: DISPENSED
Start: 2022-10-04

## (undated) RX ORDER — ACETAMINOPHEN 325 MG/1
TABLET ORAL
Status: DISPENSED
Start: 2022-05-03

## (undated) RX ORDER — FENTANYL CITRATE 50 UG/ML
INJECTION, SOLUTION INTRAMUSCULAR; INTRAVENOUS
Status: DISPENSED
Start: 2022-05-03